# Patient Record
Sex: MALE | Race: WHITE | Employment: OTHER | URBAN - METROPOLITAN AREA
[De-identification: names, ages, dates, MRNs, and addresses within clinical notes are randomized per-mention and may not be internally consistent; named-entity substitution may affect disease eponyms.]

---

## 2018-01-01 ENCOUNTER — HOSPITAL ENCOUNTER (OUTPATIENT)
Dept: INFUSION THERAPY | Age: 73
Discharge: HOME | End: 2018-11-02
Payer: MEDICARE

## 2018-01-01 ENCOUNTER — TELEPHONE (OUTPATIENT)
Dept: ONCOLOGY | Age: 73
End: 2018-01-01

## 2018-01-01 ENCOUNTER — DOCUMENTATION ONLY (OUTPATIENT)
Dept: ONCOLOGY | Age: 73
End: 2018-01-01

## 2018-01-01 ENCOUNTER — OFFICE VISIT (OUTPATIENT)
Dept: ONCOLOGY | Age: 73
End: 2018-01-01

## 2018-01-01 ENCOUNTER — HOSPITAL ENCOUNTER (OUTPATIENT)
Dept: INFUSION THERAPY | Age: 73
Discharge: HOME | End: 2018-12-19
Payer: MEDICARE

## 2018-01-01 ENCOUNTER — HOSPITAL ENCOUNTER (OUTPATIENT)
Dept: INFUSION THERAPY | Age: 73
Discharge: HOME | End: 2018-12-31
Payer: MEDICARE

## 2018-01-01 ENCOUNTER — APPOINTMENT (OUTPATIENT)
Dept: GENERAL RADIOLOGY | Age: 73
DRG: 808 | End: 2018-01-01
Attending: EMERGENCY MEDICINE
Payer: MEDICARE

## 2018-01-01 ENCOUNTER — HOSPITAL ENCOUNTER (OUTPATIENT)
Dept: INFUSION THERAPY | Age: 73
Discharge: HOME | End: 2018-11-09
Payer: MEDICARE

## 2018-01-01 ENCOUNTER — HOSPITAL ENCOUNTER (OUTPATIENT)
Dept: INFUSION THERAPY | Age: 73
Discharge: HOME | End: 2018-11-26
Payer: MEDICARE

## 2018-01-01 ENCOUNTER — HOSPITAL ENCOUNTER (OUTPATIENT)
Dept: INFUSION THERAPY | Age: 73
Discharge: HOME | End: 2018-10-31
Payer: MEDICARE

## 2018-01-01 ENCOUNTER — HOSPITAL ENCOUNTER (OUTPATIENT)
Dept: INFUSION THERAPY | Age: 73
Discharge: HOME | End: 2018-12-14
Payer: MEDICARE

## 2018-01-01 ENCOUNTER — HOSPITAL ENCOUNTER (OUTPATIENT)
Dept: INFUSION THERAPY | Age: 73
Discharge: HOME | End: 2018-10-29
Payer: MEDICARE

## 2018-01-01 ENCOUNTER — HOSPITAL ENCOUNTER (OUTPATIENT)
Dept: INFUSION THERAPY | Age: 73
Discharge: HOME | End: 2018-12-26
Payer: MEDICARE

## 2018-01-01 ENCOUNTER — APPOINTMENT (OUTPATIENT)
Dept: INFUSION THERAPY | Age: 73
End: 2018-01-01
Payer: MEDICARE

## 2018-01-01 ENCOUNTER — HOSPITAL ENCOUNTER (OUTPATIENT)
Dept: INFUSION THERAPY | Age: 73
Discharge: HOME | End: 2018-10-11
Payer: MEDICARE

## 2018-01-01 ENCOUNTER — HOSPITAL ENCOUNTER (OUTPATIENT)
Dept: INFUSION THERAPY | Age: 73
Discharge: HOME | End: 2018-11-12
Payer: MEDICARE

## 2018-01-01 ENCOUNTER — HOSPITAL ENCOUNTER (OUTPATIENT)
Dept: INFUSION THERAPY | Age: 73
Discharge: HOME | End: 2018-12-24
Payer: MEDICARE

## 2018-01-01 ENCOUNTER — HOSPITAL ENCOUNTER (OUTPATIENT)
Dept: INFUSION THERAPY | Age: 73
Discharge: HOME | End: 2018-12-05
Payer: MEDICARE

## 2018-01-01 ENCOUNTER — APPOINTMENT (OUTPATIENT)
Dept: ULTRASOUND IMAGING | Age: 73
DRG: 808 | End: 2018-01-01
Attending: NURSE PRACTITIONER
Payer: MEDICARE

## 2018-01-01 ENCOUNTER — HOSPITAL ENCOUNTER (OUTPATIENT)
Dept: INFUSION THERAPY | Age: 73
Discharge: HOME | End: 2018-11-05
Payer: MEDICARE

## 2018-01-01 ENCOUNTER — HOSPITAL ENCOUNTER (OUTPATIENT)
Dept: INFUSION THERAPY | Age: 73
Discharge: HOME | End: 2018-11-14
Payer: MEDICARE

## 2018-01-01 ENCOUNTER — HOSPITAL ENCOUNTER (OUTPATIENT)
Dept: INFUSION THERAPY | Age: 73
Discharge: HOME | End: 2018-11-21
Payer: MEDICARE

## 2018-01-01 ENCOUNTER — HOSPITAL ENCOUNTER (OUTPATIENT)
Dept: INFUSION THERAPY | Age: 73
Discharge: HOME | End: 2018-11-28
Payer: MEDICARE

## 2018-01-01 ENCOUNTER — HOSPITAL ENCOUNTER (EMERGENCY)
Age: 73
Discharge: HOME | End: 2018-10-04
Attending: EMERGENCY MEDICINE | Admitting: EMERGENCY MEDICINE
Payer: MEDICARE

## 2018-01-01 ENCOUNTER — HOSPITAL ENCOUNTER (OUTPATIENT)
Dept: INFUSION THERAPY | Age: 73
Discharge: HOME | End: 2018-10-04
Payer: MEDICARE

## 2018-01-01 ENCOUNTER — ANESTHESIA (OUTPATIENT)
Dept: ENDOSCOPY | Age: 73
DRG: 808 | End: 2018-01-01
Payer: MEDICARE

## 2018-01-01 ENCOUNTER — HOSPITAL ENCOUNTER (OUTPATIENT)
Dept: INFUSION THERAPY | Age: 73
Discharge: HOME | End: 2018-10-15
Payer: MEDICARE

## 2018-01-01 ENCOUNTER — APPOINTMENT (OUTPATIENT)
Dept: CT IMAGING | Age: 73
DRG: 808 | End: 2018-01-01
Attending: NURSE PRACTITIONER
Payer: MEDICARE

## 2018-01-01 ENCOUNTER — HOSPITAL ENCOUNTER (OUTPATIENT)
Dept: INFUSION THERAPY | Age: 73
Discharge: HOME | End: 2018-12-12
Payer: MEDICARE

## 2018-01-01 ENCOUNTER — HOSPITAL ENCOUNTER (OUTPATIENT)
Dept: INFUSION THERAPY | Age: 73
Discharge: HOME | End: 2018-10-23
Payer: MEDICARE

## 2018-01-01 ENCOUNTER — HOSPITAL ENCOUNTER (OUTPATIENT)
Dept: INFUSION THERAPY | Age: 73
Discharge: HOME | End: 2018-10-22
Payer: MEDICARE

## 2018-01-01 ENCOUNTER — ANESTHESIA EVENT (OUTPATIENT)
Dept: ENDOSCOPY | Age: 73
DRG: 808 | End: 2018-01-01
Payer: MEDICARE

## 2018-01-01 ENCOUNTER — HOSPITAL ENCOUNTER (OUTPATIENT)
Dept: GENERAL RADIOLOGY | Age: 73
Discharge: HOME | End: 2018-10-22
Attending: NURSE PRACTITIONER
Payer: MEDICARE

## 2018-01-01 ENCOUNTER — HOSPITAL ENCOUNTER (OUTPATIENT)
Dept: INFUSION THERAPY | Age: 73
Discharge: HOME | End: 2018-12-03
Payer: MEDICARE

## 2018-01-01 ENCOUNTER — HOSPITAL ENCOUNTER (OUTPATIENT)
Dept: INFUSION THERAPY | Age: 73
Discharge: HOME | End: 2018-12-21
Payer: MEDICARE

## 2018-01-01 ENCOUNTER — HOSPITAL ENCOUNTER (OUTPATIENT)
Dept: INFUSION THERAPY | Age: 73
Discharge: HOME | End: 2018-10-26
Payer: MEDICARE

## 2018-01-01 ENCOUNTER — HOSPITAL ENCOUNTER (OUTPATIENT)
Dept: INFUSION THERAPY | Age: 73
Discharge: HOME | End: 2018-12-07
Payer: MEDICARE

## 2018-01-01 ENCOUNTER — HOSPITAL ENCOUNTER (OUTPATIENT)
Dept: INFUSION THERAPY | Age: 73
Discharge: HOME | End: 2018-11-30
Payer: MEDICARE

## 2018-01-01 ENCOUNTER — HOSPITAL ENCOUNTER (OUTPATIENT)
Dept: INFUSION THERAPY | Age: 73
Discharge: HOME | End: 2018-12-17
Payer: MEDICARE

## 2018-01-01 ENCOUNTER — HOSPITAL ENCOUNTER (OUTPATIENT)
Dept: INFUSION THERAPY | Age: 73
Discharge: HOME | End: 2018-12-28
Payer: MEDICARE

## 2018-01-01 ENCOUNTER — APPOINTMENT (OUTPATIENT)
Dept: INFUSION THERAPY | Age: 73
End: 2018-01-01

## 2018-01-01 ENCOUNTER — HOSPITAL ENCOUNTER (OUTPATIENT)
Dept: INFUSION THERAPY | Age: 73
Discharge: HOME | End: 2018-10-18
Payer: MEDICARE

## 2018-01-01 ENCOUNTER — HOSPITAL ENCOUNTER (OUTPATIENT)
Dept: INFUSION THERAPY | Age: 73
Discharge: HOME | End: 2018-11-07
Payer: MEDICARE

## 2018-01-01 ENCOUNTER — HOSPITAL ENCOUNTER (INPATIENT)
Age: 73
LOS: 3 days | Discharge: HOME | DRG: 808 | End: 2018-09-29
Attending: EMERGENCY MEDICINE | Admitting: INTERNAL MEDICINE
Payer: MEDICARE

## 2018-01-01 ENCOUNTER — HOSPITAL ENCOUNTER (OUTPATIENT)
Dept: INFUSION THERAPY | Age: 73
Discharge: HOME | End: 2018-11-19
Payer: MEDICARE

## 2018-01-01 ENCOUNTER — HOSPITAL ENCOUNTER (OUTPATIENT)
Dept: INFUSION THERAPY | Age: 73
Discharge: HOME | End: 2018-11-16
Payer: MEDICARE

## 2018-01-01 VITALS
SYSTOLIC BLOOD PRESSURE: 110 MMHG | TEMPERATURE: 98.1 F | OXYGEN SATURATION: 98 % | DIASTOLIC BLOOD PRESSURE: 74 MMHG | RESPIRATION RATE: 16 BRPM | HEART RATE: 74 BPM

## 2018-01-01 VITALS
DIASTOLIC BLOOD PRESSURE: 61 MMHG | RESPIRATION RATE: 16 BRPM | OXYGEN SATURATION: 97 % | TEMPERATURE: 98.5 F | SYSTOLIC BLOOD PRESSURE: 133 MMHG | HEART RATE: 73 BPM

## 2018-01-01 VITALS
TEMPERATURE: 97.5 F | RESPIRATION RATE: 18 BRPM | HEART RATE: 74 BPM | WEIGHT: 193 LBS | BODY MASS INDEX: 27.02 KG/M2 | SYSTOLIC BLOOD PRESSURE: 139 MMHG | DIASTOLIC BLOOD PRESSURE: 69 MMHG | HEIGHT: 71 IN | OXYGEN SATURATION: 97 %

## 2018-01-01 VITALS
RESPIRATION RATE: 18 BRPM | TEMPERATURE: 96.6 F | DIASTOLIC BLOOD PRESSURE: 65 MMHG | OXYGEN SATURATION: 98 % | HEART RATE: 102 BPM | SYSTOLIC BLOOD PRESSURE: 131 MMHG

## 2018-01-01 VITALS
HEART RATE: 83 BPM | RESPIRATION RATE: 16 BRPM | SYSTOLIC BLOOD PRESSURE: 98 MMHG | TEMPERATURE: 98.2 F | DIASTOLIC BLOOD PRESSURE: 60 MMHG

## 2018-01-01 VITALS
HEART RATE: 77 BPM | RESPIRATION RATE: 18 BRPM | DIASTOLIC BLOOD PRESSURE: 64 MMHG | SYSTOLIC BLOOD PRESSURE: 106 MMHG | TEMPERATURE: 98.6 F

## 2018-01-01 VITALS
SYSTOLIC BLOOD PRESSURE: 104 MMHG | OXYGEN SATURATION: 97 % | RESPIRATION RATE: 18 BRPM | HEART RATE: 87 BPM | WEIGHT: 193 LBS | TEMPERATURE: 98.3 F | DIASTOLIC BLOOD PRESSURE: 81 MMHG | HEIGHT: 71 IN | BODY MASS INDEX: 27.02 KG/M2

## 2018-01-01 VITALS
SYSTOLIC BLOOD PRESSURE: 122 MMHG | RESPIRATION RATE: 18 BRPM | HEART RATE: 68 BPM | DIASTOLIC BLOOD PRESSURE: 69 MMHG | TEMPERATURE: 97.4 F

## 2018-01-01 VITALS
SYSTOLIC BLOOD PRESSURE: 121 MMHG | OXYGEN SATURATION: 98 % | DIASTOLIC BLOOD PRESSURE: 56 MMHG | RESPIRATION RATE: 18 BRPM | HEART RATE: 108 BPM | TEMPERATURE: 97.4 F

## 2018-01-01 VITALS
OXYGEN SATURATION: 97 % | HEART RATE: 72 BPM | DIASTOLIC BLOOD PRESSURE: 72 MMHG | SYSTOLIC BLOOD PRESSURE: 115 MMHG | RESPIRATION RATE: 18 BRPM | TEMPERATURE: 98.9 F

## 2018-01-01 VITALS
HEART RATE: 96 BPM | TEMPERATURE: 98.1 F | RESPIRATION RATE: 18 BRPM | SYSTOLIC BLOOD PRESSURE: 126 MMHG | DIASTOLIC BLOOD PRESSURE: 69 MMHG

## 2018-01-01 VITALS
RESPIRATION RATE: 16 BRPM | TEMPERATURE: 97.8 F | HEART RATE: 81 BPM | DIASTOLIC BLOOD PRESSURE: 81 MMHG | SYSTOLIC BLOOD PRESSURE: 116 MMHG

## 2018-01-01 VITALS
TEMPERATURE: 97.4 F | HEART RATE: 109 BPM | OXYGEN SATURATION: 99 % | RESPIRATION RATE: 18 BRPM | HEART RATE: 96 BPM | SYSTOLIC BLOOD PRESSURE: 130 MMHG | DIASTOLIC BLOOD PRESSURE: 76 MMHG | TEMPERATURE: 98 F | DIASTOLIC BLOOD PRESSURE: 53 MMHG | RESPIRATION RATE: 18 BRPM | SYSTOLIC BLOOD PRESSURE: 100 MMHG

## 2018-01-01 VITALS
RESPIRATION RATE: 16 BRPM | DIASTOLIC BLOOD PRESSURE: 73 MMHG | SYSTOLIC BLOOD PRESSURE: 119 MMHG | TEMPERATURE: 98 F | HEART RATE: 103 BPM

## 2018-01-01 VITALS
TEMPERATURE: 98 F | RESPIRATION RATE: 18 BRPM | HEART RATE: 110 BPM | DIASTOLIC BLOOD PRESSURE: 71 MMHG | SYSTOLIC BLOOD PRESSURE: 119 MMHG

## 2018-01-01 VITALS
RESPIRATION RATE: 18 BRPM | TEMPERATURE: 97.8 F | OXYGEN SATURATION: 95 % | DIASTOLIC BLOOD PRESSURE: 61 MMHG | SYSTOLIC BLOOD PRESSURE: 117 MMHG | BODY MASS INDEX: 26.32 KG/M2 | WEIGHT: 188 LBS | HEIGHT: 71 IN | HEART RATE: 94 BPM

## 2018-01-01 VITALS
HEART RATE: 106 BPM | RESPIRATION RATE: 18 BRPM | OXYGEN SATURATION: 99 % | TEMPERATURE: 97.8 F | SYSTOLIC BLOOD PRESSURE: 134 MMHG | DIASTOLIC BLOOD PRESSURE: 78 MMHG

## 2018-01-01 VITALS
TEMPERATURE: 97.7 F | SYSTOLIC BLOOD PRESSURE: 113 MMHG | HEART RATE: 88 BPM | RESPIRATION RATE: 18 BRPM | DIASTOLIC BLOOD PRESSURE: 68 MMHG

## 2018-01-01 VITALS
TEMPERATURE: 98 F | SYSTOLIC BLOOD PRESSURE: 135 MMHG | RESPIRATION RATE: 18 BRPM | DIASTOLIC BLOOD PRESSURE: 81 MMHG | HEART RATE: 80 BPM

## 2018-01-01 VITALS
SYSTOLIC BLOOD PRESSURE: 116 MMHG | TEMPERATURE: 96.4 F | HEART RATE: 95 BPM | OXYGEN SATURATION: 94 % | DIASTOLIC BLOOD PRESSURE: 55 MMHG | RESPIRATION RATE: 18 BRPM

## 2018-01-01 VITALS
DIASTOLIC BLOOD PRESSURE: 65 MMHG | HEART RATE: 70 BPM | RESPIRATION RATE: 16 BRPM | TEMPERATURE: 97.2 F | SYSTOLIC BLOOD PRESSURE: 102 MMHG

## 2018-01-01 VITALS
RESPIRATION RATE: 18 BRPM | TEMPERATURE: 97.6 F | SYSTOLIC BLOOD PRESSURE: 106 MMHG | HEART RATE: 86 BPM | DIASTOLIC BLOOD PRESSURE: 63 MMHG

## 2018-01-01 VITALS
TEMPERATURE: 97.7 F | BODY MASS INDEX: 23.8 KG/M2 | WEIGHT: 170 LBS | DIASTOLIC BLOOD PRESSURE: 68 MMHG | OXYGEN SATURATION: 96 % | SYSTOLIC BLOOD PRESSURE: 124 MMHG | RESPIRATION RATE: 16 BRPM | HEIGHT: 71 IN | HEART RATE: 76 BPM

## 2018-01-01 VITALS
RESPIRATION RATE: 18 BRPM | SYSTOLIC BLOOD PRESSURE: 119 MMHG | HEART RATE: 109 BPM | OXYGEN SATURATION: 93 % | DIASTOLIC BLOOD PRESSURE: 56 MMHG | TEMPERATURE: 97.3 F

## 2018-01-01 VITALS
SYSTOLIC BLOOD PRESSURE: 112 MMHG | RESPIRATION RATE: 18 BRPM | DIASTOLIC BLOOD PRESSURE: 58 MMHG | TEMPERATURE: 97.2 F | HEART RATE: 109 BPM

## 2018-01-01 VITALS
HEART RATE: 84 BPM | DIASTOLIC BLOOD PRESSURE: 65 MMHG | RESPIRATION RATE: 18 BRPM | SYSTOLIC BLOOD PRESSURE: 140 MMHG | TEMPERATURE: 98 F

## 2018-01-01 VITALS
TEMPERATURE: 96.3 F | DIASTOLIC BLOOD PRESSURE: 63 MMHG | SYSTOLIC BLOOD PRESSURE: 130 MMHG | HEART RATE: 80 BPM | OXYGEN SATURATION: 96 % | RESPIRATION RATE: 18 BRPM

## 2018-01-01 VITALS
TEMPERATURE: 97.4 F | SYSTOLIC BLOOD PRESSURE: 133 MMHG | RESPIRATION RATE: 16 BRPM | DIASTOLIC BLOOD PRESSURE: 81 MMHG | HEART RATE: 81 BPM

## 2018-01-01 VITALS
DIASTOLIC BLOOD PRESSURE: 60 MMHG | SYSTOLIC BLOOD PRESSURE: 120 MMHG | HEART RATE: 70 BPM | TEMPERATURE: 98.1 F | RESPIRATION RATE: 18 BRPM

## 2018-01-01 VITALS
RESPIRATION RATE: 18 BRPM | HEART RATE: 83 BPM | RESPIRATION RATE: 18 BRPM | HEART RATE: 89 BPM | SYSTOLIC BLOOD PRESSURE: 105 MMHG | DIASTOLIC BLOOD PRESSURE: 66 MMHG | SYSTOLIC BLOOD PRESSURE: 100 MMHG | TEMPERATURE: 98.3 F | DIASTOLIC BLOOD PRESSURE: 62 MMHG | TEMPERATURE: 97.4 F

## 2018-01-01 VITALS
RESPIRATION RATE: 16 BRPM | HEART RATE: 67 BPM | TEMPERATURE: 97.8 F | SYSTOLIC BLOOD PRESSURE: 132 MMHG | DIASTOLIC BLOOD PRESSURE: 66 MMHG

## 2018-01-01 VITALS
DIASTOLIC BLOOD PRESSURE: 70 MMHG | TEMPERATURE: 97.7 F | RESPIRATION RATE: 16 BRPM | SYSTOLIC BLOOD PRESSURE: 134 MMHG | HEART RATE: 76 BPM

## 2018-01-01 VITALS
OXYGEN SATURATION: 98 % | TEMPERATURE: 97 F | DIASTOLIC BLOOD PRESSURE: 54 MMHG | RESPIRATION RATE: 18 BRPM | SYSTOLIC BLOOD PRESSURE: 119 MMHG | HEART RATE: 89 BPM

## 2018-01-01 VITALS
SYSTOLIC BLOOD PRESSURE: 126 MMHG | DIASTOLIC BLOOD PRESSURE: 73 MMHG | OXYGEN SATURATION: 98 % | HEART RATE: 102 BPM | TEMPERATURE: 97 F | RESPIRATION RATE: 18 BRPM

## 2018-01-01 DIAGNOSIS — K74.60 CIRRHOSIS OF LIVER WITH ASCITES, UNSPECIFIED HEPATIC CIRRHOSIS TYPE (HCC): ICD-10-CM

## 2018-01-01 DIAGNOSIS — J81.0 ACUTE PULMONARY EDEMA (HCC): ICD-10-CM

## 2018-01-01 DIAGNOSIS — D64.9 ANEMIA, UNSPECIFIED TYPE: Primary | ICD-10-CM

## 2018-01-01 DIAGNOSIS — D61.818 PANCYTOPENIA (HCC): ICD-10-CM

## 2018-01-01 DIAGNOSIS — R18.8 CIRRHOSIS OF LIVER WITH ASCITES, UNSPECIFIED HEPATIC CIRRHOSIS TYPE (HCC): ICD-10-CM

## 2018-01-01 DIAGNOSIS — D61.818 PANCYTOPENIA (HCC): Primary | ICD-10-CM

## 2018-01-01 DIAGNOSIS — I21.4 NSTEMI (NON-ST ELEVATED MYOCARDIAL INFARCTION) (HCC): ICD-10-CM

## 2018-01-01 LAB
A1AT SERPL-MCNC: 152 MG/DL (ref 90–200)
ABO + RH BLD: NORMAL
AFP-TM SERPL-MCNC: 3.3 NG/ML (ref 0–8.3)
ALBUMIN SERPL-MCNC: 2.4 G/DL (ref 3.5–5)
ALBUMIN SERPL-MCNC: 2.4 G/DL (ref 3.5–5)
ALBUMIN SERPL-MCNC: 2.6 G/DL (ref 3.5–5)
ALBUMIN SERPL-MCNC: 2.6 G/DL (ref 3.5–5)
ALBUMIN/GLOB SERPL: 0.7 {RATIO} (ref 1.1–2.2)
ALP SERPL-CCNC: 209 U/L (ref 45–117)
ALP SERPL-CCNC: 213 U/L (ref 45–117)
ALP SERPL-CCNC: 223 U/L (ref 45–117)
ALP SERPL-CCNC: 244 U/L (ref 45–117)
ALT SERPL-CCNC: 15 U/L (ref 12–78)
ALT SERPL-CCNC: 30 U/L (ref 12–78)
ALT SERPL-CCNC: 32 U/L (ref 12–78)
ALT SERPL-CCNC: 32 U/L (ref 12–78)
AMORPH CRY URNS QL MICRO: ABNORMAL
ANA SER QL: POSITIVE
ANION GAP SERPL CALC-SCNC: 6 MMOL/L (ref 5–15)
ANION GAP SERPL CALC-SCNC: 7 MMOL/L (ref 5–15)
ANION GAP SERPL CALC-SCNC: 7 MMOL/L (ref 5–15)
ANION GAP SERPL CALC-SCNC: 8 MMOL/L (ref 5–15)
APPEARANCE UR: ABNORMAL
APPEARANCE UR: CLEAR
APTT PPP: 26.5 SEC (ref 22.1–32)
AST SERPL-CCNC: 31 U/L (ref 15–37)
AST SERPL-CCNC: 32 U/L (ref 15–37)
AST SERPL-CCNC: 35 U/L (ref 15–37)
AST SERPL-CCNC: 43 U/L (ref 15–37)
ATRIAL RATE: 88 BPM
BACTERIA URNS QL MICRO: NEGATIVE /HPF
BACTERIA URNS QL MICRO: NEGATIVE /HPF
BASOPHILS # BLD: 0 K/UL (ref 0–0.1)
BASOPHILS NFR BLD: 0 % (ref 0–1)
BASOPHILS NFR BLD: 1 % (ref 0–1)
BILIRUB SERPL-MCNC: 0.7 MG/DL (ref 0.2–1)
BILIRUB SERPL-MCNC: 1 MG/DL (ref 0.2–1)
BILIRUB SERPL-MCNC: 1.1 MG/DL (ref 0.2–1)
BILIRUB SERPL-MCNC: 2 MG/DL (ref 0.2–1)
BILIRUB UR QL CFM: NEGATIVE
BILIRUB UR QL CFM: POSITIVE
BLD PROD TYP BPU: NORMAL
BLOOD GROUP ANTIBODIES SERPL: NORMAL
BNP SERPL-MCNC: 1118 PG/ML (ref 0–450)
BPU ID: NORMAL
BUN SERPL-MCNC: 11 MG/DL (ref 6–20)
BUN SERPL-MCNC: 12 MG/DL (ref 6–20)
BUN/CREAT SERPL: 14 (ref 12–20)
BUN/CREAT SERPL: 17 (ref 12–20)
BUN/CREAT SERPL: 18 (ref 12–20)
BUN/CREAT SERPL: 19 (ref 12–20)
CALCIUM SERPL-MCNC: 8 MG/DL (ref 8.5–10.1)
CALCIUM SERPL-MCNC: 8.2 MG/DL (ref 8.5–10.1)
CALCIUM SERPL-MCNC: 8.2 MG/DL (ref 8.5–10.1)
CALCIUM SERPL-MCNC: 8.3 MG/DL (ref 8.5–10.1)
CALCULATED P AXIS, ECG09: 91 DEGREES
CALCULATED R AXIS, ECG10: 65 DEGREES
CALCULATED T AXIS, ECG11: 90 DEGREES
CAOX CRY URNS QL MICRO: ABNORMAL
CAOX CRY URNS QL MICRO: ABNORMAL
CERULOPLASMIN SERPL-MCNC: 37.1 MG/DL (ref 16–31)
CHLORIDE SERPL-SCNC: 101 MMOL/L (ref 97–108)
CHLORIDE SERPL-SCNC: 103 MMOL/L (ref 97–108)
CHLORIDE SERPL-SCNC: 104 MMOL/L (ref 97–108)
CHLORIDE SERPL-SCNC: 99 MMOL/L (ref 97–108)
CO2 SERPL-SCNC: 25 MMOL/L (ref 21–32)
CO2 SERPL-SCNC: 26 MMOL/L (ref 21–32)
CO2 SERPL-SCNC: 27 MMOL/L (ref 21–32)
CO2 SERPL-SCNC: 28 MMOL/L (ref 21–32)
COLOR UR: ABNORMAL
COLOR UR: ABNORMAL
COMMENT, HOLDF: NORMAL
COMMENT, HOLDF: NORMAL
CREAT SERPL-MCNC: 0.62 MG/DL (ref 0.7–1.3)
CREAT SERPL-MCNC: 0.68 MG/DL (ref 0.7–1.3)
CREAT SERPL-MCNC: 0.71 MG/DL (ref 0.7–1.3)
CREAT SERPL-MCNC: 0.79 MG/DL (ref 0.7–1.3)
CROSSMATCH RESULT,%XM: NORMAL
DIAGNOSIS, 93000: NORMAL
DIFFERENTIAL METHOD BLD: ABNORMAL
EOSINOPHIL # BLD: 0 K/UL (ref 0–0.4)
EOSINOPHIL # BLD: 0.1 K/UL (ref 0–0.4)
EOSINOPHIL NFR BLD: 0 % (ref 0–7)
EOSINOPHIL NFR BLD: 1 % (ref 0–7)
EOSINOPHIL NFR BLD: 2 % (ref 0–7)
EOSINOPHIL NFR BLD: 4 % (ref 0–7)
EPITH CASTS URNS QL MICRO: ABNORMAL /LPF
EPITH CASTS URNS QL MICRO: ABNORMAL /LPF
ERYTHROCYTE [DISTWIDTH] IN BLOOD BY AUTOMATED COUNT: 12.9 % (ref 11.5–14.5)
ERYTHROCYTE [DISTWIDTH] IN BLOOD BY AUTOMATED COUNT: 13.9 % (ref 11.5–14.5)
ERYTHROCYTE [DISTWIDTH] IN BLOOD BY AUTOMATED COUNT: 13.9 % (ref 11.5–14.5)
ERYTHROCYTE [DISTWIDTH] IN BLOOD BY AUTOMATED COUNT: 14.1 % (ref 11.5–14.5)
ERYTHROCYTE [DISTWIDTH] IN BLOOD BY AUTOMATED COUNT: 14.2 % (ref 11.5–14.5)
ERYTHROCYTE [DISTWIDTH] IN BLOOD BY AUTOMATED COUNT: 14.4 % (ref 11.5–14.5)
ERYTHROCYTE [DISTWIDTH] IN BLOOD BY AUTOMATED COUNT: 14.5 % (ref 11.5–14.5)
ERYTHROCYTE [DISTWIDTH] IN BLOOD BY AUTOMATED COUNT: 14.6 % (ref 11.5–14.5)
ERYTHROCYTE [DISTWIDTH] IN BLOOD BY AUTOMATED COUNT: 14.6 % (ref 11.5–14.5)
ERYTHROCYTE [DISTWIDTH] IN BLOOD BY AUTOMATED COUNT: 14.7 % (ref 11.5–14.5)
ERYTHROCYTE [DISTWIDTH] IN BLOOD BY AUTOMATED COUNT: 14.8 % (ref 11.5–14.5)
ERYTHROCYTE [DISTWIDTH] IN BLOOD BY AUTOMATED COUNT: 15 % (ref 11.5–14.5)
ERYTHROCYTE [DISTWIDTH] IN BLOOD BY AUTOMATED COUNT: 15.2 % (ref 11.5–14.5)
ERYTHROCYTE [DISTWIDTH] IN BLOOD BY AUTOMATED COUNT: 15.2 % (ref 11.5–14.5)
ERYTHROCYTE [DISTWIDTH] IN BLOOD BY AUTOMATED COUNT: 15.4 % (ref 11.5–14.5)
ERYTHROCYTE [DISTWIDTH] IN BLOOD BY AUTOMATED COUNT: 15.5 % (ref 11.5–14.5)
ERYTHROCYTE [DISTWIDTH] IN BLOOD BY AUTOMATED COUNT: 15.5 % (ref 11.5–14.5)
ERYTHROCYTE [DISTWIDTH] IN BLOOD BY AUTOMATED COUNT: 15.6 % (ref 11.5–14.5)
ERYTHROCYTE [DISTWIDTH] IN BLOOD BY AUTOMATED COUNT: 15.7 % (ref 11.5–14.5)
ERYTHROCYTE [DISTWIDTH] IN BLOOD BY AUTOMATED COUNT: 15.7 % (ref 11.5–14.5)
ERYTHROCYTE [DISTWIDTH] IN BLOOD BY AUTOMATED COUNT: 16.2 % (ref 11.5–14.5)
ERYTHROCYTE [DISTWIDTH] IN BLOOD BY AUTOMATED COUNT: 16.2 % (ref 11.5–14.5)
ERYTHROCYTE [DISTWIDTH] IN BLOOD BY AUTOMATED COUNT: 16.4 % (ref 11.5–14.5)
ERYTHROCYTE [DISTWIDTH] IN BLOOD BY AUTOMATED COUNT: 16.4 % (ref 11.5–14.5)
ERYTHROCYTE [DISTWIDTH] IN BLOOD BY AUTOMATED COUNT: 16.5 % (ref 11.5–14.5)
ERYTHROCYTE [DISTWIDTH] IN BLOOD BY AUTOMATED COUNT: 16.6 % (ref 11.5–14.5)
ERYTHROCYTE [DISTWIDTH] IN BLOOD BY AUTOMATED COUNT: 18.6 % (ref 11.5–14.5)
ERYTHROCYTE [DISTWIDTH] IN BLOOD BY AUTOMATED COUNT: 19.9 % (ref 11.5–14.5)
ERYTHROCYTE [DISTWIDTH] IN BLOOD BY AUTOMATED COUNT: 20.2 % (ref 11.5–14.5)
FERRITIN SERPL-MCNC: 512 NG/ML (ref 26–388)
FERRITIN SERPL-MCNC: 537 NG/ML (ref 26–388)
FOLATE SERPL-MCNC: 19.1 NG/ML (ref 5–21)
GLOBULIN SER CALC-MCNC: 3.4 G/DL (ref 2–4)
GLOBULIN SER CALC-MCNC: 3.4 G/DL (ref 2–4)
GLOBULIN SER CALC-MCNC: 3.7 G/DL (ref 2–4)
GLOBULIN SER CALC-MCNC: 3.8 G/DL (ref 2–4)
GLUCOSE SERPL-MCNC: 104 MG/DL (ref 65–100)
GLUCOSE SERPL-MCNC: 108 MG/DL (ref 65–100)
GLUCOSE SERPL-MCNC: 124 MG/DL (ref 65–100)
GLUCOSE SERPL-MCNC: 99 MG/DL (ref 65–100)
GLUCOSE UR STRIP.AUTO-MCNC: NEGATIVE MG/DL
GLUCOSE UR STRIP.AUTO-MCNC: NEGATIVE MG/DL
HAPTOGLOB SERPL-MCNC: 105 MG/DL (ref 30–200)
HAV AB SER QL IA: NEGATIVE
HBV SURFACE AB SER QL: REACTIVE
HBV SURFACE AB SER-ACNC: 29.1 MIU/ML
HCT VFR BLD AUTO: 14.6 % (ref 36.6–50.3)
HCT VFR BLD AUTO: 15.9 % (ref 36.6–50.3)
HCT VFR BLD AUTO: 16.3 % (ref 36.6–50.3)
HCT VFR BLD AUTO: 17.5 % (ref 36.6–50.3)
HCT VFR BLD AUTO: 19.7 % (ref 36.6–50.3)
HCT VFR BLD AUTO: 20 % (ref 36.6–50.3)
HCT VFR BLD AUTO: 20.2 % (ref 36.6–50.3)
HCT VFR BLD AUTO: 20.3 % (ref 36.6–50.3)
HCT VFR BLD AUTO: 20.6 % (ref 36.6–50.3)
HCT VFR BLD AUTO: 20.8 % (ref 36.6–50.3)
HCT VFR BLD AUTO: 21.1 % (ref 36.6–50.3)
HCT VFR BLD AUTO: 21.2 % (ref 36.6–50.3)
HCT VFR BLD AUTO: 21.2 % (ref 36.6–50.3)
HCT VFR BLD AUTO: 21.3 % (ref 36.6–50.3)
HCT VFR BLD AUTO: 21.3 % (ref 36.6–50.3)
HCT VFR BLD AUTO: 22.1 % (ref 36.6–50.3)
HCT VFR BLD AUTO: 22.2 % (ref 36.6–50.3)
HCT VFR BLD AUTO: 22.5 % (ref 36.6–50.3)
HCT VFR BLD AUTO: 22.6 % (ref 36.6–50.3)
HCT VFR BLD AUTO: 22.9 % (ref 36.6–50.3)
HCT VFR BLD AUTO: 23.1 % (ref 36.6–50.3)
HCT VFR BLD AUTO: 23.4 % (ref 36.6–50.3)
HCT VFR BLD AUTO: 23.4 % (ref 36.6–50.3)
HCT VFR BLD AUTO: 23.6 % (ref 36.6–50.3)
HCT VFR BLD AUTO: 23.9 % (ref 36.6–50.3)
HCT VFR BLD AUTO: 24 % (ref 36.6–50.3)
HCT VFR BLD AUTO: 24.1 % (ref 36.6–50.3)
HCT VFR BLD AUTO: 24.4 % (ref 36.6–50.3)
HCT VFR BLD AUTO: 25 % (ref 36.6–50.3)
HCT VFR BLD AUTO: 25 % (ref 36.6–50.3)
HCT VFR BLD AUTO: 26 % (ref 36.6–50.3)
HEMOCCULT STL QL: NEGATIVE
HEMOCCULT STL QL: NEGATIVE
HFE GENE MUT ANL BLD/T: NORMAL
HGB BLD-MCNC: 5.1 G/DL (ref 12.1–17)
HGB BLD-MCNC: 5.3 G/DL (ref 12.1–17)
HGB BLD-MCNC: 5.3 G/DL (ref 12.1–17)
HGB BLD-MCNC: 5.9 G/DL (ref 12.1–17)
HGB BLD-MCNC: 6.3 G/DL (ref 12.1–17)
HGB BLD-MCNC: 6.5 G/DL (ref 12.1–17)
HGB BLD-MCNC: 6.6 G/DL (ref 12.1–17)
HGB BLD-MCNC: 6.8 G/DL (ref 12.1–17)
HGB BLD-MCNC: 6.8 G/DL (ref 12.1–17)
HGB BLD-MCNC: 6.9 G/DL (ref 12.1–17)
HGB BLD-MCNC: 7 G/DL (ref 12.1–17)
HGB BLD-MCNC: 7.1 G/DL (ref 12.1–17)
HGB BLD-MCNC: 7.3 G/DL (ref 12.1–17)
HGB BLD-MCNC: 7.3 G/DL (ref 12.1–17)
HGB BLD-MCNC: 7.4 G/DL (ref 12.1–17)
HGB BLD-MCNC: 7.5 G/DL (ref 12.1–17)
HGB BLD-MCNC: 7.7 G/DL (ref 12.1–17)
HGB BLD-MCNC: 7.7 G/DL (ref 12.1–17)
HGB BLD-MCNC: 7.8 G/DL (ref 12.1–17)
HGB BLD-MCNC: 7.9 G/DL (ref 12.1–17)
HGB BLD-MCNC: 7.9 G/DL (ref 12.1–17)
HGB BLD-MCNC: 8 G/DL (ref 12.1–17)
HGB BLD-MCNC: 8.1 G/DL (ref 12.1–17)
HGB BLD-MCNC: 8.3 G/DL (ref 12.1–17)
HGB BLD-MCNC: 8.4 G/DL (ref 12.1–17)
HGB UR QL STRIP: NEGATIVE
HGB UR QL STRIP: NEGATIVE
IMM GRANULOCYTES # BLD: 0 K/UL
IMM GRANULOCYTES # BLD: 0 K/UL
IMM GRANULOCYTES # BLD: 0 K/UL (ref 0–0.04)
IMM GRANULOCYTES NFR BLD AUTO: 0 %
IMM GRANULOCYTES NFR BLD AUTO: 0 %
IMM GRANULOCYTES NFR BLD AUTO: 0 % (ref 0–0.5)
INR PPP: 1.3 (ref 0.9–1.1)
IRON SATN MFR SERPL: 93 % (ref 20–50)
IRON SATN MFR SERPL: 96 % (ref 20–50)
IRON SERPL-MCNC: 243 UG/DL (ref 35–150)
IRON SERPL-MCNC: 256 UG/DL (ref 35–150)
KETONES UR QL STRIP.AUTO: ABNORMAL MG/DL
KETONES UR QL STRIP.AUTO: ABNORMAL MG/DL
LDH SERPL L TO P-CCNC: 209 U/L (ref 85–241)
LEUKOCYTE ESTERASE UR QL STRIP.AUTO: ABNORMAL
LEUKOCYTE ESTERASE UR QL STRIP.AUTO: ABNORMAL
LYMPHOCYTES # BLD: 0.4 K/UL (ref 0.8–3.5)
LYMPHOCYTES # BLD: 0.6 K/UL (ref 0.8–3.5)
LYMPHOCYTES # BLD: 0.7 K/UL (ref 0.8–3.5)
LYMPHOCYTES # BLD: 0.8 K/UL (ref 0.8–3.5)
LYMPHOCYTES # BLD: 0.9 K/UL (ref 0.8–3.5)
LYMPHOCYTES # BLD: 1 K/UL (ref 0.8–3.5)
LYMPHOCYTES # BLD: 1.1 K/UL (ref 0.8–3.5)
LYMPHOCYTES # BLD: 1.4 K/UL (ref 0.8–3.5)
LYMPHOCYTES NFR BLD: 17 % (ref 12–49)
LYMPHOCYTES NFR BLD: 24 % (ref 12–49)
LYMPHOCYTES NFR BLD: 24 % (ref 12–49)
LYMPHOCYTES NFR BLD: 25 % (ref 12–49)
LYMPHOCYTES NFR BLD: 27 % (ref 12–49)
LYMPHOCYTES NFR BLD: 28 % (ref 12–49)
LYMPHOCYTES NFR BLD: 28 % (ref 12–49)
LYMPHOCYTES NFR BLD: 29 % (ref 12–49)
LYMPHOCYTES NFR BLD: 29 % (ref 12–49)
LYMPHOCYTES NFR BLD: 30 % (ref 12–49)
LYMPHOCYTES NFR BLD: 31 % (ref 12–49)
LYMPHOCYTES NFR BLD: 32 % (ref 12–49)
LYMPHOCYTES NFR BLD: 32 % (ref 12–49)
LYMPHOCYTES NFR BLD: 33 % (ref 12–49)
LYMPHOCYTES NFR BLD: 37 % (ref 12–49)
LYMPHOCYTES NFR BLD: 38 % (ref 12–49)
LYMPHOCYTES NFR BLD: 39 % (ref 12–49)
LYMPHOCYTES NFR BLD: 40 % (ref 12–49)
LYMPHOCYTES NFR BLD: 42 % (ref 12–49)
LYMPHOCYTES NFR BLD: 43 % (ref 12–49)
LYMPHOCYTES NFR BLD: 44 % (ref 12–49)
MAGNESIUM SERPL-MCNC: 1.5 MG/DL (ref 1.6–2.4)
MCH RBC QN AUTO: 28 PG (ref 26–34)
MCH RBC QN AUTO: 28.5 PG (ref 26–34)
MCH RBC QN AUTO: 28.7 PG (ref 26–34)
MCH RBC QN AUTO: 28.8 PG (ref 26–34)
MCH RBC QN AUTO: 29 PG (ref 26–34)
MCH RBC QN AUTO: 29.5 PG (ref 26–34)
MCH RBC QN AUTO: 29.8 PG (ref 26–34)
MCH RBC QN AUTO: 29.8 PG (ref 26–34)
MCH RBC QN AUTO: 29.9 PG (ref 26–34)
MCH RBC QN AUTO: 30 PG (ref 26–34)
MCH RBC QN AUTO: 30.1 PG (ref 26–34)
MCH RBC QN AUTO: 30.2 PG (ref 26–34)
MCH RBC QN AUTO: 30.3 PG (ref 26–34)
MCH RBC QN AUTO: 30.3 PG (ref 26–34)
MCH RBC QN AUTO: 31.1 PG (ref 26–34)
MCH RBC QN AUTO: 31.4 PG (ref 26–34)
MCH RBC QN AUTO: 31.8 PG (ref 26–34)
MCH RBC QN AUTO: 31.9 PG (ref 26–34)
MCH RBC QN AUTO: 32.5 PG (ref 26–34)
MCH RBC QN AUTO: 33.3 PG (ref 26–34)
MCH RBC QN AUTO: 33.3 PG (ref 26–34)
MCH RBC QN AUTO: 33.5 PG (ref 26–34)
MCH RBC QN AUTO: 33.7 PG (ref 26–34)
MCH RBC QN AUTO: 34 PG (ref 26–34)
MCH RBC QN AUTO: 34.1 PG (ref 26–34)
MCH RBC QN AUTO: 38.6 PG (ref 26–34)
MCHC RBC AUTO-ENTMCNC: 31.8 G/DL (ref 30–36.5)
MCHC RBC AUTO-ENTMCNC: 32.1 G/DL (ref 30–36.5)
MCHC RBC AUTO-ENTMCNC: 32.3 G/DL (ref 30–36.5)
MCHC RBC AUTO-ENTMCNC: 32.3 G/DL (ref 30–36.5)
MCHC RBC AUTO-ENTMCNC: 32.4 G/DL (ref 30–36.5)
MCHC RBC AUTO-ENTMCNC: 32.4 G/DL (ref 30–36.5)
MCHC RBC AUTO-ENTMCNC: 32.5 G/DL (ref 30–36.5)
MCHC RBC AUTO-ENTMCNC: 32.7 G/DL (ref 30–36.5)
MCHC RBC AUTO-ENTMCNC: 32.8 G/DL (ref 30–36.5)
MCHC RBC AUTO-ENTMCNC: 32.8 G/DL (ref 30–36.5)
MCHC RBC AUTO-ENTMCNC: 32.9 G/DL (ref 30–36.5)
MCHC RBC AUTO-ENTMCNC: 33.1 G/DL (ref 30–36.5)
MCHC RBC AUTO-ENTMCNC: 33.2 G/DL (ref 30–36.5)
MCHC RBC AUTO-ENTMCNC: 33.3 G/DL (ref 30–36.5)
MCHC RBC AUTO-ENTMCNC: 33.7 G/DL (ref 30–36.5)
MCHC RBC AUTO-ENTMCNC: 34 G/DL (ref 30–36.5)
MCHC RBC AUTO-ENTMCNC: 34.5 G/DL (ref 30–36.5)
MCHC RBC AUTO-ENTMCNC: 34.5 G/DL (ref 30–36.5)
MCHC RBC AUTO-ENTMCNC: 34.9 G/DL (ref 30–36.5)
MCHC RBC AUTO-ENTMCNC: 35.4 G/DL (ref 30–36.5)
MCV RBC AUTO: 100 FL (ref 80–99)
MCV RBC AUTO: 110.6 FL (ref 80–99)
MCV RBC AUTO: 86.6 FL (ref 80–99)
MCV RBC AUTO: 86.7 FL (ref 80–99)
MCV RBC AUTO: 86.7 FL (ref 80–99)
MCV RBC AUTO: 87.3 FL (ref 80–99)
MCV RBC AUTO: 87.6 FL (ref 80–99)
MCV RBC AUTO: 91.4 FL (ref 80–99)
MCV RBC AUTO: 91.4 FL (ref 80–99)
MCV RBC AUTO: 91.6 FL (ref 80–99)
MCV RBC AUTO: 91.7 FL (ref 80–99)
MCV RBC AUTO: 92.3 FL (ref 80–99)
MCV RBC AUTO: 92.4 FL (ref 80–99)
MCV RBC AUTO: 92.6 FL (ref 80–99)
MCV RBC AUTO: 92.7 FL (ref 80–99)
MCV RBC AUTO: 92.9 FL (ref 80–99)
MCV RBC AUTO: 93 FL (ref 80–99)
MCV RBC AUTO: 93.4 FL (ref 80–99)
MCV RBC AUTO: 93.9 FL (ref 80–99)
MCV RBC AUTO: 95.7 FL (ref 80–99)
MCV RBC AUTO: 95.8 FL (ref 80–99)
MCV RBC AUTO: 96.4 FL (ref 80–99)
MCV RBC AUTO: 97.6 FL (ref 80–99)
MCV RBC AUTO: 97.9 FL (ref 80–99)
MCV RBC AUTO: 98.2 FL (ref 80–99)
MCV RBC AUTO: 98.4 FL (ref 80–99)
MCV RBC AUTO: 98.5 FL (ref 80–99)
MCV RBC AUTO: 98.6 FL (ref 80–99)
MCV RBC AUTO: 98.9 FL (ref 80–99)
MITOCHONDRIA M2 IGG SER-ACNC: 20.6 UNITS (ref 0–20)
MONOCYTES # BLD: 0.2 K/UL (ref 0–1)
MONOCYTES # BLD: 0.3 K/UL (ref 0–1)
MONOCYTES # BLD: 0.4 K/UL (ref 0–1)
MONOCYTES # BLD: 0.5 K/UL (ref 0–1)
MONOCYTES NFR BLD: 10 % (ref 5–13)
MONOCYTES NFR BLD: 13 % (ref 5–13)
MONOCYTES NFR BLD: 14 % (ref 5–13)
MONOCYTES NFR BLD: 14 % (ref 5–13)
MONOCYTES NFR BLD: 15 % (ref 5–13)
MONOCYTES NFR BLD: 16 % (ref 5–13)
MONOCYTES NFR BLD: 17 % (ref 5–13)
MONOCYTES NFR BLD: 18 % (ref 5–13)
MONOCYTES NFR BLD: 8 % (ref 5–13)
MUCOUS THREADS URNS QL MICRO: ABNORMAL /LPF
NEUTS SEG # BLD: 0.9 K/UL (ref 1.8–8)
NEUTS SEG # BLD: 1 K/UL (ref 1.8–8)
NEUTS SEG # BLD: 1.1 K/UL (ref 1.8–8)
NEUTS SEG # BLD: 1.2 K/UL (ref 1.8–8)
NEUTS SEG # BLD: 1.3 K/UL (ref 1.8–8)
NEUTS SEG # BLD: 1.4 K/UL (ref 1.8–8)
NEUTS SEG # BLD: 1.5 K/UL (ref 1.8–8)
NEUTS SEG # BLD: 1.6 K/UL (ref 1.8–8)
NEUTS SEG # BLD: 1.7 K/UL (ref 1.8–8)
NEUTS SEG # BLD: 1.8 K/UL (ref 1.8–8)
NEUTS SEG # BLD: 1.8 K/UL (ref 1.8–8)
NEUTS SEG NFR BLD: 40 % (ref 32–75)
NEUTS SEG NFR BLD: 40 % (ref 32–75)
NEUTS SEG NFR BLD: 41 % (ref 32–75)
NEUTS SEG NFR BLD: 46 % (ref 32–75)
NEUTS SEG NFR BLD: 48 % (ref 32–75)
NEUTS SEG NFR BLD: 48 % (ref 32–75)
NEUTS SEG NFR BLD: 49 % (ref 32–75)
NEUTS SEG NFR BLD: 49 % (ref 32–75)
NEUTS SEG NFR BLD: 50 % (ref 32–75)
NEUTS SEG NFR BLD: 51 % (ref 32–75)
NEUTS SEG NFR BLD: 52 % (ref 32–75)
NEUTS SEG NFR BLD: 53 % (ref 32–75)
NEUTS SEG NFR BLD: 53 % (ref 32–75)
NEUTS SEG NFR BLD: 54 % (ref 32–75)
NEUTS SEG NFR BLD: 55 % (ref 32–75)
NEUTS SEG NFR BLD: 56 % (ref 32–75)
NEUTS SEG NFR BLD: 57 % (ref 32–75)
NEUTS SEG NFR BLD: 58 % (ref 32–75)
NEUTS SEG NFR BLD: 58 % (ref 32–75)
NEUTS SEG NFR BLD: 68 % (ref 32–75)
NITRITE UR QL STRIP.AUTO: NEGATIVE
NITRITE UR QL STRIP.AUTO: NEGATIVE
NRBC # BLD: 0 K/UL (ref 0–0.01)
NRBC BLD-RTO: 0 PER 100 WBC
OSMOLALITY SERPL: 281 MOSM/KG H2O
OSMOLALITY UR: 623 MOSM/KG H2O
P-R INTERVAL, ECG05: 140 MS
PATH REV BLD -IMP: ABNORMAL
PERIPHERAL SMEAR,PSM: NORMAL
PH UR STRIP: 5.5 [PH] (ref 5–8)
PH UR STRIP: 6 [PH] (ref 5–8)
PLATELET # BLD AUTO: 60 K/UL (ref 150–400)
PLATELET # BLD AUTO: 61 K/UL (ref 150–400)
PLATELET # BLD AUTO: 63 K/UL (ref 150–400)
PLATELET # BLD AUTO: 66 K/UL (ref 150–400)
PLATELET # BLD AUTO: 67 K/UL (ref 150–400)
PLATELET # BLD AUTO: 72 K/UL (ref 150–400)
PLATELET # BLD AUTO: 73 K/UL (ref 150–400)
PLATELET # BLD AUTO: 75 K/UL (ref 150–400)
PLATELET # BLD AUTO: 76 K/UL (ref 150–400)
PLATELET # BLD AUTO: 77 K/UL (ref 150–400)
PLATELET # BLD AUTO: 78 K/UL (ref 150–400)
PLATELET # BLD AUTO: 79 K/UL (ref 150–400)
PLATELET # BLD AUTO: 83 K/UL (ref 150–400)
PLATELET # BLD AUTO: 84 K/UL (ref 150–400)
PLATELET # BLD AUTO: 84 K/UL (ref 150–400)
PLATELET # BLD AUTO: 85 K/UL (ref 150–400)
PLATELET # BLD AUTO: 86 K/UL (ref 150–400)
PLATELET # BLD AUTO: 87 K/UL (ref 150–400)
PLATELET # BLD AUTO: 87 K/UL (ref 150–400)
PLATELET # BLD AUTO: 88 K/UL (ref 150–400)
PLATELET # BLD AUTO: 90 K/UL (ref 150–400)
PLATELET # BLD AUTO: 94 K/UL (ref 150–400)
PLATELET # BLD AUTO: 97 K/UL (ref 150–400)
PLATELET COMMENTS,PCOM: ABNORMAL
PMV BLD AUTO: 12.6 FL (ref 8.9–12.9)
PMV BLD AUTO: 12.6 FL (ref 8.9–12.9)
PMV BLD AUTO: 12.8 FL (ref 8.9–12.9)
PMV BLD AUTO: 12.9 FL (ref 8.9–12.9)
PMV BLD AUTO: 13 FL (ref 8.9–12.9)
POTASSIUM SERPL-SCNC: 4 MMOL/L (ref 3.5–5.1)
POTASSIUM SERPL-SCNC: 4.1 MMOL/L (ref 3.5–5.1)
POTASSIUM SERPL-SCNC: 4.1 MMOL/L (ref 3.5–5.1)
POTASSIUM SERPL-SCNC: 4.3 MMOL/L (ref 3.5–5.1)
PROT SERPL-MCNC: 5.8 G/DL (ref 6.4–8.2)
PROT SERPL-MCNC: 5.8 G/DL (ref 6.4–8.2)
PROT SERPL-MCNC: 6.3 G/DL (ref 6.4–8.2)
PROT SERPL-MCNC: 6.4 G/DL (ref 6.4–8.2)
PROT UR STRIP-MCNC: ABNORMAL MG/DL
PROT UR STRIP-MCNC: ABNORMAL MG/DL
PROTHROMBIN TIME: 13.2 SEC (ref 9–11.1)
Q-T INTERVAL, ECG07: 378 MS
QRS DURATION, ECG06: 72 MS
QTC CALCULATION (BEZET), ECG08: 457 MS
RBC # BLD AUTO: 1.32 M/UL (ref 4.1–5.7)
RBC # BLD AUTO: 1.59 M/UL (ref 4.1–5.7)
RBC # BLD AUTO: 1.66 M/UL (ref 4.1–5.7)
RBC # BLD AUTO: 1.77 M/UL (ref 4.1–5.7)
RBC # BLD AUTO: 2 M/UL (ref 4.1–5.7)
RBC # BLD AUTO: 2.08 M/UL (ref 4.1–5.7)
RBC # BLD AUTO: 2.09 M/UL (ref 4.1–5.7)
RBC # BLD AUTO: 2.15 M/UL (ref 4.1–5.7)
RBC # BLD AUTO: 2.18 M/UL (ref 4.1–5.7)
RBC # BLD AUTO: 2.2 M/UL (ref 4.1–5.7)
RBC # BLD AUTO: 2.27 M/UL (ref 4.1–5.7)
RBC # BLD AUTO: 2.33 M/UL (ref 4.1–5.7)
RBC # BLD AUTO: 2.4 M/UL (ref 4.1–5.7)
RBC # BLD AUTO: 2.41 M/UL (ref 4.1–5.7)
RBC # BLD AUTO: 2.41 M/UL (ref 4.1–5.7)
RBC # BLD AUTO: 2.43 M/UL (ref 4.1–5.7)
RBC # BLD AUTO: 2.43 M/UL (ref 4.1–5.7)
RBC # BLD AUTO: 2.44 M/UL (ref 4.1–5.7)
RBC # BLD AUTO: 2.46 M/UL (ref 4.1–5.7)
RBC # BLD AUTO: 2.5 M/UL (ref 4.1–5.7)
RBC # BLD AUTO: 2.52 M/UL (ref 4.1–5.7)
RBC # BLD AUTO: 2.55 M/UL (ref 4.1–5.7)
RBC # BLD AUTO: 2.56 M/UL (ref 4.1–5.7)
RBC # BLD AUTO: 2.59 M/UL (ref 4.1–5.7)
RBC # BLD AUTO: 2.61 M/UL (ref 4.1–5.7)
RBC # BLD AUTO: 2.62 M/UL (ref 4.1–5.7)
RBC # BLD AUTO: 2.71 M/UL (ref 4.1–5.7)
RBC # BLD AUTO: 2.75 M/UL (ref 4.1–5.7)
RBC # BLD AUTO: 3 M/UL (ref 4.1–5.7)
RBC #/AREA URNS HPF: ABNORMAL /HPF (ref 0–5)
RBC #/AREA URNS HPF: ABNORMAL /HPF (ref 0–5)
RBC MORPH BLD: ABNORMAL
RETICS # AUTO: 0 M/UL (ref 0.03–0.1)
RETICS # AUTO: 0.01 M/UL (ref 0.03–0.1)
RETICS/RBC NFR AUTO: 0.2 % (ref 0.7–2.1)
RETICS/RBC NFR AUTO: 0.4 % (ref 0.7–2.1)
SAMPLES BEING HELD,HOLD: NORMAL
SAMPLES BEING HELD,HOLD: NORMAL
SODIUM SERPL-SCNC: 131 MMOL/L (ref 136–145)
SODIUM SERPL-SCNC: 136 MMOL/L (ref 136–145)
SODIUM SERPL-SCNC: 137 MMOL/L (ref 136–145)
SODIUM SERPL-SCNC: 137 MMOL/L (ref 136–145)
SODIUM UR-SCNC: 13 MMOL/L
SP GR UR REFRACTOMETRY: 1.02 (ref 1–1.03)
SP GR UR REFRACTOMETRY: 1.02 (ref 1–1.03)
SPECIMEN EXP DATE BLD: NORMAL
STATUS OF UNIT,%ST: NORMAL
T4 FREE SERPL-MCNC: 1.2 NG/DL (ref 0.8–1.5)
T4 FREE SERPL-MCNC: 1.3 NG/DL (ref 0.8–1.5)
THERAPEUTIC RANGE,PTTT: NORMAL SECS (ref 58–77)
TIBC SERPL-MCNC: 252 UG/DL (ref 250–450)
TIBC SERPL-MCNC: 276 UG/DL (ref 250–450)
TROPONIN I SERPL-MCNC: 0.18 NG/ML
TROPONIN I SERPL-MCNC: 0.2 NG/ML
TROPONIN I SERPL-MCNC: 0.21 NG/ML
TROPONIN I SERPL-MCNC: 0.24 NG/ML
TSH SERPL DL<=0.05 MIU/L-ACNC: 4.45 UIU/ML (ref 0.36–3.74)
UNIT DIVISION, %UDIV: 0
UR CULT HOLD, URHOLD: NORMAL
UROBILINOGEN UR QL STRIP.AUTO: 1 EU/DL (ref 0.2–1)
UROBILINOGEN UR QL STRIP.AUTO: 1 EU/DL (ref 0.2–1)
VENTRICULAR RATE, ECG03: 88 BPM
VIT B12 SERPL-MCNC: >2000 PG/ML (ref 193–986)
WBC # BLD AUTO: 2.1 K/UL (ref 4.1–11.1)
WBC # BLD AUTO: 2.2 K/UL (ref 4.1–11.1)
WBC # BLD AUTO: 2.3 K/UL (ref 4.1–11.1)
WBC # BLD AUTO: 2.4 K/UL (ref 4.1–11.1)
WBC # BLD AUTO: 2.5 K/UL (ref 4.1–11.1)
WBC # BLD AUTO: 2.6 K/UL (ref 4.1–11.1)
WBC # BLD AUTO: 2.8 K/UL (ref 4.1–11.1)
WBC # BLD AUTO: 2.8 K/UL (ref 4.1–11.1)
WBC # BLD AUTO: 2.9 K/UL (ref 4.1–11.1)
WBC # BLD AUTO: 3 K/UL (ref 4.1–11.1)
WBC # BLD AUTO: 3.1 K/UL (ref 4.1–11.1)
WBC # BLD AUTO: 3.2 K/UL (ref 4.1–11.1)
WBC # BLD AUTO: 3.2 K/UL (ref 4.1–11.1)
WBC MORPH BLD: ABNORMAL
WBC URNS QL MICRO: ABNORMAL /HPF (ref 0–4)
WBC URNS QL MICRO: ABNORMAL /HPF (ref 0–4)

## 2018-01-01 PROCEDURE — 74011250636 HC RX REV CODE- 250/636: Performed by: NURSE PRACTITIONER

## 2018-01-01 PROCEDURE — 82746 ASSAY OF FOLIC ACID SERUM: CPT | Performed by: INTERNAL MEDICINE

## 2018-01-01 PROCEDURE — 85025 COMPLETE CBC W/AUTO DIFF WBC: CPT

## 2018-01-01 PROCEDURE — 36430 TRANSFUSION BLD/BLD COMPNT: CPT

## 2018-01-01 PROCEDURE — 83540 ASSAY OF IRON: CPT | Performed by: INTERNAL MEDICINE

## 2018-01-01 PROCEDURE — 74011250636 HC RX REV CODE- 250/636: Performed by: PHYSICIAN ASSISTANT

## 2018-01-01 PROCEDURE — 36592 COLLECT BLOOD FROM PICC: CPT

## 2018-01-01 PROCEDURE — 77030013169 SET IV BLD ICUM -A

## 2018-01-01 PROCEDURE — 74011250637 HC RX REV CODE- 250/637: Performed by: INTERNAL MEDICINE

## 2018-01-01 PROCEDURE — 74011250636 HC RX REV CODE- 250/636: Performed by: INTERNAL MEDICINE

## 2018-01-01 PROCEDURE — 85025 COMPLETE CBC W/AUTO DIFF WBC: CPT | Performed by: EMERGENCY MEDICINE

## 2018-01-01 PROCEDURE — 71045 X-RAY EXAM CHEST 1 VIEW: CPT

## 2018-01-01 PROCEDURE — 96523 IRRIG DRUG DELIVERY DEVICE: CPT

## 2018-01-01 PROCEDURE — 82272 OCCULT BLD FECES 1-3 TESTS: CPT | Performed by: EMERGENCY MEDICINE

## 2018-01-01 PROCEDURE — 86923 COMPATIBILITY TEST ELECTRIC: CPT

## 2018-01-01 PROCEDURE — 85025 COMPLETE CBC W/AUTO DIFF WBC: CPT | Performed by: INTERNAL MEDICINE

## 2018-01-01 PROCEDURE — 0DJ08ZZ INSPECTION OF UPPER INTESTINAL TRACT, VIA NATURAL OR ARTIFICIAL OPENING ENDOSCOPIC: ICD-10-PCS | Performed by: SPECIALIST

## 2018-01-01 PROCEDURE — 36415 COLL VENOUS BLD VENIPUNCTURE: CPT | Performed by: NURSE PRACTITIONER

## 2018-01-01 PROCEDURE — 36415 COLL VENOUS BLD VENIPUNCTURE: CPT | Performed by: INTERNAL MEDICINE

## 2018-01-01 PROCEDURE — 36415 COLL VENOUS BLD VENIPUNCTURE: CPT

## 2018-01-01 PROCEDURE — 84300 ASSAY OF URINE SODIUM: CPT | Performed by: INTERNAL MEDICINE

## 2018-01-01 PROCEDURE — 86900 BLOOD TYPING SEROLOGIC ABO: CPT | Performed by: PHYSICIAN ASSISTANT

## 2018-01-01 PROCEDURE — 85018 HEMOGLOBIN: CPT | Performed by: INTERNAL MEDICINE

## 2018-01-01 PROCEDURE — P9016 RBC LEUKOCYTES REDUCED: HCPCS

## 2018-01-01 PROCEDURE — 99284 EMERGENCY DEPT VISIT MOD MDM: CPT

## 2018-01-01 PROCEDURE — 97165 OT EVAL LOW COMPLEX 30 MIN: CPT | Performed by: OCCUPATIONAL THERAPIST

## 2018-01-01 PROCEDURE — 83516 IMMUNOASSAY NONANTIBODY: CPT | Performed by: SPECIALIST

## 2018-01-01 PROCEDURE — 88237 TISSUE CULTURE BONE MARROW: CPT | Performed by: INTERNAL MEDICINE

## 2018-01-01 PROCEDURE — 97116 GAIT TRAINING THERAPY: CPT

## 2018-01-01 PROCEDURE — 85027 COMPLETE CBC AUTOMATED: CPT | Performed by: INTERNAL MEDICINE

## 2018-01-01 PROCEDURE — 84484 ASSAY OF TROPONIN QUANT: CPT | Performed by: INTERNAL MEDICINE

## 2018-01-01 PROCEDURE — 77012 CT SCAN FOR NEEDLE BIOPSY: CPT

## 2018-01-01 PROCEDURE — 85025 COMPLETE CBC W/AUTO DIFF WBC: CPT | Performed by: NURSE PRACTITIONER

## 2018-01-01 PROCEDURE — 74011250637 HC RX REV CODE- 250/637: Performed by: EMERGENCY MEDICINE

## 2018-01-01 PROCEDURE — C1751 CATH, INF, PER/CENT/MIDLINE: HCPCS

## 2018-01-01 PROCEDURE — 86900 BLOOD TYPING SEROLOGIC ABO: CPT

## 2018-01-01 PROCEDURE — 80053 COMPREHEN METABOLIC PANEL: CPT | Performed by: INTERNAL MEDICINE

## 2018-01-01 PROCEDURE — 88185 FLOWCYTOMETRY/TC ADD-ON: CPT | Performed by: INTERNAL MEDICINE

## 2018-01-01 PROCEDURE — 82272 OCCULT BLD FECES 1-3 TESTS: CPT | Performed by: PHYSICIAN ASSISTANT

## 2018-01-01 PROCEDURE — 77030028872 HC BN BIOP NDL ON CNTRL TY TELE -C

## 2018-01-01 PROCEDURE — 82607 VITAMIN B-12: CPT | Performed by: INTERNAL MEDICINE

## 2018-01-01 PROCEDURE — 36569 INSJ PICC 5 YR+ W/O IMAGING: CPT | Performed by: NURSE PRACTITIONER

## 2018-01-01 PROCEDURE — 07DR3ZX EXTRACTION OF ILIAC BONE MARROW, PERCUTANEOUS APPROACH, DIAGNOSTIC: ICD-10-PCS | Performed by: RADIOLOGY

## 2018-01-01 PROCEDURE — 86923 COMPATIBILITY TEST ELECTRIC: CPT | Performed by: INTERNAL MEDICINE

## 2018-01-01 PROCEDURE — 86900 BLOOD TYPING SEROLOGIC ABO: CPT | Performed by: INTERNAL MEDICINE

## 2018-01-01 PROCEDURE — 74011000250 HC RX REV CODE- 250: Performed by: INTERNAL MEDICINE

## 2018-01-01 PROCEDURE — 81256 HFE GENE: CPT | Performed by: SPECIALIST

## 2018-01-01 PROCEDURE — 86923 COMPATIBILITY TEST ELECTRIC: CPT | Performed by: NURSE PRACTITIONER

## 2018-01-01 PROCEDURE — 82390 ASSAY OF CERULOPLASMIN: CPT | Performed by: SPECIALIST

## 2018-01-01 PROCEDURE — 84484 ASSAY OF TROPONIN QUANT: CPT | Performed by: EMERGENCY MEDICINE

## 2018-01-01 PROCEDURE — 88305 TISSUE EXAM BY PATHOLOGIST: CPT | Performed by: SPECIALIST

## 2018-01-01 PROCEDURE — 36591 DRAW BLOOD OFF VENOUS DEVICE: CPT

## 2018-01-01 PROCEDURE — 94762 N-INVAS EAR/PLS OXIMTRY CONT: CPT

## 2018-01-01 PROCEDURE — 86900 BLOOD TYPING SEROLOGIC ABO: CPT | Performed by: NURSE PRACTITIONER

## 2018-01-01 PROCEDURE — 36415 COLL VENOUS BLD VENIPUNCTURE: CPT | Performed by: SPECIALIST

## 2018-01-01 PROCEDURE — 76060000031 HC ANESTHESIA FIRST 0.5 HR: Performed by: SPECIALIST

## 2018-01-01 PROCEDURE — 74011250637 HC RX REV CODE- 250/637: Performed by: NURSE PRACTITIONER

## 2018-01-01 PROCEDURE — 80053 COMPREHEN METABOLIC PANEL: CPT | Performed by: EMERGENCY MEDICINE

## 2018-01-01 PROCEDURE — 36415 COLL VENOUS BLD VENIPUNCTURE: CPT | Performed by: EMERGENCY MEDICINE

## 2018-01-01 PROCEDURE — 88264 CHROMOSOME ANALYSIS 20-25: CPT | Performed by: INTERNAL MEDICINE

## 2018-01-01 PROCEDURE — 93306 TTE W/DOPPLER COMPLETE: CPT

## 2018-01-01 PROCEDURE — 82728 ASSAY OF FERRITIN: CPT | Performed by: INTERNAL MEDICINE

## 2018-01-01 PROCEDURE — P9016 RBC LEUKOCYTES REDUCED: HCPCS | Performed by: NURSE PRACTITIONER

## 2018-01-01 PROCEDURE — 86708 HEPATITIS A ANTIBODY: CPT | Performed by: PHYSICIAN ASSISTANT

## 2018-01-01 PROCEDURE — 76040000019: Performed by: SPECIALIST

## 2018-01-01 PROCEDURE — 83615 LACTATE (LD) (LDH) ENZYME: CPT | Performed by: INTERNAL MEDICINE

## 2018-01-01 PROCEDURE — 84439 ASSAY OF FREE THYROXINE: CPT | Performed by: INTERNAL MEDICINE

## 2018-01-01 PROCEDURE — 88313 SPECIAL STAINS GROUP 2: CPT | Performed by: INTERNAL MEDICINE

## 2018-01-01 PROCEDURE — 81001 URINALYSIS AUTO W/SCOPE: CPT | Performed by: EMERGENCY MEDICINE

## 2018-01-01 PROCEDURE — 74011000250 HC RX REV CODE- 250: Performed by: PHYSICIAN ASSISTANT

## 2018-01-01 PROCEDURE — 81450 HL NEO GSAP 5-50DNA/DNA&RNA: CPT | Performed by: INTERNAL MEDICINE

## 2018-01-01 PROCEDURE — 99213 OFFICE O/P EST LOW 20 MIN: CPT

## 2018-01-01 PROCEDURE — 85730 THROMBOPLASTIN TIME PARTIAL: CPT | Performed by: INTERNAL MEDICINE

## 2018-01-01 PROCEDURE — 88342 IMHCHEM/IMCYTCHM 1ST ANTB: CPT | Performed by: INTERNAL MEDICINE

## 2018-01-01 PROCEDURE — 88184 FLOWCYTOMETRY/ TC 1 MARKER: CPT | Performed by: INTERNAL MEDICINE

## 2018-01-01 PROCEDURE — 81001 URINALYSIS AUTO W/SCOPE: CPT | Performed by: PHYSICIAN ASSISTANT

## 2018-01-01 PROCEDURE — 77030018719 HC DRSG PTCH ANTIMIC J&J -A

## 2018-01-01 PROCEDURE — 86900 BLOOD TYPING SEROLOGIC ABO: CPT | Performed by: EMERGENCY MEDICINE

## 2018-01-01 PROCEDURE — 83540 ASSAY OF IRON: CPT | Performed by: SPECIALIST

## 2018-01-01 PROCEDURE — 86038 ANTINUCLEAR ANTIBODIES: CPT | Performed by: SPECIALIST

## 2018-01-01 PROCEDURE — 77030009426 HC FCPS BIOP ENDOSC BSC -B: Performed by: SPECIALIST

## 2018-01-01 PROCEDURE — 83935 ASSAY OF URINE OSMOLALITY: CPT | Performed by: INTERNAL MEDICINE

## 2018-01-01 PROCEDURE — 84443 ASSAY THYROID STIM HORMONE: CPT | Performed by: INTERNAL MEDICINE

## 2018-01-01 PROCEDURE — 85610 PROTHROMBIN TIME: CPT | Performed by: INTERNAL MEDICINE

## 2018-01-01 PROCEDURE — P9016 RBC LEUKOCYTES REDUCED: HCPCS | Performed by: INTERNAL MEDICINE

## 2018-01-01 PROCEDURE — 83010 ASSAY OF HAPTOGLOBIN QUANT: CPT | Performed by: INTERNAL MEDICINE

## 2018-01-01 PROCEDURE — 76937 US GUIDE VASCULAR ACCESS: CPT

## 2018-01-01 PROCEDURE — 93005 ELECTROCARDIOGRAM TRACING: CPT

## 2018-01-01 PROCEDURE — 85045 AUTOMATED RETICULOCYTE COUNT: CPT | Performed by: INTERNAL MEDICINE

## 2018-01-01 PROCEDURE — 77030014115

## 2018-01-01 PROCEDURE — 96360 HYDRATION IV INFUSION INIT: CPT

## 2018-01-01 PROCEDURE — 77030018786 HC NDL GD F/USND BARD -B

## 2018-01-01 PROCEDURE — 82103 ALPHA-1-ANTITRYPSIN TOTAL: CPT | Performed by: SPECIALIST

## 2018-01-01 PROCEDURE — 83930 ASSAY OF BLOOD OSMOLALITY: CPT | Performed by: INTERNAL MEDICINE

## 2018-01-01 PROCEDURE — 90686 IIV4 VACC NO PRSV 0.5 ML IM: CPT | Performed by: INTERNAL MEDICINE

## 2018-01-01 PROCEDURE — 88374 M/PHMTRC ALYS ISHQUANT/SEMIQ: CPT | Performed by: INTERNAL MEDICINE

## 2018-01-01 PROCEDURE — 97535 SELF CARE MNGMENT TRAINING: CPT | Performed by: OCCUPATIONAL THERAPIST

## 2018-01-01 PROCEDURE — 85027 COMPLETE CBC AUTOMATED: CPT

## 2018-01-01 PROCEDURE — 65270000029 HC RM PRIVATE

## 2018-01-01 PROCEDURE — 90471 IMMUNIZATION ADMIN: CPT

## 2018-01-01 PROCEDURE — 99152 MOD SED SAME PHYS/QHP 5/>YRS: CPT

## 2018-01-01 PROCEDURE — 83880 ASSAY OF NATRIURETIC PEPTIDE: CPT | Performed by: EMERGENCY MEDICINE

## 2018-01-01 PROCEDURE — 74011250636 HC RX REV CODE- 250/636: Performed by: EMERGENCY MEDICINE

## 2018-01-01 PROCEDURE — 0DBK8ZX EXCISION OF ASCENDING COLON, VIA NATURAL OR ARTIFICIAL OPENING ENDOSCOPIC, DIAGNOSTIC: ICD-10-PCS | Performed by: SPECIALIST

## 2018-01-01 PROCEDURE — 82105 ALPHA-FETOPROTEIN SERUM: CPT | Performed by: PHYSICIAN ASSISTANT

## 2018-01-01 PROCEDURE — 85018 HEMOGLOBIN: CPT | Performed by: NURSE PRACTITIONER

## 2018-01-01 PROCEDURE — 86923 COMPATIBILITY TEST ELECTRIC: CPT | Performed by: PHYSICIAN ASSISTANT

## 2018-01-01 PROCEDURE — 65660000000 HC RM CCU STEPDOWN

## 2018-01-01 PROCEDURE — 86706 HEP B SURFACE ANTIBODY: CPT | Performed by: PHYSICIAN ASSISTANT

## 2018-01-01 PROCEDURE — 88341 IMHCHEM/IMCYTCHM EA ADD ANTB: CPT | Performed by: INTERNAL MEDICINE

## 2018-01-01 PROCEDURE — 83735 ASSAY OF MAGNESIUM: CPT | Performed by: INTERNAL MEDICINE

## 2018-01-01 PROCEDURE — 99285 EMERGENCY DEPT VISIT HI MDM: CPT

## 2018-01-01 PROCEDURE — 76700 US EXAM ABDOM COMPLETE: CPT

## 2018-01-01 PROCEDURE — 74011250636 HC RX REV CODE- 250/636

## 2018-01-01 PROCEDURE — 86923 COMPATIBILITY TEST ELECTRIC: CPT | Performed by: EMERGENCY MEDICINE

## 2018-01-01 PROCEDURE — 36415 COLL VENOUS BLD VENIPUNCTURE: CPT | Performed by: PHYSICIAN ASSISTANT

## 2018-01-01 PROCEDURE — 30233N1 TRANSFUSION OF NONAUTOLOGOUS RED BLOOD CELLS INTO PERIPHERAL VEIN, PERCUTANEOUS APPROACH: ICD-10-PCS | Performed by: INTERNAL MEDICINE

## 2018-01-01 PROCEDURE — P9016 RBC LEUKOCYTES REDUCED: HCPCS | Performed by: EMERGENCY MEDICINE

## 2018-01-01 PROCEDURE — 82728 ASSAY OF FERRITIN: CPT | Performed by: SPECIALIST

## 2018-01-01 PROCEDURE — 97161 PT EVAL LOW COMPLEX 20 MIN: CPT

## 2018-01-01 PROCEDURE — 74011250636 HC RX REV CODE- 250/636: Performed by: RADIOLOGY

## 2018-01-01 RX ORDER — SODIUM CHLORIDE 0.9 % (FLUSH) 0.9 %
10 SYRINGE (ML) INJECTION AS NEEDED
Status: ACTIVE | OUTPATIENT
Start: 2018-01-01 | End: 2018-01-01

## 2018-01-01 RX ORDER — SODIUM CHLORIDE 0.9 % (FLUSH) 0.9 %
5-10 SYRINGE (ML) INJECTION AS NEEDED
Status: ACTIVE | OUTPATIENT
Start: 2018-01-01 | End: 2018-01-01

## 2018-01-01 RX ORDER — SODIUM CHLORIDE 0.9 % (FLUSH) 0.9 %
5-10 SYRINGE (ML) INJECTION EVERY 8 HOURS
Status: DISCONTINUED | OUTPATIENT
Start: 2018-01-01 | End: 2018-01-01 | Stop reason: HOSPADM

## 2018-01-01 RX ORDER — DIPHENHYDRAMINE HCL 25 MG
25 CAPSULE ORAL ONCE
Status: COMPLETED | OUTPATIENT
Start: 2018-01-01 | End: 2018-01-01

## 2018-01-01 RX ORDER — SODIUM CHLORIDE 9 MG/ML
250 INJECTION, SOLUTION INTRAVENOUS AS NEEDED
Status: DISPENSED | OUTPATIENT
Start: 2018-01-01 | End: 2018-01-01

## 2018-01-01 RX ORDER — MIDAZOLAM HYDROCHLORIDE 1 MG/ML
.25-5 INJECTION, SOLUTION INTRAMUSCULAR; INTRAVENOUS AS NEEDED
Status: DISCONTINUED | OUTPATIENT
Start: 2018-01-01 | End: 2018-01-01 | Stop reason: HOSPADM

## 2018-01-01 RX ORDER — SODIUM CHLORIDE 0.9 % (FLUSH) 0.9 %
10 SYRINGE (ML) INJECTION AS NEEDED
Status: DISCONTINUED | OUTPATIENT
Start: 2018-01-01 | End: 2018-01-01 | Stop reason: HOSPADM

## 2018-01-01 RX ORDER — SODIUM CHLORIDE 0.9 % (FLUSH) 0.9 %
5-10 SYRINGE (ML) INJECTION AS NEEDED
Status: DISCONTINUED | OUTPATIENT
Start: 2018-01-01 | End: 2018-01-01 | Stop reason: HOSPADM

## 2018-01-01 RX ORDER — SODIUM CHLORIDE 9 MG/ML
250 INJECTION, SOLUTION INTRAVENOUS AS NEEDED
Status: DISCONTINUED | OUTPATIENT
Start: 2018-01-01 | End: 2018-01-01 | Stop reason: HOSPADM

## 2018-01-01 RX ORDER — PROPOFOL 10 MG/ML
INJECTION, EMULSION INTRAVENOUS
Status: DISCONTINUED | OUTPATIENT
Start: 2018-01-01 | End: 2018-01-01 | Stop reason: HOSPADM

## 2018-01-01 RX ORDER — MAGNESIUM SULFATE HEPTAHYDRATE 40 MG/ML
2 INJECTION, SOLUTION INTRAVENOUS ONCE
Status: COMPLETED | OUTPATIENT
Start: 2018-01-01 | End: 2018-01-01

## 2018-01-01 RX ORDER — HEPARIN 100 UNIT/ML
500 SYRINGE INTRAVENOUS AS NEEDED
Status: ACTIVE | OUTPATIENT
Start: 2018-01-01 | End: 2018-01-01

## 2018-01-01 RX ORDER — BUMETANIDE 0.25 MG/ML
1 INJECTION INTRAMUSCULAR; INTRAVENOUS ONCE
Status: COMPLETED | OUTPATIENT
Start: 2018-01-01 | End: 2018-01-01

## 2018-01-01 RX ORDER — PANTOPRAZOLE SODIUM 40 MG/1
40 TABLET, DELAYED RELEASE ORAL
Status: DISCONTINUED | OUTPATIENT
Start: 2018-01-01 | End: 2018-01-01 | Stop reason: HOSPADM

## 2018-01-01 RX ORDER — MIDAZOLAM HYDROCHLORIDE 1 MG/ML
5 INJECTION, SOLUTION INTRAMUSCULAR; INTRAVENOUS
Status: DISCONTINUED | OUTPATIENT
Start: 2018-01-01 | End: 2018-01-01

## 2018-01-01 RX ORDER — SODIUM CHLORIDE 0.9 % (FLUSH) 0.9 %
10-30 SYRINGE (ML) INJECTION AS NEEDED
Status: DISCONTINUED | OUTPATIENT
Start: 2018-01-01 | End: 2018-01-01 | Stop reason: HOSPADM

## 2018-01-01 RX ORDER — SODIUM CHLORIDE 9 MG/ML
250 INJECTION, SOLUTION INTRAVENOUS AS NEEDED
Status: DISPENSED | OUTPATIENT
Start: 2018-01-01 | End: 2019-01-01

## 2018-01-01 RX ORDER — SODIUM CHLORIDE 9 MG/ML
50 INJECTION, SOLUTION INTRAVENOUS CONTINUOUS
Status: DISCONTINUED | OUTPATIENT
Start: 2018-01-01 | End: 2018-01-01

## 2018-01-01 RX ORDER — CARVEDILOL 3.12 MG/1
3.12 TABLET ORAL 2 TIMES DAILY WITH MEALS
Status: DISCONTINUED | OUTPATIENT
Start: 2018-01-01 | End: 2018-01-01 | Stop reason: HOSPADM

## 2018-01-01 RX ORDER — ACETAMINOPHEN 325 MG/1
650 TABLET ORAL ONCE
Status: COMPLETED | OUTPATIENT
Start: 2018-01-01 | End: 2018-01-01

## 2018-01-01 RX ORDER — SODIUM CHLORIDE 0.9 % (FLUSH) 0.9 %
10 SYRINGE (ML) INJECTION EVERY 24 HOURS
Status: DISCONTINUED | OUTPATIENT
Start: 2018-01-01 | End: 2018-01-01 | Stop reason: HOSPADM

## 2018-01-01 RX ORDER — SODIUM CHLORIDE 9 MG/ML
INJECTION, SOLUTION INTRAVENOUS
Status: DISCONTINUED | OUTPATIENT
Start: 2018-01-01 | End: 2018-01-01 | Stop reason: HOSPADM

## 2018-01-01 RX ORDER — HEPARIN SODIUM (PORCINE) LOCK FLUSH IV SOLN 100 UNIT/ML 100 UNIT/ML
500 SOLUTION INTRAVENOUS AS NEEDED
Status: DISCONTINUED | OUTPATIENT
Start: 2018-01-01 | End: 2018-01-01

## 2018-01-01 RX ORDER — SODIUM CHLORIDE 9 MG/ML
10 INJECTION INTRAMUSCULAR; INTRAVENOUS; SUBCUTANEOUS AS NEEDED
Status: ACTIVE | OUTPATIENT
Start: 2018-01-01 | End: 2018-01-01

## 2018-01-01 RX ORDER — FLUMAZENIL 0.1 MG/ML
0.2 INJECTION INTRAVENOUS
Status: DISCONTINUED | OUTPATIENT
Start: 2018-01-01 | End: 2018-01-01 | Stop reason: HOSPADM

## 2018-01-01 RX ORDER — BACITRACIN 500 UNIT/G
1 PACKET (EA) TOPICAL AS NEEDED
Status: DISCONTINUED | OUTPATIENT
Start: 2018-01-01 | End: 2018-01-01 | Stop reason: HOSPADM

## 2018-01-01 RX ORDER — FENTANYL CITRATE 50 UG/ML
100 INJECTION, SOLUTION INTRAMUSCULAR; INTRAVENOUS
Status: DISCONTINUED | OUTPATIENT
Start: 2018-01-01 | End: 2018-01-01

## 2018-01-01 RX ORDER — PEPPERMINT OIL
SPIRIT ORAL ONCE
Status: COMPLETED | OUTPATIENT
Start: 2018-01-01 | End: 2018-01-01

## 2018-01-01 RX ORDER — EPINEPHRINE 0.1 MG/ML
1 INJECTION INTRACARDIAC; INTRAVENOUS
Status: DISCONTINUED | OUTPATIENT
Start: 2018-01-01 | End: 2018-01-01 | Stop reason: HOSPADM

## 2018-01-01 RX ORDER — SODIUM CHLORIDE 9 MG/ML
250 INJECTION, SOLUTION INTRAVENOUS AS NEEDED
Status: CANCELLED | OUTPATIENT
Start: 2018-01-01

## 2018-01-01 RX ORDER — SODIUM CHLORIDE 0.9 % (FLUSH) 0.9 %
10-40 SYRINGE (ML) INJECTION EVERY 8 HOURS
Status: DISCONTINUED | OUTPATIENT
Start: 2018-01-01 | End: 2018-01-01 | Stop reason: HOSPADM

## 2018-01-01 RX ORDER — SODIUM CHLORIDE 9 MG/ML
250 INJECTION, SOLUTION INTRAVENOUS AS NEEDED
Status: DISCONTINUED | OUTPATIENT
Start: 2018-01-01 | End: 2018-01-01

## 2018-01-01 RX ORDER — DEXTROMETHORPHAN/PSEUDOEPHED 2.5-7.5/.8
1.2 DROPS ORAL
Status: DISCONTINUED | OUTPATIENT
Start: 2018-01-01 | End: 2018-01-01 | Stop reason: HOSPADM

## 2018-01-01 RX ORDER — LIDOCAINE HYDROCHLORIDE 10 MG/ML
10 INJECTION, SOLUTION EPIDURAL; INFILTRATION; INTRACAUDAL; PERINEURAL
Status: COMPLETED | OUTPATIENT
Start: 2018-01-01 | End: 2018-01-01

## 2018-01-01 RX ORDER — PROPOFOL 10 MG/ML
INJECTION, EMULSION INTRAVENOUS AS NEEDED
Status: DISCONTINUED | OUTPATIENT
Start: 2018-01-01 | End: 2018-01-01 | Stop reason: HOSPADM

## 2018-01-01 RX ORDER — NALOXONE HYDROCHLORIDE 0.4 MG/ML
0.4 INJECTION, SOLUTION INTRAMUSCULAR; INTRAVENOUS; SUBCUTANEOUS
Status: DISCONTINUED | OUTPATIENT
Start: 2018-01-01 | End: 2018-01-01 | Stop reason: HOSPADM

## 2018-01-01 RX ORDER — SODIUM CHLORIDE 0.9 % (FLUSH) 0.9 %
20 SYRINGE (ML) INJECTION EVERY 24 HOURS
Status: DISCONTINUED | OUTPATIENT
Start: 2018-01-01 | End: 2018-01-01 | Stop reason: HOSPADM

## 2018-01-01 RX ORDER — CARVEDILOL 3.12 MG/1
3.12 TABLET ORAL 2 TIMES DAILY WITH MEALS
Qty: 60 TAB | Refills: 0 | Status: SHIPPED | OUTPATIENT
Start: 2018-01-01 | End: 2018-01-01

## 2018-01-01 RX ORDER — LIDOCAINE HYDROCHLORIDE 20 MG/ML
INJECTION, SOLUTION EPIDURAL; INFILTRATION; INTRACAUDAL; PERINEURAL AS NEEDED
Status: DISCONTINUED | OUTPATIENT
Start: 2018-01-01 | End: 2018-01-01 | Stop reason: HOSPADM

## 2018-01-01 RX ORDER — ATROPINE SULFATE 0.1 MG/ML
0.5 INJECTION INTRAVENOUS
Status: DISCONTINUED | OUTPATIENT
Start: 2018-01-01 | End: 2018-01-01 | Stop reason: HOSPADM

## 2018-01-01 RX ORDER — FENTANYL CITRATE 50 UG/ML
25 INJECTION, SOLUTION INTRAMUSCULAR; INTRAVENOUS AS NEEDED
Status: DISCONTINUED | OUTPATIENT
Start: 2018-01-01 | End: 2018-01-01 | Stop reason: HOSPADM

## 2018-01-01 RX ORDER — GUAIFENESIN 100 MG/5ML
324 LIQUID (ML) ORAL
Status: COMPLETED | OUTPATIENT
Start: 2018-01-01 | End: 2018-01-01

## 2018-01-01 RX ORDER — ONDANSETRON 2 MG/ML
4 INJECTION INTRAMUSCULAR; INTRAVENOUS
Status: DISCONTINUED | OUTPATIENT
Start: 2018-01-01 | End: 2018-01-01 | Stop reason: HOSPADM

## 2018-01-01 RX ORDER — ACETAMINOPHEN 325 MG/1
650 TABLET ORAL
Status: DISCONTINUED | OUTPATIENT
Start: 2018-01-01 | End: 2018-01-01 | Stop reason: HOSPADM

## 2018-01-01 RX ORDER — PANTOPRAZOLE SODIUM 40 MG/1
40 TABLET, DELAYED RELEASE ORAL
Qty: 30 TAB | Refills: 0 | Status: SHIPPED | OUTPATIENT
Start: 2018-01-01 | End: 2018-01-01

## 2018-01-01 RX ADMIN — SODIUM CHLORIDE, PRESERVATIVE FREE 500 UNITS: 5 INJECTION INTRAVENOUS at 09:50

## 2018-01-01 RX ADMIN — SODIUM CHLORIDE 250 ML: 900 INJECTION, SOLUTION INTRAVENOUS at 10:36

## 2018-01-01 RX ADMIN — DIPHENHYDRAMINE HYDROCHLORIDE 25 MG: 25 CAPSULE ORAL at 09:53

## 2018-01-01 RX ADMIN — Medication 10 ML: at 08:04

## 2018-01-01 RX ADMIN — Medication 10 ML: at 08:19

## 2018-01-01 RX ADMIN — ACETAMINOPHEN 650 MG: 325 TABLET ORAL at 09:33

## 2018-01-01 RX ADMIN — Medication 10 ML: at 08:28

## 2018-01-01 RX ADMIN — Medication 10 ML: at 17:35

## 2018-01-01 RX ADMIN — CARVEDILOL 3.12 MG: 3.12 TABLET, FILM COATED ORAL at 09:17

## 2018-01-01 RX ADMIN — ACETAMINOPHEN 650 MG: 325 TABLET ORAL at 10:14

## 2018-01-01 RX ADMIN — Medication 10 ML: at 08:43

## 2018-01-01 RX ADMIN — Medication 10 ML: at 14:55

## 2018-01-01 RX ADMIN — Medication 10 ML: at 06:39

## 2018-01-01 RX ADMIN — HEPARIN 500 UNITS: 100 SYRINGE at 08:28

## 2018-01-01 RX ADMIN — Medication 500 UNITS: at 08:20

## 2018-01-01 RX ADMIN — Medication 10 ML: at 08:40

## 2018-01-01 RX ADMIN — Medication 10 ML: at 08:53

## 2018-01-01 RX ADMIN — Medication 10 ML: at 15:18

## 2018-01-01 RX ADMIN — PROPOFOL 10 MG: 10 INJECTION, EMULSION INTRAVENOUS at 12:03

## 2018-01-01 RX ADMIN — HEPARIN 300 UNITS: 100 SYRINGE at 09:49

## 2018-01-01 RX ADMIN — Medication: at 22:15

## 2018-01-01 RX ADMIN — Medication 10 ML: at 15:42

## 2018-01-01 RX ADMIN — PANTOPRAZOLE SODIUM 40 MG: 40 TABLET, DELAYED RELEASE ORAL at 09:16

## 2018-01-01 RX ADMIN — SODIUM CHLORIDE 50 ML/HR: 900 INJECTION, SOLUTION INTRAVENOUS at 11:02

## 2018-01-01 RX ADMIN — Medication 10 ML: at 08:20

## 2018-01-01 RX ADMIN — DIPHENHYDRAMINE HYDROCHLORIDE 25 MG: 25 CAPSULE ORAL at 09:33

## 2018-01-01 RX ADMIN — Medication 10 ML: at 14:17

## 2018-01-01 RX ADMIN — CARVEDILOL 3.12 MG: 3.12 TABLET, FILM COATED ORAL at 19:12

## 2018-01-01 RX ADMIN — Medication 10 ML: at 08:27

## 2018-01-01 RX ADMIN — Medication 10 ML: at 08:48

## 2018-01-01 RX ADMIN — SODIUM CHLORIDE 250 ML: 900 INJECTION, SOLUTION INTRAVENOUS at 10:55

## 2018-01-01 RX ADMIN — PANTOPRAZOLE SODIUM 40 MG: 40 TABLET, DELAYED RELEASE ORAL at 06:39

## 2018-01-01 RX ADMIN — INFLUENZA VIRUS VACCINE 0.5 ML: 15; 15; 15; 15 SUSPENSION INTRAMUSCULAR at 14:29

## 2018-01-01 RX ADMIN — Medication 10 ML: at 08:39

## 2018-01-01 RX ADMIN — PROPOFOL 50 MG: 10 INJECTION, EMULSION INTRAVENOUS at 12:00

## 2018-01-01 RX ADMIN — HEPARIN 500 UNITS: 100 SYRINGE at 12:45

## 2018-01-01 RX ADMIN — Medication 500 UNITS: at 08:50

## 2018-01-01 RX ADMIN — Medication 10 ML: at 07:52

## 2018-01-01 RX ADMIN — Medication 500 UNITS: at 08:53

## 2018-01-01 RX ADMIN — Medication 10 ML: at 12:55

## 2018-01-01 RX ADMIN — Medication 10 ML: at 08:50

## 2018-01-01 RX ADMIN — Medication 10 ML: at 08:45

## 2018-01-01 RX ADMIN — PROPOFOL 140 MCG/KG/MIN: 10 INJECTION, EMULSION INTRAVENOUS at 12:00

## 2018-01-01 RX ADMIN — Medication 10 ML: at 08:13

## 2018-01-01 RX ADMIN — CARVEDILOL 3.12 MG: 3.12 TABLET, FILM COATED ORAL at 08:57

## 2018-01-01 RX ADMIN — Medication 500 UNITS: at 08:41

## 2018-01-01 RX ADMIN — DIPHENHYDRAMINE HYDROCHLORIDE 25 MG: 25 CAPSULE ORAL at 09:43

## 2018-01-01 RX ADMIN — LIDOCAINE HYDROCHLORIDE 40 MG: 20 INJECTION, SOLUTION EPIDURAL; INFILTRATION; INTRACAUDAL; PERINEURAL at 12:02

## 2018-01-01 RX ADMIN — Medication 500 UNITS: at 07:52

## 2018-01-01 RX ADMIN — SODIUM CHLORIDE 1000 ML: 900 INJECTION, SOLUTION INTRAVENOUS at 10:14

## 2018-01-01 RX ADMIN — SODIUM CHLORIDE, PRESERVATIVE FREE 500 UNITS: 5 INJECTION INTRAVENOUS at 08:45

## 2018-01-01 RX ADMIN — Medication 10 ML: at 08:05

## 2018-01-01 RX ADMIN — CARVEDILOL 3.12 MG: 3.12 TABLET, FILM COATED ORAL at 20:10

## 2018-01-01 RX ADMIN — Medication 200 UNITS: at 09:00

## 2018-01-01 RX ADMIN — Medication 10 ML: at 23:05

## 2018-01-01 RX ADMIN — MIDAZOLAM 1 MG: 1 INJECTION INTRAMUSCULAR; INTRAVENOUS at 13:47

## 2018-01-01 RX ADMIN — LIDOCAINE HYDROCHLORIDE 10 ML: 10 INJECTION, SOLUTION EPIDURAL; INFILTRATION; INTRACAUDAL; PERINEURAL at 14:13

## 2018-01-01 RX ADMIN — FENTANYL CITRATE 25 MCG: 50 INJECTION, SOLUTION INTRAMUSCULAR; INTRAVENOUS at 13:47

## 2018-01-01 RX ADMIN — SODIUM CHLORIDE: 9 INJECTION, SOLUTION INTRAVENOUS at 11:54

## 2018-01-01 RX ADMIN — Medication 10 ML: at 08:12

## 2018-01-01 RX ADMIN — DIPHENHYDRAMINE HYDROCHLORIDE 25 MG: 25 CAPSULE ORAL at 10:31

## 2018-01-01 RX ADMIN — Medication 10 ML: at 15:35

## 2018-01-01 RX ADMIN — Medication 10 ML: at 08:49

## 2018-01-01 RX ADMIN — Medication 10 ML: at 09:50

## 2018-01-01 RX ADMIN — SODIUM CHLORIDE 250 ML: 900 INJECTION, SOLUTION INTRAVENOUS at 12:53

## 2018-01-01 RX ADMIN — MIDAZOLAM 1 MG: 1 INJECTION INTRAMUSCULAR; INTRAVENOUS at 13:39

## 2018-01-01 RX ADMIN — MAGNESIUM SULFATE HEPTAHYDRATE 2 G: 40 INJECTION, SOLUTION INTRAVENOUS at 10:46

## 2018-01-01 RX ADMIN — ACETAMINOPHEN 650 MG: 325 TABLET ORAL at 09:53

## 2018-01-01 RX ADMIN — FENTANYL CITRATE 25 MCG: 50 INJECTION, SOLUTION INTRAMUSCULAR; INTRAVENOUS at 13:39

## 2018-01-01 RX ADMIN — Medication 500 UNITS: at 08:43

## 2018-01-01 RX ADMIN — BUMETANIDE 1 MG: 0.25 INJECTION INTRAMUSCULAR; INTRAVENOUS at 12:55

## 2018-01-01 RX ADMIN — MIDAZOLAM 1 MG: 1 INJECTION INTRAMUSCULAR; INTRAVENOUS at 13:42

## 2018-01-01 RX ADMIN — SODIUM CHLORIDE 250 ML: 900 INJECTION, SOLUTION INTRAVENOUS at 10:09

## 2018-01-01 RX ADMIN — Medication 500 UNITS: at 08:11

## 2018-01-01 RX ADMIN — Medication 10 ML: at 12:45

## 2018-01-01 RX ADMIN — DIPHENHYDRAMINE HYDROCHLORIDE 25 MG: 25 CAPSULE ORAL at 10:14

## 2018-01-01 RX ADMIN — Medication 10 ML: at 07:27

## 2018-01-01 RX ADMIN — Medication 10 ML: at 08:11

## 2018-01-01 RX ADMIN — ACETAMINOPHEN 650 MG: 325 TABLET ORAL at 10:31

## 2018-01-01 RX ADMIN — FENTANYL CITRATE 25 MCG: 50 INJECTION, SOLUTION INTRAMUSCULAR; INTRAVENOUS at 13:43

## 2018-01-01 RX ADMIN — ACETAMINOPHEN 650 MG: 325 TABLET ORAL at 09:43

## 2018-01-01 RX ADMIN — PANTOPRAZOLE SODIUM 40 MG: 40 TABLET, DELAYED RELEASE ORAL at 08:56

## 2018-01-01 RX ADMIN — Medication 10 ML: at 09:47

## 2018-01-01 RX ADMIN — SODIUM CHLORIDE, PRESERVATIVE FREE 500 UNITS: 5 INJECTION INTRAVENOUS at 17:35

## 2018-01-01 RX ADMIN — Medication 10 ML: at 09:48

## 2018-01-01 RX ADMIN — SODIUM CHLORIDE 250 ML: 900 INJECTION, SOLUTION INTRAVENOUS at 14:11

## 2018-01-01 RX ADMIN — Medication 10 ML: at 23:15

## 2018-01-01 RX ADMIN — Medication 500 UNITS: at 15:42

## 2018-01-01 RX ADMIN — SODIUM CHLORIDE 10 ML: 9 INJECTION INTRAMUSCULAR; INTRAVENOUS; SUBCUTANEOUS at 08:00

## 2018-01-01 RX ADMIN — POLYETHYLENE GLYCOL-3350 AND ELECTROLYTES 2000 ML: 236; 6.74; 5.86; 2.97; 22.74 POWDER, FOR SOLUTION ORAL at 15:06

## 2018-01-01 RX ADMIN — Medication 500 UNITS: at 14:17

## 2018-01-01 RX ADMIN — SODIUM CHLORIDE 250 ML: 900 INJECTION, SOLUTION INTRAVENOUS at 09:30

## 2018-01-01 RX ADMIN — Medication 10 ML: at 08:38

## 2018-01-01 RX ADMIN — Medication 10 ML: at 08:00

## 2018-01-01 RX ADMIN — ASPIRIN 81 MG 324 MG: 81 TABLET ORAL at 12:38

## 2018-01-01 RX ADMIN — SODIUM CHLORIDE, PRESERVATIVE FREE 500 UNITS: 5 INJECTION INTRAVENOUS at 08:39

## 2018-09-26 PROBLEM — E87.1 HYPONATREMIA: Status: ACTIVE | Noted: 2018-01-01

## 2018-09-26 PROBLEM — D61.818 PANCYTOPENIA (HCC): Status: ACTIVE | Noted: 2018-01-01

## 2018-09-26 PROBLEM — R93.89 ABNORMAL CHEST X-RAY: Status: ACTIVE | Noted: 2018-01-01

## 2018-09-26 PROBLEM — R77.8 ELEVATED TROPONIN: Status: ACTIVE | Noted: 2018-01-01

## 2018-09-26 NOTE — Clinical Note
Status[de-identified] Inpatient [101] Type of Bed: Remote Telemetry [29] Inpatient Hospitalization Certified Necessary for the Following Reasons: 9. Other (further clarification in H&P documentation) Admitting Diagnosis: Pancytopenia ([facility] [address] [[id number]] Admitting Physician: Edelmira Earl Attending Physician: Edelmira Earl Estimated Length of Stay: 3-4 Midnights Discharge Plan[de-identified] Home with Office Follow-up

## 2018-09-26 NOTE — CONSULTS
[facility] [place] [id number] [facility], [address] [place] [address] W: [phone]  F: [phone] Reason for Visit:  
Opal Toscano is a 74 y.o. male who is seen in consultation at the request of Dr. Qi Fields  for evaluation of pancytopenia. Hematology / Oncology Treatment History: N/A History of Present Illness:  
Mr Ashvin Varma was admitted on 9/26/2018 from the ED when he presented with c/o weakness and fatigue x 2 days associated with decreased appetite and SOB. Denies any change in bowels or color change. ED labs WBC 2.5 ANC 1.7  Hgb 5.1 .6 Therefore was admitted for further eval and management. Reports was so SOB and weak over the last couple of  days that he had to call the EMS. Mr Ashvin Varma states he is unaware of any abnormal blood work from the past. Was told in 2016 that he had something wrong with his liver but he has never had any follow up. He does not currently have a PCP. Denies fever, night sweats or weight loss. Denies having had colonoscopies. ETOH use: drank 2 drinks/day x 40 yrs: quit 6 months ago Smoking Hx: 1 ppd x 40 yrs ; quit 6 months ago due to cost of cigarettes Family Hx of cancer: father had lung cancer No siblings or children Mother had to have B12 injections. Retired : enjoys traveling and reading Past Medical History:  
Diagnosis Date  Cirrhosis ([facility] [address]  Pancytopenia ([facility] [address]  Patella fracture 2007 Right knee Past Surgical History:  
Procedure Laterality Date  HX APPENDECTOMY  HX HERNIA REPAIR Bilateral   
 HX RETINAL DETACHMENT REPAIR Social History Substance Use Topics  Smoking status: Former Smoker  Smokeless tobacco: Not on file  Alcohol use Yes Comment: rare Family History Problem Relation Age of Onset  Anemia Mother  Cancer Father Lung Current Facility-Administered Medications Medication Dose Route Frequency  0.9% sodium chloride infusion 250 mL  250 mL IntraVENous PRN  
 sodium chloride (NS) flush 5-10 mL  5-10 mL IntraVENous Q8H  
 sodium chloride (NS) flush 5-10 mL  5-10 mL IntraVENous PRN  
 acetaminophen (TYLENOL) tablet 650 mg  650 mg Oral Q4H PRN  
 [START ON 9/27/2018] pantoprazole (PROTONIX) tablet 40 mg  40 mg Oral ACB  ondansetron (ZOFRAN) injection 4 mg  4 mg IntraVENous Q4H PRN  
 influenza vaccine 2018-19 (6 mos+)(PF) (FLUARIX QUAD/FLULAVAL QUAD) injection 0.5 mL  0.5 mL IntraMUSCular PRIOR TO DISCHARGE Current Outpatient Prescriptions Medication Sig  
 folic acid/multivit-min/lutein (CENTRUM SILVER PO) Take 1 Tab by mouth daily. No Known Allergies Review of Systems: A complete review of systems was obtained, negative except as described above. Physical Exam:  
 
Visit Vitals  /55 (BP 1 Location: Right arm, BP Patient Position: At rest)  Pulse 97  Temp 98.4 °F (36.9 °C)  Resp 19  
 Ht 5' 11\" (1.803 m)  Wt 170 lb (77.1 kg)  SpO2 95%  BMI 23.71 kg/m2 ECOG PS: 1 General: No distress Eyes: PERRLA, anicteric sclerae HENT: Atraumatic with normal appearance of ears and nose; OP clear Neck: Supple; no thyromegaly Lymphatic: No cervical, supraclavicular, or axillary adenopathy Respiratory: CTAB, normal respiratory effort CV: Normal rate, regular rhythm, no murmurs, no peripheral edema GI: Soft, nontender, nondistended, no masses, no hepatomegaly, no splenomegaly MS: Digits without clubbing or cyanosis. Skin: No rashes, ecchymoses, or petechiae. Normal temperature, turgor, and texture. Neuro/Psych: Alert, oriented, appropriate affect, normal judgment/insight Results:  
 
Lab Results Component Value Date/Time  WBC 2.5 (L) 09/26/2018 10:15 AM  
 HGB 5.1 (LL) 09/26/2018 10:15 AM  
 HCT 14.6 (LL) 09/26/2018 10:15 AM  
 PLATELET 78 (L) 06/69/8291 10:15 AM  
 .6 (H) 09/26/2018 10:15 AM  
 ABS. NEUTROPHILS 1.7 (L) 09/26/2018 10:15 AM  
 
Lab Results Component Value Date/Time Sodium 131 (L) 09/26/2018 10:15 AM  
 Potassium 4.0 09/26/2018 10:15 AM  
 Chloride 99 09/26/2018 10:15 AM  
 CO2 25 09/26/2018 10:15 AM  
 Glucose 124 (H) 09/26/2018 10:15 AM  
 BUN 11 09/26/2018 10:15 AM  
 Creatinine 0.79 09/26/2018 10:15 AM  
 GFR est AA >60 09/26/2018 10:15 AM  
 GFR est non-AA >60 09/26/2018 10:15 AM  
 Calcium 8.3 (L) 09/26/2018 10:15 AM  
 
Lab Results Component Value Date/Time Bilirubin, total 0.7 09/26/2018 10:15 AM  
 ALT (SGPT) 32 09/26/2018 10:15 AM  
 AST (SGOT) 35 09/26/2018 10:15 AM  
 Alk. phosphatase 244 (H) 09/26/2018 10:15 AM  
 Protein, total 6.4 09/26/2018 10:15 AM  
 Albumin 2.6 (L) 09/26/2018 10:15 AM  
 Globulin 3.8 09/26/2018 10:15 AM  
 
Lab Results Component Value Date/Time Reticulocyte count 0.4 (L) 09/26/2018 10:15 AM  
 Iron % saturation 93 (H) 09/26/2018 10:15 AM  
 TIBC 276 09/26/2018 10:15 AM  
 Ferritin 512 (H) 09/26/2018 01:00 PM  
 Vitamin B12 >2000 (H) 09/26/2018 01:00 PM  
 Folate 19.1 09/26/2018 01:00 PM  
 Haptoglobin 105 09/26/2018 10:15 AM  
  09/26/2018 10:15 AM  
 Hep C  virus Ab Interp. NONREACTIVE 04/20/2016 09:58 AM  
 
Lab Results Component Value Date/Time INR 1.3 (H) 09/26/2018 10:15 AM  
 aPTT 26.5 09/26/2018 10:15 AM  
 
Lab Results Component Value Date/Time  09/26/2018 10:15 AM  
 
9/26/2018 Peripheral Smear There is a macrocytic, normochromic anemia. Granulocytes and  
lymphocytes are reduced and mature. Platelets are reduced and no  
blasts are seen. If cytopenias persist, a bone marrow biopsy  may be  
beneficial. Per Dr. Estephania Moeller MD. 
 
4/20/2016 US ABD 1. Nodular liver contour with diffusely heterogeneous echogenicity. Findings  
are consistent with the clinical concern of cirrhosis. 9/26/2018 XR CHEST Impression: Small bilateral effusions with underlying atelectasis and mild 
pulmonary edema. Assessment and Recommendations:  
 
1. Thrombocytopenia Chronic since at least 2009. Possibly related to cirrhosis. Peripheral smear unremarkable. Additional labs pending. Check US of liver/spleen. Consider bone marrow biopsy given multiple cell lines down, but will await these other results first. 
 
2. Leukopenia Acute ; unclear etiology Possibly 2/2 to cirrhosis, splenomegaly Labs pending, US pending 3. Anemia, macrocytic Severe, and fairly acute. No evidence of hemolysis, stool blood negative. Low retic is concerning for a production issue, but etiology not readily apparent. No evidence of B12 deficiency, iron deficiency, folate deficiency. Peripheral smear negative. Agree with transfusion support. GI considering EGD to eval for varices. We may need to consider bone marrow biopsy, given multiple cell lines down. 4. Elevated Troponin Cardiology consulted: felt likely due to demand ischemia due to severe anemia ECHO pending 5. Cirrhosis Dx in 2016; viral hepatitis panel negative at that time GI consult: additional labs ordered Patient seen in conjunction with Jadon Walker NP.  
 
 
Signed By: Efrem Rosales MD

## 2018-09-26 NOTE — PROGRESS NOTES
Problem: Falls - Risk of 
Goal: *Absence of Falls Document Cale Velázquez Fall Risk and appropriate interventions in the flowsheet. Outcome: Progressing Towards Goal 
Fall Risk Interventions: 
Mobility Interventions: Communicate number of staff needed for ambulation/transfer, OT consult for ADLs, Patient to call before getting OOB, PT Consult for mobility concerns, PT Consult for assist device competence, Utilize walker, cane, or other assistive device Medication Interventions: Patient to call before getting OOB, Teach patient to arise slowly, Assess postural VS orthostatic hypotension Elimination Interventions: Call light in reach, Patient to call for help with toileting needs, Toileting schedule/hourly rounds, Urinal in reach

## 2018-09-26 NOTE — ED PROVIDER NOTES
HPI Comments: Pt. Presents to the ER with complaints of fatigue. Pt. Has had these sx for the last few days. Pt. Reports that he feels well at rest, however, with minimal exertion (including just standing up), pt. Feels like his legs go weak, he feels SOB and dizzy, and he feels like he is going to pass out. Pt. Denies similar sx in the past.  NO fevers/chills. No CP or chest pressure. No changes with his urine or bowel movements. Pt. Denies any blood in his stool or black stool. No changes with his urine. No abdominal pain. No swelling in his legs. No recent travel or surgeries. Pt. Denies any other complaints. Patient is a 74 y.o. male presenting with fatigue. Fatigue Associated symptoms include shortness of breath. Pertinent negatives include no chest pain, no vomiting and no nausea. Past Medical History:  
Diagnosis Date  Patella fracture 2007 Right knee Past Surgical History:  
Procedure Laterality Date  HX APPENDECTOMY  HX HERNIA REPAIR Bilateral   
 HX RETINAL DETACHMENT REPAIR No family history on file. Social History Social History  Marital status: SINGLE Spouse name: N/A  
 Number of children: N/A  
 Years of education: N/A Occupational History  Not on file. Social History Main Topics  Smoking status: Never Smoker  Smokeless tobacco: Not on file  Alcohol use Yes  Drug use: No  
 Sexual activity: Not on file Other Topics Concern  Not on file Social History Narrative  No narrative on file ALLERGIES: Review of patient's allergies indicates no known allergies. Review of Systems Constitutional: Positive for fatigue. Negative for chills and fever. HENT: Negative for rhinorrhea and sore throat. Respiratory: Positive for shortness of breath. Negative for cough. Cardiovascular: Negative for chest pain. Gastrointestinal: Negative for abdominal pain, diarrhea, nausea and vomiting. Genitourinary: Negative for dysuria and hematuria. Musculoskeletal: Negative for arthralgias and myalgias. Skin: Negative for pallor and rash. Neurological: Positive for dizziness. Negative for weakness and light-headedness. Vitals:  
 09/26/18 2339 BP: 110/51 Pulse: 90 Resp: 18 Temp: 98.1 °F (36.7 °C) SpO2: 96% Weight: 77.1 kg (170 lb) Height: 5' 11\" (1.803 m) Physical Exam  
Vital signs reviewed. Nursing notes reviewed. Const:  No acute distress, well developed, well nourished Head:  Atraumatic, normocephalic Eyes:  PERRL, conjunctiva normal, no scleral icterus Neck:  Supple, trachea midline Cardiovascular:  RRR, no murmurs, no gallops, no rubs Resp:  No resp distress, no increased work of breathing, no wheezes, no rhonchi, mild rales at the left base, Abd:  Soft, non-tender, non-distended, no rebound, no guarding, no CVA tenderness :  Deferred MSK:  No pedal edema, normal ROM Neuro:  Alert and oriented x3, no cranial nerve defect Skin:  Warm, dry, intact Psych: normal mood and affect, behavior is normal, judgement and thought content is normal 
 
 
 
MDM Number of Diagnoses or Management Options Acute pulmonary edema ([address]): Anemia, unspecified type:  
NSTEMI (non-ST elevated myocardial infarction) [facility]): Amount and/or Complexity of Data Reviewed Clinical lab tests: ordered and reviewed Tests in the radiology section of CPT®: ordered and reviewed Review and summarize past medical records: yes Patient Progress Patient progress: stable ED Course Pt. Presents to the ER with complaints of HORN. Pt. Found to have anemia. No blood in her stool. Pt. Also with elevated troponin and pulmonary edema. Pt. To be evaluated for admission by the hospitalist.  
 
Procedures

## 2018-09-26 NOTE — CONSULTS
Lisbet Smith DO [facility] [address], [address] [place], [address] [facility] Office 21  [phone] Office 21  [phone] Date of  Admission: 9/26/2018  9:42 AM 
PCP- None Martin Dale is a 74 y.o. male admitted for Pancytopenia ([facility] [address] Pancytopenia ([facility] [address] Consult requested by Villa Braun MD 
 
Assessment/Plan Elevated troponinlikely due to demand ischemia due to severe anemia with hemoglobin of 5. Does have risk factor of smoking for many years. No personal family history of coronary artery disease. Addendum: STEMI type II elevated troponin in setting of severe anemia Recommend to continue trending troponin. Echocardiogram is pending at this time  Consider outpatient follow-up once acute pancytopenia has been resolved. Pancytopeniawhite count of 2.5, hemoglobin 5, platelets of 95L 
-Treatment per  primary team 
  
Liver fibrosisnoted on previous abdominal imaging, INR is not significantly elevated unclear if pancytopenia is related to liver cirrhosis or more likely bone marrow suppression. Clinically does not appear to have significant cirrhosis, however has evidence of fibrosis on prior abdominal imaging. I appreciate the opportunity to [facility]. See below note for details. please do not hesitate to contact us with questions or concerns. Thank you for allowing me to participate in this nice gentleman's care Chon Spangler DO Subjective: 
Martin Dale is a 74 y.o. male with a prior history of liver fibrosis and thrombocytopenia presents to the ED for evaluation of feeling weak and dizzy. He notices gotten worse over the last several weeks. When he stands up he feels like his legs will give way and just does not have any energy.   Symptoms were progressive so he sought care in the emergency department. In the ED he was found to have pancytopenia with a white count of 2.5, hemoglobin 5.1, platelets of 61,786. He also had an elevated troponin of 0.2. Electrocardiogram showed sinus rhythm with nonspecific ST and T wave changes in his inferior and lateral leads. He is without any chest pain, with apnea and PND. Past Medical History:  
Diagnosis Date  Cirrhosis ([facility] [address]  Pancytopenia ([facility] [address]  Patella fracture 2007 Right knee Past Surgical History:  
Procedure Laterality Date  HX APPENDECTOMY  HX HERNIA REPAIR Bilateral   
 HX RETINAL DETACHMENT REPAIR No Known Allergies Family History Problem Relation Age of Onset  Anemia Mother  Cancer Father Lung Social History Substance Use Topics  Smoking status: Former Smoker  Smokeless tobacco: Not on file  Alcohol use Yes Comment: rare Medications: (Not in a hospital admission) Current Facility-Administered Medications Medication Dose Route Frequency  0.9% sodium chloride infusion 250 mL  250 mL IntraVENous PRN  
 sodium chloride (NS) flush 5-10 mL  5-10 mL IntraVENous Q8H  
 sodium chloride (NS) flush 5-10 mL  5-10 mL IntraVENous PRN  
 acetaminophen (TYLENOL) tablet 650 mg  650 mg Oral Q4H PRN  
 [START ON 9/27/2018] pantoprazole (PROTONIX) tablet 40 mg  40 mg Oral ACB  ondansetron (ZOFRAN) injection 4 mg  4 mg IntraVENous Q4H PRN Current Outpatient Prescriptions Medication Sig  
 folic acid/multivit-min/lutein (CENTRUM SILVER PO) Take 1 Tab by mouth daily. Review of Systems: 
Pertinent Positive: Fatigue Pertinent Negative: No chest pain shortness of breath orthopnea PND All Other systems reviewed and are negative for a Comprehensive ROS (10+) Physical Exam: 
Visit Vitals  /57  Pulse 80  Temp 98.1 °F (36.7 °C)  Resp 19  
 Ht 5' 11\" (1.803 m)  Wt 170 lb (77.1 kg)  SpO2 96%  BMI 23.71 kg/m2 Gen: Well-developed, well-nourished, in no acute distress HEENT:   pale conjunctivae, hearing intact to voice, moist mucous membranes Neck: Supple,No JVD, No Carotid Bruit Resp: No accessory muscle use, Clear breath sounds, No rales or rhonchi 
Card: Normal Rate,Regular Rythm,Normal S1, S2, No murmurs, rubs or gallop. No thrills. Abd:  Soft, non-tender, non-distended, normoactive bowel sounds are present MSK: No cyanosis or clubbing Skin: No rashes or ulcers Neuro:  Cranial nerves are grossly intact, moving all four extremities, no focal deficit, follows commands appropriately Psych:  Good insight, oriented to person, place and time, alert, Nml Affect LE: No edema Vascular:Distal Pulses 2+ and symmetric EKG: Normal sinus rhythm nonspecific ST-T wave changes in inferior leads LABS: 
 
Lab Results Component Value Date/Time WBC 2.5 (L) 09/26/2018 10:15 AM  
 HGB 5.1 (LL) 09/26/2018 10:15 AM  
 HCT 14.6 (LL) 09/26/2018 10:15 AM  
 PLATELET 78 (L) 31/83/7803 10:15 AM  
 .6 (H) 09/26/2018 10:15 AM  
 
Lab Results Component Value Date/Time Sodium 131 (L) 09/26/2018 10:15 AM  
 Potassium 4.0 09/26/2018 10:15 AM  
 Chloride 99 09/26/2018 10:15 AM  
 CO2 25 09/26/2018 10:15 AM  
 Anion gap 7 09/26/2018 10:15 AM  
 Glucose 124 (H) 09/26/2018 10:15 AM  
 BUN 11 09/26/2018 10:15 AM  
 Creatinine 0.79 09/26/2018 10:15 AM  
 BUN/Creatinine ratio 14 09/26/2018 10:15 AM  
 GFR est AA >60 09/26/2018 10:15 AM  
 GFR est non-AA >60 09/26/2018 10:15 AM  
 Calcium 8.3 (L) 09/26/2018 10:15 AM  
 
Lab Results Component Value Date/Time  (H) 04/23/2016 12:43 AM  
 CK-MB Index 0.7 04/20/2016 09:58 AM  
 Troponin-I, Qt. 0.20 (H) 09/26/2018 12:58 PM  
 
Lab Results Component Value Date/Time  
 aPTT 26.5 09/26/2018 10:15 AM  
 
Lab Results Component Value Date/Time  INR 1.3 (H) 09/26/2018 10:15 AM  
 INR 1.1 04/20/2016 09:58 AM  
 INR 1.2 (H) 10/25/2009 12:49 PM  
 Prothrombin time 13.2 (H) 09/26/2018 10:15 AM  
 Prothrombin time 11.0 04/20/2016 09:58 AM  
 Prothrombin time 11.8 (H) 10/25/2009 12:49 PM  
 
 
 
 
Humberto Landin DO

## 2018-09-26 NOTE — ED TRIAGE NOTES
Patient presents with 2-day history of weakness/fatigue. Reports decreased appetite. [Dyspnea noted while patient changing to gown.]

## 2018-09-26 NOTE — PROGRESS NOTES
GI note Consult received chart reviewed Cirrhosis by imaging Pancytopenia Stool occult blood negative No report of GI blood loss Viral serologies negative I've added additional serologic testing He needs [facility] screening with ultrasound Eventual EGD but not scheduled yet so he can feed. Full consult to follow Jessie Lozano MD

## 2018-09-26 NOTE — PROGRESS NOTES
Pt receiving blood - Hgb was 5.1 on admission. Will follow and attempt to evaluate in the morning. Pt was ambulating outdoors with a straight cane, independently in apartment, no falls in past year. Pt was independent in ADL's but had noted fatigue, dizziness and HORN in recent days.

## 2018-09-26 NOTE — PROGRESS NOTES
[facility]: MED RECONCILIATION Allergies: Review of patient's allergies indicates no known allergies. Prior to Admission Medications:  
 
Prior to Admission Medications Prescriptions Last Dose Informant Patient Reported? Taking?  
folic acid/multivit-min/lutein (CENTRUM SILVER PO) 9/25/2018 at am  Yes Yes Sig: Take 1 Tab by mouth daily. Facility-Administered Medications: None Thank you, Abdi Menchaca, PharmD, BCPS

## 2018-09-26 NOTE — ED NOTES
Bedside and Verbal shift change report given to Juan Manuel Germain (oncoming nurse) by Natty Alexander (offgoing nurse). Report included the following information SBAR, ED Summary, Intake/Output, MAR and Recent Results.

## 2018-09-26 NOTE — PROGRESS NOTES
9/26/2018 
4:07 PM 
Case management note Met with patient to discuss discharge planning. Confirmed demographics. Patient lives alone in a condo with a couple of steps on walkway. Patient uses a cane. He takes No RX medications. If needed Emmanuel @ [facility]. Patient drives and performs ADL's. Patient does not have PCP, gave list of [facility] PCP's and he was appreciative. Patient has living will and POA is [facility] [place] . Reason for Admission:   pancytopenia RRAT Score:     15 Do you (patient/family) have any concerns for transition/discharge? Lives alone Plan for utilizing home health: Would benefit Likelihood of readmission? Moderate/yellow Transition of Care Plan:      Home with services Care Management Interventions PCP Verified by CM: Yes (no PCP, gave list of  PCP's) Mode of Transport at Discharge: Self Transition of Care Consult (CM Consult): Discharge Planning Current Support Network: Lives Alone Confirm Follow Up Transport: Friends Plan discussed with Pt/Family/Caregiver: Yes Discharge Location Discharge Placement: Home with outpatient services [facility] [place], [address]

## 2018-09-26 NOTE — PROGRESS NOTES
Bedside shift change report given to Ny Rosas (oncoming nurse) by Samy YAÑEZ (offgoing nurse). Report included the following information SBAR, Kardex, Intake/Output, MAR, Recent Results and Cardiac Rhythm NSR.

## 2018-09-26 NOTE — IP AVS SNAPSHOT
[address] [facility] 
 
 
 [address] [place] [address] 
[phone] Patient: Kriss Tinajero MRN: [id number] ZWT:[id number] About your hospitalization You were admitted on:  September 26, 2018 You last received care in the:  [facility] 3 [address] [address] You were discharged on:  September 29, 2018 Why you were hospitalized Your primary diagnosis was:  Pancytopenia (Hcc) Your diagnoses also included:  Cirrhosis (Hcc), Hyponatremia, Elevated Troponin, Abnormal Chest X-Ray Follow-up Information Follow up With Details Comments Contact Info Mango Nuñez MD Go on 10/4/2018 appt at 1:30 pm 301 [facility] [address] [place] [address] 
[phone] Dom Leon MD Call to schedule follow up and review of pending liver tests [address] 
[phone] None   None ([phone]) Patient stated that they have no PCP Your Scheduled Appointments Thursday October 04, 2018  1:30 PM EDT HOSPITAL FOLLOW-UP with Mango Nuñez MD  
[facility] at [facility] 301 [facility], [address] 1900 [facility]  
[phone] Thursday October 04, 2018  2:30 PM EDT Infusion with SS INF4 CH4 <4H  
[address] ([address]) 301 [facility] 70 [id number] [phone] Discharge Orders None A check jovany indicates which time of day the medication should be taken. My Medications START taking these medications Instructions Each Dose to Equal  
 Morning Noon Evening Bedtime  
 carvedilol 3.125 mg tablet Commonly known as:  Jay Jay  Your next dose is:  09/29/2018 pm  
   
 Take 1 Tab by mouth two (2) times daily (with meals) for 30 days. 3.125 mg  
    
   
   
   
  
 pantoprazole 40 mg tablet Commonly known as:  PROTONIX Start taking on:  9/30/2018 Your next dose is:  2018 pm  
   
 Take 1 Tab by mouth Daily (before breakfast) for 30 days. 40 mg CONTINUE taking these medications Instructions Each Dose to Equal  
 Morning Noon Evening Bedtime CENTRUM SILVER PO Your next dose is:  2018 as per routine Take 1 Tab by mouth daily. 1 Tab Where to Get Your Medications Information on where to get these meds will be given to you by the nurse or doctor. ! Ask your nurse or doctor about these medications  
  carvedilol 3.125 mg tablet  
 pantoprazole 40 mg tablet Discharge Instructions HOSPITALIST DISCHARGE INSTRUCTIONS 
 
NAME:             Charmaine Cheadle :  1945 MRN:  [id number] Date:     2018 ADMIT DATE: 2018 DISCHARGE DATE: 2018 PRINCIPAL ADMITTING DIAGNOSIS: 
Pancytopenia ([address] DISCHARGE DIAGNOSES: 
Principal Problem: 
  Pancytopenia ([address] (2018) Cirrhosis ([facility] Hyponatremia (2018) Elevated troponin (2018) Abnormal chest x-ray (2018) MEDICATIONS: 
 
· It is important that medications are taken exactly as they are prescribed on the discharge medication instructions and keep them your  in the bottles provided by the pharmacist.  
· Keep a list of the medication names, dosages, and times to be taken at all times. · Do not take other medications without consulting your doctor. Recommended diet:  Regular Diet Recommended activity: Activity as tolerated Post discharge care: 
 
Notify follow up health care provider or return to the emergency department if you cannot get hold of your doctor if you feel worse or experience symptoms similar to those that brought you to hospital 
 
Follow-up Information Follow up With Details Comments Contact Info Darius Salgado MD Go on 10/4/2018 appt at 1:30 pm 301 [facility]. [address] [place] [address] [phone] Dom Leon MD Call to schedule follow up and review of pending liver tests Chapin Jones [address] [place] [address] 
[phone] Information obtained by : 
I understand that if any problems occur once I am at home I am to contact my physician and I understand and acknowledge receipt of the instructions indicated above. Physician's or R.N.'s Signature                                                                  Date/Time Patient or Representative Signature                                                          Date/Time Bone Marrow Aspiration and Biopsy: What to Expect at [facility] Your Recovery The biopsy site may feel sore for several days. It can help to walk, take pain medicine, and put ice packs on the site. You will probably be able to return to work and your usual activities the day after the procedure. Your doctor or nurse will call you with the results of your test. 
This care sheet gives you a general idea about how long it will take for you to recover. But each person recovers at a different pace. Follow the steps below to get better as quickly as possible. How can you care for yourself at home? Activity 
  · Rest when you feel tired. Getting enough sleep will help you recover.  
  · You may drive when you are no longer taking pain pills and can quickly move your foot from the gas pedal to the brake. You must also be able to sit comfortably for a long period of time, even if you do not plan to go far. You might get caught in traffic.  
  · Most people are able to return to work the day after the procedure. Medicines   · Your doctor will tell you if and when you can restart your medicines. He or she will also give you instructions about taking any new medicines.  
  · If you take blood thinners, such as warfarin (Coumadin), clopidogrel (Plavix), or aspirin, be sure to talk to your doctor. He or she will tell you if and when to start taking those medicines again. Make sure that you understand exactly what your doctor wants you to do.  
  · Be safe with medicines. Take pain medicines exactly as directed. ¨ If the doctor gave you a prescription medicine for pain, take it as prescribed. ¨ If you are not taking a prescription pain medicine, take an over-the-counter medicine such as acetaminophen (Tylenol), ibuprofen (Advil, Motrin), or naproxen (Aleve). Read and follow all instructions on the label. ¨ Do not take two or more pain medicines at the same time unless the doctor told you to. Many pain medicines have acetaminophen, which is Tylenol. Too much acetaminophen (Tylenol) can be harmful.  
  · If you think your pain medicine is making you sick to your stomach: 
¨ Take your medicine after meals (unless your doctor has told you not to). ¨ Ask your doctor for a different pain medicine.  
  · If your doctor prescribed antibiotics, take them as directed. Do not stop taking them just because you feel better.  
 Ice 
  · Put ice or a cold pack on the biopsy site for 10 to 20 minutes at a time. Put a thin cloth between the ice and your skin. Follow-up care is a key part of your treatment and safety. Be sure to make and go to all appointments, and call your doctor if you are having problems. It's also a good idea to know your test results and keep a list of the medicines you take. When should you call for help? Call 911 anytime you think you may need emergency care. For example, call if: 
  · You passed out (lost consciousness).  
 Call your doctor now or seek immediate medical care if: 
  · You have signs of infection, such as: ¨ Increased pain, swelling, warmth, or redness. ¨ Red streaks leading from the biopsy site. ¨ Pus draining from the biopsy site. ¨ Swollen lymph nodes in your neck, armpits, or groin. ¨ A fever.  
 Watch closely for any changes in your health, and be sure to contact your doctor if: 
  · You are not getting better as expected. Where can you learn more? Go to http://xiang-lucho.info/. Enter E148 in the search box to learn more about \"Bone Marrow Aspiration and Biopsy: What to Expect at Home. \" Current as of: September 10, 2017 Content Version: 11.7 © 0970-8731 "[employer]. Care instructions adapted under license by [employer] (which disclaims liability or warranty for this information). If you have questions about a medical condition or this instruction, always ask your healthcare professional. [address] any warranty or liability for your use of this information. [employer] Announcement We are excited to announce that we are making your provider's discharge notes available to you in [employer]. You will see these notes when they are completed and signed by the physician that discharged you from your recent hospital stay. If you have any questions or concerns about any information you see in [employer], please call the Health Information Department where you were seen or reach out to your Primary Care Provider for more information about your plan of care. Introducing hospitals & HEALTH SERVICES! Hazel Drew introduces [employer] patient portal. Now you can access parts of your medical record, email your doctor's office, and request medication refills online. 1. In your internet browser, go to [email or web address] 2. Click on the First Time User? Click Here link in the Sign In box. You will see the New Member Sign Up page. 3. Enter your [employer] Access Code exactly as it appears below.  You will not need to use this code after youve completed the sign-up process. If you do not sign up before the expiration date, you must request a new code. · [employer] Access Code: [email or web address] Expires: 12/25/2018  9:46 AM 
 
4. Enter the last four digits of your Social Security Number (xxxx) and Date of Birth (mm/dd/yyyy) as indicated and click Submit. You will be taken to the next sign-up page. 5. Create a [employer] ID. This will be your [employer] login ID and cannot be changed, so think of one that is secure and easy to remember. 6. Create a [employer] password. You can change your password at any time. 7. Enter your Password Reset Question and Answer. This can be used at a later time if you forget your password. 8. Enter your e-mail address. You will receive e-mail notification when new information is available in [address] 9. Click Sign Up. You can now view and download portions of your medical record. 10. Click the Download Summary menu link to download a portable copy of your medical information. If you have questions, please visit the Frequently Asked Questions section of the [employer] website. Remember, [employer] is NOT to be used for urgent needs. For medical emergencies, dial 911. Now available from your iPhone and Android! Introducing Judson Tran As a Valanastacia Grimaldo patient, I wanted to make you aware of our electronic visit tool called [employer]son Trevonrickshiva. Francisco Javier Grimaldo 24/7 allows you to connect within minutes with a medical provider 24 hours a day, seven days a week via a mobile device or tablet or logging into a secure website from your computer. You can access Judson Chelsea from anywhere in the [place].  
 
A virtual visit might be right for you when you have a simple condition and feel like you just dont want to get out of bed, or cant get away from work for an appointment, when your regular Vallerremy Lev provider is not available (evenings, weekends or holidays), or when youre out of town and need minor care. Electronic visits cost only $49 and if the [address] [place] [address] 24/7 provider determines a prescription is needed to treat your condition, one can be electronically transmitted to a nearby pharmacy*. Please take a moment to enroll today if you have not already done so. The enrollment process is free and takes just a few minutes. To enroll, please download the [address] [place] [address] 24/7 lay to your tablet or phone, or visit [email or web address] to enroll on your computer. And, as an [address] [place] [address] patient with a [employer] account, the results of your visits will be scanned into your electronic medical record and your primary care provider will be able to view the scanned results. We urge you to continue to see your regular [address] [place] [address] provider for your ongoing medical care. And while your primary care provider may not be the one available when you seek a [employer] virtual visit, the peace of mind you get from getting a real diagnosis real time can be priceless. For more information on [employer], view our Frequently Asked Questions (FAQs) at [email or web address]. Sincerely, 
 
Mark Meehan MD 
Chief Medical Officer [address] *:  certain medications cannot be prescribed via [employer] Unresulted Labs-Please follow up with your PCP about these lab tests Order Current Status HEMOCHROMATOSIS MUTATION In process Providers Seen During Your Hospitalization Provider Specialty Primary office phone Huyen Hart MD Emergency Medicine [phone] Bret Roche MD Internal Medicine [phone] Yovani Galdamez MD Internal Medicine [phone] Immunizations Administered for This Admission Name Date Influenza Vaccine (Quad) PF 9/29/2018 Your Primary Care Physician (PCP) Primary Care Physician Office Phone Office Fax NONE ** None ** ** None ** You are allergic to the following No active allergies Recent Documentation Height Weight BMI Smoking Status 1.803 m 77.1 kg 23.71 kg/m2 Former Smoker Emergency Contacts Name Discharge Info Relation Home Work Mobile SurjitDheeraj D DISCHARGE CAREGIVER [3] Friend [5]   [phone] Patient Belongings The following personal items are in your possession at time of discharge: 
  Dental Appliances: Lowers, Uppers, Other (comment) (Patient left in room)  Visual Aid: Glasses, At bedside      Home Medications: None   Jewelry: None  Clothing: At bedside, Shirt, Pants    Other Valuables: Cell Phone, Mapp 1923, Personal toiletries, Money (comment) (100) Please provide this summary of care documentation to your next provider. Signatures-by signing, you are acknowledging that this After Visit Summary has been reviewed with you and you have received a copy. Patient Signature:  ____________________________________________________________ Date:  ____________________________________________________________  
  
Karyn Macias Provider Signature:  ____________________________________________________________ Date:  ____________________________________________________________

## 2018-09-26 NOTE — H&P
Admission History and Physical 
 
 
NAME:  Jay Jacobs :   1945 MRN:  [id number] PCP:  None  
 
Date/Time:  2018 Assessment/Plan:   
  
Pancytopenia ([facility] (2018): Suspect this may be due to bone marrow suppression and cirrhosis and not due to blood loss. Has been noted to have low WBC and platelets in past.  Currently symptomatic with anemia. -- agree with transfusion of 2 unit prbc 
 -- check iron, b12, folate, retic, ldh, hapto, pt, ptt prior to transfusion -- hematology consult Cirrhosis ([facility] ():  Prior viral hepatitis panel was negative. Patient has not had follow up since hospitalization. -- GI consult -- will defer additional serologic testing to GI Nausea:  Has nausea with eating and early satiety. -- GI consult as above 
 -- add PPI incase of varices Hyponatremia (2018): May be from poor po intake. -- check TSH 
 -- urine sodium, osm, serum osm 
 -- no IVF due to possible edema on CXR Elevated troponin (2018): Denies chest pain. Has HORN but this is due to anemia, likely. -- trend troponin -- check echo 
 -- cardiology consult Abnormal chest x-ray (2018):  Despite CXR with mild edema, patient does not have peripheral edema or rales. -- no IVF 
 -- hold on diuresis for now due to mildly low BP Subjective: CHIEF COMPLAINT:  Lightheaded and weak. HISTORY OF PRESENT ILLNESS:    
Mr. Leandra Yang is a 74 y.o.  male who is admitted with Pancytopenia ([facility] Mr. Leandra Yang presented to the Emergency Department today complaining of increasing weakness and lightheadedness for the past few days. Symptoms worse when standing and trying to walk. Legs feel weak. Then feels like he might pass out. Has had sob with activity. No chest pains. No melena or blood in stool. Last BM this AM was brown. Does report poor appetite and nausea when eating, but no vomiting.   Denies blood in urine. Does not recall being told about liver disease on last admission. Past Medical History:  
Diagnosis Date  Cir[facility]osis ([facility]  Pancytopenia ([facility]  Patella fracture 2007 Right knee Past Surgical History:  
Procedure Laterality Date  HX APPENDECTOMY  HX HERNIA REPAIR Bilateral   
 HX RETINAL DETACHMENT REPAIR Social History Substance Use Topics  Smoking status: Former Smoker  Smokeless tobacco: Not on file  Alcohol use Yes Comment: rare Family History Problem Relation Age of Onset  Anemia Mother  Cancer Father Lung No Known Allergies Prior to Admission medications Medication Sig Start Date End Date Taking? Authorizing Provider HYDROcodone-acetaminophen (NORCO) 5-325 mg per tablet Take 1 Tab by mouth every four (4) hours as needed. Max Daily Amount: 6 Tabs. For moderate pain 4/23/16   Danisha Garza MD  
MV,MINERALS/FA/LYCOPENE/GINKGO (ONE-A-DAY MEN'S 50+ ADVANTAGE PO) Take 1 Tab by mouth daily. Historical Provider Review of Systems: 
(bold if positive, if negative) Gen:  fatigueEyes:  ENT:  CVS:  dizzinessPulm:  dyspneaGI:  nausea :   
MS:  Skin:  Hem:  Renal:   
Neuro:  weakness Objective: VITALS:   
Vital signs reviewed; most recent are: 
 
Visit Vitals  /57  Pulse 80  Temp 98.1 °F (36.7 °C)  Resp 19  
 Ht 5' 11\" (1.803 m)  Wt 77.1 kg (170 lb)  SpO2 96%  BMI 23.71 kg/m2 SpO2 Readings from Last 6 Encounters:  
09/26/18 96% 04/23/16 97% No intake or output data in the 24 hours ending 09/26/18 1240 Exam:  
 
Physical Exam: 
 
Gen:  Well-developed, well-nourished, in no acute distress HEENT:  Pale conjunctivae, PERRL, hearing intact to voice, moist mucous membranes Neck:  Supple Resp:  No accessory muscle use, clear breath sounds without wheezes rales or rhonchi Card:  No murmurs, normal S1, S2 without thrills or peripheral edema Abd:  Soft, non-tender, non-distended, normoactive bowel sounds are present Musc:  No cyanosis Skin:  No rashes or ulcers, skin turgor is good Neuro:  Cranial nerves 3-12 are grossly intact,  strength is 5/5 bilaterally, dorsi / plantarflexion strength is 5/5 bilaterally, follows commands appropriately Psych:  Alert with good insight. Oriented to person, place, and time Labs: 
 
Recent Labs  
   09/26/18 
 1015 WBC  2.5* HGB  5.1*  
HCT  14.6* PLT  78* Recent Labs  
   09/26/18 
 1015 NA  131* K  4.0  
CL  99  
CO2  25 GLU  124* BUN  11  
CREA  0.79 CA  8.3* ALB  2.6* TBILI  0.7 SGOT  35 ALT  32 No results found for: [address] No results for input(s): PH, PCO2, PO2, HCO3, FIO2 in the last 72 hours. No results for input(s): INR in the last 72 hours. No lab exists for component: INREXT Chest Xray:  Small bilateral effusions with underlying atelectasis and mild pulmonary edema. EKG reviewed:   Initial EKG: No acute changes suggestive of ischemia Medical records reviewed in preparation for this admission: Old medical records. Surrogate decision maker:  Qing Schmidt. Total time spent in care of this patient: 79 Minutes Care Plan discussed with: Patient Discussed:  Care Plan Prophylaxis:  SCD's 
 
Probable Disposition:  Home w/Family 
        
___________________________________________________ Attending Physician: Bianca Zhu MD

## 2018-09-27 NOTE — PROGRESS NOTES
Pt received to [facility] [place], confirmed NKA and NPO since breakfast.  IV flushed/patent. Vitals stable, Mallampati 2 with 3 fingers, dentures were removed prior to arrival.  Dr Brianna Corona here for consent for bone marrow bx.   
 
1330--To CT, prepped on table. Timeout at 1350, start time 1351. Pt tolerated procedure well, total of 3 mg versed and 75 mcg fentanyl given for procedure. Stop time 1400. Clean dressing applied to back, returned to [facility] in stable condition. Tolerating PO. Report called to OTILIO Barillas.

## 2018-09-27 NOTE — PROGRESS NOTES
Physical Therapy Note: 
Chart reviewed and spoke with nursing. Patient is currently off the floor for a procedure. Will continue to follow up with the patient once available and appropriate. Thank you.

## 2018-09-27 NOTE — PROGRESS NOTES
Medical Progress Note NAME: Damian Flannery :  1945 MRM:  [id number] Date/Time: 2018  7:09 AM 
 
  
Assessment / Plan:  
 
Pancytopenia ([address] (2018): Suspect this may be due to bone marrow suppression and cirrhosis and not due to blood loss. Has been noted to have low WBC and platelets in past.  No evidence of iron, b12, or folate def. Retic low. S/p 2 unit prbc. No visible bleeding noted. -- monitor HGB 
 -- consideration of BM bx 
  
Cirrhosis (HCC) ():  Prior viral hepatitis panel was negative. Patient has not had follow up since hospitalization. -- await US 
 -- await additional serologies 
  
Nausea:  Has nausea with eating and early satiety. Resolved overnight and ate well last night. -- continue PPI incase of varices 
  
Hyponatremia (2018): May be from poor po intake. Resolved overnight. TSH elevated. -- check free T4 
  
Elevated troponin (2018): Denies chest pain. Has HORN but this is due to anemia, likely. Appreciate cards eval.  Trop flat overnight. -- await echo 
 -- further testing as outpatient **6:23 PM 
Type 2 MI due to anemia. EF noted to be 45% by echo with areas of hypokinesis. Will add low dose bb with parameters and await further cardiology recs. 
  
Abnormal chest x-ray (2018): Denies sob overnight despite transfusion. No rales on exam. 
 -- await echo Subjective: Chief Complaint:  Feels much better. No dyspnea. Denies lightheadedness. No bleeding noted. ROS: 
(bold if positive, if negative) Objective:  
 
 
Vitals:  
 
 
  
Last 24hrs VS reviewed since prior progress note. Most recent are: 
 
Visit Vitals  /60  Pulse 93  Temp 98.9 °F (37.2 °C)  Resp 20  
 Ht 5' 11\" (1.803 m)  Wt 77.1 kg (170 lb)  SpO2 97%  BMI 23.71 kg/m2 SpO2 Readings from Last 6 Encounters:  
18 97% 16 97% Intake/Output Summary (Last 24 hours) at 18 9179 Last data filed at 09/27/18 0209 Gross per 24 hour Intake           1355.1 ml Output             1050 ml Net            305.1 ml Exam:  
 
Physical Exam: 
 
Gen:  Well-developed, well-nourished, in no acute distress HEENT:  Pale conjunctivae, hearing intact to voice, moist mucous membranes Resp:  No accessory muscle use, clear breath sounds without wheezes rales or rhonchi 
Card:  No murmurs, normal S1, S2 without thrills or peripheral edema Abd:  Soft, non-tender, non-distended, normoactive bowel sounds are present Skin:  No rashes Neuro: Face symmetric, tongue midline, speech fluent,  strength is 5/5 bilaterally, follows commands appropriately Psych:  Good insight, oriented to person, place and time, alert Telemetry reviewed:   normal sinus rhythm, brief atrial tach? Medications Reviewed: (see below) Lab Data Reviewed: (see below) 
 
______________________________________________________________________ Medications:  
 
Current Facility-Administered Medications Medication Dose Route Frequency  magnesium sulfate 2 g/50 ml IVPB (premix or compounded)  2 g IntraVENous ONCE  
 0.9% sodium chloride infusion 250 mL  250 mL IntraVENous PRN  
 sodium chloride (NS) flush 5-10 mL  5-10 mL IntraVENous Q8H  
 sodium chloride (NS) flush 5-10 mL  5-10 mL IntraVENous PRN  
 acetaminophen (TYLENOL) tablet 650 mg  650 mg Oral Q4H PRN  pantoprazole (PROTONIX) tablet 40 mg  40 mg Oral ACB  ondansetron (ZOFRAN) injection 4 mg  4 mg IntraVENous Q4H PRN  
 influenza vaccine 2018-19 (6 mos+)(PF) (FLUARIX QUAD/FLULAVAL QUAD) injection 0.5 mL  0.5 mL IntraMUSCular PRIOR TO DISCHARGE  
 0.9% sodium chloride infusion 250 mL  250 mL IntraVENous PRN Lab Review:  
 
Recent Labs  
   09/27/18 
 0414  09/26/18 
 2144  09/26/18 
 1015 WBC  3.0*   --   2.5* HGB  7.5*  6.3*  5.1*  
HCT  21.2*   --   14.6* PLT  63*   --   78* Recent Labs  
   09/27/18 
 0414  09/26/18 1015  
NA  137  131* K  4.1  4.0  
CL  103  99 CO2  26  25 GLU  99  124* BUN  12  11 CREA  0.62*  0.79 CA  8.0*  8.3*  
MG  1.5*   --   
ALB  2.4*  2.6* TBILI  2.0*  0.7 SGOT  43*  35 ALT  32  32 INR   --   1.3* No results found for: Mendy Jara Total time spent with patient: 30 Minutes Care Plan discussed with: Patient Discussed:  Care Plan Prophylaxis:  SCD's Disposition:  Home w/Family 
        
___________________________________________________ Attending Physician: Mace Buerger, MD

## 2018-09-27 NOTE — CONSULTS
Gastroenterology Consultation Note Admit Date: 9/26/2018 Consult Date: 9/27/2018 I greatly appreciate your asking me to see Dileep Bridges, thank you very much for the opportunity to participate in his care. Narrative Assessment and Plan · Cirrhosis - noted on imaging initially in 2016, etiology unclear (does have a history of daily alcohol use) · US without evidence of HCC, check AFP - if also normal no additional imaging for [facility] [address] screening · Hepatitis panel negative, additional liver serologies ordered · Check hep B surface Ab and Hep A total Ab - if not immune then recommend immunizations · Variceal screening - EGD 9/28/18 · Pancytopenia · Low suspicion for GI blood loss · Hematology following and considering bone marrow biopsy · Nausea - resolved · Overdue for colorectal cancer screening · Colonoscopy 9/28/18 Plan 
- clear liquids for remains of day 
- start bowel prep 
- NPO after midnight Medina Jackson. Alex Quintanilla, [address] [place] [address] 
[phone] office 
[phone] voicemail I have personally reviewed the history and independently examined the patient. I have reviewed the chart and agree with the documentation recorded by the Mid Level Provider, including the assessment, treatment plan, and disposition. ASSESSMENT AND PLAN: 
Pancytopenia, cirrhosis, and iron excess. For EGDcolonoscopy tomorrow. Check HFE gene. Jessie Lozano MD 
 
 
 
Subjective: Chief Complaint: fatigue History of Present Illness: Dileep Bridges is a 74 y.o. male who presented to ED with complaints of fatigue, weakness, and lightheadedness. In ED found to have pancytopenia and admitted for further evaluation. Cirrhosis also diagnosed on imaging (also noted on US in 2016 but patient never followed up with GI). Patient states that he was in his usual state of health until onset of fatigue and weakness. Did notice some nausea with meals but no vomiting.  Denies associated abdominal pain, change in bowel habits or weight loss. No know family history of liver disease. Does admit to daily alcohol use up until 6 months ago. He would have \"several\" drinks per day. No known family history of colon cancer or colon polyps. No prior colonoscopy. Currently patient states that he is feeling much better since blood transfusion. Also states that nausea he was experiencing has resolved. He is tolerating diet without difficulty. PCP: 
None Past Medical History:  
Diagnosis Date  Cirrhosis ([facility] [address]  Pancytopenia ([facility] [address]  Patella fracture 2007 Right knee Past Surgical History:  
Procedure Laterality Date  HX APPENDECTOMY  HX HERNIA REPAIR Bilateral   
 HX RETINAL DETACHMENT REPAIR Social History Substance Use Topics  Smoking status: Former Smoker  Smokeless tobacco: Not on file  Alcohol use Yes Comment: rare Family History Problem Relation Age of Onset  Anemia Mother  Cancer Father Lung No Known Allergies Home Medications: 
Prior to Admission Medications Prescriptions Last Dose Informant Patient Reported? Taking?  
folic acid/multivit-min/lutein (CENTRUM SILVER PO) 9/25/2018 at am  Yes Yes Sig: Take 1 Tab by mouth daily. Facility-Administered Medications: None Hospital Medications: 
Current Facility-Administered Medications Medication Dose Route Frequency  magnesium sulfate 2 g/50 ml IVPB (premix or compounded)  2 g IntraVENous ONCE  
 0.9% sodium chloride infusion 250 mL  250 mL IntraVENous PRN  
 sodium chloride (NS) flush 5-10 mL  5-10 mL IntraVENous Q8H  
 sodium chloride (NS) flush 5-10 mL  5-10 mL IntraVENous PRN  
 acetaminophen (TYLENOL) tablet 650 mg  650 mg Oral Q4H PRN  pantoprazole (PROTONIX) tablet 40 mg  40 mg Oral ACB  ondansetron (ZOFRAN) injection 4 mg  4 mg IntraVENous Q4H PRN  
 influenza vaccine 2018-19 (6 mos+)(PF) (FLUARIX QUAD/FLULAVAL QUAD) injection 0.5 mL  0.5 mL IntraMUSCular PRIOR TO DISCHARGE  
 0.9% sodium chloride infusion 250 mL  250 mL IntraVENous PRN Review of Systems: Admission ROS by Anna Sarmiento MD from 9/26/2018 were reviewed with the patient and changes (other than per HPI) include: none Objective:  
 
Physical Exam: 
Visit Vitals  /59 (BP 1 Location: Right arm, BP Patient Position: At rest;Supine)  Pulse 77  Temp 98.2 °F (36.8 °C)  Resp 24  
 Ht 5' 11\" (1.803 m)  Wt 77.1 kg (170 lb)  SpO2 93%  BMI 23.71 kg/m2 SpO2 Readings from Last 6 Encounters:  
09/27/18 93% 04/23/16 97% Intake/Output Summary (Last 24 hours) at 09/27/18 1105 Last data filed at 09/27/18 1016 Gross per 24 hour Intake           1835.1 ml Output             1500 ml Net            335.1 ml  
  
General: no distress, comfortable Skin:  No rash or palpable dermatologic mass lesions HEENT: Pupils equal, sclera anicteric, oropharynx with no gross lesions Cardiovascular: No abnormal audible heart sounds, well perfused, no edema Respiratory:  No abnormal audible breath sounds, normal respiratory effort, no throacic deformity GI:  Bowel sounds present, soft, nondistended, nontender Musculoskeletal:  No skeletal deformity nor acute arthritis noted. Neurological:  Motor and sensory function intact in upper extremeties Psychiatric:  Normal affect, memory intact, appears to have insight into current illness Laboratory:   
Recent Results (from the past 24 hour(s)) URINALYSIS W/MICROSCOPIC Collection Time: 09/26/18 11:11 AM  
Result Value Ref Range Color DARK YELLOW Appearance CLOUDY (A) CLEAR Specific gravity 1.024 1.003 - 1.030    
 pH (UA) 5.5 5.0 - 8.0 Protein TRACE (A) NEG mg/dL Glucose NEGATIVE  NEG mg/dL Ketone TRACE (A) NEG mg/dL Blood NEGATIVE  NEG Urobilinogen 1.0 0.2 - 1.0 EU/dL Nitrites NEGATIVE  NEG  Leukocyte Esterase SMALL (A) NEG    
 WBC 0-4 0 - 4 /hpf  
 RBC 0-5 0 - 5 /hpf Epithelial cells FEW FEW /lpf Bacteria NEGATIVE  NEG /hpf Mucus 1+ (A) NEG /lpf Amorphous Crystals FEW (A) NEG    
 CA Oxalate crystals 3+ (A) NEG  
OCCULT BLOOD, STOOL Collection Time: 09/26/18 11:11 AM  
Result Value Ref Range Occult blood, stool NEGATIVE  NEG    
BILIRUBIN, CONFIRM Collection Time: 09/26/18 11:11 AM  
Result Value Ref Range Bilirubin UA, confirm POSITIVE (A) NEG    
TROPONIN I Collection Time: 09/26/18 12:58 PM  
Result Value Ref Range Troponin-I, Qt. 0.20 (H) <0.05 ng/mL OSMOLALITY, UR Collection Time: 09/26/18 12:59 PM  
Result Value Ref Range Osmolality,urine 623 MOSM/kg H2O  
SODIUM, UR, RANDOM Collection Time: 09/26/18 12:59 PM  
Result Value Ref Range Sodium,urine random 13 MMOL/L FERRITIN Collection Time: 09/26/18  1:00 PM  
Result Value Ref Range Ferritin 512 (H) 26 - 388 NG/ML  
FOLATE Collection Time: 09/26/18  1:00 PM  
Result Value Ref Range Folate 19.1 5.0 - 21.0 ng/mL VITAMIN B12 Collection Time: 09/26/18  1:00 PM  
Result Value Ref Range Vitamin B12 >2000 (H) 193 - 986 pg/mL OSMOLALITY, SERUM/PLASMA Collection Time: 09/26/18  1:00 PM  
Result Value Ref Range Osmolality, serum/plasma 281 mOsm/kg H2O  
TROPONIN I Collection Time: 09/26/18  9:44 PM  
Result Value Ref Range Troponin-I, Qt. 0.24 (H) <0.05 ng/mL HEMOGLOBIN Collection Time: 09/26/18  9:44 PM  
Result Value Ref Range HGB 6.3 (L) 12.1 - 17.0 g/dL TROPONIN I Collection Time: 09/27/18  4:14 AM  
Result Value Ref Range Troponin-I, Qt. 0.18 (H) <0.05 ng/mL CBC WITH AUTOMATED DIFF Collection Time: 09/27/18  4:14 AM  
Result Value Ref Range WBC 3.0 (L) 4.1 - 11.1 K/uL  
 RBC 2.20 (L) 4.10 - 5.70 M/uL HGB 7.5 (L) 12.1 - 17.0 g/dL HCT 21.2 (L) 36.6 - 50.3 % MCV 96.4 80.0 - 99.0 FL  
 MCH 34.1 (H) 26.0 - 34.0 PG  
 MCHC 35.4 30.0 - 36.5 g/dL RDW 20.2 (H) 11.5 - 14.5 % PLATELET 63 (L) 272 - 400 K/uL MPV 13.0 (H) 8.9 - 12.9 FL  
 NRBC 0.0 0  WBC ABSOLUTE NRBC 0.00 0.00 - 0.01 K/uL NEUTROPHILS 52 32 - 75 % LYMPHOCYTES 33 12 - 49 % MONOCYTES 14 (H) 5 - 13 % EOSINOPHILS 1 0 - 7 % BASOPHILS 0 0 - 1 % IMMATURE GRANULOCYTES 0 0.0 - 0.5 % ABS. NEUTROPHILS 1.6 (L) 1.8 - 8.0 K/UL  
 ABS. LYMPHOCYTES 1.0 0.8 - 3.5 K/UL  
 ABS. MONOCYTES 0.4 0.0 - 1.0 K/UL  
 ABS. EOSINOPHILS 0.0 0.0 - 0.4 K/UL  
 ABS. BASOPHILS 0.0 0.0 - 0.1 K/UL  
 ABS. IMM. GRANS. 0.0 0.00 - 0.04 K/UL  
 DF AUTOMATED METABOLIC PANEL, COMPREHENSIVE Collection Time: 09/27/18  4:14 AM  
Result Value Ref Range Sodium 137 136 - 145 mmol/L Potassium 4.1 3.5 - 5.1 mmol/L Chloride 103 97 - 108 mmol/L  
 CO2 26 21 - 32 mmol/L Anion gap 8 5 - 15 mmol/L Glucose 99 65 - 100 mg/dL BUN 12 6 - 20 MG/DL Creatinine 0.62 (L) 0.70 - 1.30 MG/DL  
 BUN/Creatinine ratio 19 12 - 20 GFR est AA >60 >60 ml/min/1.73m2 GFR est non-AA >60 >60 ml/min/1.73m2 Calcium 8.0 (L) 8.5 - 10.1 MG/DL Bilirubin, total 2.0 (H) 0.2 - 1.0 MG/DL  
 ALT (SGPT) 32 12 - 78 U/L  
 AST (SGOT) 43 (H) 15 - 37 U/L Alk. phosphatase 223 (H) 45 - 117 U/L Protein, total 5.8 (L) 6.4 - 8.2 g/dL Albumin 2.4 (L) 3.5 - 5.0 g/dL Globulin 3.4 2.0 - 4.0 g/dL A-G Ratio 0.7 (L) 1.1 - 2.2 MAGNESIUM Collection Time: 09/27/18  4:14 AM  
Result Value Ref Range Magnesium 1.5 (L) 1.6 - 2.4 mg/dL TSH 3RD GENERATION Collection Time: 09/27/18  4:14 AM  
Result Value Ref Range TSH 4.45 (H) 0.36 - 3.74 uIU/mL T4, FREE Collection Time: 09/27/18  4:14 AM  
Result Value Ref Range T4, Free 1.3 0.8 - 1.5 NG/DL FERRITIN Collection Time: 09/27/18  4:14 AM  
Result Value Ref Range Ferritin 537 (H) 26 - 388 NG/ML  
IRON PROFILE Collection Time: 09/27/18  4:14 AM  
Result Value Ref Range Iron 243 (H) 35 - 150 ug/dL TIBC 252 250 - 450 ug/dL Iron % saturation 96 (H) 20 - 50 % Assessment/Plan:  
 
Principal Problem: 
  Pancytopenia ([facility] [address] (9/26/2018) Active Problems: 
  Cirrhosis ([facility] () Hyponatremia (9/26/2018) Elevated troponin (9/26/2018) Abnormal chest x-ray (9/26/2018) See above narrative for full detail.  
 
Moishe Osgood, PA-C 
09/27/18 
11:05 AM

## 2018-09-27 NOTE — ED NOTES
Spoke with Dr. Herlene Gottron in regard to lab work. Verbal order received to transfused an addition unit of blood. Order placed. Will continue to monitor troponin

## 2018-09-27 NOTE — ROUTINE PROCESS
TRANSFER - IN REPORT: 
 
Verbal report received from Milind Michelle RN (name) on Carlos Foster  being received from ED (unit) for routine progression of care Report consisted of patients Situation, Background, Assessment and  
Recommendations(SBAR). Information from the following report(s) SBAR, Kardex, Intake/Output, MAR, Accordion and Recent Results was reviewed with the receiving nurse. Opportunity for questions and clarification was provided. Assessment completed upon patients arrival to unit and care assumed. Bedside and Verbal shift change report given to Nargis Maurice RN (oncoming nurse) by Kalli Forte RN (offgoing nurse). Report included the following information SBAR, Kardex, Intake/Output, MAR, Accordion and Recent Results.

## 2018-09-27 NOTE — PROGRESS NOTES
[facility] [place] at [address], [facility] [address] W: [phone]  F: [phone] Reason for Visit:  
Shira King is a 74 y.o. male who is seen in consultation at the request of Dr. Jennifer Fraser  for evaluation of pancytopenia. Hematology / Oncology Treatment History: N/A History of Present Illness:  
Mr Mirela Agarwal was admitted on 9/26/2018 from the ED when he presented with c/o weakness and fatigue x 2 days associated with decreased appetite and SOB. Denies any change in bowels or color change. ED labs WBC 2.5 ANC 1.7  Hgb 5.1 .6 Therefore was admitted for further eval and management. Reports was so SOB and weak over the last couple of  days that he had to call the EMS. Mr Mirela Agarwal states he is unaware of any abnormal blood work from the past. Was told in 2016 that he had something wrong with his liver but he has never had any follow up. He does not currently have a PCP. Denies fever, night sweats or weight loss. Denies having had colonoscopies. ETOH use: drank 2 drinks/day x 40 yrs: quit 6 months ago Smoking Hx: 1 ppd x 40 yrs ; quit 6 months ago due to cost of cigarettes Family Hx of cancer: father had lung cancer No siblings or children Mother had to have B12 injections. Retired : enjoys traveling and reading Interval History:  
Feeling stronger today; ate good breakfast; deneis pain or SOB. Anxious about procedure tomorrow. No family at bedside. Past Medical History:  
Diagnosis Date  Cirrhosis ([address]  Pancytopenia ([address]  Patella fracture 2007 Right knee Past Surgical History:  
Procedure Laterality Date  HX APPENDECTOMY  HX HERNIA REPAIR Bilateral   
 HX RETINAL DETACHMENT REPAIR Social History Substance Use Topics  Smoking status: Former Smoker  Smokeless tobacco: Not on file  Alcohol use Yes Comment: rare Family History Problem Relation Age of Onset  Anemia Mother  Cancer Father Lung Current Facility-Administered Medications Medication Dose Route Frequency  magnesium sulfate 2 g/50 ml IVPB (premix or compounded)  2 g IntraVENous ONCE  
 0.9% sodium chloride infusion 250 mL  250 mL IntraVENous PRN  
 sodium chloride (NS) flush 5-10 mL  5-10 mL IntraVENous Q8H  
 sodium chloride (NS) flush 5-10 mL  5-10 mL IntraVENous PRN  
 acetaminophen (TYLENOL) tablet 650 mg  650 mg Oral Q4H PRN  pantoprazole (PROTONIX) tablet 40 mg  40 mg Oral ACB  ondansetron (ZOFRAN) injection 4 mg  4 mg IntraVENous Q4H PRN  
 influenza vaccine 2018-19 (6 mos+)(PF) (FLUARIX QUAD/FLULAVAL QUAD) injection 0.5 mL  0.5 mL IntraMUSCular PRIOR TO DISCHARGE  
 0.9% sodium chloride infusion 250 mL  250 mL IntraVENous PRN No Known Allergies Review of Systems: A complete review of systems was obtained, negative except as described above. Physical Exam:  
 
Visit Vitals  /59 (BP 1 Location: Right arm, BP Patient Position: At rest;Supine)  Pulse 77  Temp 98.2 °F (36.8 °C)  Resp 24  
 Ht 5' 11\" (1.803 m)  Wt 77.1 kg (170 lb)  SpO2 93%  BMI 23.71 kg/m2 ECOG PS: 1 General: No distress Eyes: PERRLA, anicteric sclerae HENT: Atraumatic with normal appearance of ears and nose; OP clear Neck: Supple; no thyromegaly Respiratory: CTAB, normal respiratory effort CV: Normal rate, regular rhythm, no murmurs, no peripheral edema GI: Soft, nontender, nondistended, no masses, no hepatomegaly, no splenomegaly MS: Digits without clubbing or cyanosis. Skin: No rashes, ecchymoses, or petechiae. Normal temperature, turgor, and texture. Neuro/Psych: Alert, oriented, appropriate affect, normal judgment/insight Results:  
 
Lab Results Component Value Date/Time  WBC 3.0 (L) 09/27/2018 04:14 AM  
 HGB 7.5 (L) 09/27/2018 04:14 AM  
 HCT 21.2 (L) 09/27/2018 04:14 AM  
 PLATELET 63 (L) 02/69/8101 04:14 AM  
 MCV 96.4 09/27/2018 04:14 AM  
 ABS. NEUTROPHILS 1.6 (L) 09/27/2018 04:14 AM  
 
Lab Results Component Value Date/Time Sodium 137 09/27/2018 04:14 AM  
 Potassium 4.1 09/27/2018 04:14 AM  
 Chloride 103 09/27/2018 04:14 AM  
 CO2 26 09/27/2018 04:14 AM  
 Glucose 99 09/27/2018 04:14 AM  
 BUN 12 09/27/2018 04:14 AM  
 Creatinine 0.62 (L) 09/27/2018 04:14 AM  
 GFR est AA >60 09/27/2018 04:14 AM  
 GFR est non-AA >60 09/27/2018 04:14 AM  
 Calcium 8.0 (L) 09/27/2018 04:14 AM  
 
Lab Results Component Value Date/Time Bilirubin, total 2.0 (H) 09/27/2018 04:14 AM  
 ALT (SGPT) 32 09/27/2018 04:14 AM  
 AST (SGOT) 43 (H) 09/27/2018 04:14 AM  
 Alk. phosphatase 223 (H) 09/27/2018 04:14 AM  
 Protein, total 5.8 (L) 09/27/2018 04:14 AM  
 Albumin 2.4 (L) 09/27/2018 04:14 AM  
 Globulin 3.4 09/27/2018 04:14 AM  
 
Lab Results Component Value Date/Time Reticulocyte count 0.4 (L) 09/26/2018 10:15 AM  
 Iron % saturation 96 (H) 09/27/2018 04:14 AM  
 TIBC 252 09/27/2018 04:14 AM  
 Ferritin 537 (H) 09/27/2018 04:14 AM  
 Vitamin B12 >2000 (H) 09/26/2018 01:00 PM  
 Folate 19.1 09/26/2018 01:00 PM  
 Haptoglobin 105 09/26/2018 10:15 AM  
  09/26/2018 10:15 AM  
 TSH 4.45 (H) 09/27/2018 04:14 AM  
 Hep C  virus Ab Interp. NONREACTIVE 04/20/2016 09:58 AM  
 
Lab Results Component Value Date/Time INR 1.3 (H) 09/26/2018 10:15 AM  
 aPTT 26.5 09/26/2018 10:15 AM  
 
Lab Results Component Value Date/Time  09/26/2018 10:15 AM  
 
9/26/2018 Peripheral Smear There is a macrocytic, normochromic anemia. Granulocytes and  
lymphocytes are reduced and mature. Platelets are reduced and no  
blasts are seen. If cytopenias persist, a bone marrow biopsy  may be  
beneficial. Per Dr. Rigoberto Garvey MD. 
 
4/20/2016 US ABD 1. Nodular liver contour with diffusely heterogeneous echogenicity. Findings  
are consistent with the clinical concern of cirrhosis. 9/26/2018 XR CHEST Impression: Small bilateral effusions with underlying atelectasis and mild 
pulmonary edema. 9/27/2018 ECHO Read pending 9/27/2018 Ultrasound of abd 
IMPRESSION:  
1. Cirrhosis. Liver protocol CT or MRI should be considered for more sensitive 
hepatocellular carcinoma screening. 2. Small volume of ascites and right pleural effusion. Assessment and Recommendations:  
 
1. Thrombocytopenia 
stable Chronic since at least 2009. Possibly related to cirrhosis. Peripheral smear unremarkable. US Abd: no splenomegaly Consider bone marrow biopsy given multiple cell lines down, but will await these other results first. 
 
2. Leukopenia Slight improvement this am 
Acute ; unclear etiology Possibly 2/2 to cirrhosis, splenomegaly US abd: no splenomegaly noted 3. Anemia, macrocytic (s/p 3 units PRBCS) Severe, and fairly acute. Admitting Hgb 5.1 Responded to transfusion No evidence of hemolysis, stool blood negative. Low retic is concerning for a production issue, but etiology not readily apparent. No evidence of B12 deficiency, iron deficiency, folate deficiency. Peripheral smear negative. GI considering EGD to eval for varices. We may need to consider bone marrow biopsy, given multiple cell lines down:Plan for BM bx today (9/27) Discussed with GI PA: planning for EGD/Colonoscopy tomorrow (9/28) 4. Elevated Troponin Cardiology consulted: felt likely due to demand ischemia due to severe anemia ECHO read pending 5. Cirrhosis Dx in 2016; viral hepatitis panel negative at that time GI consult: additional labs ordered Plan reviewed with Dr Richi Pike Signed By: Dylon Thacker NP

## 2018-09-27 NOTE — PROGRESS NOTES
Occupational Therapy Note: 
Chart reviewed and spoke with nursing. Patient is currently off the floor for a procedure. Will continue to follow once available + appropriate. Thank you.  
Kris Brown OTR/TIFFANY

## 2018-09-27 NOTE — PROGRESS NOTES
Spiritual Care Partner Volunteer visited patient in 320 on 9.27.18. Documented by: 
 
Belkis Snyder M.Div, M.S, [facility] Spiritual Care available at [facility]([id number])

## 2018-09-27 NOTE — CDMP QUERY
Please clarify if this patient is (was) being treated/managed for:  
 
=> Type 2 MI in the setting of elevated troponin, likely due to demand ischemia; Cardiology consult, trend troponins 
=> Other explanation of clinical findings  
=> Clinically Undetermined (no explanation for clinical findings) The medical record reflects the following clinical findings, treatment, and risk factors. Risk Factors:  Severe anemia POA Clinical Indicators:  Cardiology consult: \" Electrocardiogram showed sinus rhythm with nonspecific ST and T wave changes in his inferior and lateral leads\" & \" Elevated troponinlikely due to demand ischemia due to severe anemia\" Trop 0.21/0.20/0.24/0.18 ED diff diagnosis: NSTEMI Treatment: Cardiology consult; trend troponins, received 2 units PRBC Please clarify and document your clinical opinion in the progress notes and discharge summary including the definitive and/or presumptive diagnosis, (suspected or probable), related to the above clinical findings. Please include clinical findings supporting your diagnosis. Thank you, 
Doug Solis, MSN, [facility], [address] [place] [address]

## 2018-09-27 NOTE — ED NOTES
TRANSFER - OUT REPORT: 
 
Verbal report given to Alicia Reyes RN on Shira King  being transferred to [facility] for routine progression of care Report consisted of patients Situation, Background, Assessment and  
Recommendations(SBAR). Information from the following report(s) SBAR, MAR, Accordion and Cardiac Rhythm Sinus Rhythm was reviewed with the receiving nurse. Lines:  
Peripheral IV 09/26/18 Left; Outer Antecubital (Active) Site Assessment Clean, dry, & intact 9/26/2018  3:20 PM  
Phlebitis Assessment 0 9/26/2018  3:20 PM  
Infiltration Assessment 0 9/26/2018  3:20 PM  
Dressing Status Clean, dry, & intact 9/26/2018  3:20 PM  
Dressing Type Transparent 9/26/2018  3:20 PM  
Hub Color/Line Status Pink; Infusing 9/26/2018  3:20 PM  
Action Taken Blood drawn 9/26/2018 10:13 AM  
Alcohol Cap Used Yes 9/26/2018  3:20 PM  
  
 
Opportunity for questions and clarification was provided. Patient transported with: 
 Monitor Registered Nurse

## 2018-09-27 NOTE — ED NOTES
Admission Time Out Check signed & held orders:  [x] Yes or  [] No  
 
Assess Vital Signs over the last 2 hours:  [x] Stable or  [] Unstable Patient Vitals for the past 4 hrs: 
 Temp Pulse Resp BP SpO2  
09/26/18 2245 - 87 18 110/66 -  
09/26/18 2230 - 85 21 109/54 94 % 09/26/18 2215 - 83 24 99/47 91 %  
09/26/18 2200 - 83 19 111/62 94 % 09/26/18 2145 - 89 20 116/51 98 %  
09/26/18 2130 - 84 25 111/59 95 % 09/26/18 2115 - 85 22 108/51 94 % 09/26/18 2105 98.9 °F (37.2 °C) - - - -  
09/26/18 2100 - 84 24 109/61 96 %  
09/26/18 2045 - 81 23 112/58 92 %  
09/26/18 2030 - 87 26 105/56 91 %  
09/26/18 2020 - 85 19 104/53 95 % 09/26/18 2006 98.8 °F (37.1 °C) 93 18 105/58 94 % 09/26/18 2000 - 86 25 105/58 96 %  
09/26/18 1945 - 80 26 100/54 97 % 09/26/18 1930 - 81 21 108/55 94 % 09/26/18 1915 - 82 21 107/49 97 % 09/26/18 1903 98.7 °F (37.1 °C) 90 18 127/65 99 % Does this patient meet Code Purple criteria or other Core Measure protocol? [] Yes or  [x] No or  [] Already being treated Unit patient assigned to: Telemetry Does the patient need any special isolation? []  Yes or  [x] No 
 
Is the assigned unit appropriate? [x] Yes or  [] No 
 
Does the patient need to be transported on a monitor and/or has critical drips? [x] Yes or  [] No or  [] Order obtained to travel without Monitor/nurse Any needs/concerns that need to be addressed prior to admission? (i.e. ED/Admit provider or Nursing Supervisor) 
    [] Yes or  [x] No 
 
Does patient require IV fluid continued on admission? [] Yes or  [x] No 
 
 
Inderjit Calloway Che

## 2018-09-27 NOTE — PROGRESS NOTES
9- CASE MANAGEMENT NOTE: 
I met with the pt to introduce myself and explain the role of case management. He confirmed that he lives alone in a first floor apartment with several entry steps. He is independent with his ADL's, uses a cane and drives. His friend/POA, Myrna Houser ([phone]), lives in  [place] and he states he has no local . He will require taxi transportation upon discharge. CM will continue to follow.  MARSHA SummersW, CM

## 2018-09-28 NOTE — ROUTINE PROCESS
Bedside and Verbal shift change report given to [address] (oncoming nurse) by Nicky Carpenter RN (offgoing nurse). Report given with SBAR, Kardex, MAR and Recent Results.

## 2018-09-28 NOTE — PERIOP NOTES
Procedure being performed under MAC; Jordan Trejo CRNA at bedside monitoring patient at [phone]. See anesthesia notes. Endoscope was pre-cleaned at bedside immediately following procedure by Valdez Winters GI Tech. at [phone]. Care of patient assumed from the anesthesia provider at [phone]. Patient tolerated procedure well. Abdomen remains soft and non tender post procedure, no complaints or indication of discomfort noted at this time. See anesthesia note. Patient transferred to Endoscopy Recovery and report given to recovery nurse Addis Lemos. recovery room RN. TRANSFER - OUT REPORT: 
 
Verbal report given to Dorys(name) on Curry Sheppard  being transferred to [facility](unit) for routine progression of care Report consisted of patients Situation, Background, Assessment and  
Recommendations(SBAR). Information from the following report(s) SBAR, Procedure Summary and Recent Results was reviewed with the receiving nurse. Lines:  
Peripheral IV 09/26/18 Left; Outer Antecubital (Active) Site Assessment Clean, dry, & intact 9/28/2018 11:08 AM  
Phlebitis Assessment 0 9/28/2018 11:08 AM  
Infiltration Assessment 0 9/28/2018 11:08 AM  
Dressing Status Clean, dry, & intact 9/28/2018 11:08 AM  
Dressing Type Transparent;Tape 9/28/2018 11:08 AM  
Hub Color/Line Status Pink;Capped 9/28/2018 11:08 AM  
Action Taken Open ports on tubing capped 9/28/2018 11:08 AM  
Alcohol Cap Used Yes 9/28/2018 11:08 AM  
  
 
Opportunity for questions and clarification was provided.

## 2018-09-28 NOTE — PROGRESS NOTES
Mild persistent elevated troponin in setting of pancytopenia. No chest pain or dyspnea. BP is borderline. Recommend to continue low dose carvedilol for LV dysfunction. Not able to tolerate acei due to BP. Recommend outpaitent follow-up with me pending evaluation of pancytopenia. No aspirin due to thrombocytopenia and severe anemia. Please do not hesitate to contact me with further questions. Signed By: Matti Wetzel DO September 28, 2018

## 2018-09-28 NOTE — PERIOP NOTES
Chart accessed/reviewed by writer, patient is a pending or scheduled endoscopy of Dr.Lyons Breen Quiet

## 2018-09-28 NOTE — PROGRESS NOTES
Bedside and Verbal shift change report given to martine (oncoming nurse) by Rod Gonzalez (offgoing nurse). Report included the following information SBAR, Kardex, MAR, Accordion and Recent Results.

## 2018-09-28 NOTE — ROUTINE PROCESS
Richard Masters 1945 
[id number] Situation: 
Verbal report received from: Santos Santillan RN Procedure: Procedure(s): ESOPHAGOGASTRODUODENOSCOPY (EGD) COLONOSCOPY 
ENDOSCOPIC POLYPECTOMY Background: 
 
Preoperative diagnosis: anemia Postoperative diagnosis: Normal EGD Ascending colon polyp Hemorrhoids :  Dr. Melvi Summers Assistant(s): Endoscopy Technician-1: Wanda Nunes Endoscopy RN-1: Santos Santillan RN Specimens:  
ID Type Source Tests Collected by Time Destination 1 : Ascending colon polyp Preservative   Reena Villegas MD 9/28/2018 1213 Pathology H. Pylori  no Assessment: 
Intra-procedure medications Anesthesia gave intra-procedure sedation and medications, see anesthesia flow sheet yes Intravenous fluids: NS@ Leanora Peyer Vital signs stable Abdominal assessment: round and soft Recommendation: 
Discharge patient per MD order. Return to floor Permission to share finding with family or friend n/a.

## 2018-09-28 NOTE — PROGRESS NOTES
Hilario Patel [facility] 
[address] [facility], [address] [place] [address] 
[phone] Medical Progress Note NAME:         Carlos Foster :        1945 MRM:        [id number] Date:          2018 Subjective: Patient has been seen and examined as a follow up for multiple medical issues. Chart, labs, diagnostics reviewed. He feels well. Weak but not in much acute distress. No nausea or vomiting. No abdominal discomfort. Objective: 
 
Vital Signs: 
 
Visit Vitals  /61  Pulse 79  Temp 97.8 °F (36.6 °C)  Resp 18  Ht 5' 11\" (1.803 m)  Wt 77.1 kg (170 lb)  SpO2 99%  BMI 23.71 kg/m2 Intake/Output Summary (Last 24 hours) at 18 1527 Last data filed at 18 1238 Gross per 24 hour Intake              350 ml Output             1450 ml Net            -1100 ml Physical Examination: 
 
General:   Well looking and nourished patient in no acute distress Eyes:   pink conjunctivae, PERRLA with no discharge. ENT:   no ottorrhea or rhinorrhea with moist mucous membranes Neck: no masses, thyroid non-tender and trachea central. 
Pulm: clear breath sounds without crackles or wheezes Card:  no JVD or murmurs, has regular and normal S1, S2 without thrills, bruits or peripheral edema Abd:  Soft, non-tender, non-distended, normoactive bowel sounds Musc:  No cyanosis, clubbing, atrophy or deformities. Skin:  No rashes, bruising or ulcers. Neuro: Awake and alert. Generally a non focal exam. Follows commands appropriately Psych:  Has a good insight and is oriented x 3 Current Facility-Administered Medications Medication Dose Route Frequency  carvedilol (COREG) tablet 3.125 mg  3.125 mg Oral BID WITH MEALS  
 0.9% sodium chloride infusion 250 mL  250 mL IntraVENous PRN  
 sodium chloride (NS) flush 5-10 mL  5-10 mL IntraVENous Q8H  
  sodium chloride (NS) flush 5-10 mL  5-10 mL IntraVENous PRN  
 acetaminophen (TYLENOL) tablet 650 mg  650 mg Oral Q4H PRN  pantoprazole (PROTONIX) tablet 40 mg  40 mg Oral ACB  ondansetron (ZOFRAN) injection 4 mg  4 mg IntraVENous Q4H PRN  
 influenza vaccine 2018-19 (6 mos+)(PF) (FLUARIX QUAD/FLULAVAL QUAD) injection 0.5 mL  0.5 mL IntraMUSCular PRIOR TO DISCHARGE  
 0.9% sodium chloride infusion 250 mL  250 mL IntraVENous PRN Laboratory data and review: 
 
Recent Labs  
   09/28/18 
 1312  09/28/18 
 0113  09/27/18 
 0414   09/26/18 
 1015 WBC   --   2.9*  3.0*   --   2.5* HGB  7.3*  7.0*  7.5*   < >  5.1*  
HCT  21.1*  20.3*  21.2*   --   14.6* PLT   --   61*  63*   --   78*  
 < > = values in this interval not displayed. Recent Labs  
   09/28/18 
 0113  09/27/18 
 0414  09/26/18 
 1015 NA  137  137  131* K  4.3  4.1  4.0  
CL  104  103  99 CO2  27  26  25 GLU  104*  99  124* BUN  12  12  11 CREA  0.71  0.62*  0.79 CA  8.2*  8.0*  8.3*  
MG   --   1.5*   --   
ALB  2.4*  2.4*  2.6* SGOT  32  43*  35 ALT  15  32  32 INR   --    --   1.3* No components found for: [place] Assessment and Plan: 
 
Pancytopenia ([facility] [address] (9/26/2018): suspect this may be due to bone marrow suppression and cirrhosis vs other. No evidence of iron, b12, or folate def. Retic low. S/p 2 unit prbc. No visible bleeding noted. Hgb remains stable. Seen by GI and hematology. He is sp a bone marrow aspirate (results pending), EGD and colonoscopy - both unremarkable other than a polyp which was removed. Mobilize with plan for discharge 9/29. Cirrhosis [facility]):  Prior viral hepatitis panel was negative. Abdominal ultrasound not suggestive of HCC. AFP and other serologies pending Nausea:  Has nausea with eating and early satiety. Better. EGD unremarkable. Continue PPi.    
Hyponatremia (9/26/2018):  May be from poor po intake. Resolved overnight. TSH elevated.  Free T4 normal.  
 
 Elevated troponin (9/26/2018)/Type 2 MI due to anemia:  Denies chest pain.  Has HORN but this is due to anemia, likely. Troponin flat. Seen by cardiology. Echo showed an EF of 45 % to 50 %. There was hypokinesis of the basal anterior and basal-mid anteroseptal wall(s). Out patient cardiology follow up for further testing once other work up completed Abnormal chest x-ray (9/26/2018):  Denies sob overnight despite transfusion. No rales on exam. Stable. Total time spent for the patient's care: 35 Minutes Care Plan discussed with: Patient and Nursing Staff Discussed:  Care Plan and D/C Planning Prophylaxis:  H2B/PPI Anticipated Disposition:  Home w/Family 
        
___________________________________________________ Attending Physician:   Meredith Bustos MD

## 2018-09-28 NOTE — ROUTINE PROCESS
Bedside and Verbal shift change report given to oncoming nurse by Meghan Davis RN (offgoing nurse). Report given with SBAR, Kardex, MAR and Recent Results.

## 2018-09-28 NOTE — PERIOP NOTES
Khoa Ashley 1945 
[id number] Situation: 
 
Scheduled Procedure: Procedure(s): ESOPHAGOGASTRODUODENOSCOPY (EGD) COLONOSCOPY Verbal report received from: Dayna Chowdhury RN 
Preoperative diagnosis: anemia Background: 
 
Procedure: Procedure(s): ESOPHAGOGASTRODUODENOSCOPY (EGD) COLONOSCOPY Physician performing procedure; Dr. Aj Novoa SBAR QUESTIONS FLOOR TO ENDO RN 
 
NPO Status/Last PO Intake: 12 midnight Pregnancy Test:No If yes, result: none Is the patient taking Blood Thinners: NO If yes, list:  and last taken Is the patient diabetic:no If yes, what was the last BS:    Time taken? Anything given? no          
Does the patient have a Pacemaker/Defibrillator in place?: no  
Does the patient need antibiotics before/during/after procedure: no If the patient is having a colon, How much prep was drank? All What were the Colon prep results? clear Does the patient have SCD in place:no Is patient on CONTACT precautions:no If yes, what kind of CONTACT precautions:  
 
Assessment: 
Are the vital signs stable prior to patient coming to ENDO?  yes Is the patient alert/oriented and able to sign consent for the procedures:yes How does the patient's abdomen feel prior to coming to ENDO? round and soft yes Does the patient have a patient IV in place? Yes Recommendation: 
Family or Friend present no Permission to share finding with Family or Friend yes

## 2018-09-28 NOTE — PROGRESS NOTES
Physical Therapy:  
Orders received and  Chart reviewed. Patient currently off the floor for procedure. We will continue to follow and re-attempt later as able. Thank you.  
Darryl Carroll PT,DPT,NCS

## 2018-09-28 NOTE — PROGRESS NOTES
[facility] [place] [address] [address] [facility], [address] [place] [address] W: [phone]  F: [phone] Reason for Visit:  
Magda Morrissey is a 74 y.o. male who is seen in consultation at the request of Dr. Dejon Burns  for evaluation of pancytopenia. Hematology / Oncology Treatment History: N/A History of Present Illness:  
Mr Fannie Saenz was admitted on 9/26/2018 from the ED when he presented with c/o weakness and fatigue x 2 days associated with decreased appetite and SOB. Denies any change in bowels or color change. ED labs WBC 2.5 ANC 1.7  Hgb 5.1 .6 Therefore was admitted for further eval and management. Reports was so SOB and weak over the last couple of  days that he had to call the EMS. Mr Fannie Saenz states he is unaware of any abnormal blood work from the past. Was told in 2016 that he had something wrong with his liver but he has never had any follow up. He does not currently have a PCP. Denies fever, night sweats or weight loss. Denies having had colonoscopies. ETOH use: drank 2 drinks/day x 40 yrs: quit 6 months ago Smoking Hx: 1 ppd x 40 yrs ; quit 6 months ago due to cost of cigarettes Family Hx of cancer: father had lung cancer No siblings or children Mother had to have B12 injections. Retired : enjoys traveling and reading Interval History:  
Slept well: denies any SOB or dizzy spells. Weakness greatly improved. No family at bedside. Current Facility-Administered Medications Medication Dose Route Frequency  carvedilol (COREG) tablet 3.125 mg  3.125 mg Oral BID WITH MEALS  
 0.9% sodium chloride infusion 250 mL  250 mL IntraVENous PRN  
 sodium chloride (NS) flush 5-10 mL  5-10 mL IntraVENous Q8H  
 sodium chloride (NS) flush 5-10 mL  5-10 mL IntraVENous PRN  
 acetaminophen (TYLENOL) tablet 650 mg  650 mg Oral Q4H PRN  pantoprazole (PROTONIX) tablet 40 mg  40 mg Oral ACB  ondansetron (ZOFRAN) injection 4 mg  4 mg IntraVENous Q4H PRN  
 influenza vaccine 2018-19 (6 mos+)(PF) (FLUARIX QUAD/FLULAVAL QUAD) injection 0.5 mL  0.5 mL IntraMUSCular PRIOR TO DISCHARGE  
 0.9% sodium chloride infusion 250 mL  250 mL IntraVENous PRN No Known Allergies Review of Systems: A complete review of systems was obtained, negative except as described above. Physical Exam:  
 
Visit Vitals  /57 (BP 1 Location: Left arm, BP Patient Position: At rest;Supine)  Pulse 77  Temp 98.9 °F (37.2 °C)  Resp 18  Ht 5' 11\" (1.803 m)  Wt 77.1 kg (170 lb)  SpO2 91%  BMI 23.71 kg/m2 ECOG PS: 1 General: No distress Eyes: PERRLA, anicteric sclerae HENT: Atraumatic with normal appearance of ears and nose; OP clear Neck: Supple; no thyromegaly Respiratory: CTAB, normal respiratory effort CV: Normal rate, regular rhythm, no murmurs, no peripheral edema GI: Soft, nontender, nondistended, no masses, no hepatomegaly, no splenomegaly MS: Digits without clubbing or cyanosis. Skin: No rashes, ecchymoses, or petechiae. Normal temperature, turgor, and texture. Neuro/Psych: Alert, oriented, appropriate affect, normal judgment/insight Results:  
 
Lab Results Component Value Date/Time WBC 2.9 (L) 09/28/2018 01:13 AM  
 HGB 7.0 (L) 09/28/2018 01:13 AM  
 HCT 20.3 (L) 09/28/2018 01:13 AM  
 PLATELET 61 (L) 32/33/9599 01:13 AM  
 MCV 97.6 09/28/2018 01:13 AM  
 ABS. NEUTROPHILS 1.6 (L) 09/27/2018 04:14 AM  
 
Lab Results Component Value Date/Time Sodium 137 09/28/2018 01:13 AM  
 Potassium 4.3 09/28/2018 01:13 AM  
 Chloride 104 09/28/2018 01:13 AM  
 CO2 27 09/28/2018 01:13 AM  
 Glucose 104 (H) 09/28/2018 01:13 AM  
 BUN 12 09/28/2018 01:13 AM  
 Creatinine 0.71 09/28/2018 01:13 AM  
 GFR est AA >60 09/28/2018 01:13 AM  
 GFR est non-AA >60 09/28/2018 01:13 AM  
 Calcium 8.2 (L) 09/28/2018 01:13 AM  
 
Lab Results Component Value Date/Time Bilirubin, total 1.1 (H) 09/28/2018 01:13 AM  
 ALT (SGPT) 15 09/28/2018 01:13 AM  
 AST (SGOT) 32 09/28/2018 01:13 AM  
 Alk. phosphatase 213 (H) 09/28/2018 01:13 AM  
 Protein, total 5.8 (L) 09/28/2018 01:13 AM  
 Albumin 2.4 (L) 09/28/2018 01:13 AM  
 Globulin 3.4 09/28/2018 01:13 AM  
 
Lab Results Component Value Date/Time Reticulocyte count 0.4 (L) 09/26/2018 10:15 AM  
 Iron % saturation 96 (H) 09/27/2018 04:14 AM  
 TIBC 252 09/27/2018 04:14 AM  
 Ferritin 537 (H) 09/27/2018 04:14 AM  
 Vitamin B12 >2000 (H) 09/26/2018 01:00 PM  
 Folate 19.1 09/26/2018 01:00 PM  
 Haptoglobin 105 09/26/2018 10:15 AM  
  09/26/2018 10:15 AM  
 TSH 4.45 (H) 09/27/2018 04:14 AM  
 Hep C  virus Ab Interp. NONREACTIVE 04/20/2016 09:58 AM  
 
Lab Results Component Value Date/Time INR 1.3 (H) 09/26/2018 10:15 AM  
 aPTT 26.5 09/26/2018 10:15 AM  
 
Lab Results Component Value Date/Time  09/26/2018 10:15 AM  
 
9/26/2018 Peripheral Smear There is a macrocytic, normochromic anemia. Granulocytes and  
lymphocytes are reduced and mature. Platelets are reduced and no  
blasts are seen. If cytopenias persist, a bone marrow biopsy  may be  
beneficial. Per Dr. Niecy Odell MD. 
 
4/20/2016 US ABD 1. Nodular liver contour with diffusely heterogeneous echogenicity. Findings  
are consistent with the clinical concern of cirrhosis. 9/26/2018 XR CHEST Impression: Small bilateral effusions with underlying atelectasis and mild 
pulmonary edema. 9/27/2018 ECHO Systolic function mildly reduced EF 45-50% 9/27/2018 Ultrasound of abd 
IMPRESSION:  
1. Cirrhosis. Liver protocol CT or MRI should be considered for more sensitive 
hepatocellular carcinoma screening. 2. Small volume of ascites and right pleural effusion. Assessment and Recommendations:  
 
1. Thrombocytopenia 
stable Chronic since at least 2009. Possibly related to cirrhosis. Peripheral smear unremarkable. US Abd: spleen upper limits of normal 
BM bx results pending 2. Leukopenia Slight improvement this am 
Acute ; unclear etiology Possibly 2/2 to cirrhosis US abd: spleen upper limits of normal 
 
3. Anemia, macrocytic (s/p 3 units PRBCS) Severe, and fairly acute. Admitting Hgb 5.1 Responded to transfusion: Hgb 7.0 this am will repeat labs this pm to see if remains stable or need an additional trasnfusion No evidence of hemolysis, stool blood negative. No evidence of B12 deficiency, iron deficiency, folate deficiency. Peripheral smear negative Low retic is concerning for a production issue, but etiology not readily apparent. .   
(9/27) BM bx: path pending 
(9/28) GI following: planning for EGD/Colonoscopy today 4. Elevated Troponin Cardiology consulted: felt likely due to demand ischemia due to severe anemia 
(4/18) ECHO Systolic function mildly reduced EF 45-50%; initiated on carvedilol 5. Cirrhosis Dx in 2016; viral hepatitis panel negative at that time GI following: scheduled for EGD/Colonoscopy today (9/28) Plan reviewed with Dr Abad Madsen Signed By: Jena Adma NP

## 2018-09-28 NOTE — PROGRESS NOTES
Occupational Therapy EVALUATION/discharge Patient: Khoa Ashley (89 y.o. male) Date: 9/28/2018 Primary Diagnosis: Pancytopenia ([address] Pancytopenia (HCC) 
anemia Procedure(s) (LRB): ESOPHAGOGASTRODUODENOSCOPY (EGD) (N/A) COLONOSCOPY (N/A) ENDOSCOPIC POLYPECTOMY (N/A) Day of Surgery Precautions: Fall ASSESSMENT:  
Based on the objective data described below, the patient presents at an overall supervision level with LE ADLs, toileting and functional mobility in bathroom using cane to amb. He demonstrated good safety awareness and able to self correct when balance was lost.  Pt lives alone, amb with cane and drives. He verbalized independence with energy conservation techniques. Further skilled acute occupational therapy is not indicated at this time. Discharge Recommendations: None for OT Further Equipment Recommendations for Discharge: none for OT SUBJECTIVE:  
Patient stated I am a little cautious after the anesthesia.  OBJECTIVE DATA SUMMARY:  
HISTORY:  
Past Medical History:  
Diagnosis Date  Cirrhosis ([facility] [address]  Pancytopenia ([facility] [address]  Patella fracture 2007 Right knee Past Surgical History:  
Procedure Laterality Date  COLONOSCOPY N/A 9/28/2018 COLONOSCOPY performed by Pao Vazquez MD at [address]  HX HERNIA REPAIR Bilateral   
 HX RETINAL DETACHMENT REPAIR Prior Level of Function/Environment/Context: Pt lives alone, amb with cane and drives. Home Situation Home Environment: Apartment # Steps to Enter: 3 Rails to Enter: No 
Wheelchair Ramp: No 
One/Two Story Residence: One story Living Alone: Yes Support Systems: Friends \ neighbors Patient Expects to be Discharged to[de-identified] [id number] Current DME Used/Available at Home: Cane, straight Tub or Shower Type: Tub/Shower combination Hand dominance: Right EXAMINATION OF PERFORMANCE DEFICITS: 
Cognitive/Behavioral Status: 
Neurologic State: Alert; Appropriate for age Orientation Level: Oriented X4 Cognition: Appropriate for age attention/concentration; Follows commands Perception: Appears intact Perseveration: No perseveration noted Safety/Judgement: Awareness of environment; Fall prevention; Insight into deficits Hearing: Auditory Auditory Impairment: None Vision/Perceptual:   
 
Acuity: Impaired near vision Corrective Lenses: Reading glasses Range of Motion: 
AROM: Within functional limits PROM: Within functional limits Strength: 
Strength: Generally decreased, functional 
  
  
  
  
 
Coordination: 
Coordination: Within functional limits Fine Motor Skills-Upper: Left Intact; Right Intact Gross Motor Skills-Upper: Left Intact; Right Intact Tone & Sensation: 
Tone: Normal 
Sensation: Intact Balance: 
Sitting: Intact Standing: Intact; With support (cane) Functional Mobility and Transfers for ADLs: 
Bed Mobility: 
Rolling: Independent Supine to Sit: Independent Sit to Supine: Independent Scooting: Independent Transfers: 
Sit to Stand: Supervision Stand to Sit: Supervision Bathroom Mobility: Supervision/set up (using cane) Toilet Transfer : Supervision; Additional time (using cane) ADL Assessment: 
Feeding: Independent Oral Facial Hygiene/Grooming: Supervision (standing at sink to wash hands) Bathing: Supervision; Additional time Upper Body Dressing: Setup Lower Body Dressing: Supervision; Additional time Toileting: Supervision ADL Intervention and task modifications: 
Patient was educated on the benefits of maintaining activity tolerance, functional mobility, and independence with self care tasks during acute stay. Encouraged patient to be out of bed for all meals, perform daily ADLs (as approved by RN/MD regarding bathing etc), performing functional mobility to/from bathroom, and increasing time OOB daily.  Patient educated about the importance of maintaining activity tolerance to ensure safe return home and to baseline. Patient verbalized understanding of education. Cognitive Retraining Safety/Judgement: Awareness of environment; Fall prevention; Insight into deficits Functional Measure: 
Barthel Index: 
 
Bathin Bladder: 10 Bowels: 10 
Groomin Dressin Feeding: 10 Mobility: 10 Stairs: 5 Toilet Use: 5 Transfer (Bed to Chair and Back): 10 Total: 70 Barthel and G-code impairment scale: 
Percentage of impairment CH 
0% CI 
1-19% CJ 
20-39% CK 
40-59% CL 
60-79% CM 
80-99% CN 
100% Barthel Score 0-100 100 99-80 79-60 59-40 20-39 1-19 
 0 Barthel Score 0-20 20 17-19 13-16 9-12 5-8 1-4 0 The Barthel ADL Index: Guidelines 1. The index should be used as a record of what a patient does, not as a record of what a patient could do. 2. The main aim is to establish degree of independence from any help, physical or verbal, however minor and for whatever reason. 3. The need for supervision renders the patient not independent. 4. A patient's performance should be established using the best available evidence. Asking the patient, friends/relatives and nurses are the usual sources, but direct observation and common sense are also important. However direct testing is not needed. 5. Usually the patient's performance over the preceding 24-48 hours is important, but occasionally longer periods will be relevant. 6. Middle categories imply that the patient supplies over 50 per cent of the effort. 7. Use of aids to be independent is allowed. Evelyn Davalos., Barthel, D.W. (1645). Functional evaluation: the Barthel Index. [facility] (14)2. AHSAN Mercado, Felix Lowry., Juan R Lyles., Griselda, [address] ().  Measuring the change indisability after inpatient rehabilitation; comparison of the responsiveness of the Barthel Index and Functional [place] Measure. Journal of Neurology, Neurosurgery, and Psychiatry, 66(4), 906-794. DENNIS Miles, TASH Pantoja, & Veronica Tanner M.A. (2004.) Assessment of post-stroke quality of life in cost-effectiveness studies: The usefulness of the Barthel Index and the EuroQoL-5D. [facility], 13, 108-60 G codes: In compliance with CMSs Claims Based Outcome Reporting, the following G-code set was chosen for this patient based on their primary functional limitation being treated: The outcome measure chosen to determine the severity of the functional limitation was the Barthel Index with a score of 70/100 which was correlated with the impairment scale. ? Self Care:  
  - CURRENT STATUS: CJ - 20%-39% impaired, limited or restricted  - GOAL STATUS: CJ - 20%-39% impaired, limited or restricted  - D/C STATUS:  CJ - 20%-39% impaired, limited or restricted Occupational Therapy Evaluation Charge Determination History Examination Decision-Making LOW Complexity : Brief history review  LOW Complexity : 1-3 performance deficits relating to physical, cognitive , or psychosocial skils that result in activity limitations and / or participation restrictions  LOW Complexity : No comorbidities that affect functional and no verbal or physical assistance needed to complete eval tasks Based on the above components, the patient evaluation is determined to be of the following complexity level: LOW Pain: 
Pain Scale 1: Numeric (0 - 10) Pain Intensity 1: 0 Activity Tolerance:  
Fair Please refer to the flowsheet for vital signs taken during this treatment. After treatment:  
[]  Patient left in no apparent distress sitting up in chair 
[x]  Patient left in no apparent distress in bed 
[x]  Call bell left within reach [x]  Nursing notified 
[]  Caregiver present 
[]  Bed alarm activated COMMUNICATION/EDUCATION:  
Communication/Collaboration: [x]      Home safety education was provided and the patient/caregiver indicated understanding. [x]      Patient/family have participated as able and agree with findings and recommendations. []      Patient is unable to participate in plan of care at this time. Findings and recommendations were discussed with: Physical Therapist and Registered Nurse Nikolas Brady OT Time Calculation: 26 mins

## 2018-09-28 NOTE — PROGRESS NOTES
Problem: Falls - Risk of 
Goal: *Absence of Falls Document Chris Herrera Fall Risk and appropriate interventions in the flowsheet. Outcome: Progressing Towards Goal 
Fall Risk Interventions: 
Mobility Interventions: Patient to call before getting OOB, Utilize walker, cane, or other assistive device Medication Interventions: Patient to call before getting OOB, Teach patient to arise slowly Elimination Interventions: Call light in reach, Patient to call for help with toileting needs, Toileting schedule/hourly rounds

## 2018-09-28 NOTE — PROCEDURES
[address] Junie Draper. Rachelle Bolanos, [address] [place] [address] 
[phone] 2018 Esophagogastroduodenoscopy & Colonoscopy Procedure Note Opal Toscano : 1945 [address] [place] [address] Record Number: [id number] Indications:    Anemia Cirrhosis Referring Physician:  None Anesthesia/Sedation: Conscious Sedation/Moderate Sedation/MAC Endoscopist:  Dr. Wendy Kaur Complications:  None Estimated Blood Loss:  None Permit: The indications, risks, benefits and alternatives were reviewed with the patient or their decision maker who was provided an opportunity to ask questions and all questions were answered. The specific risks of esophagogastroduodenoscopy with conscious sedation were reviewed, including but not limited to anesthetic complication, bleeding, adverse drug reaction, missed lesion, infection, IV site reactions, and intestinal perforation which would lead to the need for surgical repair. Alternatives to EGD and colonoscopy including radiographic imaging, observation without testing, or laboratory testing were reviewed as well as the limitations of those alternatives discussed. After considering the options and having all their questions answered, the patient or their decision maker provided both verbal and written consent to proceed. -----------EGD------------ Procedure in Detail: After obtaining informed consent, positioning of the patient in the left lateral decubitus position, and conduction of a pre-procedure pause or \"time out\" the endoscope was introduced into the mouth and advanced to the duodenum. A careful inspection was made, and findings or interventions are described below. Findings:  
Esophagus:normal 
Stomach: normal  
Duodenum/jejunum: normal 
 
 
 
----------Colonoscopy----------- Procedure in Detail: After obtaining informed consent, positioning of the patient in the left lateral decubitus position, and conduction of a pre-procedure pause or \"time out\" the endoscope was introduced into the anus and advanced to the cecum, which was identified by the ileocecal valve and appendiceal orifice. The quality of the colonic preparation was good. A careful inspection was made as the colonoscope was withdrawn, findings and interventions are described below. Findings:  
One ascending colon polyp 2mm, taken with cold forceps. 
 
 
------------------------------ Specimens:  
 See above Complications:  
None; patient tolerated the procedure well. Impressions: 
EGD:  Normal - no varices. Colonoscopy: One tiny polyp Recommendations: - Await pathology. 
-Consider causes for anemia outside the GI tract. Thank you for entrusting me with this patient's care. Please do not hesitate to contact me with any questions or if I can be of assistance with any of your other patients' GI needs. Signed By: Tru Hooker MD 
                      September 28, 2018 Surgical assistant none

## 2018-09-28 NOTE — ANESTHESIA PREPROCEDURE EVALUATION
Anesthetic History No history of anesthetic complications Review of Systems / Medical History Patient summary reviewed, nursing notes reviewed and pertinent labs reviewed Pulmonary Within defined limits Neuro/Psych Within defined limits Cardiovascular Exercise tolerance: >4 METS Comments: Reduced EF, 45% by echo GI/Hepatic/Renal 
  
 
 
 
Liver disease (cirrhosis) Endo/Other Anemia (hg=7) Other Findings Comments: Presented with pancytopenia and fatigue a couple days ago, endo for anemia and cirrhosis Physical Exam 
 
Airway Mallampati: I 
TM Distance: 4 - 6 cm Neck ROM: normal range of motion Mouth opening: Normal 
 
 Cardiovascular Rhythm: regular Rate: normal 
 
 
 
 Dental 
 
Dentition: Edentulous Pulmonary Breath sounds clear to auscultation Abdominal 
 
 
 
 Other Findings Anesthetic Plan ASA: 3 Anesthesia type: MAC Induction: Intravenous Anesthetic plan and risks discussed with: Patient

## 2018-09-29 NOTE — PROGRESS NOTES
2300 
Bedside and Verbal shift change report given to Hanna Trammell (oncoming nurse) by Laurence Jett (offgoing nurse). Report included the following information SBAR, Kardex, Procedure Summary, Intake/Output, MAR, Accordion and Recent Results. Patient in bed. Initial assessment done. 5198 MD aware of HGB - 6.8. No orders given. Visit Vitals  /64 (BP 1 Location: Left arm, BP Patient Position: At rest;Sitting)  Pulse 78  Temp 98.5 °F (36.9 °C)  Resp 20  
 Ht 5' 11\" (1.803 m)  Wt 77.1 kg (170 lb)  SpO2 94%  BMI 23.71 kg/m2  
 
0700 Bedside and Verbal shift change report given to Nargis YAÑEZ (oncoming nurse) by Jr Flores RN (offgoing nurse). Report included the following information SBAR, Kardex, Procedure Summary, Intake/Output, MAR, Accordion and Recent Results.

## 2018-09-29 NOTE — PROGRESS NOTES
Bedside and Verbal shift change report given to Geri Gutierrez RN (oncoming nurse) by Chago Malone RN (offgoing nurse). Report included the following information SBAR, Kardex, Accordion, Recent Results and Cardiac Rhythm NSR. Bedside and Verbal shift change report given to Randi Moore RN (oncoming nurse) by Geri Gutierrez RN (offgoing nurse). Report included the following information SBAR, Kardex, Accordion, Recent Results and Cardiac Rhythm NSR.

## 2018-09-29 NOTE — PROGRESS NOTES
physical Therapy EVALUATION/DISCHARGE Patient: Johnna Colunga (7 y.o. male) Date: 9/29/2018 Primary Diagnosis: Pancytopenia ([address] Pancytopenia (HCC) 
anemia Procedure(s) (LRB): ESOPHAGOGASTRODUODENOSCOPY (EGD) (N/A) COLONOSCOPY (N/A) ENDOSCOPIC POLYPECTOMY (N/A) 1 Day Post-Op Precautions:   Fall ASSESSMENT : 
Based on the objective data described below, the patient presents with decreased endurance and otherwise flat surface ambulation at baseline status on day 3 of admission with pancytopenia. He ambulates using single point cane with modified independence today and is performing lower body dressing independently on PT arrival. He demonstrates appropriate device use, safety considerations, and insight into deficits throughout encounter. He tolerates treatment without complaints and is cleared for discharge from PT perspective, which he anticipates within next few hours. Skilled physical therapy is not indicated at this time. PLAN : 
Discharge Recommendations: None Further Equipment Recommendations for Discharge: none SUBJECTIVE:  
Patient stated I do all my own shopping and cooking.  Pt received seated, agreeable to PT and cleared by RN. OBJECTIVE DATA SUMMARY:  
HISTORY:   
Past Medical History:  
Diagnosis Date  Cirrhosis ([address]  Pancytopenia ([address]  Patella fracture 2007 Right knee Past Surgical History:  
Procedure Laterality Date  COLONOSCOPY N/A 9/28/2018 COLONOSCOPY performed by Romulo Corbin MD at [address] [place] [address] Drive  HX HERNIA REPAIR Bilateral   
 HX RETINAL DETACHMENT REPAIR Prior Level of Function/Home Situation: independent baseline mobility using single point cane. Lives alone. Denies fall history. Personal factors and/or comorbidities impacting plan of care: as above. Home Situation Home Environment: Apartment # Steps to Enter: 0 Rails to Enter: No 
Wheelchair Ramp: No 
One/Two Story Residence: One story Living Alone: Yes Support Systems: Friends \ neighbors Patient Expects to be Discharged to[de-identified] [id number] Current DME Used/Available at Home: Cane, straight Tub or Shower Type: Tub/Shower combination EXAMINATION/PRESENTATION/DECISION MAKING:  
Critical Behavior: 
Neurologic State: Alert, Appropriate for age Orientation Level: Oriented X4 Cognition: Appropriate decision making, Appropriate for age attention/concentration, Appropriate safety awareness, Follows commands Safety/Judgement: Awareness of environment, Fall prevention, Insight into deficits Hearing: Auditory Auditory Impairment: None Skin:  Exposed skin intact Edema: none noted LEs. Range Of Motion: 
AROM: Within functional limits PROM: Within functional limits Strength:   
Strength: Generally decreased, functional 
  
  
  
  
  
  
Tone & Sensation:  
Tone: Normal 
  
  
  
  
Sensation: Intact Coordination: 
Coordination: Within functional limits Functional Mobility: 
Bed Mobility: 
  
  
Sit to Supine: Independent Scooting: Independent Transfers: 
Sit to Stand: Modified independent Stand to Sit: Modified independent Balance:  
Sitting: Intact Standing: With support Standing - Static: Good Standing - Dynamic : Good Ambulation/Gait Training: 
Distance (ft): 200 Feet (ft) Assistive Device: Gait belt;Cane, straight Ambulation - Level of Assistance: Modified independent Gait Abnormalities: Decreased step clearance Base of Support: Widened Speed/Jojo: Pace decreased (<100 feet/min) No loss of balance, appropriate attention to environment and obstacle negotiation. Functional Measure: 
Tinetti test: 
 
Sitting Balance: 1 Arises: 1 Attempts to Rise: 2 Immediate Standing Balance: 2 Standing Balance: 2 Nudged: 2 Eyes Closed: 1 Turn 360 Degrees - Continuous/Discontinuous: 1 Turn 360 Degrees - Steady/Unsteady: 1 Sitting Down: 2 Balance Score: 15 Indication of Gait: 1 
R Step Length/Height: 1 L Step Length/Height: 1 
R Foot Clearance: 1 L Foot Clearance: 1 Step Symmetry: 1 Step Continuity: 1 Path: 1 (uses cane) Trunk: 0 Walking Time: 0 Gait Score: 8 Total Score: 23 Tinetti Test and G-code impairment scale: 
Percentage of Impairment CH 
 
0% 
 CI 
 
1-19% CJ 
 
20-39% CK 
 
40-59% CL 
 
60-79% CM 
 
80-99% CN  
 
100% Tinetti Score 0-28 28 23-27 17-22 12-16 6-11 1-5 0 Tinetti Tool Score Risk of Falls 
<19 = High Fall Risk 19-24 = Moderate Fall Risk 25-28 = Low Fall Risk Tinetti ME. Performance-Oriented Assessment of Mobility Problems in Elderly Patients. [address]; [id number]. (Scoring Description: PT Bulletin Feb. 10, 1993) Older adults: Percy Mccartney et al, 2009; n = [address] [facility] elderly evaluated with ABC, BLAIR, ADL, and IADL) · Mean BLAIR score for males aged 69-68 years = 26.21(3.40) · Mean BLAIR score for females age 69-68 years = 25.16(4.30) · Mean BLAIR score for males over 80 years = 23.29(6.02) · Mean BLAIR score for females over 80 years = 17.20(8.32) G codes: In compliance with CMSs Claims Based Outcome Reporting, the following G-code set was chosen for this patient based on their primary functional limitation being treated: The outcome measure chosen to determine the severity of the functional limitation was the tinetti with a score of 23/28 which was correlated with the impairment scale. ? Mobility - Walking and Moving Around:  
  - CURRENT STATUS: CI - 1%-19% impaired, limited or restricted  - GOAL STATUS: CI - 1%-19% impaired, limited or restricted  - D/C STATUS:  CI - 1%-19% impaired, limited or restricted Physical Therapy Evaluation Charge Determination History Examination Presentation Decision-Making MEDIUM  Complexity : 1-2 comorbidities / personal factors will impact the outcome/ POC  HIGH Complexity : 4+ Standardized tests and measures addressing body structure, function, activity limitation and / or participation in recreation  LOW Complexity : Stable, uncomplicated  LOW Complexity : FOTO score of  Based on the above components, the patient evaluation is determined to be of the following complexity level: LOW Pain: 
Pain Scale 1: Numeric (0 - 10) Pain Intensity 1: 0 Activity Tolerance: No pt complaints. Please refer to the flowsheet for vital signs taken during this treatment. After treatment:  
[]   Patient left in no apparent distress sitting up in chair 
[x]   Patient left in no apparent distress in bed 
[x]   Call bell left within reach [x]   Nursing notified 
[]   Caregiver present 
[]   Bed alarm activated COMMUNICATION/EDUCATION:  
Communication/Collaboration: 
[x]   Fall prevention education was provided and the patient/caregiver indicated understanding. [x]   Patient/family have participated as able and agree with findings and recommendations. []   Patient is unable to participate in plan of care at this time. Findings and recommendations were discussed with: Registered Nurse Thank you for this referral. 
Nadine Chester, PT, DPT Time Calculation: 25 mins

## 2018-09-29 NOTE — DISCHARGE SUMMARY
Hilario Patel [facility] 79 
8905 [facility], [address] [place] [address] Tel: [phone] Physician Discharge Summary Patient ID:    Nicolasa Lovett Age:              74 y.o.    : 1945 MRN:             [id number] PCP: None Admit date: 2018 Discharge date: 2018 Principal admission Diagnosis: 
Pancytopenia ([address] Discharge Diagnoses: 
Principal Problem: 
  Pancytopenia ([address] (2018) Cirrhosis ([address] () Hyponatremia (2018) Elevated troponin (2018) Abnormal chest x-ray (2018) Consults: Cardiology, GI and Hematology/Oncology Hospital Course:  
 
Mr. Vernon Hernandez is a 74 y.o. admitted to [facility] and treated for the following: 
 
Pancytopenia ([address] (2018): suspect this may be due to bone marrow suppression and cirrhosis vs other. No evidence of iron, b12, or folate def.  Retic low.  S/p 2 unit prbc. No visible bleeding noted. Hgb remains stable. Seen by GI and hematology. He is sp a bone marrow aspirate (results pending), EGD and colonoscopy - both unremarkable other than a polyp which was removed. Hgb dropped to 6.8 and he was transfused with 1 unit PRBC. Hgb 7.7 g/dL at discharge. He has a hematology follow up    
  
Cirrhosis [facility]):  Prior viral hepatitis panel was negative. Abdominal ultrasound not suggestive of HCC. AFP and other serologies pending. Out patient follow up 
  
Nausea:  Has nausea with eating and early satiety. Better. EGD unremarkable. Continue PPi.    
Hyponatremia (2018):  May be from poor po intake.  Resolved. TSH elevated. Free T4 normal.  
  
Elevated troponin (2018)/Type 2 MI due to anemia:  Denies chest pain.  Has HORN but this is due to anemia, likely. Troponin flat. Seen by cardiology. Echo showed an EF of 45 % to 50 %. There was hypokinesis of the basal anterior and basal-mid anteroseptal wall(s).  Out patient cardiology follow up for further testing once other work up completed. Continue Coreg 
  
Abnormal chest x-ray (9/26/2018): indicated some pulmonary edema. He has remained asymptomatic. Discharge Exam:   
Visit Vitals  /65 (BP 1 Location: Right arm, BP Patient Position: At rest;Supine)  Pulse 74  Temp 98.2 °F (36.8 °C)  Resp 17  Ht 5' 11\" (1.803 m)  Wt 77.1 kg (170 lb)  SpO2 97%  BMI 23.71 kg/m2 General: well looking and stable patient in no acute distress Pulm: clear breath sounds without wheezes Card: no murmurs, normal S1, S2 without thrills, bruits Abd:    soft, non-tender, normoactive bowel sounds Skin: no rashes or ulcers, skin turgor is good Neuro: awake, alert and has a non focal  
 
Activity: Activity as tolerated Diet: Regular Diet Current Discharge Medication List  
  
START taking these medications Details  
pantoprazole (PROTONIX) 40 mg tablet Take 1 Tab by mouth Daily (before breakfast) for 30 days. Qty: 30 Tab, Refills: 0  
  
carvedilol (COREG) 3.125 mg tablet Take 1 Tab by mouth two (2) times daily (with meals) for 30 days. Qty: 60 Tab, Refills: 0 CONTINUE these medications which have NOT CHANGED Details  
folic acid/multivit-min/lutein (CENTRUM SILVER PO) Take 1 Tab by mouth daily. Follow-up Information Follow up With Details Comments Contact Info Elena Johnston MD Go on 10/4/2018 appt at 1:30 pm [address] [place] [address] 
[phone] Meg Martinez MD Call to schedule follow up and review of pending liver tests [address] [facility] 57 
[phone] Follow-up tests or labs: None Discharge Condition: Stable Disposition: home Time taken to arrange discharge:  35 minutes. Signed: 
Patrick Kennedy MD     [facility] 9/29/2018   12:09 PM

## 2018-09-29 NOTE — DISCHARGE INSTRUCTIONS
HOSPITALIST DISCHARGE INSTRUCTIONS    NAME:             Demetria Gallagher   :  1945   MRN:  [id number]     Date:     2018    ADMIT DATE: 2018     DISCHARGE DATE: 2018     PRINCIPAL ADMITTING DIAGNOSIS:  Pancytopenia ([facility] [address] [facility]    DISCHARGE DIAGNOSES:  Principal Problem:    Pancytopenia ([address] (2018)    Cirrhosis ([facility] [address] [facility]     Hyponatremia (2018)    Elevated troponin (2018)    Abnormal chest x-ray (2018)    MEDICATIONS:    · It is important that medications are taken exactly as they are prescribed on the discharge medication instructions and keep them your  in the bottles provided by the pharmacist.   · Keep a list of the medication names, dosages, and times to be taken at all times. · Do not take other medications without consulting your doctor. Recommended diet:  Regular Diet    Recommended activity: Activity as tolerated    Post discharge care:    Notify follow up health care provider or return to the emergency department if you cannot get hold of your doctor if you feel worse or experience symptoms similar to those that brought you to hospital    Follow-up Information     Follow up With Details Jewel Perez MD Go on 10/4/2018 appt at 1:30 pm [address]  [address] [facility]  [phone]      Xander Cerrato MD Call to schedule follow up and review of pending liver tests [address] [place] [address]  [phone]            Information obtained by :  I understand that if any problems occur once I am at home I am to contact my physician and I understand and acknowledge receipt of the instructions indicated above.                                                                                                                                            Physician's or R.N.'s Signature                                                                  Date/Time Patient or Representative Signature                                                          Date/Time           Bone Marrow Aspiration and Biopsy: What to Expect at Home  Your Recovery  The biopsy site may feel sore for several days. It can help to walk, take pain medicine, and put ice packs on the site. You will probably be able to return to work and your usual activities the day after the procedure. Your doctor or nurse will call you with the results of your test.  This care sheet gives you a general idea about how long it will take for you to recover. But each person recovers at a different pace. Follow the steps below to get better as quickly as possible. How can you care for yourself at home? Activity    · Rest when you feel tired. Getting enough sleep will help you recover.     · You may drive when you are no longer taking pain pills and can quickly move your foot from the gas pedal to the brake. You must also be able to sit comfortably for a long period of time, even if you do not plan to go far. You might get caught in traffic.     · Most people are able to return to work the day after the procedure. Medicines    · Your doctor will tell you if and when you can restart your medicines. He or she will also give you instructions about taking any new medicines.     · If you take blood thinners, such as warfarin (Coumadin), clopidogrel (Plavix), or aspirin, be sure to talk to your doctor. He or she will tell you if and when to start taking those medicines again. Make sure that you understand exactly what your doctor wants you to do.     · Be safe with medicines. Take pain medicines exactly as directed. ¨ If the doctor gave you a prescription medicine for pain, take it as prescribed.   ¨ If you are not taking a prescription pain medicine, take an over-the-counter medicine such as acetaminophen (Tylenol), ibuprofen (Advil, Motrin), or naproxen (Aleve). Read and follow all instructions on the label. ¨ Do not take two or more pain medicines at the same time unless the doctor told you to. Many pain medicines have acetaminophen, which is Tylenol. Too much acetaminophen (Tylenol) can be harmful.     · If you think your pain medicine is making you sick to your stomach:  ¨ Take your medicine after meals (unless your doctor has told you not to). ¨ Ask your doctor for a different pain medicine.     · If your doctor prescribed antibiotics, take them as directed. Do not stop taking them just because you feel better.    Ice    · Put ice or a cold pack on the biopsy site for 10 to 20 minutes at a time. Put a thin cloth between the ice and your skin. Follow-up care is a key part of your treatment and safety. Be sure to make and go to all appointments, and call your doctor if you are having problems. It's also a good idea to know your test results and keep a list of the medicines you take. When should you call for help? Call 911 anytime you think you may need emergency care. For example, call if:    · You passed out (lost consciousness).    Call your doctor now or seek immediate medical care if:    · You have signs of infection, such as:  ¨ Increased pain, swelling, warmth, or redness. ¨ Red streaks leading from the biopsy site. ¨ Pus draining from the biopsy site. ¨ Swollen lymph nodes in your neck, armpits, or groin. ¨ A fever.    Watch closely for any changes in your health, and be sure to contact your doctor if:    · You are not getting better as expected. Where can you learn more? Go to http://xiang-lucho.info/. Enter E148 in the search box to learn more about \"Bone Marrow Aspiration and Biopsy: What to Expect at Home. \"  Current as of: September 10, 2017  Content Version: 11.7  © 8615-8324 [employer].  Care instructions adapted under license by [employer] (which disclaims liability or warranty for this information). If you have questions about a medical condition or this instruction, always ask your healthcare professional. [place] [address] any warranty or liability for your use of this information.

## 2018-09-30 NOTE — ANESTHESIA POSTPROCEDURE EVALUATION
Post-Anesthesia Evaluation and Assessment Patient: Barry Yanez MRN: [id number]  SSN: [id number] YOB: 1945  Age: 74 y.o. Sex: male Cardiovascular Function/Vital Signs Visit Vitals  /68  Pulse 76  Temp 36.5 °C (97.7 °F)  Resp 16  
 Ht 5' 11\" (1.803 m)  Wt 77.1 kg (170 lb)  SpO2 96%  BMI 23.71 kg/m2 Patient is status post MAC anesthesia for Procedure(s): ESOPHAGOGASTRODUODENOSCOPY (EGD) COLONOSCOPY 
ENDOSCOPIC POLYPECTOMY. Nausea/Vomiting: None Postoperative hydration reviewed and adequate. Pain: 
Pain Scale 1: Numeric (0 - 10) (09/29/18 0856) Pain Intensity 1: 0 (09/29/18 0856) Managed Neurological Status:  
Neuro Neurologic State: Alert; Appropriate for age (09/28/18 2319) Orientation Level: Oriented X4 (09/28/18 2319) Cognition: Appropriate decision making; Appropriate for age attention/concentration; Appropriate safety awareness; Follows commands (09/28/18 2319) Speech: Clear; Appropriate for age (09/28/18 2319) Assessment L Pupil: Brisk;Round (09/28/18 1547) Size L Pupil (mm): 3 (09/28/18 1547) Assessment R Pupil: Brisk;Round (09/28/18 1547) Size R Pupil (mm): 3 (09/28/18 1547) At baseline Mental Status and Level of Consciousness: Arousable Pulmonary Status:  
O2 Device: Room air (09/29/18 0805) Adequate oxygenation and airway patent Complications related to anesthesia: None Post-anesthesia assessment completed. No concerns Signed By: Jeanette Martínez MD   
 September 30, 2018

## 2018-10-04 NOTE — DISCHARGE INSTRUCTIONS
Thrombocytopenia: Care Instructions  Your Care Instructions    Thrombocytopenia is a low number of platelets in the blood. Platelets are the cells that help blood clot. If you don't have enough of them, your blood cannot clot well. So it is harder to stop bleeding. You may have low platelets because your bone marrow does not make them. Or your body's defenses (immune system) may destroy them. Having an enlarged spleen can also reduce the number of platelets in your blood. This is because they can get trapped in the enlarged spleen. Some diseases or medicines may also cause low platelets. But platelets may go back to normal levels if the disease is treated or the medicine is stopped. You may not need treatment if your problem is mild. If you do need treatment, you may have platelets added to your blood. Or you may get medicine to stop the loss of platelets or help your body make them. Follow-up care is a key part of your treatment and safety. Be sure to make and go to all appointments, and call your doctor if you are having problems. It's also a good idea to know your test results and keep a list of the medicines you take. How can you care for yourself at home? · Be safe with medicines. Take your medicines exactly as prescribed. Call your doctor if you think you are having a problem with your medicine. · Do not take aspirin or anti-inflammatory medicines unless your doctor says it is okay. Examples are ibuprofen (Advil, Motrin) and naproxen (Aleve). They may increase the risk of bleeding. · Avoid contact sports or activities that could cause you to fall. When should you call for help? Call 911 anytime you think you may need emergency care. For example, call if:    · You passed out (lost consciousness).     · You have signs of severe bleeding, which includes:  ¨ You have a severe headache that is different from past headaches. ¨ You vomit blood or what looks like coffee grounds.   ¨ Your stools are maroon or very bloody.    Call your doctor now or seek immediate medical care if:    · You are dizzy or lightheaded, or you feel like you may faint.     · You have abnormal bleeding, such as:  ¨ Your stools are black and look like tar, or they have streaks of blood. ¨ You have blood in your urine. ¨ You have joint pain. ¨ You have bruises or blood spots under your skin.    Watch closely for changes in your health, and be sure to contact your doctor if:    · You do not get better as expected. Where can you learn more? Go to http://xiang-lucho.info/. Enter R063 in the search box to learn more about \"Thrombocytopenia: Care Instructions. \"  Current as of: May 7, 2018  Content Version: 11.8  © 5831-9452 [employer]. Care instructions adapted under license by [employer] (which disclaims liability or warranty for this information). If you have questions about a medical condition or this instruction, always ask your healthcare professional. [place] [address] any warranty or liability for your use of this information. Anemia: Care Instructions  Your Care Instructions    Anemia is a low level of red blood cells, which carry oxygen throughout your body. Many things can cause anemia. Lack of iron is one of the most common causes. Your body needs iron to make hemoglobin, a substance in red blood cells that carries oxygen from the lungs to your body's cells. Without enough iron, the body produces fewer and smaller red blood cells. As a result, your body's cells do not get enough oxygen, and you feel tired and weak. And you may have trouble concentrating. Bleeding is the most common cause of a lack of iron. You may have heavy menstrual bleeding or bleeding caused by conditions such as ulcers, hemorrhoids, or cancer. Regular use of aspirin or other anti-inflammatory medicines (such as ibuprofen) also can cause bleeding in some people.  A lack of iron in your diet also can cause anemia, especially at times when the body needs more iron, such as during pregnancy, infancy, and the teen years. Your doctor may have prescribed iron pills. It may take several months of treatment for your iron levels to return to normal. Your doctor also may suggest that you eat foods that are rich in iron, such as meat and beans. There are many other causes of anemia. It is not always due to a lack of iron. Finding the specific cause of your anemia will help your doctor find the right treatment for you. Follow-up care is a key part of your treatment and safety. Be sure to make and go to all appointments, and call your doctor if you are having problems. It's also a good idea to know your test results and keep a list of the medicines you take. How can you care for yourself at home? · Take your medicines exactly as prescribed. Call your doctor if you think you are having a problem with your medicine. · If your doctor recommends iron pills, take them as directed:  ¨ Try to take the pills on an empty stomach about 1 hour before or 2 hours after meals. But you may need to take iron with food to avoid an upset stomach. ¨ Do not take antacids or drink milk or caffeine drinks (such as coffee, tea, or cola) at the same time or within 2 hours of the time that you take your iron. They can make it hard for your body to absorb the iron. ¨ Vitamin C (from food or supplements) helps your body absorb iron. Try taking iron pills with a glass of orange juice or some other food that is high in vitamin C, such as citrus fruits. ¨ Iron pills may cause stomach problems, such as heartburn, nausea, diarrhea, constipation, and cramps. Be sure to drink plenty of fluids, and include fruits, vegetables, and fiber in your diet each day. Iron pills often make your bowel movements dark or green. ¨ If you forget to take an iron pill, do not take a double dose of iron the next time you take a pill.   ¨ Keep iron pills out of the reach of small children. An overdose of iron can be very dangerous. · Follow your doctor's advice about eating iron-rich foods. These include red meat, shellfish, poultry, eggs, beans, raisins, whole-grain bread, and leafy green vegetables. · Steam vegetables to help them keep their iron content. When should you call for help? Call 911 anytime you think you may need emergency care. For example, call if:    · You have symptoms of a heart attack. These may include:  ¨ Chest pain or pressure, or a strange feeling in the chest.  ¨ Sweating. ¨ Shortness of breath. ¨ Nausea or vomiting. ¨ Pain, pressure, or a strange feeling in the back, neck, jaw, or upper belly or in one or both shoulders or arms. ¨ Lightheadedness or sudden weakness. ¨ A fast or irregular heartbeat. After you call 911, the  may tell you to chew 1 adult-strength or 2 to 4 low-dose aspirin. Wait for an ambulance. Do not try to drive yourself.     · You passed out (lost consciousness).    Call your doctor now or seek immediate medical care if:    · You have new or increased shortness of breath.     · You are dizzy or lightheaded, or you feel like you may faint.     · Your fatigue and weakness continue or get worse.     · You have any abnormal bleeding, such as:  ¨ Nosebleeds. ¨ Vaginal bleeding that is different (heavier, more frequent, at a different time of the month) than what you are used to. ¨ Bloody or black stools, or rectal bleeding. ¨ Bloody or pink urine.    Watch closely for changes in your health, and be sure to contact your doctor if:    · You do not get better as expected. Where can you learn more? Go to http://xiang-lucho.info/. Enter R301 in the search box to learn more about \"Anemia: Care Instructions. \"  Current as of: May 7, 2018  Content Version: 11.8  © 6435-0799 [employer].  Care instructions adapted under license by [employer] (which disclaims liability or warranty for this information). If you have questions about a medical condition or this instruction, always ask your healthcare professional. [place] [address] any warranty or liability for your use of this information.

## 2018-10-04 NOTE — PROGRESS NOTES
[facility] [place] [facility] [place] [address]  [address] [facility], [address] [facility] [address] [facility]  Silvano Chaney: [phone]  F: [phone]     Reason for Visit:   Ana Davison is a 74 y.o. male who is seen for follow up of pancytopenia. History of Present Illness:   nAa Davison is a pleasant 74 y.o. male who presents today for hospital follow up. See my inpatient consult notes for additional details. He had a bone marrow biopsy done in the hospital, and he is here to discuss the results. He reports feeling much better since returning home. Improved energy, though still some fatigue. Has been active, doing housework, running errands. No dyspnea or dizziness. Weakness in legs has resolved. No bleeding. No pain. He is unaccompanied today. Past Medical History:   Diagnosis Date    Anemia     Cirrhosis ([address]     Pancytopenia ([address]     Patella fracture 2007    Right knee      Past Surgical History:   Procedure Laterality Date    COLONOSCOPY N/A 9/28/2018    COLONOSCOPY performed by Miriam Blackwell MD at [address] HX APPENDECTOMY      HX HERNIA REPAIR Bilateral     HX RETINAL DETACHMENT REPAIR        Social History   Substance Use Topics    Smoking status: Former Smoker    Smokeless tobacco: Never Used    Alcohol use Yes      Comment: rare      Family History   Problem Relation Age of Onset    Anemia Mother     Cancer Father 61     Lung     Current Outpatient Prescriptions   Medication Sig    pantoprazole (PROTONIX) 40 mg tablet Take 1 Tab by mouth Daily (before breakfast) for 30 days.  carvedilol (COREG) 3.125 mg tablet Take 1 Tab by mouth two (2) times daily (with meals) for 30 days.  folic acid/multivit-min/lutein (CENTRUM SILVER PO) Take 1 Tab by mouth daily. No current facility-administered medications for this visit. No Known Allergies     Review of Systems: A complete review of systems was obtained, negative except as described above.     Physical Exam:     Visit Vitals    /69 (BP 1 Location: Left arm, BP Patient Position: Sitting)    Pulse 74    Temp 97.5 °F (36.4 °C) (Temporal)    Resp 18    Ht 5' 11\" (1.803 m)    Wt 193 lb (87.5 kg)    SpO2 97%    BMI 26.92 kg/m2     General: No distress  Eyes: PERRLA, anicteric sclerae  HENT: Atraumatic, OP clear  Neck: Supple  Lymphatic: No cervical, supraclavicular, or inguinal adenopathy  Respiratory: CTAB, normal respiratory effort  CV: Normal rate, regular rhythm, no murmurs, no peripheral edema  GI: Soft, nontender, nondistended, no masses, no hepatomegaly, no splenomegaly  MS: Normal gait and station. Digits without clubbing or cyanosis. Skin: No rashes, ecchymoses, or petechiae. Normal temperature, turgor, and texture. Psych: Alert, oriented, appropriate affect, normal judgment/insight    Results:     Lab Results   Component Value Date/Time    WBC 3.2 (L) 10/04/2018 02:52 PM    HGB 5.3 (LL) 10/04/2018 02:52 PM    HCT 15.9 (LL) 10/04/2018 02:52 PM    PLATELET 94 (L) 64/21/8750 02:52 PM    .0 (H) 10/04/2018 02:52 PM    ABS. NEUTROPHILS PENDING 10/04/2018 02:52 PM     Lab Results   Component Value Date/Time    Sodium 137 09/28/2018 01:13 AM    Potassium 4.3 09/28/2018 01:13 AM    Chloride 104 09/28/2018 01:13 AM    CO2 27 09/28/2018 01:13 AM    Glucose 104 (H) 09/28/2018 01:13 AM    BUN 12 09/28/2018 01:13 AM    Creatinine 0.71 09/28/2018 01:13 AM    GFR est AA >60 09/28/2018 01:13 AM    GFR est non-AA >60 09/28/2018 01:13 AM    Calcium 8.2 (L) 09/28/2018 01:13 AM     Lab Results   Component Value Date/Time    Bilirubin, total 1.1 (H) 09/28/2018 01:13 AM    ALT (SGPT) 15 09/28/2018 01:13 AM    AST (SGOT) 32 09/28/2018 01:13 AM    Alk.  phosphatase 213 (H) 09/28/2018 01:13 AM    Protein, total 5.8 (L) 09/28/2018 01:13 AM    Albumin 2.4 (L) 09/28/2018 01:13 AM    Globulin 3.4 09/28/2018 01:13 AM     Lab Results   Component Value Date/Time    Reticulocyte count 0.2 (L) 10/04/2018 02:52 PM    Iron % saturation 96 (H) 09/27/2018 04:14 AM    TIBC 252 09/27/2018 04:14 AM    Ferritin 537 (H) 09/27/2018 04:14 AM    Vitamin B12 >2000 (H) 09/26/2018 01:00 PM    Folate 19.1 09/26/2018 01:00 PM    Haptoglobin 105 09/26/2018 10:15 AM     09/26/2018 10:15 AM    TSH 4.45 (H) 09/27/2018 04:14 AM    OSCAR, Direct Positive (A) 09/27/2018 04:14 AM    Hep C  virus Ab Interp. NONREACTIVE 04/20/2016 09:58 AM     Lab Results   Component Value Date/Time    INR 1.3 (H) 09/26/2018 10:15 AM    aPTT 26.5 09/26/2018 10:15 AM     HGB (g/dL)   Date Value   10/04/2018 5.3 (LL)   09/29/2018 7.7 (L)   09/29/2018 6.8 (L)   09/28/2018 7.3 (L)   09/28/2018 7.0 (L)   09/27/2018 7.5 (L)   09/26/2018 6.3 (L)   09/26/2018 5.1 (LL)   04/21/2016 10.6 (L)   04/20/2016 13.0   10/26/2009 12.4         Peripheral smear 9/26/2018: There is a macrocytic, normochromic anemia. Granulocytes and   lymphocytes are reduced and mature. Platelets are reduced and no   blasts are seen. If cytopenias persist, a bone marrow biopsy  may be   beneficial. Per Dr. Alanis Jennings MD.    Abdominal US 9/27/2018  1. Cirrhosis. Liver protocol CT or MRI should be considered for more sensitive hepatocellular carcinoma screening. 2. Small volume of ascites and right pleural effusion. Bone marrow biopsy 9/27/2018: Cellular marrow with maturing trilineage hematopoiesis, no increase in blasts. Lymphoid aggregates, favor benign/reactive. Mild reticulin fibrosis. Increased storage iron. FISH and cytogenetics pending. Assessment:   1) Anemia, macrocytic  S/p 3u PRBC during hospital stay 9/2018. Uncertain etiology. Labwork fairly unremarkable. EGD and colonoscopy without bleeding. Concern for production issue, given low retic and other cytopenias. However, bone marrow biopsy completed 9/27 was unrevealing. Possible early MDS, and FISH and cytogenetics are pending.   We will continue to monitor for now with transfusion support as needed for HGB <7.  If anemia persists, consider referral to [facility] or [facility] for another opinion. 2) Thrombocytopenia  Chronic since at least 2009. Possibly related to cirrhosis, labwork otherwise unremarkable. Spleen at upper limit of normal on imaging. Bone marrow without definite etiology, though perhaps early MDS as above. Monitor.    3) Neutropenia  Mild. New this year. As above, possibly related to cirrhosis vs early MDS. Monitor. 4) Cirrhosis   Follow up with Dr. Vidal Rodriguez    5) Health maintenance  He needs a PCP. I gave him a list of names. Plan:     · Labs today: CBC, retic  · Follow up with GI  · Establish with a PCP  · Labs monthly: CBC  · Return to see me in 2 months, or sooner pending today's lab results      Signed By: Olden Fleischer, MD        ADDENDUM  Labs returned with HGB back down to 5.3, from 7.7 less than a week ago. Somewhat surprising, as he reported clinically doing better when I saw him in the office earlier today. We have instructed him to go to the ED for urgent transfusion. The cause of his severe anemia is uncertain. Extensive evaluation with labs, endoscopy, and bone marrow biopsy has all been unrevealing. I recommend repeating his stool blood to rule out bleeding, given the rapid drop. Though low retic continues to support a production issue. Likely would benefit from second opinion at an academic center.

## 2018-10-04 NOTE — PROGRESS NOTES
[facility] Lab Visit: 
 
1440:  Pt arrived ambulatory and in no distress, labs drawn , Departed [facility] ambulatory and in no distress. Visit Vitals  /72  Pulse 72  Temp 98.9 °F (37.2 °C)  Resp 18  SpO2 97% Labs available in CC once resulted. HGB 5.3 called to MD office no orders received. Office to call patient and offer ER or [facility] for blood transfusion tomorrow. Recent Results (from the past 12 hour(s)) CBC WITH AUTOMATED DIFF Collection Time: 10/04/18  2:52 PM  
Result Value Ref Range WBC 3.2 (L) 4.1 - 11.1 K/uL  
 RBC 1.59 (L) 4.10 - 5.70 M/uL HGB 5.3 (LL) 12.1 - 17.0 g/dL HCT 15.9 (LL) 36.6 - 50.3 % .0 (H) 80.0 - 99.0 FL  
 MCH 33.3 26.0 - 34.0 PG  
 MCHC 33.3 30.0 - 36.5 g/dL  
 RDW 16.4 (H) 11.5 - 14.5 % PLATELET 94 (L) 151 - 400 K/uL NRBC 0.0 0  WBC ABSOLUTE NRBC 0.00 0.00 - 0.01 K/uL NEUTROPHILS PENDING % LYMPHOCYTES PENDING % MONOCYTES PENDING % EOSINOPHILS PENDING % BASOPHILS PENDING % IMMATURE GRANULOCYTES PENDING %  
 ABS. NEUTROPHILS PENDING K/UL  
 ABS. LYMPHOCYTES PENDING K/UL  
 ABS. MONOCYTES PENDING K/UL  
 ABS. EOSINOPHILS PENDING K/UL  
 ABS. BASOPHILS PENDING K/UL  
 ABS. IMM. GRANS. PENDING K/UL  
 DF PENDING   
RETICULOCYTE COUNT Collection Time: 10/04/18  2:52 PM  
Result Value Ref Range Reticulocyte count 0.2 (L) 0.7 - 2.1 % Absolute Retic Cnt. 0.0037 (L) 0.0260 - 0.0950 M/ul

## 2018-10-04 NOTE — PROGRESS NOTES
10/4/2018 5:45 PM 
Case management note Met with patient to discuss discharge planning. Confirmed demographics. Patient lives in single story apartment alone with a couple of steps. Patient has a cane. Patient performs ADL's independently Patient drove to hospital today per MD call for blood transfusion. hgb 5.3 [facility] [address] for [employer] needs Patient does not have PCP but continued discussion with Dr. Lainey Acosta today Reason for Readmission:     Low hgb RRAT Score and Risk Level:     15 Level of Readmission:    Level 1 Care Conference scheduled:   Patient would benefit from goals of care, education on disease process Resources/supports as identified by patient/family:   No support in [facility] Top Challenges facing patient (as identified by patient/family and CM): Finances/Medication cost?     No issus with finances Transportation      Self/ taxie Support system or lack thereof? No support Living arrangements? Lives alone Self-care/ADLs/Cognition? Can do self are/ fair health cognition Current Advanced Directive/Advance Care Plan:  Patient has advance directive and POA/ Leigha Lick Plan for utilizing home health:   Patient would benefit from skilled nursing care for hgb management/ education of disease process/ PT for strengthen and endurance Likelihood of additional readmission:   Moderate/yellow    Higher due to disease process Transition of Care Plan:    Based on readmission, the patient's previous Plan of Care 
 has been evaluated and/or modified. The current Transition of Care Plan is:       Home health, community resources for support Care Management Interventions PCP Verified by CM: No (no pcp, list given on last admission, pt had discussion with dr. Evelyn Marie today) Mode of Transport at Discharge: Self Transition of Care Consult (CM Consult): Discharge Planning Current Support Network: Lives Alone Confirm Follow Up Transport: Self Discharge Location Discharge Placement: Home with home health Marily Meng, [address]

## 2018-10-04 NOTE — PROGRESS NOTES
[facility]: MED RECONCILIATION Information obtained from: Patient Allergies: Review of patient's allergies indicates no known allergies. Comment: 
Patient's med rec was done previously on 9/26/18 Patient was discharged on 9/29/18 (Coreg and Protonix were added) Prior to Admission Medications:  
 
Medication Documentation Review Audit Reviewed by Carola Carias PHARMD (Pharmacist) on 10/04/18 at [id number] [id number] Medication Sig Documenting Provider Last Dose Status Taking?  
 
 carvedilol (COREG) 3.125 mg tablet Take 1 Tab by mouth two (2) times daily (with meals) for 30 days. Carla Hardin MD 10/4/2018 AM Active Yes  
 folic acid/multivit-min/lutein (CENTRUM SILVER PO) Take 1 Tab by mouth daily. Historical Provider 10/4/2018 Active Yes  
 pantoprazole (PROTONIX) 40 mg tablet Take 1 Tab by mouth Daily (before breakfast) for 30 days. Carla Hardin MD 10/4/2018 AM Active Yes Rhett Gomez [place]   Contact: [phone]

## 2018-10-04 NOTE — PATIENT INSTRUCTIONS
· Get labs today in the infusion center  · Get labs in 1 month at [address]  · Get labs in 2 months at [address]  · Return to see me in 2 months, a few days after your lab draw  · Follow up with Dr. Puneet Izaguirre, gastroenterologist  · Establish care with a primary care physician

## 2018-10-04 NOTE — ED PROVIDER NOTES
HPI Comments: 4:14 PM 
I have evaluated the patient as the Provider in Triage. I have reviewed His vital signs and the triage nurse assessment. I have talked with the patient and any available family and advised that I am the provider in triage and have ordered the appropriate study to initiate their work up based on the clinical presentation during my assessment. I have advised that the patient will be accommodated in the Main ED as soon as possible. I have also requested to contact the triage nurse or myself immediately if the patient experiences any changes in their condition during this brief waiting period. Dorthey Halsted, MD 
 
Pt. Presents to the ER with anemia. Pt. Had labs drawn today. Pt. Found to have a Hg of 5. Pt. Denies sx.  -lightheadedness, - dizziness.  - blood in his stool, - black stool. - pain. Pt. Was called to come into the ER for transfusion. 74 y.o. male with past medical history significant for patella fracture, cirrhosis, pancytopenia, and anemia who presents from home with chief complaint of abnormal lab results. He states \"I feel fine. \" Pt reports a hemoglobin of 5.3 at his follow up with Dr. Óscar Chen (Oncology). Pt notes he was referred to the ED for an emergent blood transfusion by Dr. Óscar Chen. Pt was admitted to the hospital from 9/26-9/29 for pancytopenia and anemia. Pt notes he required three red blood count transfusions during his admission. Pt has no other complaints. Pt denies blood in stools, hematuria, and vomiting. There are no other acute medical concerns at this time. Note written by Oumar Baumann, as dictated by CAMMY Cooley 5:13 PM 
 
 
 
 
The history is provided by the patient. Past Medical History:  
Diagnosis Date  Anemia  Cirrhosis ([address]  Pancytopenia ([address]  Patella fracture 2007 Right knee Past Surgical History:  
Procedure Laterality Date  COLONOSCOPY N/A 9/28/2018 COLONOSCOPY performed by Yaneth Davis MD at [address] [place] [address] Drive  HX HERNIA REPAIR Bilateral   
 HX RETINAL DETACHMENT REPAIR Family History:  
Problem Relation Age of Onset  Anemia Mother  Cancer Father 61 Lung Social History Social History  Marital status: SINGLE Spouse name: N/A  
 Number of children: N/A  
 Years of education: N/A Occupational History  Not on file. Social History Main Topics  Smoking status: Former Smoker  Smokeless tobacco: Never Used  Alcohol use Yes Comment: rare  Drug use: No  
 Sexual activity: No  
 
Other Topics Concern  Not on file Social History Narrative ALLERGIES: Review of patient's allergies indicates no known allergies. Review of Systems Constitutional: Negative. Negative for activity change, chills, fatigue and unexpected weight change. Respiratory: Negative for cough, chest tightness, shortness of breath and wheezing. Cardiovascular: Negative. Negative for chest pain and palpitations. Gastrointestinal: Negative. Negative for abdominal pain, blood in stool, diarrhea, nausea and vomiting. Genitourinary: Negative. Negative for dysuria, flank pain, frequency and hematuria. Musculoskeletal: Negative. Negative for arthralgias, back pain, neck pain and neck stiffness. Skin: Negative. Negative for color change and rash. Neurological: Negative. Negative for dizziness, numbness and headaches. Psychiatric/Behavioral: Negative. Negative for confusion. All other systems reviewed and are negative. There were no vitals filed for this visit. Physical Exam  
Constitutional: He is oriented to person, place, and time. He appears well-developed and well-nourished. He is active. Non-toxic appearance. No distress. HENT:  
Head: Normocephalic and atraumatic.   
Eyes: Conjunctivae are normal. Pupils are equal, round, and reactive to light. Right eye exhibits no discharge. Left eye exhibits no discharge. Neck: Normal range of motion and full passive range of motion without pain. Neck supple. No tracheal tenderness present. Cardiovascular: Normal rate, regular rhythm, normal heart sounds, intact distal pulses and normal pulses. Exam reveals no gallop and no friction rub. No murmur heard. Pulmonary/Chest: Effort normal and breath sounds normal. No respiratory distress. He has no wheezes. He has no rales. He exhibits no tenderness. Abdominal: Soft. Bowel sounds are normal. He exhibits no distension. There is no tenderness. There is no rebound and no guarding. Genitourinary:  
Genitourinary Comments: Tan colored stool NC. normal rectal tone. No TTP. Musculoskeletal: Normal range of motion. He exhibits no edema or tenderness. Neurological: He is alert and oriented to person, place, and time. He has normal strength. No cranial nerve deficit or sensory deficit. Coordination normal.  
Skin: Skin is warm, dry and intact. No abrasion and no rash noted. He is not diaphoretic. No erythema. Psychiatric: He has a normal mood and affect. His speech is normal and behavior is normal. Cognition and memory are normal.  
Nursing note and vitals reviewed. MDM Number of Diagnoses or Management Options Diagnosis management comments:  
 
Ddx: blood loss anemia, chronic anemia Amount and/or Complexity of Data Reviewed Clinical lab tests: ordered and reviewed Review and summarize past medical records: yes Discuss the patient with other providers: yes Patient Progress Patient progress: stable ED Course Procedures Hemoglobin now 7. Told nurse to hold starting transfusion at this time. Per Dr Bettencourt's recent note transfuse <7. Will consult hematology. Sonia Soto PA-C 
 
 
 CONSULT NOTE:  
6:18 PM 
Sonia Soto PA-C spoke with Dr. Burak Madrigal, Specialty: heme/onc Discussed pt's hx, disposition, and available diagnostic and imaging results. Reviewed care plans. Consultant agrees with plans as outlined. She states if hemoglobin is now 7.0, he's hemodynamically stable and no signs of blood in stool or urine he can f/u outpatient with Dr. Stephanie Wade in the next week. Written by Efra Brown PA-C. 
 
 
LABORATORY TESTS: 
Recent Results (from the past 12 hour(s)) SAMPLES BEING HELD Collection Time: 10/04/18  2:50 PM  
Result Value Ref Range SAMPLES BEING HELD sst   
 COMMENT Add-on orders for these samples will be processed based on acceptable specimen integrity and analyte stability, which may vary by analyte. CBC WITH AUTOMATED DIFF Collection Time: 10/04/18  2:52 PM  
Result Value Ref Range WBC 3.2 (L) 4.1 - 11.1 K/uL  
 RBC 1.59 (L) 4.10 - 5.70 M/uL HGB 5.3 (LL) 12.1 - 17.0 g/dL HCT 15.9 (LL) 36.6 - 50.3 % .0 (H) 80.0 - 99.0 FL  
 MCH 33.3 26.0 - 34.0 PG  
 MCHC 33.3 30.0 - 36.5 g/dL  
 RDW 16.4 (H) 11.5 - 14.5 % PLATELET 94 (L) 099 - 400 K/uL NRBC 0.0 0  WBC ABSOLUTE NRBC 0.00 0.00 - 0.01 K/uL NEUTROPHILS 46 32 - 75 % LYMPHOCYTES 44 12 - 49 % MONOCYTES 10 5 - 13 % EOSINOPHILS 0 0 - 7 % BASOPHILS 0 0 - 1 % IMMATURE GRANULOCYTES 0 %  
 ABS. NEUTROPHILS 1.5 (L) 1.8 - 8.0 K/UL  
 ABS. LYMPHOCYTES 1.4 0.8 - 3.5 K/UL  
 ABS. MONOCYTES 0.3 0.0 - 1.0 K/UL  
 ABS. EOSINOPHILS 0.0 0.0 - 0.4 K/UL  
 ABS. BASOPHILS 0.0 0.0 - 0.1 K/UL  
 ABS. IMM. GRANS. 0.0 K/UL  
 DF MANUAL    
 RBC COMMENTS NORMOCYTIC, NORMOCHROMIC    
RETICULOCYTE COUNT Collection Time: 10/04/18  2:52 PM  
Result Value Ref Range Reticulocyte count 0.2 (L) 0.7 - 2.1 % Absolute Retic Cnt. 0.0037 (L) 0.0260 - 0.0950 M/ul METABOLIC PANEL, COMPREHENSIVE Collection Time: 10/04/18  5:09 PM  
Result Value Ref Range Sodium 136 136 - 145 mmol/L Potassium 4.1 3.5 - 5.1 mmol/L  Chloride 101 97 - 108 mmol/L  
 CO2 28 21 - 32 mmol/L  
 Anion gap 7 5 - 15 mmol/L Glucose 108 (H) 65 - 100 mg/dL BUN 12 6 - 20 MG/DL Creatinine 0.68 (L) 0.70 - 1.30 MG/DL  
 BUN/Creatinine ratio 18 12 - 20 GFR est AA >60 >60 ml/min/1.73m2 GFR est non-AA >60 >60 ml/min/1.73m2 Calcium 8.2 (L) 8.5 - 10.1 MG/DL Bilirubin, total 1.0 0.2 - 1.0 MG/DL  
 ALT (SGPT) 30 12 - 78 U/L  
 AST (SGOT) 31 15 - 37 U/L Alk. phosphatase 209 (H) 45 - 117 U/L Protein, total 6.3 (L) 6.4 - 8.2 g/dL Albumin 2.6 (L) 3.5 - 5.0 g/dL Globulin 3.7 2.0 - 4.0 g/dL A-G Ratio 0.7 (L) 1.1 - 2.2    
CBC WITH AUTOMATED DIFF Collection Time: 10/04/18  5:09 PM  
Result Value Ref Range WBC 2.4 (L) 4.1 - 11.1 K/uL  
 RBC 2.09 (L) 4.10 - 5.70 M/uL HGB 7.0 (L) 12.1 - 17.0 g/dL HCT 20.6 (L) 36.6 - 50.3 % MCV 98.6 80.0 - 99.0 FL  
 MCH 33.5 26.0 - 34.0 PG  
 MCHC 34.0 30.0 - 36.5 g/dL  
 RDW 16.5 (H) 11.5 - 14.5 % PLATELET 66 (L) 536 - 400 K/uL NRBC 0.0 0  WBC ABSOLUTE NRBC 0.00 0.00 - 0.01 K/uL NEUTROPHILS 46 32 - 75 % LYMPHOCYTES 40 12 - 49 % MONOCYTES 13 5 - 13 % EOSINOPHILS 1 0 - 7 % BASOPHILS 0 0 - 1 % IMMATURE GRANULOCYTES 0 0.0 - 0.5 % ABS. NEUTROPHILS 1.1 (L) 1.8 - 8.0 K/UL  
 ABS. LYMPHOCYTES 1.0 0.8 - 3.5 K/UL  
 ABS. MONOCYTES 0.3 0.0 - 1.0 K/UL  
 ABS. EOSINOPHILS 0.0 0.0 - 0.4 K/UL  
 ABS. BASOPHILS 0.0 0.0 - 0.1 K/UL  
 ABS. IMM. GRANS. 0.0 0.00 - 0.04 K/UL  
 DF AUTOMATED PLATELET COMMENTS Large Platelets RBC COMMENTS NORMOCYTIC, NORMOCHROMIC    
TYPE + CROSSMATCH Collection Time: 10/04/18  5:09 PM  
Result Value Ref Range Crossmatch Expiration 10/07/2018 ABO/Rh(D) A POSITIVE Antibody screen NEG Unit number [id number] Blood component type [facility] Unit division 00 Status of unit ALLOCATED Crossmatch result Compatible OCCULT BLOOD, STOOL Collection Time: 10/04/18  6:11 PM  
Result Value Ref Range  Occult blood, stool NEGATIVE  NEG    
URINALYSIS W/MICROSCOPIC  
 Collection Time: 10/04/18  6:11 PM  
Result Value Ref Range Color DARK YELLOW Appearance CLEAR CLEAR Specific gravity 1.023 1.003 - 1.030    
 pH (UA) 6.0 5.0 - 8.0 Protein TRACE (A) NEG mg/dL Glucose NEGATIVE  NEG mg/dL Ketone TRACE (A) NEG mg/dL Blood NEGATIVE  NEG Urobilinogen 1.0 0.2 - 1.0 EU/dL Nitrites NEGATIVE  NEG Leukocyte Esterase TRACE (A) NEG    
 WBC PENDING /hpf  
 RBC PENDING /hpf Epithelial cells PENDING /lpf Bacteria PENDING /hpf URINE CULTURE HOLD SAMPLE Collection Time: 10/04/18  6:11 PM  
Result Value Ref Range Urine culture hold URINE ON HOLD IN MICROBIOLOGY DEPT FOR 3 DAYS. IF UNPRESERVED URINE IS SUBMITTED, IT CANNOT BE USED FOR ADDITIONAL TESTING AFTER 24 HRS, RECOLLECTION WILL BE REQUIRED. BILIRUBIN, CONFIRM Collection Time: 10/04/18  6:11 PM  
Result Value Ref Range Bilirubin UA, confirm NEGATIVE  NEG DISCHARGE NOTE: 
6:53 PM 
The patient's results have been reviewed with them and/or available family. Patient and/or family verbally conveyed their understanding and agreement of the patient's signs, symptoms, diagnosis, treatment and prognosis and additionally agree to follow up as recommended in the discharge instructions or to return to the Emergency Room should their condition change prior to their follow-up appointment. The patient/family verbally agrees with the care-plan and verbally conveys that all of their questions have been answered. The discharge instructions have also been provided to the patient and/or family with some educational information regarding the patient's diagnosis as well a list of reasons why the patient would want to return to the ER prior to their follow-up appointment, should their condition change. Plan: 
1. F/U with hematology 2. Return precautions discussed and advised to return to ER if worse

## 2018-10-04 NOTE — MR AVS SNAPSHOT
[address] 
 
 
 301 [facility], [address] 
[phone] Patient: Richard Apodaca MRN: [id number] [facility]:7[birth year]1/[birth year]19 Visit Information Date & Time Provider Department Dept. Phone Encounter #  
 10/4/2018  1:30 PM MD JIMBO [facility] [employer] at [address] [place] [address] [id number] Follow-up Instructions Return in about 2 months (around 12/4/2018) for dalila Bettencourt fu, tcp fu. Follow-up and Disposition History Your Appointments 12/13/2018  8:30 AM  
ESTABLISHED PATIENT with MD BRIDGER [facility] at [address] [place] [address] [facility]) Appt Note: 2mo fu, windy 301 [facility], [address] [place] [address] [facility] [place] [facility] [address]  
[phone]  
  
   
 [address] [place] [address], [address] [place] [address] Upcoming Health Maintenance Date Due DTaP/Tdap/Td series (1 - Tdap) 2/8/1964 Shingrix Vaccine Age 50> (1 of 2) 2/8/1993 GLAUCOMA SCREENING Q2Y 2/8/2008 Pneumococcal 65+ Low/Medium Risk (1 of 2 - PCV13) 2/8/2008 MEDICARE YEARLY EXAM 3/14/2018 Allergies as of 10/4/2018  Review Complete On: 10/4/2018 By: Bong Bermeo MD  
 No Known Allergies Current Immunizations  Reviewed on 9/28/2018 Name Date Influenza Vaccine (Quad) PF 9/29/2018  2:29 PM  
  
 Not reviewed this visit You Were Diagnosed With   
  
 Codes Comments [facility]    -  Primary ICD-10-CM: F50.615 ICD-9-CM: 284.19 Cirrhosis of liver with ascites, unspecified hepatic cirrhosis type ([facility]     ICD-10-CM: K74.60, R18.8 ICD-9-CM: 571.5 Vitals BP Pulse Temp Resp Height(growth percentile) 139/69 (BP 1 Location: Left arm, BP Patient Position: Sitting) 74 97.5 °F (36.4 °C) (Temporal) 18 5' 11\" (1.803 m) Weight(growth percentile) SpO2 BMI Smoking Status 193 lb (87.5 kg) 97% 26.92 kg/m2 Former Smoker Vitals History BMI and BSA Data Body Mass Index Body Surface Area  
 26.92 kg/m 2 2.09 m 2 Preferred Pharmacy Pharmacy Name Phone [id number] [phone] [facility] [phone] Your Updated Medication List  
  
   
This list is accurate as of 10/4/18  2:07 PM.  Always use your most recent med list.  
  
  
  
  
 carvedilol 3.125 mg tablet Commonly known as:  Casimiro Dhruv Take 1 Tab by mouth two (2) times daily (with meals) for 30 days. CENTRUM SILVER PO Take 1 Tab by mouth daily. pantoprazole 40 mg tablet Commonly known as:  PROTONIX Take 1 Tab by mouth Daily (before breakfast) for 30 days. We Performed the Following CBC WITH AUTOMATED DIFF [38358 CPT(R)] CBC WITH AUTOMATED DIFF [22745 CPT(R)] REFERRAL TO GASTROENTEROLOGY [AIP79 Custom] Comments:  
 Please evaluate patient for cirrhosis. Seen by Dr. Melvi Summers in the hospital at [facility] 9/2018. Follow-up Instructions Return in about 2 months (around 12/4/2018) for dalila Bettencourt fu, tcp fu. To-Do List   
 10/04/2018  2:30 PM  
  Appointment with SS INF4 CH4 <4H at [facility] 73 ([phone]) Referral Information Referral ID Referred By Referred To  
  
 [id number] Barbara BETTENCOURT MD [address] [place], [address]  Phone: [phone] Fax: [phone] Visits Status Start Date End Date 1 New Request 10/4/18 10/4/19 If your referral has a status of pending review or denied, additional information will be sent to support the outcome of this decision. Patient Instructions · Get labs today in the infusion center · Get labs in 1 month at [facility] 
· Get labs in 2 months at [address] [place] [address] 
· Return to see me in 2 months, a few days after your lab draw · Follow up with Dr. Melvi Summers, gastroenterologist 
· Establish care with a primary care physician [facility] SERVICES! [employer] introduces Swyzzle patient portal. Now you can access parts of your medical record, email your doctor's office, and request medication refills online. 1. In your internet browser, go to [email or web address] 2. Click on the First Time User? Click Here link in the Sign In box. You will see the New Member Sign Up page. 3. Enter your Swyzzle Access Code exactly as it appears below. You will not need to use this code after youve completed the sign-up process. If you do not sign up before the expiration date, you must request a new code. · Swyzzle Access Code: [email or web address] Expires: 12/25/2018  9:46 AM 
 
4. Enter the last four digits of your Social Security Number (xxxx) and Date of Birth (mm/dd/yyyy) as indicated and click Submit. You will be taken to the next sign-up page. 5. Create a Swyzzle ID. This will be your Swyzzle login ID and cannot be changed, so think of one that is secure and easy to remember. 6. Create a Swyzzle password. You can change your password at any time. 7. Enter your Password Reset Question and Answer. This can be used at a later time if you forget your password. 8. Enter your e-mail address. You will receive e-mail notification when new information is available in [address] 9. Click Sign Up. You can now view and download portions of your medical record. 10. Click the Download Summary menu link to download a portable copy of your medical information. If you have questions, please visit the Frequently Asked Questions section of the Swyzzle website. Remember, Swyzzle is NOT to be used for urgent needs. For medical emergencies, dial 911. Now available from your iPhone and Android! Please provide this summary of care documentation to your next provider. Your primary care clinician is listed as NONE. If you have any questions after today's visit, please call [phone].

## 2018-10-05 NOTE — TELEPHONE ENCOUNTER
3[address] Rehan Oneill at [facility]  [phone]    Patient sent to the ED yesterday with HGB of 5.3. He was transfused up to 7.0 and discharged. I remain quite worried about the acute drop in his HGB from 7.7 to 5.3 in just 5 days. I would have preferred that he be admitted, as he is not holding onto blood for any length of time, and I expect him to be quite low again after the weekend. My extensive evaluation has been unremarkable. I don't know what is causing his anemia. I recommend transfusion support as needed and referral to an academic center. I called the patient to discuss. No answer, left message for him to call me back.

## 2018-10-08 NOTE — TELEPHONE ENCOUNTER
Patient left a voicemail and stated that he was supposed to have an appointment with the infusion center this morning but is having bad diarrhea and is not able to make it in. Patient stated that he rescheduled for thursday and wanted to let our office know.  # [phone]

## 2018-10-08 NOTE — TELEPHONE ENCOUNTER
[facility] Thing[facility] at [facility] 88  [phone]    10/08/18- Patient reports diarrhea this morning that's \"slowing down\" now, but patient doesn't want to be too far from his bathroom. He denies bloody or dark stools, dizziness or weakness. Stated he feels pretty good. Patient reports drinking fluids fine and was able to eat breakfast this morning. Discussed with Emily Nava, okay to reschedule [facility] appointment to Thursday for labs/blood transfusion. Patient verbalized understanding, no further questions or concerns at this time.

## 2018-10-11 NOTE — DISCHARGE INSTRUCTIONS
OUTPATIENT INFUSION CENTER    DISCHARGE INSTRUCTIONS FOR:  BLOOD TRANSFUSION    We hope you are feeling better after your blood transfusion. Some mild tenderness or slight bruising at your IV site is normal.  Avoid lifting or heavy use of that extremity for the rest of the day. Drink plenty of fluids, eat a normal diet and get some rest.    There are some important signs that you need to watch for in case you experience a delayed reaction to the blood you have received. Call your physician immediately if you develop any of the following symptoms:    1. Severe headache or backache;    2. Fever above 100 degrees;    3. Chills;    4. Difficulty breathing;    5.  Blood or red color in urine;    6. The feeling of weakness or constant fatigue;    7. Yellowing of the whites of your eyes or skin (jaundice). If your physician is not available, call or go to the nearest emergency room, or dial 911.     Andres Kennedy, Signature: ___________________________ 10/11/2018  Kain Mulligan RN

## 2018-10-11 NOTE — PROGRESS NOTES
[facility] VISIT NOTE 
 
0820 Pt arrived at [facility] ambulatory and in no distress for labs and possible blood transfusion. Assessment completed, pt denies any chest pain, tachycardia, shortness of breath and dizziness. Blood pressure 107/60, pulse 89, temperature 98 °F (36.7 °C), resp. rate 18, SpO2 98 %. Labs drawn peripherally from 22G PIV placed in right arm in case he needs a blood transfusion. Critical Hgb reviewed with Dr. Anamaria Kimbrough nurse. 2 units PRBC ordered per standing orders. Anupam Matamoros NP signed consent with the patient today. Recent Results (from the past 12 hour(s)) CBC WITH AUTOMATED DIFF Collection Time: 10/11/18  8:53 AM  
Result Value Ref Range WBC 2.1 (L) 4.1 - 11.1 K/uL  
 RBC 1.77 (L) 4.10 - 5.70 M/uL HGB 5.9 (LL) 12.1 - 17.0 g/dL HCT 17.5 (LL) 36.6 - 50.3 % MCV 98.9 80.0 - 99.0 FL  
 MCH 33.3 26.0 - 34.0 PG  
 MCHC 33.7 30.0 - 36.5 g/dL  
 RDW 16.4 (H) 11.5 - 14.5 % PLATELET 73 (L) 867 - 400 K/uL MPV 12.9 8.9 - 12.9 FL  
 NRBC 0.0 0  WBC ABSOLUTE NRBC 0.00 0.00 - 0.01 K/uL NEUTROPHILS 40 32 - 75 % LYMPHOCYTES 39 12 - 49 % MONOCYTES 18 (H) 5 - 13 % EOSINOPHILS 2 0 - 7 % BASOPHILS 1 0 - 1 % IMMATURE GRANULOCYTES 0 0.0 - 0.5 % ABS. NEUTROPHILS 0.9 (L) 1.8 - 8.0 K/UL  
 ABS. LYMPHOCYTES 0.8 0.8 - 3.5 K/UL  
 ABS. MONOCYTES 0.4 0.0 - 1.0 K/UL  
 ABS. EOSINOPHILS 0.0 0.0 - 0.4 K/UL  
 ABS. BASOPHILS 0.0 0.0 - 0.1 K/UL  
 ABS. IMM. GRANS. 0.0 0.00 - 0.04 K/UL  
 DF AUTOMATED    
 RBC COMMENTS ANISOCYTOSIS 1+ 
    
 RBC COMMENTS MICROCYTOSIS 
1+ WBC COMMENTS ATYPICAL LYMPHOCYTES PRESENT    
TYPE + CROSSMATCH Collection Time: 10/11/18  8:53 AM  
Result Value Ref Range Crossmatch Expiration 10/14/2018 ABO/Rh(D) A POSITIVE Antibody screen NEG Unit number [id number] Blood component type RC LR Unit division 00 Status of unit ISSUED Crossmatch result Compatible Unit number [id number] Blood component type RC LR Unit division 00 Status of unit ISSUED Crossmatch result Compatible Signs/symptoms of adverse blood reaction discussed with pt, voiced understanding. 1100:  1st unit PRBCs started and infusing without difficulty, observed x 15 minutes 1310:  1st unit completed without adverse reaction noted, NS flushing line. 1320:  2nd unit PRBCs started and infusing without difficulty, observed x 15 minutes 1530:  2nd unit completed without adverse reaction noted, NS flushing line. 1530 Tolerated transfusion  well, no adverse reaction noted. D/C instructions reviewed, copy to pt, voiced understanding. Patient declined 1 hour post transfusion observation. D/Cd from [facility] ambulatory and in no distress. PIV flushed and removed.  
 
Next appointment is 10/15/18 at 10am.

## 2018-10-15 NOTE — PROGRESS NOTES
Patient seen in infusion center to f/u on lab work. Advised Dr. Leilani Orozco would like to initiate process for second opinion/referral to [facility]. He continues with pancytopenia requiring frequent blood transfusions. Would like [facility] to review path from bone marrow biopsy and further evaluation of patient condition. Patient is agreeable. Will refer to [facility], he will be notified of appointment Will support here with at least weekly lab check with possible transfusion until appointment at [facility]. Will need to provide him with specific directions and location of  parking at [facility] clinic as he is unfamiliar with area.

## 2018-10-15 NOTE — PROGRESS NOTES
Problem: Patient Education:  Go to Education Activity Goal: Patient/Family Education Outcome: Progressing Towards Goal 
Pt here for labs possible blood transfusion.

## 2018-10-15 NOTE — PROGRESS NOTES
[facility] VISIT NOTE Pt arrived at [facility] ambulatory and in no distress for labs and possible blood transfusion. .  Assessment completed, pt said he felt much better than last week. Jacinta Slater Right wrist PIV accessed and labs drawn and sent. Labs not within treatment parameters. Recent Results (from the past 12 hour(s)) CBC WITH AUTOMATED DIFF Collection Time: 10/15/18 10:55 AM  
Result Value Ref Range WBC 2.2 (L) 4.1 - 11.1 K/uL  
 RBC 2.41 (L) 4.10 - 5.70 M/uL HGB 7.5 (L) 12.1 - 17.0 g/dL HCT 23.6 (L) 36.6 - 50.3 % MCV 97.9 80.0 - 99.0 FL  
 MCH 31.1 26.0 - 34.0 PG  
 MCHC 31.8 30.0 - 36.5 g/dL  
 RDW 15.6 (H) 11.5 - 14.5 % PLATELET 79 (L) 806 - 400 K/uL NRBC 0.0 0  WBC ABSOLUTE NRBC 0.00 0.00 - 0.01 K/uL NEUTROPHILS 41 32 - 75 % LYMPHOCYTES 43 12 - 49 % MONOCYTES 15 (H) 5 - 13 % EOSINOPHILS 1 0 - 7 % BASOPHILS 0 0 - 1 % IMMATURE GRANULOCYTES 0 0.0 - 0.5 % ABS. NEUTROPHILS 1.0 (L) 1.8 - 8.0 K/UL  
 ABS. LYMPHOCYTES 0.9 0.8 - 3.5 K/UL  
 ABS. MONOCYTES 0.3 0.0 - 1.0 K/UL  
 ABS. EOSINOPHILS 0.0 0.0 - 0.4 K/UL  
 ABS. BASOPHILS 0.0 0.0 - 0.1 K/UL  
 ABS. IMM. GRANS. 0.0 0.00 - 0.04 K/UL  
 DF SMEAR SCANNED    
 RBC COMMENTS NORMOCYTIC, NORMOCHROMIC    
 
 
D/C'd from [facility] ambulatory and in no distress. Next appointment is 10/18/18.

## 2018-10-18 NOTE — PROGRESS NOTES
[facility] VISIT NOTE 
 
0840 Pt arrived at [facility] ambulatory and in no distress for Labs/ blood. Assessment completed, pt w/o complaints PIV 24G L Hand Positive blood return noted Pt did not need a blood transfusion today Hgb 7.9 Tolerated treatment well, no adverse reaction noted. PIV removed gauze and band aid . Patient Vitals for the past 12 hrs: 
 Temp Pulse Resp BP  
10/18/18 0841 97.8 °F (36.6 °C) 67 16 132/66 Recent Results (from the past 12 hour(s)) CBC WITH AUTOMATED DIFF Collection Time: 10/18/18  9:20 AM  
Result Value Ref Range WBC 2.6 (L) 4.1 - 11.1 K/uL  
 RBC 2.43 (L) 4.10 - 5.70 M/uL HGB 7.9 (L) 12.1 - 17.0 g/dL HCT 23.9 (L) 36.6 - 50.3 % MCV 98.4 80.0 - 99.0 FL  
 MCH 32.5 26.0 - 34.0 PG  
 MCHC 33.1 30.0 - 36.5 g/dL  
 RDW 15.4 (H) 11.5 - 14.5 % PLATELET 88 (L) 502 - 400 K/uL NRBC 0.0 0  WBC ABSOLUTE NRBC 0.00 0.00 - 0.01 K/uL NEUTROPHILS 40 32 - 75 % LYMPHOCYTES 42 12 - 49 % MONOCYTES 15 (H) 5 - 13 % EOSINOPHILS 2 0 - 7 % BASOPHILS 0 0 - 1 % IMMATURE GRANULOCYTES 0 0.0 - 0.5 % ABS. NEUTROPHILS 1.1 (L) 1.8 - 8.0 K/UL  
 ABS. LYMPHOCYTES 1.1 0.8 - 3.5 K/UL  
 ABS. MONOCYTES 0.4 0.0 - 1.0 K/UL  
 ABS. EOSINOPHILS 0.1 0.0 - 0.4 K/UL  
 ABS. BASOPHILS 0.0 0.0 - 0.1 K/UL  
 ABS. IMM. GRANS. 0.0 0.00 - 0.04 K/UL  
 DF AUTOMATED    
TYPE & SCREEN Collection Time: 10/18/18  9:20 AM  
Result Value Ref Range Crossmatch Expiration 10/21/2018 ABO/Rh(D) A POSITIVE Antibody screen NEG   
 
0940 D/C'd from [facility] ambulatory and in no distress accompanied by self. Next appointment is 10/22/18 at 0900.

## 2018-10-23 NOTE — PROGRESS NOTES
[facility] VISIT NOTE 
 
 
1952: Pt arrived at [facility] ambulatory and in no distress for lab draw/possible transfusion. Assessment completed, pt voiced no acute issues and voiced no complaints. Labs drawn peripherally from R AC. Unable to establish a PIV, provider notified. Patient Vitals for the past 12 hrs: 
 Temp Pulse Resp BP SpO2  
10/22/18 1709 98.5 °F (36.9 °C) 73 16 133/61   
10/22/18 1635 98 °F (36.7 °C) 76 16 137/63   
10/22/18 1535 97.7 °F (36.5 °C) 77 16 110/55   
10/22/18 1455 97.7 °F (36.5 °C) 75 16 111/54   
10/22/18 1435 97 °F (36.1 °C) 75 16 105/68   
10/22/18 1419 98.2 °F (36.8 °C) 76 16 104/53 97 % Pt sent to IR for PICC line placement. Pt returned to [facility] at 1420. PICC line placed in RUE. Positive blood return noted. Pt received 1 unit PRBC today. Pt to receive 2nd unit on 10/23/18. Tolerated treatment well, no adverse reaction noted. PICC de-accessed and flushed per protocol. Positive blood return noted. 1730: D/C'd from [facility] and in no distress.  Next appointment is 10/23/18 at 10:00am.

## 2018-10-23 NOTE — PROGRESS NOTES
5155AV arrived at [facility] ambulatory and in no distress for transfusion of 1 units PRBCs. Assessment completed, no new complaints voiced. IPICC flushed with positive blood return. Signs/symptoms of adverse blood reaction discussed with pt, voiced understanding. Medications received:  
NS @ [facility] Tylenol 650 mg po Benadryl 25 mg po 
  
1022 1st unit PRBCs started and infusing without difficulty, will monitor. 1250: 1st unit completed without adverse reaction noted, NS flushing line. Patient Vitals for the past 12 hrs: 
 Temp Pulse Resp BP  
10/23/18 1305 98.1 °F (36.7 °C) 70 18 120/60  
10/23/18 1222 97.7 °F (36.5 °C) 66 18 103/58  
10/23/18 1122 97.4 °F (36.3 °C) 67 18 103/55  
10/23/18 1052 97.9 °F (36.6 °C) 72 18 114/53  
10/23/18 1037 98 °F (36.7 °C) 72 18 108/53  
10/23/18 1018 97.7 °F (36.5 °C) 78 18 113/54  
10/23/18 0932 97.5 °F (36.4 °C) 86 18 117/58 1315 Tolerated transfusion well, no adverse reaction noted. D/C instructions reviewed, copy to pt, voiced understanding. Patient declined 1 hour post transfusion observation. D/Cd from [facility] ambulatory and in no distress accompanied by self. Next appt 10/26/18

## 2018-10-26 NOTE — PROGRESS NOTES
[facility] Lab Visit: 
 
7837:  Pt arrived ambulatory and in no distress, labs drawn via R single lumen PICC line. Flushed with saline and hep locked. Hbg 8.3 Departed [facility] ambulatory and in no distress. Recent Results (from the past 8 hour(s)) CBC WITH AUTOMATED DIFF Collection Time: 10/26/18  7:51 AM  
Result Value Ref Range WBC 2.4 (L) 4.1 - 11.1 K/uL  
 RBC 2.61 (L) 4.10 - 5.70 M/uL HGB 8.3 (L) 12.1 - 17.0 g/dL HCT 25.0 (L) 36.6 - 50.3 % MCV 95.8 80.0 - 99.0 FL  
 MCH 31.8 26.0 - 34.0 PG  
 MCHC 33.2 30.0 - 36.5 g/dL  
 RDW 15.5 (H) 11.5 - 14.5 % PLATELET 67 (L) 752 - 400 K/uL MPV 12.8 8.9 - 12.9 FL  
 NRBC 0.0 0  WBC ABSOLUTE NRBC 0.00 0.00 - 0.01 K/uL NEUTROPHILS 49 32 - 75 % LYMPHOCYTES 30 12 - 49 % MONOCYTES 17 (H) 5 - 13 % EOSINOPHILS 4 0 - 7 % BASOPHILS 0 0 - 1 % IMMATURE GRANULOCYTES 0 0.0 - 0.5 % ABS. NEUTROPHILS 1.2 (L) 1.8 - 8.0 K/UL  
 ABS. LYMPHOCYTES 0.7 (L) 0.8 - 3.5 K/UL  
 ABS. MONOCYTES 0.4 0.0 - 1.0 K/UL  
 ABS. EOSINOPHILS 0.1 0.0 - 0.4 K/UL  
 ABS. BASOPHILS 0.0 0.0 - 0.1 K/UL  
 ABS. IMM. GRANS. 0.0 0.00 - 0.04 K/UL  
 DF SMEAR SCANNED    
 RBC COMMENTS NORMOCYTIC, NORMOCHROMIC Visit Vitals /56 (BP 1 Location: Left arm, BP Patient Position: Sitting) Pulse (!) 109 Temp 97.3 °F (36.3 °C) Resp 18 SpO2 93% Labs available in CC once resulted.

## 2018-10-29 NOTE — PROGRESS NOTES
[facility] VISIT NOTE 
 
0830 Pt arrived at [facility] ambulatory with cane and in no distress for labs & PICC line dressing change. Assessment completed, pt c/o mild nausea this morning. No other acute concerns voiced at this time. Blood pressure 134/78, pulse (!) 106, temperature 97.8 °F (36.6 °C), resp. rate 18, SpO2 99 %. Patient arrived with single lumen PICC line present to right arm. Dressing changed today and PICC line flushed and positive blood return noted. Labs drawn as ordered. Recent Results (from the past 12 hour(s)) CBC WITH AUTOMATED DIFF Collection Time: 10/29/18  8:38 AM  
Result Value Ref Range WBC 2.6 (L) 4.1 - 11.1 K/uL  
 RBC 2.55 (L) 4.10 - 5.70 M/uL HGB 8.0 (L) 12.1 - 17.0 g/dL HCT 24.4 (L) 36.6 - 50.3 % MCV 95.7 80.0 - 99.0 FL  
 MCH 31.4 26.0 - 34.0 PG  
 MCHC 32.8 30.0 - 36.5 g/dL  
 RDW 15.2 (H) 11.5 - 14.5 % PLATELET 76 (L) 202 - 400 K/uL NRBC 0.0 0  WBC ABSOLUTE NRBC 0.00 0.00 - 0.01 K/uL NEUTROPHILS 57 32 - 75 % LYMPHOCYTES 24 12 - 49 % MONOCYTES 17 (H) 5 - 13 % EOSINOPHILS 2 0 - 7 % BASOPHILS 0 0 - 1 % IMMATURE GRANULOCYTES 0 0.0 - 0.5 % ABS. NEUTROPHILS 1.5 (L) 1.8 - 8.0 K/UL  
 ABS. LYMPHOCYTES 0.6 (L) 0.8 - 3.5 K/UL  
 ABS. MONOCYTES 0.4 0.0 - 1.0 K/UL  
 ABS. EOSINOPHILS 0.1 0.0 - 0.4 K/UL  
 ABS. BASOPHILS 0.0 0.0 - 0.1 K/UL  
 ABS. IMM. GRANS. 0.0 0.00 - 0.04 K/UL  
 DF AUTOMATED PLATELET COMMENTS Large Platelets RBC COMMENTS NORMOCYTIC, NORMOCHROMIC Per MD orders, patient is not within parameters to receive blood transfusion today. 5985 D/C'd from [facility] ambulatory and in no distress. Next appointment is 10/31/18 at 0900.

## 2018-10-31 NOTE — PROGRESS NOTES
Outpatient Infusion Center Short Visit Progress Note 0830 Patient admitted to [facility] for PICC flush ambulatory in stable condition. Assessment completed. No new concerns voiced. Right arm single lumen PICC w/positive blood return, flushed with 20cc NS and heparin per protocol. Green cap applied post flush. Visit Vitals /76 Pulse 96 Temp 97.4 °F (36.3 °C) Resp 18 Medications: 
IV NS 
IV Heparin 7818 Patient tolerated treatment well. Patient discharged from [place] [address] ambulatory in no distress. Patient aware of next appointment scheduled for 10/2 @ 0830.

## 2018-11-02 NOTE — PROGRESS NOTES
Outpatient Infusion Center Progress Note 0820 Pt admit to [facility] for labs and possible blood transfusion ambulatory in stable condition. Assessment completed. No new concerns voiced. PICC flushed with positive blood. Labs drawn and sent. Hgb 7.4 today, pt does not need transfusion Visit Vitals /56 Pulse (!) 108 Temp 97.4 °F (36.3 °C) Resp 18 SpO2 98% 0900 Pt tolerated treatment well. D/c home ambulatory in no distress. Pt aware of next appointment scheduled for 11/5/18. Recent Results (from the past 12 hour(s)) CBC WITH AUTOMATED DIFF Collection Time: 11/02/18  8:22 AM  
Result Value Ref Range WBC 2.6 (L) 4.1 - 11.1 K/uL  
 RBC 2.44 (L) 4.10 - 5.70 M/uL HGB 7.4 (L) 12.1 - 17.0 g/dL HCT 22.9 (L) 36.6 - 50.3 % MCV 93.9 80.0 - 99.0 FL  
 MCH 30.3 26.0 - 34.0 PG  
 MCHC 32.3 30.0 - 36.5 g/dL  
 RDW 15.0 (H) 11.5 - 14.5 % PLATELET 85 (L) 934 - 400 K/uL MPV 13.0 (H) 8.9 - 12.9 FL  
 NRBC 0.0 0  WBC ABSOLUTE NRBC 0.00 0.00 - 0.01 K/uL NEUTROPHILS 53 32 - 75 % LYMPHOCYTES 27 12 - 49 % MONOCYTES 18 (H) 5 - 13 % EOSINOPHILS 2 0 - 7 % BASOPHILS 0 0 - 1 % IMMATURE GRANULOCYTES 0 0.0 - 0.5 % ABS. NEUTROPHILS 1.3 (L) 1.8 - 8.0 K/UL  
 ABS. LYMPHOCYTES 0.7 (L) 0.8 - 3.5 K/UL  
 ABS. MONOCYTES 0.5 0.0 - 1.0 K/UL  
 ABS. EOSINOPHILS 0.1 0.0 - 0.4 K/UL  
 ABS. BASOPHILS 0.0 0.0 - 0.1 K/UL  
 ABS. IMM. GRANS. 0.0 0.00 - 0.04 K/UL  
 DF SMEAR SCANNED    
 RBC COMMENTS ANISOCYTOSIS 1+ 
    
 RBC COMMENTS OVALOCYTES PRESENT

## 2018-11-05 NOTE — PROGRESS NOTES
[facility] VISIT NOTE 
 
[id number]. Pt arrived at [facility] ambulatory and in no distress for Labs + Possible Blood Transfusion. Assessment completed, pt states no new complaints or concerns. Right Single Lumen PICC flushed with positive blood return noted and labs drawn. PICC dressing changed as ordered per [facility] central line protocol. 0945. Notified Ramón Black NP's office of Hgb of 6.8. Orders for 2 units of PRBC given. 1115:  1st unit PRBCs started and infusing without difficulty, observed x 15 minutes 1315:  1st unit completed without adverse reaction noted, NS flushing line. 1335:  2nd unit PRBCs started and infusing without difficulty, observed x 15 minutes 1541:  2nd unit completed without adverse reaction noted, NS flushing line. Tolerated treatment well, no adverse reaction noted. PICC and flushed per protocol. Positive blood return noted. Patient Vitals for the past 12 hrs: 
 Temp Pulse Resp BP  
11/05/18 1544 97.4 °F (36.3 °C) 81  133/81  
11/05/18 1535 97 °F (36.1 °C) 78 16 131/74  
11/05/18 1435 97.5 °F (36.4 °C) 76 16 115/77  
11/05/18 1405 97.3 °F (36.3 °C) 75 16 110/67  
11/05/18 1350 97.1 °F (36.2 °C) 77 16 112/66  
11/05/18 1331 97 °F (36.1 °C) 78 16 110/68  
11/05/18 1315 97.8 °F (36.6 °C) 77 16 94/60  
11/05/18 1215 97.1 °F (36.2 °C) 75 16 105/65  
11/05/18 1145 97.7 °F (36.5 °C) 80 16 104/63  
11/05/18 1130 97.3 °F (36.3 °C) 82 16 107/65  
11/05/18 1112 97 °F (36.1 °C) 78 16 107/67  
11/05/18 0813 97 °F (36.1 °C) (!) 109 16 120/70 Recent Results (from the past 12 hour(s)) CBC WITH AUTOMATED DIFF Collection Time: 11/05/18  8:19 AM  
Result Value Ref Range WBC 2.3 (L) 4.1 - 11.1 K/uL  
 RBC 2.27 (L) 4.10 - 5.70 M/uL HGB 6.8 (L) 12.1 - 17.0 g/dL HCT 21.2 (L) 36.6 - 50.3 % MCV 93.4 80.0 - 99.0 FL  
 MCH 30.0 26.0 - 34.0 PG  
 MCHC 32.1 30.0 - 36.5 g/dL  
 RDW 15.2 (H) 11.5 - 14.5 % PLATELET 87 (L) 601 - 400 K/uL NRBC 0.0 0  WBC ABSOLUTE NRBC 0.00 0.00 - 0.01 K/uL NEUTROPHILS 52 32 - 75 % LYMPHOCYTES 29 12 - 49 % MONOCYTES 16 (H) 5 - 13 % EOSINOPHILS 2 0 - 7 % BASOPHILS 1 0 - 1 % IMMATURE GRANULOCYTES 0 0.0 - 0.5 % ABS. NEUTROPHILS 1.2 (L) 1.8 - 8.0 K/UL  
 ABS. LYMPHOCYTES 0.7 (L) 0.8 - 3.5 K/UL  
 ABS. MONOCYTES 0.4 0.0 - 1.0 K/UL  
 ABS. EOSINOPHILS 0.0 0.0 - 0.4 K/UL  
 ABS. BASOPHILS 0.0 0.0 - 0.1 K/UL  
 ABS. IMM. GRANS. 0.0 0.00 - 0.04 K/UL  
 DF SMEAR SCANNED    
 PLATELET COMMENTS Large Platelets RBC COMMENTS ANISOCYTOSIS 1+ 
    
 RBC COMMENTS OVALOCYTES PRESENT 
    
TYPE + CROSSMATCH Collection Time: 11/05/18  8:19 AM  
Result Value Ref Range Crossmatch Expiration 11/08/2018 ABO/Rh(D) A POSITIVE Antibody screen NEG Unit number [id number] Blood component type RC LR,1 Unit division 00 Status of unit ISSUED Crossmatch result Compatible Unit number [id number] Blood component type RC LR Unit division 00 Status of unit ISSUED Crossmatch result Compatible 1545. Tolerated transfusion  well, no adverse reaction noted. D/C instructions reviewed, copy to pt, voiced understanding. Patient declined 1 hour post transfusion observation. D/Cd from [facility] ambulatory and in no distress.   Next appt 11/7/18 at 8:30 am.

## 2018-11-07 NOTE — PROGRESS NOTES
[facility] Progress Note Date: 2018 Name: Shania Wu MRN: [id number] : 1945 
 
0820. Mr. Ashlie Mccord Arrived ambulatory and in no distress for [facility]. Assessment was completed, no acute issues at this time, no new complaints voiced. Single Lumen PICC line to RUE with + blood return. Flushed per protocol. Patient Vitals for the past 12 hrs: 
 Temp Pulse Resp BP  
18 0820 98 °F (36.7 °C) 80 18 135/81 Clarified with Candida Moore NP that patient is to receive [facility] MWF. Labs M & F. Flushing only on W.  
 
0830. Mr. Ashlie Mccord tolerated treatment well and was discharged from [place] [address] in stable condition. He is to return 18 for his next appointment. Chris Izaguirre RN 2018

## 2018-11-09 NOTE — PROGRESS NOTES
[facility] Progress Note Date: 2018 Name: Danelle Sanchez MRN: [id number] : 1945 
 
2203. Mr. Wilman Garcia Arrived ambulatory and in no distress for Labs. Assessment was completed, no acute issues at this time, no new complaints voiced. Single Lumen PICC line to RUE with + blood return. Flushed per protocol. Patient Vitals for the past 12 hrs: 
 Temp Pulse Resp BP  
18 0814 98 °F (36.7 °C) (!) 110 18 119/71 Hgb 8.1- does not need transfusion today. 7925. Mr. Wilman Garcia tolerated treatment well and was discharged from [place] [address] in stable condition. He is to return 18 for his next appointment. Dieudonne Merida RN 2018

## 2018-11-14 NOTE — PROGRESS NOTES
Outpatient Infusion Center Short Visit Progress Note 0830 Patient admitted to [facility] for PICC flush ambulatory in stable condition. Assessment completed. No new concerns voiced. RUE PICC w/positive blood return from single lumen. Flushed per protocol. Green cap applied. Visit Vitals /58 (BP 1 Location: Left arm, BP Patient Position: Sitting) Pulse (!) 109 Temp 97.2 °F (36.2 °C) Resp 18 Medications: 
IV NS 
IV Heparin 1672 Patient tolerated treatment well. Patient discharged from [facility] ambulatory in no distress. Patient aware of next appointment scheduled for 11/16 @ 0800.

## 2018-11-16 NOTE — PROGRESS NOTES
Outpatient Infusion Center Progress Note 0820 Pt admit to [facility] for Blood Transfusion ambulatory with cane in stable condition. Assessment completed. No new concerns voiced. R PICC flushed with positive blood return; labs drawn and sent for processing. Hgb, 6.5; per orders 2 units to be transfused today; pt stated does not want to \"be here all day\" and would like to receive only one unit today and the other on Monday, 11/19. Notified Svetlana Dominique NP, okay for pt to receive one unit today. Visit Vitals /65 Pulse 70 Temp 97.2 °F (36.2 °C) Resp 16 Medications: 
NS Tylenol 650 mg Benadryl 25 mg 
1 unit PRBC 
 
1050 First unit initiated. 1310 First unit completed. Pt monitored post transfusion with no s/s of reaction. Educated and provided with written material regarding s/s of delayed reaction to watch for at home. 1330 Pt tolerated treatment well. Pt refused to remain in [facility] x 1 hour post transfusion for observation. T/C drawn and sent for processing. D/c home ambulatory in no distress. Pt aware of next appointment scheduled for 11/19/2018 for second unit of PRBC's. Recent Results (from the past 12 hour(s)) CBC WITH AUTOMATED DIFF Collection Time: 11/16/18  8:30 AM  
Result Value Ref Range WBC 2.3 (L) 4.1 - 11.1 K/uL  
 RBC 2.15 (L) 4.10 - 5.70 M/uL HGB 6.5 (L) 12.1 - 17.0 g/dL HCT 20.0 (L) 36.6 - 50.3 % MCV 93.0 80.0 - 99.0 FL  
 MCH 30.2 26.0 - 34.0 PG  
 MCHC 32.5 30.0 - 36.5 g/dL  
 RDW 14.8 (H) 11.5 - 14.5 % PLATELET 90 (L) 644 - 400 K/uL NRBC 0.0 0  WBC ABSOLUTE NRBC 0.00 0.00 - 0.01 K/uL NEUTROPHILS 51 32 - 75 % LYMPHOCYTES 30 12 - 49 % MONOCYTES 18 (H) 5 - 13 % EOSINOPHILS 1 0 - 7 % BASOPHILS 0 0 - 1 % IMMATURE GRANULOCYTES 0 0.0 - 0.5 % ABS. NEUTROPHILS 1.2 (L) 1.8 - 8.0 K/UL  
 ABS. LYMPHOCYTES 0.7 (L) 0.8 - 3.5 K/UL  
 ABS. MONOCYTES 0.4 0.0 - 1.0 K/UL  
 ABS. EOSINOPHILS 0.0 0.0 - 0.4 K/UL ABS. BASOPHILS 0.0 0.0 - 0.1 K/UL  
 ABS. IMM. GRANS. 0.0 0.00 - 0.04 K/UL  
 DF SMEAR SCANNED    
 RBC COMMENTS HYPOCHROMIA 1+ 
    
 RBC COMMENTS ANISOCYTOSIS 
1+ TYPE + CROSSMATCH Collection Time: 11/16/18  8:30 AM  
Result Value Ref Range Crossmatch Expiration 11/19/2018 ABO/Rh(D) A POSITIVE Antibody screen NEG Unit number [id number] Blood component type [facility] Unit division 00 Status of unit ISSUED Crossmatch result Compatible Unit number [id number] Blood component type [facility] Unit division 00 Status of unit REL FROM [facility] Crossmatch result Compatible

## 2018-11-19 NOTE — PROGRESS NOTES
[facility] VISIT NOTE 
 
0740 Pt arrived at [facility] ambulatory with cane and in no distress for labs & possible transfusion. Assessment completed, pt c/o mild nausea and fatigue this morning. No other acute concerns voiced at this time. Blood pressure 135/77, pulse (!) 108, temperature 97.8 °F (36.6 °C), resp. rate 18. Single lumen PICC line present to right arm. Dressing changed per protocol. PICC line flushed and labs drawn as ordered. Recent Results (from the past 12 hour(s)) CBC WITH AUTOMATED DIFF Collection Time: 11/19/18  7:50 AM  
Result Value Ref Range WBC 2.6 (L) 4.1 - 11.1 K/uL  
 RBC 2.41 (L) 4.10 - 5.70 M/uL HGB 7.1 (L) 12.1 - 17.0 g/dL HCT 22.1 (L) 36.6 - 50.3 % MCV 91.7 80.0 - 99.0 FL  
 MCH 29.5 26.0 - 34.0 PG  
 MCHC 32.1 30.0 - 36.5 g/dL  
 RDW 14.6 (H) 11.5 - 14.5 % PLATELET 85 (L) 372 - 400 K/uL NRBC 0.0 0  WBC ABSOLUTE NRBC 0.00 0.00 - 0.01 K/uL NEUTROPHILS 50 32 - 75 % LYMPHOCYTES 32 12 - 49 % MONOCYTES 16 (H) 5 - 13 % EOSINOPHILS 2 0 - 7 % BASOPHILS 1 0 - 1 % IMMATURE GRANULOCYTES 0 0.0 - 0.5 % ABS. NEUTROPHILS 1.3 (L) 1.8 - 8.0 K/UL  
 ABS. LYMPHOCYTES 0.8 0.8 - 3.5 K/UL  
 ABS. MONOCYTES 0.4 0.0 - 1.0 K/UL  
 ABS. EOSINOPHILS 0.0 0.0 - 0.4 K/UL  
 ABS. BASOPHILS 0.0 0.0 - 0.1 K/UL  
 ABS. IMM. GRANS. 0.0 0.00 - 0.04 K/UL  
 DF AUTOMATED Per standing blood transfusion orders patient is within parameters to receive 1 unit of PRBC's today. Medications received: 
Tylenol PO Benadryl PO 
 
1020 Packed red blood cells initiated and titrated per protocol. No s/s of adverse reaction noted. Patient declined to remain in [facility] for 1 hour post transfusion observation. Tolerated treatment well, no adverse reaction noted. Discharge instructions reviewed with patient and PICC line flushed per protocol. Patient Vitals for the past 12 hrs: 
 Temp Pulse Resp BP  
11/19/18 1240 98.2 °F (36.8 °C) 83 16 98/60 11/19/18 1220 98.2 °F (36.8 °C) 75 16 97/61  
11/19/18 1120 97.9 °F (36.6 °C) 80 16 104/60  
11/19/18 1050 98 °F (36.7 °C) 81 16 101/58  
11/19/18 1035 98.2 °F (36.8 °C) 83 16 106/62  
11/19/18 1015 98 °F (36.7 °C) 81 18 105/64  
11/19/18 0740 97.8 °F (36.6 °C) (!) 108 18 135/77  
 
 
1245 D/C'd from [facility] ambulatory with cane and in no distress. Next appointment is 11/21/18 at 0800.

## 2018-11-21 NOTE — PROGRESS NOTES
Problem: Patient Education:  Go to Education Activity Goal: Patient/Family Education Outcome: Progressing Towards Goal 
Pt to receive PRBC if labs allow

## 2018-11-21 NOTE — PROGRESS NOTES
[facility] VISIT NOTE 
 
0740 Pt arrived at [facility] ambulatory and in no distress for labs/blood transfusion. Assessment completed, pt w/o complaints. CARROLL PICC. Positive blood return noted and labs drawn. No transfusion needed Hgb 7.5 today Tolerated treatment well, no adverse reaction noted. PICC flushed and . Positive blood return noted. Patient Vitals for the past 12 hrs: 
 Temp Pulse Resp BP  
11/21/18 0753 98 °F (36.7 °C) (!) 103 16 119/73  
 
0900 D/C'd from [facility] ambulatory and in no distress accompanied by self Next appointment is 11/26/18 at 0830.

## 2018-11-26 NOTE — PROGRESS NOTES
Outpatient Infusion Center Short Visit Progress Note 2025 Patient admitted to [facility] for labs/POCC dressing change/possible blood transfusion ambulatory with cane in stable condition. Assessment completed. No new concerns voiced. 1019 Hgb 6.6; 2UPRBC's ordered 1250 1st unit started. Brent Escalera RN or designated RN to complete blood documentation. 1400 SBAR given to [employer] Visit Vitals BP 97/54 Pulse 99 Temp 98.2 °F (36.8 °C) Resp 18 Recent Results (from the past 12 hour(s)) CBC W/O DIFF Collection Time: 11/26/18  8:37 AM  
Result Value Ref Range WBC 2.5 (L) 4.1 - 11.1 K/uL  
 RBC 2.18 (L) 4.10 - 5.70 M/uL HGB 6.6 (L) 12.1 - 17.0 g/dL HCT 20.2 (L) 36.6 - 50.3 % MCV 92.7 80.0 - 99.0 FL  
 MCH 30.3 26.0 - 34.0 PG  
 MCHC 32.7 30.0 - 36.5 g/dL  
 RDW 14.4 11.5 - 14.5 % PLATELET 84 (L) 744 - 400 K/uL NRBC 0.0 0  WBC ABSOLUTE NRBC 0.00 0.00 - 0.01 K/uL  
TYPE + CROSSMATCH Collection Time: 11/26/18 10:19 AM  
Result Value Ref Range Crossmatch Expiration 11/29/2018 ABO/Rh(D) A POSITIVE Antibody screen NEG Unit number [id number] Blood component type [facility] Unit division 00 Status of unit ISSUED Crossmatch result Compatible Unit number [id number] Blood component type [facility] Unit division 00 Status of unit ISSUED Crossmatch result Compatible RUE PICC with positive blood return. Lab drawn, PICC flushed per protocol and dressing changed using sterile technique. Medications: 
IV NS 
IV Heparin 2U PRBC's Patient aware of next appointment scheduled for 11/28 @ 0830.

## 2018-11-26 NOTE — PROGRESS NOTES
AMANDA from [employer]. 2nd unit of PRBC completed without difficulty. Pt declined hour observation post tx. Pt monitored post transfusion with no s/s of reaction. Educated and provided with written material regarding s/s of delayed reaction to watch for at home. 1800 Pt tolerated treatment well. D/c home ambulatory in no distress. Pt aware of next appointment scheduled for 11/28/18 @ 0830

## 2018-11-28 NOTE — PROGRESS NOTES
Outpatient Infusion Center Short Visit Progress Note 3831 Patient admitted to [facility] for PICC flush ambulatory in stable condition. Assessment completed. No new concerns voiced. CARROLL PICC intact. No signs redness/edema. Dressing D&I. Green cap in place. Flushed per protocol. +blood return noted. New green cap applied. Pigtail secured with Kerlix. Visit Vitals /69 (BP 1 Location: Left arm) Pulse 96 Temp 98.1 °F (36.7 °C) Resp 18 Medications: 
IV NS 
IV Heparin 1540 Patient tolerated treatment well. Patient discharged from [place] [address] ambulatory in no distress. Patient aware of next appointment scheduled for 11/30 @ 0800.

## 2018-11-30 NOTE — PROGRESS NOTES
Outpatient Infusion Center Short Visit Progress Note 
 
0800 Patient admitted to [address] [place] [address] for labs PICC flush ambulatory in stable condition. Assessment completed. No new concerns voiced. Recent Results (from the past 12 hour(s)) CBC WITH AUTOMATED DIFF Collection Time: 11/30/18  8:09 AM  
Result Value Ref Range WBC 2.6 (L) 4.1 - 11.1 K/uL  
 RBC 2.62 (L) 4.10 - 5.70 M/uL HGB 7.8 (L) 12.1 - 17.0 g/dL HCT 24.0 (L) 36.6 - 50.3 % MCV 91.6 80.0 - 99.0 FL  
 MCH 29.8 26.0 - 34.0 PG  
 MCHC 32.5 30.0 - 36.5 g/dL  
 RDW 14.1 11.5 - 14.5 % PLATELET 75 (L) 910 - 400 K/uL NRBC 0.0 0  WBC ABSOLUTE NRBC 0.00 0.00 - 0.01 K/uL NEUTROPHILS 58 32 - 75 % LYMPHOCYTES 24 12 - 49 % MONOCYTES 16 (H) 5 - 13 % EOSINOPHILS 2 0 - 7 % BASOPHILS 0 0 - 1 % IMMATURE GRANULOCYTES 0 0.0 - 0.5 % ABS. NEUTROPHILS 1.5 (L) 1.8 - 8.0 K/UL  
 ABS. LYMPHOCYTES 0.6 (L) 0.8 - 3.5 K/UL  
 ABS. MONOCYTES 0.4 0.0 - 1.0 K/UL  
 ABS. EOSINOPHILS 0.1 0.0 - 0.4 K/UL  
 ABS. BASOPHILS 0.0 0.0 - 0.1 K/UL  
 ABS. IMM. GRANS. 0.0 0.00 - 0.04 K/UL  
 DF SMEAR SCANNED    
 PLATELET COMMENTS Large Platelets RBC COMMENTS NORMOCYTIC, NORMOCHROMIC    
 
 
CARROLL PICC with positive blood return. No s/s redness or edema. Per parameters PRBCs not required 0945 Patient tolerated treatment well. Patient discharged from [place] [address] ambulatory in no distress at 0945.  Patient aware of next appointment scheduled for 12/3/18 @ 8:30

## 2018-12-03 NOTE — PROGRESS NOTES
Outpatient Infusion Center Short Visit Progress Note 0892 Patient admitted to [facility] for labs/possible transfusion ambulatory with cane assist in stable condition. Assessment completed. No new concerns voiced. Recent Results (from the past 12 hour(s)) CBC WITH AUTOMATED DIFF Collection Time: 12/03/18  8:30 AM  
Result Value Ref Range WBC 2.9 (L) 4.1 - 11.1 K/uL  
 RBC 2.43 (L) 4.10 - 5.70 M/uL HGB 7.3 (L) 12.1 - 17.0 g/dL HCT 22.5 (L) 36.6 - 50.3 % MCV 92.6 80.0 - 99.0 FL  
 MCH 30.0 26.0 - 34.0 PG  
 MCHC 32.4 30.0 - 36.5 g/dL  
 RDW 13.9 11.5 - 14.5 % PLATELET 83 (L) 127 - 400 K/uL NRBC 0.0 0  WBC ABSOLUTE NRBC 0.00 0.00 - 0.01 K/uL NEUTROPHILS 52 32 - 75 % LYMPHOCYTES 32 12 - 49 % MONOCYTES 15 (H) 5 - 13 % EOSINOPHILS 1 0 - 7 % BASOPHILS 0 0 - 1 % IMMATURE GRANULOCYTES 0 0.0 - 0.5 % ABS. NEUTROPHILS 1.5 (L) 1.8 - 8.0 K/UL  
 ABS. LYMPHOCYTES 0.9 0.8 - 3.5 K/UL  
 ABS. MONOCYTES 0.4 0.0 - 1.0 K/UL  
 ABS. EOSINOPHILS 0.0 0.0 - 0.4 K/UL  
 ABS. BASOPHILS 0.0 0.0 - 0.1 K/UL  
 ABS. IMM. GRANS. 0.0 0.00 - 0.04 K/UL  
 DF AUTOMATED    
TYPE + CROSSMATCH Collection Time: 12/03/18  8:30 AM  
Result Value Ref Range Crossmatch Expiration 12/06/2018 ABO/Rh(D) A POSITIVE Antibody screen NEG Unit number [id number] Blood component type [facility] Unit division 00 Status of unit ISSUED Crossmatch result Compatible PICC with positive blood return. Per protocol-patient received one unit PRBC today Visit Vitals /69 Pulse 68 Temp 97.4 °F (36.3 °C) Resp 18 Patient Vitals for the past 12 hrs: 
 Temp Pulse Resp BP  
12/03/18 1409 97.4 °F (36.3 °C) 68 18 122/69  
12/03/18 1310 97.3 °F (36.3 °C) 67 16 105/65  
12/03/18 1241 97.3 °F (36.3 °C) 77 16 102/63  
12/03/18 1225 97.5 °F (36.4 °C) 73 16 99/62  
12/03/18 1200 97.1 °F (36.2 °C) 75 16 109/65  
12/03/18 0832 98.2 °F (36.8 °C) 82 16 108/64 Pt politely declined one hour post tx observation. Pt monitored post transfusion with no s/s of reaction. Educated and provided with written material regarding s/s of delayed reaction to watch for at home. 1430 Pt tolerated treatment well. D/c home ambulatory in no distress. Pt aware of next appointment scheduled for 12/5/18 @ 9193

## 2018-12-05 NOTE — PROGRESS NOTES
Outpatient Infusion Center Short Visit Progress Note 0830 Patient admitted to [facility] for PICC flush ambulatory in stable condition. Assessment completed. No new concerns voiced. Patient Vitals for the past 12 hrs: 
 Temp Pulse Resp BP  
12/05/18 0840 (P) 97.3 °F (36.3 °C) (!) (P) 106 (P) 18 (P) 105/67 Medications: 
IV NS 
IV heparin 3858 Patient tolerated treatment well. Patient discharged from [place] [address] ambulatory in no distress. Patient aware of next appointment scheduled for 12/7 @ 0800.

## 2018-12-07 NOTE — PROGRESS NOTES
[facility] VISIT NOTE 
 
0800 Pt arrived at [facility] ambulatory and in no distress for labs/PRBC. Assessment completed, pt w/o complaints. CARROLL PICC . Positive blood return noted and labs drawn. No transfusion needed today Hgb 7.7 Tolerated treatment well, no adverse reaction noted. PICC  flushed per protocol. Positive blood return noted. Patient Vitals for the past 12 hrs: 
 Temp Pulse Resp BP  
12/07/18 0807 97.7 °F (36.5 °C) 88 18 113/68 Recent Results (from the past 12 hour(s)) CBC WITH AUTOMATED DIFF Collection Time: 12/07/18  8:13 AM  
Result Value Ref Range WBC 2.3 (L) 4.1 - 11.1 K/uL  
 RBC 2.56 (L) 4.10 - 5.70 M/uL HGB 7.7 (L) 12.1 - 17.0 g/dL HCT 23.4 (L) 36.6 - 50.3 % MCV 91.4 80.0 - 99.0 FL  
 MCH 30.1 26.0 - 34.0 PG  
 MCHC 32.9 30.0 - 36.5 g/dL  
 RDW 13.9 11.5 - 14.5 % PLATELET 72 (L) 055 - 400 K/uL MPV 12.6 8.9 - 12.9 FL  
 NRBC 0.0 0  WBC ABSOLUTE NRBC 0.00 0.00 - 0.01 K/uL NEUTROPHILS 46 32 - 75 % LYMPHOCYTES 38 12 - 49 % MONOCYTES 14 (H) 5 - 13 % EOSINOPHILS 2 0 - 7 % BASOPHILS 0 0 - 1 % IMMATURE GRANULOCYTES 0 0.0 - 0.5 % ABS. NEUTROPHILS 1.1 (L) 1.8 - 8.0 K/UL  
 ABS. LYMPHOCYTES 0.9 0.8 - 3.5 K/UL  
 ABS. MONOCYTES 0.3 0.0 - 1.0 K/UL  
 ABS. EOSINOPHILS 0.0 0.0 - 0.4 K/UL  
 ABS. BASOPHILS 0.0 0.0 - 0.1 K/UL  
 ABS. IMM. GRANS. 0.0 0.00 - 0.04 K/UL  
 DF AUTOMATED    
 
0840 D/C'd from [facility] ambulatory and in no distress accompanied by self. Next appointment is 12/10/18 at 0830.

## 2018-12-07 NOTE — PROGRESS NOTES
Problem: Patient Education:  Go to Education Activity Goal: Patient/Family Education Outcome: Progressing Towards Goal 
Pt receiving PRBC

## 2018-12-12 NOTE — PROGRESS NOTES
0805  Pt arrived at [facility] 860 line dressing change, labs, and possible blood and in no distress . Assessment completed, no new complaints voiced. PICC dressing changed per protocol, labs drawn and sent. Labs reviewed, will transfuse 2 units of blood today. Signs/symptoms of adverse blood reaction discussed with pt, voiced understanding. Medications received:   NS @ KVO     Patient Vitals for the past 12 hrs:   Temp Pulse Resp BP   12/12/18 1525 98.6 °F (37 °C) 77 18 106/64   12/12/18 1455 97.8 °F (36.6 °C) 73 18 111/65   12/12/18 1355 97.9 °F (36.6 °C) 73 18 103/59   12/12/18 1325 97 °F (36.1 °C) 74 18 101/63   12/12/18 1310 97.6 °F (36.4 °C) 77 18 109/58   12/12/18 1253 97.4 °F (36.3 °C) 78 18 108/64   12/12/18 1225 97.5 °F (36.4 °C) 80 18 103/60   12/12/18 1125 97.9 °F (36.6 °C) 80 18 91/57   12/12/18 1055 97.5 °F (36.4 °C) 84 18 99/61   12/12/18 1040 97.2 °F (36.2 °C) 78 18 109/58   12/12/18 1021 97.5 °F (36.4 °C) 72 18 108/63   12/12/18 0807 97.2 °F (36.2 °C) (!) 110 18 127/72       1025: 1st unit PRBCs started and infusing without difficulty, will monitor. 1240: 1st unit completed without adverse reaction noted, NS flushing line. 1255: 2nd unit PRBCs started and infusing without difficulty   1520: 2nd unit completed without adverse reaction noted, NS flushing line. 1525 Tolerated transfusion well, no adverse reaction noted. D/C instructions reviewed, copy to pt, voiced understanding. Patient declined 1 hour post transfusion observation. D/Cd from [facility] ambulatory and in no distress accompanied by self.  Next appt 12/14/18

## 2018-12-13 NOTE — PROGRESS NOTES
[facility] is a 74 y.o. male follow up for pancytopenia. 1. Have you been to the ER, urgent care clinic since your last visit? Hospitalized since your last visit?no    2. Have you seen or consulted any other health care providers outside of the [facility] since your last visit? Include any pap smears or colon screening.  no

## 2018-12-13 NOTE — PROGRESS NOTES
[facility] [place] at [facility]  [address] [facility], [address] [facility] [address] [facility]  Shruthi Rivers: [phone]  F: [phone]     Reason for Visit:   Damian Flannery is a 74 y.o. male who is seen for follow up of pancytopenia. History of Present Illness:   Presents today for follow up. No issues with blood transfusions. Had 2 units of blood yesterday. PICC line care has been going well. No issues with care of this in the home. No swelling in arms. No pain at the site. Appetite has been down, feeling full quickly. Had fluid removed from abd from abdominal paracentesis. Has felt that fluid has come back again some, but not as bad as it was before. Bowels have been alternating with constipation and loose stools. Soft stools usually 1-2 times daily. He is unaccompanied today. PAST HISTORY: The following sections were reviewed and updated in the EMR as appropriate: PMH, SH, FH, Medications, Allergies. No Known Allergies     Review of Systems: A complete review of systems was obtained, reviewed, and scanned into the EMR. Pertinent findings reviewed above. Physical Exam:     Visit Vitals  /61 (BP 1 Location: Left arm, BP Patient Position: Sitting)   Pulse 94   Temp 97.8 °F (36.6 °C) (Oral)   Resp 18   Ht 5' 11\" (1.803 m)   Wt 188 lb (85.3 kg)   SpO2 95%   BMI 26.22 kg/m²     General: No distress  Eyes: PERRLA, anicteric sclerae  HENT: Atraumatic, OP clear  Neck: Supple  Lymphatic: No cervical, supraclavicular, or inguinal adenopathy  Respiratory: CTAB, normal respiratory effort  CV: Normal rate, regular rhythm, no murmurs, no peripheral edema  GI: Soft, nontender, nondistended, no masses, no hepatomegaly, no splenomegaly  MS: Normal gait and station. Digits without clubbing or cyanosis. Skin: No rashes, ecchymoses, or petechiae. Normal temperature, turgor, and texture.   Psych: Alert, oriented, appropriate affect, normal judgment/insight    Results:     Lab Results   Component Value Date/Time    WBC 3.2 (L) 12/12/2018 08:10 AM    HGB 7.0 (L) 12/12/2018 08:10 AM    HCT 21.3 (L) 12/12/2018 08:10 AM    PLATELET 97 (L) 66/07/4981 08:10 AM    MCV 91.4 12/12/2018 08:10 AM    ABS. NEUTROPHILS 1.8 12/12/2018 08:10 AM     Lab Results   Component Value Date/Time    Sodium 136 10/04/2018 05:09 PM    Potassium 4.1 10/04/2018 05:09 PM    Chloride 101 10/04/2018 05:09 PM    CO2 28 10/04/2018 05:09 PM    Glucose 108 (H) 10/04/2018 05:09 PM    BUN 12 10/04/2018 05:09 PM    Creatinine 0.68 (L) 10/04/2018 05:09 PM    GFR est AA >60 10/04/2018 05:09 PM    GFR est non-AA >60 10/04/2018 05:09 PM    Calcium 8.2 (L) 10/04/2018 05:09 PM     Lab Results   Component Value Date/Time    Bilirubin, total 1.0 10/04/2018 05:09 PM    ALT (SGPT) 30 10/04/2018 05:09 PM    AST (SGOT) 31 10/04/2018 05:09 PM    Alk. phosphatase 209 (H) 10/04/2018 05:09 PM    Protein, total 6.3 (L) 10/04/2018 05:09 PM    Albumin 2.6 (L) 10/04/2018 05:09 PM    Globulin 3.7 10/04/2018 05:09 PM     Lab Results   Component Value Date/Time    Reticulocyte count 0.2 (L) 10/04/2018 02:52 PM    Iron % saturation 96 (H) 09/27/2018 04:14 AM    TIBC 252 09/27/2018 04:14 AM    Ferritin 537 (H) 09/27/2018 04:14 AM    Vitamin B12 >2,000 (H) 09/26/2018 01:00 PM    Folate 19.1 09/26/2018 01:00 PM    Haptoglobin 105 09/26/2018 10:15 AM     09/26/2018 10:15 AM    TSH 4.45 (H) 09/27/2018 04:14 AM    OSCAR, Direct Positive (A) 09/27/2018 04:14 AM    Hep C  virus Ab Interp.  NONREACTIVE 04/20/2016 09:58 AM     Lab Results   Component Value Date/Time    INR 1.3 (H) 09/26/2018 10:15 AM    aPTT 26.5 09/26/2018 10:15 AM     HGB (g/dL)   Date Value   12/12/2018 7.0 (L)   12/07/2018 7.7 (L)   12/03/2018 7.3 (L)   11/30/2018 7.8 (L)   11/26/2018 6.6 (L)   11/21/2018 7.5 (L)   11/19/2018 7.1 (L)   11/16/2018 6.5 (L)   11/12/2018 7.5 (L)   11/09/2018 8.1 (L)   11/05/2018 6.8 (L)   11/02/2018 7.4 (L)   10/29/2018 8.0 (L)   10/26/2018 8.3 (L)   10/22/2018 5.3 (LL) 10/18/2018 7.9 (L)   10/15/2018 7.5 (L)   10/11/2018 5.9 (LL)   10/04/2018 7.0 (L)   10/04/2018 5.3 (LL)   09/29/2018 7.7 (L)   09/29/2018 6.8 (L)   09/28/2018 7.3 (L)   09/28/2018 7.0 (L)   09/27/2018 7.5 (L)   09/26/2018 6.3 (L)   09/26/2018 5.1 (LL)   04/21/2016 10.6 (L)   04/20/2016 13.0   10/26/2009 12.4         Peripheral smear 9/26/2018: There is a macrocytic, normochromic anemia. Granulocytes and   lymphocytes are reduced and mature. Platelets are reduced and no   blasts are seen. If cytopenias persist, a bone marrow biopsy  may be   beneficial. Per Dr. Brittanie Morataya MD.    Abdominal US 9/27/2018  1. Cirrhosis. Liver protocol CT or MRI should be considered for more sensitive hepatocellular carcinoma screening. 2. Small volume of ascites and right pleural effusion. Bone marrow biopsy 9/27/2018: Cellular marrow with maturing trilineage hematopoiesis, no increase in blasts. Lymphoid aggregates, favor benign/reactive. Mild reticulin fibrosis. Increased storage iron. FISH and cytogenetics pending. Assessment:   1) Anemia, macrocytic  Uncertain etiology. Labwork fairly unremarkable. EGD and colonoscopy without bleeding. Concern for production issue, given low retic and other cytopenias. However, bone marrow biopsy completed 9/27 was unrevealing. 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 I have sent him to [address] [place] [address] for evaluation, he is seeing Dr. Shantelle Harrison and we are awaiting their opinion. For now, we will continue with transfusion support as needed for HGB <7. Check CBC weekly now. 2) Thrombocytopenia  Chronic since at least 2009. Possibly related to cirrhosis, labwork otherwise unremarkable. Spleen at upper limit of normal on imaging. Bone marrow without definite etiology, though perhaps early MDS as above. Monitor.    3) Neutropenia  Mild. New this year. As above, possibly related to cirrhosis vs early MDS. Monitor. Improved on labs done yesterday. 4) Cirrhosis   Follow up with Dr. Vidal Rodriguez, seen 10/16/18. Planned f/u again in March. Had abdominal paracentesis on 12/6/18 at [facility] due to ascites likely due to cirrhosis. Pathology pending. 5) Health maintenance  He needs a PCP. I gave him a list of names. 6) Poor venous access  PICC line to right arm now. Discussed option of port placement due to potential need for long term venous access. He would have difficulty finding someone to drive him to and from this procedure. He will think about it and let us know when to schedule. Plan:     · Labs with CBC with diff weekly  · Follow up with GI as scheduled. · Continue to recommend patient establish with a PCP  · Return to see me in 2 months following [facility] follow up    Patient was seen in conjunction with Jenna Shelby NP.     Signed By: Almas Villa MD

## 2018-12-14 NOTE — PROGRESS NOTES
Outpatient Infusion Center Progress Note    4249 Pt admit to [facility] for Labs/Possible Blood Tx ambulatory in stable condition. Assessment completed. Labs obtained via PICC line. No new concerns voiced. Visit Vitals  /62   Pulse 83   Temp 98.3 °F (36.8 °C)   Resp 18           0850: Blood Tx not needed today. See labs below. PICC line with positive blood return, flushed and capped. D/c home ambulatory in no distress. Pt aware of next appointment scheduled for 12/17. Recent Results (from the past 8 hour(s))   CBC WITH AUTOMATED DIFF    Collection Time: 12/14/18  7:59 AM   Result Value Ref Range    WBC 2.8 (L) 4.1 - 11.1 K/uL    RBC 3.00 (L) 4.10 - 5.70 M/uL    HGB 8.4 (L) 12.1 - 17.0 g/dL    HCT 26.0 (L) 36.6 - 50.3 %    MCV 86.7 80.0 - 99.0 FL    MCH 28.0 26.0 - 34.0 PG    MCHC 32.3 30.0 - 36.5 g/dL    RDW 16.6 (H) 11.5 - 14.5 %    PLATELET 85 (L) 975 - 400 K/uL    NRBC 0.0 0  WBC    ABSOLUTE NRBC 0.00 0.00 - 0.01 K/uL    NEUTROPHILS 54 32 - 75 %    LYMPHOCYTES 27 12 - 49 %    MONOCYTES 18 (H) 5 - 13 %    EOSINOPHILS 1 0 - 7 %    BASOPHILS 0 0 - 1 %    IMMATURE GRANULOCYTES 0 0.0 - 0.5 %    ABS. NEUTROPHILS 1.5 (L) 1.8 - 8.0 K/UL    ABS. LYMPHOCYTES 0.8 0.8 - 3.5 K/UL    ABS. MONOCYTES 0.5 0.0 - 1.0 K/UL    ABS. EOSINOPHILS 0.0 0.0 - 0.4 K/UL    ABS. BASOPHILS 0.0 0.0 - 0.1 K/UL    ABS. IMM.  GRANS. 0.0 0.00 - 0.04 K/UL    DF SMEAR SCANNED      RBC COMMENTS NORMOCYTIC, NORMOCHROMIC

## 2018-12-17 NOTE — PROGRESS NOTES
[facility] VISIT NOTE      Pt arrived at [facility] ambulatory and in no distress for lab draw. Assessment completed, pt has no complaints  Visit Vitals  /53   Pulse (!) 109   Temp 98 °F (36.7 °C)   Resp 18   SpO2 99%     Blood drawn from right upper arm PICC line, single lumen, without difficulty. Positive blood return. Dressing is clean, dry, and intact. Recent Results (from the past 12 hour(s))   CBC WITH AUTOMATED DIFF    Collection Time: 12/17/18  8:42 AM   Result Value Ref Range    WBC 2.9 (L) 4.1 - 11.1 K/uL    RBC 2.75 (L) 4.10 - 5.70 M/uL    HGB 7.9 (L) 12.1 - 17.0 g/dL    HCT 24.1 (L) 36.6 - 50.3 %    MCV 87.6 80.0 - 99.0 FL    MCH 28.7 26.0 - 34.0 PG    MCHC 32.8 30.0 - 36.5 g/dL    RDW 16.2 (H) 11.5 - 14.5 %    PLATELET 86 (L) 461 - 400 K/uL    NRBC 0.0 0  WBC    ABSOLUTE NRBC 0.00 0.00 - 0.01 K/uL    NEUTROPHILS 53 32 - 75 %    LYMPHOCYTES 29 12 - 49 %    MONOCYTES 17 (H) 5 - 13 %    EOSINOPHILS 0 0 - 7 %    BASOPHILS 0 0 - 1 %    IMMATURE GRANULOCYTES 0 0.0 - 0.5 %    ABS. NEUTROPHILS 1.5 (L) 1.8 - 8.0 K/UL    ABS. LYMPHOCYTES 0.8 0.8 - 3.5 K/UL    ABS. MONOCYTES 0.5 0.0 - 1.0 K/UL    ABS. EOSINOPHILS 0.0 0.0 - 0.4 K/UL    ABS. BASOPHILS 0.0 0.0 - 0.1 K/UL    ABS. IMM. GRANS. 0.0 0.00 - 0.04 K/UL    DF AUTOMATED     TYPE & SCREEN    Collection Time: 12/17/18  8:42 AM   Result Value Ref Range    Crossmatch Expiration 12/20/2018     ABO/Rh(D) A POSITIVE     Antibody screen NEG        PICC line flushed per protocol. Positive blood return noted. PICC line secured to upper arm. no adverse reaction noted. D/C'd from [facility] ambulatory and in no distress accompanied by self. . Next appointment is 12/19/18 at 0830.

## 2018-12-17 NOTE — PROGRESS NOTES
Problem: Knowledge Deficit  Goal: *Verbalizes understanding of procedures and medications  Outcome: Progressing Towards Goal  Patient here for labs

## 2018-12-19 NOTE — PROGRESS NOTES
[facility] Progress Note    Date: 2018    Name: Yehuda Anne    MRN: [id number]         : 1945     0815. Mr. Nancie Benedict Arrived ambulatory and in no distress for PICC line care. Assessment was completed, no acute issues at this time, no new complaints voiced. RUE PICC with + blood return, flushing without difficulty. PICC dressing changed per policy. Mr. Courtney Leonard vitals were reviewed. Patient Vitals for the past 12 hrs:   Temp Pulse Resp BP SpO2   18 0815 96.4 °F (35.8 °C) 95 18 116/55 94 %           0830. Mr. Nancie Benedict tolerated treatment well and was discharged from [place] [address] in stable condition. He is to return on 18 for his next appointment.     Estelle Ogden RN  2018

## 2018-12-21 NOTE — PROGRESS NOTES
[facility] VISIT NOTE    [id number]. Pt arrived at [facility] ambulatory and in no distress for PICC care. Assessment completed, pt c/o SOB, lightheadedness and fatigue. Notified MD office. Received order from Louise Garcia NP to draw CBC. Right PICC flushed per protocol. Positive blood return noted and labs drawn. 0945. Notified MD office of lab results. Orders to transfuse 2 units of PRBC's given. Signs/symptoms of adverse blood reaction discussed with pt, voiced understanding. Medications received:  NS @ KVO  Tylenol 650 mg PO  Benadryl 25 mg PO    1105.:  1st unit PRBCs started and infusing without difficulty, observed x 15 minutes  1320:  1st unit completed without adverse reaction noted, NS flushing line. 1345:  2nd unit PRBCs started and infusing without difficulty, observed x 15 minutes  1620:  2nd unit completed without adverse reaction noted, NS flushing line. 1625. Tolerated transfusion well, no adverse reaction noted. D/C instructions reviewed, copy to pt, voiced understanding. Patient declined 1 hour post transfusion observation. D/Cd from [facility] ambulatory and in no distress.   Next appt 12/24/18 at 8:00 am.    Patient Vitals for the past 12 hrs:   Temp Pulse Resp BP SpO2   12/21/18 1620 96.3 °F (35.7 °C) 80 18 130/63    12/21/18 1545 96.2 °F (35.7 °C) 95 18 114/70    12/21/18 1445 97 °F (36.1 °C) 79 16 105/58    12/21/18 1415 96.4 °F (35.8 °C) 77 18 105/60    12/21/18 1400 96.5 °F (35.8 °C) 79 18 114/61    12/21/18 1343 96.4 °F (35.8 °C) 80 18 104/57    12/21/18 1305 96.4 °F (35.8 °C) 82 16 105/58    12/21/18 1205 96.4 °F (35.8 °C) 85 16 90/57    12/21/18 1135 96.4 °F (35.8 °C) 82 16 104/60    12/21/18 1120 96.6 °F (35.9 °C) 84 16 104/58    12/21/18 1103 96.3 °F (35.7 °C) 86 16 103/55    12/21/18 0818 96.5 °F (35.8 °C) (!) 108 18 91/55 96 %     Recent Results (from the past 12 hour(s))   TYPE & SCREEN    Collection Time: 12/21/18  8:35 AM   Result Value Ref Range    Crossmatch Expiration 12/24/2018     ABO/Rh(D) A POSITIVE     Antibody screen NEG     Unit number [id number]     Blood component type [facility]     Unit division 00     Status of unit ISSUED     Crossmatch result Compatible     Unit number [id number]     Blood component type [facility]     Unit division 00     Status of unit ISSUED     Crossmatch result Compatible    CBC WITH AUTOMATED DIFF    Collection Time: 12/21/18  8:35 AM   Result Value Ref Range    WBC 2.3 (L) 4.1 - 11.1 K/uL    RBC 2.40 (L) 4.10 - 5.70 M/uL    HGB 6.9 (L) 12.1 - 17.0 g/dL    HCT 20.8 (L) 36.6 - 50.3 %    MCV 86.7 80.0 - 99.0 FL    MCH 28.8 26.0 - 34.0 PG    MCHC 33.2 30.0 - 36.5 g/dL    RDW 16.2 (H) 11.5 - 14.5 %    PLATELET 83 (L) 176 - 400 K/uL    NRBC 0.0 0  WBC    ABSOLUTE NRBC 0.00 0.00 - 0.01 K/uL    NEUTROPHILS 54 32 - 75 %    LYMPHOCYTES 28 12 - 49 %    MONOCYTES 17 (H) 5 - 13 %    EOSINOPHILS 1 0 - 7 %    BASOPHILS 0 0 - 1 %    IMMATURE GRANULOCYTES 0 0.0 - 0.5 %    ABS. NEUTROPHILS 1.3 (L) 1.8 - 8.0 K/UL    ABS. LYMPHOCYTES 0.6 (L) 0.8 - 3.5 K/UL    ABS. MONOCYTES 0.4 0.0 - 1.0 K/UL    ABS. EOSINOPHILS 0.0 0.0 - 0.4 K/UL    ABS. BASOPHILS 0.0 0.0 - 0.1 K/UL    ABS. IMM.  GRANS. 0.0 0.00 - 0.04 K/UL    DF SMEAR SCANNED      PLATELET COMMENTS Large Platelets      RBC COMMENTS NORMOCYTIC, NORMOCHROMIC

## 2018-12-24 NOTE — PROGRESS NOTES
Outpatient Infusion Center Progress Note    0740 Pt admit to [facility] for PICC flush ambulatory in stable condition. Assessment completed. No new concerns voiced. PICC flushed with positive blood return, heparinized and new Curos cap applied to end clave. Visit Vitals  /73   Pulse (!) 102   Temp 97 °F (36.1 °C)   Resp 18   SpO2 98%       Dressing to be changed at next appointment, 12/26/18.    0745 Pt tolerated treatment well. D/c home ambulatory in no distress. Pt aware of next appointment scheduled for 12/26/18.

## 2018-12-26 NOTE — PROGRESS NOTES
Problem: Infection - Risk of, Central Venous Catheter-Associated Bloodstream Infection  Goal: *Absence of infection signs and symptoms  Outcome: Progressing Towards Goal  Educated pt on s/sx infection at site of PICC

## 2018-12-26 NOTE — PROGRESS NOTES
Outpatient [facility] Short Visit Progress Note    0882 Patient admitted to [facility] for PICC flush and dressing change ambulatory in stable condition. Assessment completed. No new concerns voiced. RUE PICC flushed w/positive blood return noted. Dressing changed using sterile technique. Slight redness under dressing-appears to be from tape irritation. Pt denied pain or discomfort to site. Skin prep used prior to applying new dressing. Biopatch and anchor applied. Visit Vitals  /66   Pulse 89   Temp 97.4 °F (36.3 °C)   Resp 18       Medications:  IV Ns  IV Heparin    0910 Patient tolerated treatment well. Patient discharged from [place] [address] ambulatory in no distress. Patient aware of next appointment scheduled for 12/28 @ 0830 for labs.

## 2018-12-26 NOTE — PROGRESS NOTES
Problem: Knowledge Deficit  Goal: *Verbalizes understanding of procedures and medications  Outcome: Progressing Towards Goal  Instructed pt to inform nursing staff of pain/irritation/discomfort under dressing site

## 2018-12-28 NOTE — PROGRESS NOTES
Outpatient Infusion Center Short Visit Progress Note 0830 Patient admitted to [facility] for Labs/possible transfusion ambulatory in stable condition. Assessment completed. No new concerns voiced. Blood drawn from [address]. Flushed per protocol and new green cap applied. Visit Vitals /63 Pulse 86 Temp 97.6 °F (36.4 °C) Resp 18 Recent Results (from the past 12 hour(s)) CBC WITH AUTOMATED DIFF Collection Time: 12/28/18  8:33 AM  
Result Value Ref Range WBC 2.6 (L) 4.1 - 11.1 K/uL  
 RBC 2.59 (L) 4.10 - 5.70 M/uL HGB 7.5 (L) 12.1 - 17.0 g/dL HCT 22.6 (L) 36.6 - 50.3 % MCV 87.3 80.0 - 99.0 FL  
 MCH 29.0 26.0 - 34.0 PG  
 MCHC 33.2 30.0 - 36.5 g/dL  
 RDW 15.7 (H) 11.5 - 14.5 % PLATELET 84 (L) 617 - 400 K/uL NRBC 0.0 0  WBC ABSOLUTE NRBC 0.00 0.00 - 0.01 K/uL NEUTROPHILS 52 32 - 75 % LYMPHOCYTES 30 12 - 49 % MONOCYTES 18 (H) 5 - 13 % EOSINOPHILS 0 0 - 7 % BASOPHILS 0 0 - 1 % IMMATURE GRANULOCYTES 0 0.0 - 0.5 % ABS. NEUTROPHILS 1.3 (L) 1.8 - 8.0 K/UL  
 ABS. LYMPHOCYTES 0.8 0.8 - 3.5 K/UL  
 ABS. MONOCYTES 0.5 0.0 - 1.0 K/UL  
 ABS. EOSINOPHILS 0.0 0.0 - 0.4 K/UL  
 ABS. BASOPHILS 0.0 0.0 - 0.1 K/UL  
 ABS. IMM. GRANS. 0.0 0.00 - 0.04 K/UL  
 DF SMEAR SCANNED    
 PLATELET COMMENTS Large Platelets RBC COMMENTS ANISOCYTOSIS 1+ 
    
 RBC COMMENTS OVALOCYTES PRESENT 
    
 RBC COMMENTS HYPOCHROMIA 2+ Medications: 
IV NS 
IV Heparin 0945 Hgb 7.5. Transfusion not needed today. Patient discharged from [place] [address] ambulatory in no distress. Patient aware of next appointment scheduled for 12/31/18 @ 0830.

## 2018-12-31 NOTE — PROGRESS NOTES
50 [facility] DISCHARGE INSTRUCTIONS FOR:  BLOOD TRANSFUSION We hope you are feeling better after your blood transfusion. Some mild tenderness or slight bruising at your IV site is normal.  Avoid lifting or heavy use of that extremity for the rest of the day. Drink plenty of fluids, eat a normal diet and get some rest. 
 
There are some important signs that you need to watch for in case you experience a delayed reaction to the blood you have received. Call your physician immediately if you develop any of the following symptoms: 1. Severe headache or backache; 
 
2. Fever above 100 degrees; 
 
3. Chills; 
 
4. Difficulty breathing; 
 
5.  Blood or red color in urine; 
 
6. The feeling of weakness or constant fatigue; 
 
7. Yellowing of the whites of your eyes or skin (jaundice). If your physician is not available, call or go to the nearest emergency room, or dial 911. Jerome Pino, Signature: ___________________________ 12/31/2018 [facility]

## 2018-12-31 NOTE — PROGRESS NOTES
Outpatient Infusion Center Progress Note 0820 Pt admit to [facility] for Blood Transfusion ambulatory in stable condition. Assessment completed. No new concerns voiced. R arm PICC with positive blood return. Medications: 
Tylenol PO Benadryl PO 
 
1050 First unit initiated. 1310 First unit completed. 1320 Second unit initiated. 1530 Second unit completed. Patient Vitals for the past 12 hrs: 
 Temp Pulse Resp BP  
12/31/18 1530 97.8 °F (36.6 °C) 81 16 116/81  
12/31/18 1520 97.9 °F (36.6 °C) 79 16 112/57  
12/31/18 1420 98.1 °F (36.7 °C) 81 16 109/61  
12/31/18 1350 97.6 °F (36.4 °C) 78 16 116/61  
12/31/18 1335 98.1 °F (36.7 °C) 77 16 105/61  
12/31/18 1310 97.5 °F (36.4 °C) 77 16 104/67  
12/31/18 1250 98.1 °F (36.7 °C) 79 16 99/61  
12/31/18 1150 97.4 °F (36.3 °C) 81 16 98/54  
12/31/18 1120 97.9 °F (36.6 °C) 86 16 100/58  
12/31/18 1105 97.4 °F (36.3 °C) 89 16 (!) 85/57  
12/31/18 1045 98 °F (36.7 °C) 92 16 (!) 81/52 Pt monitored post transfusion with no s/s of reaction. Educated and provided with written material regarding s/s of delayed reaction to watch for at home. 1545 Pt tolerated treatment well. D/c home ambulatory in no distress. Pt declined 1 hour observation. Consent and discharge instructions given. Pt aware of next appointment scheduled for 1/2/18 at 0830 for PICC line drsg change.  
 
Floridalma Sigala RN

## 2019-01-01 ENCOUNTER — TELEPHONE (OUTPATIENT)
Dept: ONCOLOGY | Age: 74
End: 2019-01-01

## 2019-01-01 ENCOUNTER — HOSPITAL ENCOUNTER (OUTPATIENT)
Dept: INFUSION THERAPY | Age: 74
Discharge: HOME | End: 2019-01-21
Payer: MEDICARE

## 2019-01-01 ENCOUNTER — APPOINTMENT (OUTPATIENT)
Dept: VASCULAR SURGERY | Age: 74
DRG: 809 | End: 2019-01-01
Attending: INTERNAL MEDICINE
Payer: MEDICARE

## 2019-01-01 ENCOUNTER — HOSPITAL ENCOUNTER (OUTPATIENT)
Dept: INFUSION THERAPY | Age: 74
Discharge: HOME | End: 2019-01-09
Payer: MEDICARE

## 2019-01-01 ENCOUNTER — HOSPITAL ENCOUNTER (OUTPATIENT)
Dept: INFUSION THERAPY | Age: 74
Discharge: HOME | End: 2019-02-20
Payer: MEDICARE

## 2019-01-01 ENCOUNTER — HOSPITAL ENCOUNTER (OUTPATIENT)
Dept: INFUSION THERAPY | Age: 74
Discharge: HOME | End: 2019-01-18
Payer: MEDICARE

## 2019-01-01 ENCOUNTER — APPOINTMENT (OUTPATIENT)
Dept: INFUSION THERAPY | Age: 74
End: 2019-01-01
Payer: MEDICARE

## 2019-01-01 ENCOUNTER — APPOINTMENT (OUTPATIENT)
Dept: ULTRASOUND IMAGING | Age: 74
DRG: 432 | End: 2019-01-01
Attending: INTERNAL MEDICINE
Payer: MEDICARE

## 2019-01-01 ENCOUNTER — APPOINTMENT (OUTPATIENT)
Dept: INTERVENTIONAL RADIOLOGY/VASCULAR | Age: 74
DRG: 432 | End: 2019-01-01
Attending: FAMILY MEDICINE
Payer: MEDICARE

## 2019-01-01 ENCOUNTER — APPOINTMENT (OUTPATIENT)
Dept: ULTRASOUND IMAGING | Age: 74
DRG: 809 | End: 2019-01-01
Attending: INTERNAL MEDICINE
Payer: MEDICARE

## 2019-01-01 ENCOUNTER — HOSPITAL ENCOUNTER (OUTPATIENT)
Dept: INFUSION THERAPY | Age: 74
End: 2019-01-01
Payer: MEDICARE

## 2019-01-01 ENCOUNTER — HOSPITAL ENCOUNTER (INPATIENT)
Age: 74
LOS: 5 days | Discharge: HOME HEALTH | DRG: 809 | End: 2019-02-04
Attending: EMERGENCY MEDICINE | Admitting: INTERNAL MEDICINE
Payer: MEDICARE

## 2019-01-01 ENCOUNTER — HOSPICE ADMISSION (OUTPATIENT)
Dept: HOSPICE | Facility: HOSPICE | Age: 74
End: 2019-01-01

## 2019-01-01 ENCOUNTER — APPOINTMENT (OUTPATIENT)
Dept: CT IMAGING | Age: 74
DRG: 809 | End: 2019-01-01
Attending: NURSE PRACTITIONER
Payer: MEDICARE

## 2019-01-01 ENCOUNTER — HOSPITAL ENCOUNTER (OUTPATIENT)
Dept: INFUSION THERAPY | Age: 74
Discharge: HOME | End: 2019-02-06
Payer: MEDICARE

## 2019-01-01 ENCOUNTER — APPOINTMENT (OUTPATIENT)
Dept: GENERAL RADIOLOGY | Age: 74
DRG: 432 | End: 2019-01-01
Attending: INTERNAL MEDICINE
Payer: MEDICARE

## 2019-01-01 ENCOUNTER — APPOINTMENT (OUTPATIENT)
Dept: GENERAL RADIOLOGY | Age: 74
DRG: 432 | End: 2019-01-01
Attending: EMERGENCY MEDICINE
Payer: MEDICARE

## 2019-01-01 ENCOUNTER — APPOINTMENT (OUTPATIENT)
Dept: GENERAL RADIOLOGY | Age: 74
DRG: 809 | End: 2019-01-01
Attending: PHYSICIAN ASSISTANT
Payer: MEDICARE

## 2019-01-01 ENCOUNTER — HOSPICE ADMISSION (OUTPATIENT)
Dept: HOSPICE | Facility: HOSPICE | Age: 74
End: 2019-01-01
Payer: MEDICARE

## 2019-01-01 ENCOUNTER — HOME CARE VISIT (OUTPATIENT)
Dept: HOSPICE | Facility: HOSPICE | Age: 74
End: 2019-01-01
Payer: MEDICARE

## 2019-01-01 ENCOUNTER — APPOINTMENT (OUTPATIENT)
Dept: GENERAL RADIOLOGY | Age: 74
DRG: 809 | End: 2019-01-01
Attending: INTERNAL MEDICINE
Payer: MEDICARE

## 2019-01-01 ENCOUNTER — HOSPITAL ENCOUNTER (OUTPATIENT)
Dept: INFUSION THERAPY | Age: 74
Discharge: HOME | End: 2019-01-02
Payer: MEDICARE

## 2019-01-01 ENCOUNTER — HOSPITAL ENCOUNTER (OUTPATIENT)
Dept: ULTRASOUND IMAGING | Age: 74
Discharge: HOME | End: 2019-01-21
Attending: NURSE PRACTITIONER
Payer: MEDICARE

## 2019-01-01 ENCOUNTER — HOSPITAL ENCOUNTER (INPATIENT)
Age: 74
LOS: 5 days | Discharge: HOSPICE | DRG: 432 | End: 2019-03-22
Attending: EMERGENCY MEDICINE | Admitting: INTERNAL MEDICINE
Payer: MEDICARE

## 2019-01-01 ENCOUNTER — HOSPITAL ENCOUNTER (OUTPATIENT)
Dept: INFUSION THERAPY | Age: 74
Discharge: HOME | End: 2019-01-11
Payer: MEDICARE

## 2019-01-01 ENCOUNTER — HOSPITAL ENCOUNTER (OUTPATIENT)
Dept: INFUSION THERAPY | Age: 74
Discharge: HOME | End: 2019-01-04
Payer: MEDICARE

## 2019-01-01 ENCOUNTER — DOCUMENTATION ONLY (OUTPATIENT)
Dept: ONCOLOGY | Age: 74
End: 2019-01-01

## 2019-01-01 ENCOUNTER — HOSPITAL ENCOUNTER (OUTPATIENT)
Dept: INFUSION THERAPY | Age: 74
Discharge: HOME | End: 2019-01-23
Payer: MEDICARE

## 2019-01-01 ENCOUNTER — HOSPITAL ENCOUNTER (OUTPATIENT)
Dept: INFUSION THERAPY | Age: 74
Discharge: HOME | End: 2019-01-25
Payer: MEDICARE

## 2019-01-01 ENCOUNTER — HOSPITAL ENCOUNTER (OUTPATIENT)
Dept: INFUSION THERAPY | Age: 74
Discharge: HOME | End: 2019-03-04
Payer: MEDICARE

## 2019-01-01 ENCOUNTER — HOSPITAL ENCOUNTER (OUTPATIENT)
Dept: INFUSION THERAPY | Age: 74
Discharge: HOME | End: 2019-02-27
Payer: MEDICARE

## 2019-01-01 ENCOUNTER — HOSPITAL ENCOUNTER (OUTPATIENT)
Dept: INFUSION THERAPY | Age: 74
Discharge: HOME | End: 2019-01-28
Payer: MEDICARE

## 2019-01-01 ENCOUNTER — HOSPITAL ENCOUNTER (OUTPATIENT)
Dept: INFUSION THERAPY | Age: 74
Discharge: HOME | End: 2019-01-07
Payer: MEDICARE

## 2019-01-01 ENCOUNTER — HOSPITAL ENCOUNTER (OUTPATIENT)
Dept: INFUSION THERAPY | Age: 74
Discharge: HOME | End: 2019-01-16
Payer: MEDICARE

## 2019-01-01 ENCOUNTER — APPOINTMENT (OUTPATIENT)
Dept: NON INVASIVE DIAGNOSTICS | Age: 74
DRG: 809 | End: 2019-01-01
Attending: INTERNAL MEDICINE
Payer: MEDICARE

## 2019-01-01 ENCOUNTER — TELEPHONE (OUTPATIENT)
Dept: INFUSION THERAPY | Age: 74
End: 2019-01-01

## 2019-01-01 ENCOUNTER — APPOINTMENT (OUTPATIENT)
Dept: INFUSION THERAPY | Age: 74
End: 2019-01-01

## 2019-01-01 ENCOUNTER — APPOINTMENT (OUTPATIENT)
Dept: INTERVENTIONAL RADIOLOGY/VASCULAR | Age: 74
DRG: 809 | End: 2019-01-01
Attending: INTERNAL MEDICINE
Payer: MEDICARE

## 2019-01-01 ENCOUNTER — HOSPITAL ENCOUNTER (INPATIENT)
Age: 74
LOS: 5 days | End: 2019-03-27
Attending: INTERNAL MEDICINE | Admitting: FAMILY MEDICINE

## 2019-01-01 VITALS
RESPIRATION RATE: 20 BRPM | HEIGHT: 70 IN | OXYGEN SATURATION: 98 % | SYSTOLIC BLOOD PRESSURE: 90 MMHG | TEMPERATURE: 98 F | HEART RATE: 93 BPM | BODY MASS INDEX: 20.76 KG/M2 | WEIGHT: 145 LBS | DIASTOLIC BLOOD PRESSURE: 48 MMHG

## 2019-01-01 VITALS
OXYGEN SATURATION: 98 % | DIASTOLIC BLOOD PRESSURE: 51 MMHG | WEIGHT: 177.25 LBS | TEMPERATURE: 98.2 F | RESPIRATION RATE: 16 BRPM | HEIGHT: 71 IN | SYSTOLIC BLOOD PRESSURE: 101 MMHG | HEART RATE: 80 BPM | BODY MASS INDEX: 24.81 KG/M2

## 2019-01-01 VITALS
HEART RATE: 95 BPM | DIASTOLIC BLOOD PRESSURE: 56 MMHG | BODY MASS INDEX: 26.6 KG/M2 | WEIGHT: 190 LBS | RESPIRATION RATE: 12 BRPM | OXYGEN SATURATION: 99 % | HEIGHT: 71 IN | SYSTOLIC BLOOD PRESSURE: 112 MMHG

## 2019-01-01 VITALS
DIASTOLIC BLOOD PRESSURE: 57 MMHG | OXYGEN SATURATION: 99 % | TEMPERATURE: 96.8 F | SYSTOLIC BLOOD PRESSURE: 89 MMHG | RESPIRATION RATE: 18 BRPM | HEART RATE: 97 BPM

## 2019-01-01 VITALS
RESPIRATION RATE: 16 BRPM | HEART RATE: 117 BPM | DIASTOLIC BLOOD PRESSURE: 60 MMHG | DIASTOLIC BLOOD PRESSURE: 70 MMHG | SYSTOLIC BLOOD PRESSURE: 106 MMHG | OXYGEN SATURATION: 96 % | HEART RATE: 92 BPM | RESPIRATION RATE: 18 BRPM | SYSTOLIC BLOOD PRESSURE: 103 MMHG | TEMPERATURE: 97.8 F | TEMPERATURE: 96.8 F

## 2019-01-01 VITALS
DIASTOLIC BLOOD PRESSURE: 61 MMHG | SYSTOLIC BLOOD PRESSURE: 103 MMHG | TEMPERATURE: 98.1 F | HEART RATE: 89 BPM | RESPIRATION RATE: 18 BRPM

## 2019-01-01 VITALS
SYSTOLIC BLOOD PRESSURE: 115 MMHG | RESPIRATION RATE: 16 BRPM | DIASTOLIC BLOOD PRESSURE: 66 MMHG | HEART RATE: 103 BPM | TEMPERATURE: 97.7 F

## 2019-01-01 VITALS
RESPIRATION RATE: 16 BRPM | SYSTOLIC BLOOD PRESSURE: 108 MMHG | OXYGEN SATURATION: 100 % | TEMPERATURE: 97.5 F | HEART RATE: 58 BPM | DIASTOLIC BLOOD PRESSURE: 66 MMHG

## 2019-01-01 VITALS
BODY MASS INDEX: 22.5 KG/M2 | SYSTOLIC BLOOD PRESSURE: 95 MMHG | TEMPERATURE: 98.1 F | RESPIRATION RATE: 16 BRPM | HEIGHT: 70 IN | OXYGEN SATURATION: 100 % | WEIGHT: 157.2 LBS | DIASTOLIC BLOOD PRESSURE: 46 MMHG | HEART RATE: 80 BPM

## 2019-01-01 VITALS
RESPIRATION RATE: 14 BRPM | SYSTOLIC BLOOD PRESSURE: 114 MMHG | TEMPERATURE: 97.9 F | DIASTOLIC BLOOD PRESSURE: 69 MMHG | HEART RATE: 79 BPM

## 2019-01-01 VITALS
RESPIRATION RATE: 16 BRPM | OXYGEN SATURATION: 96 % | SYSTOLIC BLOOD PRESSURE: 121 MMHG | HEART RATE: 97 BPM | DIASTOLIC BLOOD PRESSURE: 72 MMHG | TEMPERATURE: 97.8 F

## 2019-01-01 VITALS
HEART RATE: 94 BPM | SYSTOLIC BLOOD PRESSURE: 90 MMHG | DIASTOLIC BLOOD PRESSURE: 64 MMHG | TEMPERATURE: 97.4 F | RESPIRATION RATE: 16 BRPM

## 2019-01-01 VITALS
HEART RATE: 80 BPM | TEMPERATURE: 97.7 F | RESPIRATION RATE: 18 BRPM | DIASTOLIC BLOOD PRESSURE: 64 MMHG | SYSTOLIC BLOOD PRESSURE: 98 MMHG | OXYGEN SATURATION: 100 %

## 2019-01-01 VITALS
SYSTOLIC BLOOD PRESSURE: 102 MMHG | HEART RATE: 113 BPM | DIASTOLIC BLOOD PRESSURE: 57 MMHG | TEMPERATURE: 97.5 F | RESPIRATION RATE: 20 BRPM

## 2019-01-01 VITALS
SYSTOLIC BLOOD PRESSURE: 104 MMHG | RESPIRATION RATE: 16 BRPM | TEMPERATURE: 96.7 F | HEART RATE: 107 BPM | DIASTOLIC BLOOD PRESSURE: 60 MMHG

## 2019-01-01 DIAGNOSIS — R18.8 CIRRHOSIS OF LIVER WITH ASCITES, UNSPECIFIED HEPATIC CIRRHOSIS TYPE (HCC): Primary | ICD-10-CM

## 2019-01-01 DIAGNOSIS — K76.9 LIVER LESION: ICD-10-CM

## 2019-01-01 DIAGNOSIS — D61.818 PANCYTOPENIA (HCC): ICD-10-CM

## 2019-01-01 DIAGNOSIS — R53.83 FATIGUE, UNSPECIFIED TYPE: ICD-10-CM

## 2019-01-01 DIAGNOSIS — Z71.89 ADVANCED CARE PLANNING/COUNSELING DISCUSSION: ICD-10-CM

## 2019-01-01 DIAGNOSIS — K70.31 ALCOHOLIC CIRRHOSIS OF LIVER WITH ASCITES (HCC): ICD-10-CM

## 2019-01-01 DIAGNOSIS — D64.9 SYMPTOMATIC ANEMIA: ICD-10-CM

## 2019-01-01 DIAGNOSIS — R18.8 OTHER ASCITES: ICD-10-CM

## 2019-01-01 DIAGNOSIS — K76.9 LIVER LESION: Primary | ICD-10-CM

## 2019-01-01 DIAGNOSIS — K74.60 CIRRHOSIS OF LIVER WITH ASCITES, UNSPECIFIED HEPATIC CIRRHOSIS TYPE (HCC): ICD-10-CM

## 2019-01-01 DIAGNOSIS — R18.8 CIRRHOSIS OF LIVER WITH ASCITES, UNSPECIFIED HEPATIC CIRRHOSIS TYPE (HCC): ICD-10-CM

## 2019-01-01 DIAGNOSIS — J18.9 COMMUNITY ACQUIRED PNEUMONIA OF RIGHT LOWER LOBE OF LUNG: ICD-10-CM

## 2019-01-01 DIAGNOSIS — J90 PLEURAL EFFUSION: ICD-10-CM

## 2019-01-01 DIAGNOSIS — R10.84 GENERALIZED ABDOMINAL PAIN: ICD-10-CM

## 2019-01-01 DIAGNOSIS — D64.9 ANEMIA, UNSPECIFIED TYPE: Primary | ICD-10-CM

## 2019-01-01 DIAGNOSIS — K70.31 ASCITES DUE TO ALCOHOLIC CIRRHOSIS (HCC): ICD-10-CM

## 2019-01-01 DIAGNOSIS — Z71.89 GOALS OF CARE, COUNSELING/DISCUSSION: ICD-10-CM

## 2019-01-01 DIAGNOSIS — K74.60 CIRRHOSIS OF LIVER WITH ASCITES, UNSPECIFIED HEPATIC CIRRHOSIS TYPE (HCC): Primary | ICD-10-CM

## 2019-01-01 DIAGNOSIS — R53.1 WEAKNESS: Primary | ICD-10-CM

## 2019-01-01 DIAGNOSIS — Z71.89 DNR (DO NOT RESUSCITATE) DISCUSSION: ICD-10-CM

## 2019-01-01 DIAGNOSIS — R63.0 ANOREXIA: ICD-10-CM

## 2019-01-01 LAB
ABO + RH BLD: NORMAL
ALBUMIN SERPL-MCNC: 1.8 G/DL (ref 3.5–5)
ALBUMIN SERPL-MCNC: 2 G/DL (ref 3.5–5)
ALBUMIN SERPL-MCNC: 2.1 G/DL (ref 3.5–5)
ALBUMIN SERPL-MCNC: 2.2 G/DL (ref 3.5–5)
ALBUMIN SERPL-MCNC: 2.4 G/DL (ref 3.5–5)
ALBUMIN/GLOB SERPL: 0.5 {RATIO} (ref 1.1–2.2)
ALBUMIN/GLOB SERPL: 0.7 {RATIO} (ref 1.1–2.2)
ALBUMIN/GLOB SERPL: 0.9 {RATIO} (ref 1.1–2.2)
ALP SERPL-CCNC: 174 U/L (ref 45–117)
ALP SERPL-CCNC: 175 U/L (ref 45–117)
ALP SERPL-CCNC: 231 U/L (ref 45–117)
ALP SERPL-CCNC: 268 U/L (ref 45–117)
ALP SERPL-CCNC: 293 U/L (ref 45–117)
ALP SERPL-CCNC: 361 U/L (ref 45–117)
ALP SERPL-CCNC: 361 U/L (ref 45–117)
ALT SERPL-CCNC: 18 U/L (ref 12–78)
ALT SERPL-CCNC: 19 U/L (ref 12–78)
ALT SERPL-CCNC: 27 U/L (ref 12–78)
ALT SERPL-CCNC: 28 U/L (ref 12–78)
ALT SERPL-CCNC: 36 U/L (ref 12–78)
ALT SERPL-CCNC: 92 U/L (ref 12–78)
ALT SERPL-CCNC: 94 U/L (ref 12–78)
AMMONIA PLAS-SCNC: <10 UMOL/L
AMMONIA PLAS-SCNC: <10 UMOL/L
AMORPH CRY URNS QL MICRO: ABNORMAL
ANION GAP SERPL CALC-SCNC: 4 MMOL/L (ref 5–15)
ANION GAP SERPL CALC-SCNC: 5 MMOL/L (ref 5–15)
ANION GAP SERPL CALC-SCNC: 6 MMOL/L (ref 5–15)
ANION GAP SERPL CALC-SCNC: 8 MMOL/L (ref 5–15)
APPEARANCE FLD: ABNORMAL
APPEARANCE UR: ABNORMAL
APPEARANCE UR: ABNORMAL
APTT PPP: 33.9 SEC (ref 22.1–32)
AST SERPL-CCNC: 18 U/L (ref 15–37)
AST SERPL-CCNC: 25 U/L (ref 15–37)
AST SERPL-CCNC: 30 U/L (ref 15–37)
AST SERPL-CCNC: 32 U/L (ref 15–37)
AST SERPL-CCNC: 36 U/L (ref 15–37)
AST SERPL-CCNC: 38 U/L (ref 15–37)
AST SERPL-CCNC: 44 U/L (ref 15–37)
ATRIAL RATE: 84 BPM
B PERT DNA SPEC QL NAA+PROBE: NOT DETECTED
BACTERIA SPEC CULT: ABNORMAL
BACTERIA SPEC CULT: NORMAL
BACTERIA URNS QL MICRO: NEGATIVE /HPF
BACTERIA URNS QL MICRO: NEGATIVE /HPF
BASOPHILS # BLD: 0 K/UL (ref 0–0.1)
BASOPHILS NFR BLD: 0 % (ref 0–1)
BASOPHILS NFR BLD: 1 % (ref 0–1)
BASOPHILS NFR BLD: 1 % (ref 0–1)
BILIRUB DIRECT SERPL-MCNC: 0.5 MG/DL (ref 0–0.2)
BILIRUB DIRECT SERPL-MCNC: 3.2 MG/DL (ref 0–0.2)
BILIRUB SERPL-MCNC: 0.9 MG/DL (ref 0.2–1)
BILIRUB SERPL-MCNC: 1.1 MG/DL (ref 0.2–1)
BILIRUB SERPL-MCNC: 1.1 MG/DL (ref 0.2–1)
BILIRUB SERPL-MCNC: 1.2 MG/DL (ref 0.2–1)
BILIRUB SERPL-MCNC: 1.7 MG/DL (ref 0.2–1)
BILIRUB SERPL-MCNC: 4.3 MG/DL (ref 0.2–1)
BILIRUB SERPL-MCNC: 5 MG/DL (ref 0.2–1)
BILIRUB UR QL CFM: NEGATIVE
BILIRUB UR QL CFM: NEGATIVE
BLD PROD TYP BPU: NORMAL
BLOOD GROUP ANTIBODIES SERPL: NORMAL
BNP SERPL-MCNC: 405 PG/ML (ref 0–450)
BPU ID: NORMAL
BUN SERPL-MCNC: 15 MG/DL (ref 6–20)
BUN SERPL-MCNC: 16 MG/DL (ref 6–20)
BUN SERPL-MCNC: 17 MG/DL (ref 6–20)
BUN SERPL-MCNC: 19 MG/DL (ref 6–20)
BUN SERPL-MCNC: 26 MG/DL (ref 6–20)
BUN SERPL-MCNC: 31 MG/DL (ref 6–20)
BUN SERPL-MCNC: 33 MG/DL (ref 6–20)
BUN SERPL-MCNC: 44 MG/DL (ref 6–20)
BUN SERPL-MCNC: 47 MG/DL (ref 6–20)
BUN/CREAT SERPL: 24 (ref 12–20)
BUN/CREAT SERPL: 26 (ref 12–20)
BUN/CREAT SERPL: 26 (ref 12–20)
BUN/CREAT SERPL: 29 (ref 12–20)
BUN/CREAT SERPL: 30 (ref 12–20)
BUN/CREAT SERPL: 59 (ref 12–20)
BUN/CREAT SERPL: 60 (ref 12–20)
BUN/CREAT SERPL: 62 (ref 12–20)
BUN/CREAT SERPL: 73 (ref 12–20)
BUN/CREAT SERPL: 77 (ref 12–20)
C PNEUM DNA SPEC QL NAA+PROBE: NOT DETECTED
CALCIUM SERPL-MCNC: 7.6 MG/DL (ref 8.5–10.1)
CALCIUM SERPL-MCNC: 7.7 MG/DL (ref 8.5–10.1)
CALCIUM SERPL-MCNC: 7.7 MG/DL (ref 8.5–10.1)
CALCIUM SERPL-MCNC: 8 MG/DL (ref 8.5–10.1)
CALCIUM SERPL-MCNC: 8 MG/DL (ref 8.5–10.1)
CALCIUM SERPL-MCNC: 8.1 MG/DL (ref 8.5–10.1)
CALCIUM SERPL-MCNC: 8.2 MG/DL (ref 8.5–10.1)
CALCIUM SERPL-MCNC: 8.2 MG/DL (ref 8.5–10.1)
CALCIUM SERPL-MCNC: 8.4 MG/DL (ref 8.5–10.1)
CALCULATED P AXIS, ECG09: 127 DEGREES
CALCULATED R AXIS, ECG10: 79 DEGREES
CALCULATED T AXIS, ECG11: 46 DEGREES
CC UR VC: ABNORMAL
CHLORIDE SERPL-SCNC: 100 MMOL/L (ref 97–108)
CHLORIDE SERPL-SCNC: 103 MMOL/L (ref 97–108)
CHLORIDE SERPL-SCNC: 95 MMOL/L (ref 97–108)
CHLORIDE SERPL-SCNC: 95 MMOL/L (ref 97–108)
CHLORIDE SERPL-SCNC: 96 MMOL/L (ref 97–108)
CHLORIDE SERPL-SCNC: 96 MMOL/L (ref 97–108)
CHLORIDE SERPL-SCNC: 97 MMOL/L (ref 97–108)
CHLORIDE SERPL-SCNC: 98 MMOL/L (ref 97–108)
CO2 SERPL-SCNC: 25 MMOL/L (ref 21–32)
CO2 SERPL-SCNC: 26 MMOL/L (ref 21–32)
CO2 SERPL-SCNC: 27 MMOL/L (ref 21–32)
CO2 SERPL-SCNC: 28 MMOL/L (ref 21–32)
COLOR FLD: YELLOW
COLOR UR: ABNORMAL
COLOR UR: ABNORMAL
COMMENT, HOLDF: NORMAL
CREAT SERPL-MCNC: 0.44 MG/DL (ref 0.7–1.3)
CREAT SERPL-MCNC: 0.5 MG/DL (ref 0.7–1.3)
CREAT SERPL-MCNC: 0.53 MG/DL (ref 0.7–1.3)
CREAT SERPL-MCNC: 0.55 MG/DL (ref 0.7–1.3)
CREAT SERPL-MCNC: 0.57 MG/DL (ref 0.7–1.3)
CREAT SERPL-MCNC: 0.58 MG/DL (ref 0.7–1.3)
CREAT SERPL-MCNC: 0.58 MG/DL (ref 0.7–1.3)
CREAT SERPL-MCNC: 0.64 MG/DL (ref 0.7–1.3)
CREAT SERPL-MCNC: 0.64 MG/DL (ref 0.7–1.3)
CREAT SERPL-MCNC: 0.65 MG/DL (ref 0.7–1.3)
CREAT SERPL-MCNC: 0.72 MG/DL (ref 0.7–1.3)
CROSSMATCH RESULT,%XM: NORMAL
DIAGNOSIS, 93000: NORMAL
DIFFERENTIAL METHOD BLD: ABNORMAL
ECHO AO ROOT DIAM: 3.6 CM
ECHO LA MAJOR AXIS: 3.5 CM
ECHO LA TO AORTIC ROOT RATIO: 0.97
ECHO LA VOL 2C: 55.34 ML (ref 18–58)
ECHO LA VOL 4C: 57.75 ML (ref 18–58)
ECHO LA VOL BP: 61.96 ML (ref 18–58)
ECHO LA VOL/BSA BIPLANE: 30.23 ML/M2 (ref 16–28)
ECHO LA VOLUME INDEX A2C: 27 ML/M2 (ref 16–28)
ECHO LA VOLUME INDEX A4C: 28.18 ML/M2 (ref 16–28)
ECHO LV INTERNAL DIMENSION DIASTOLIC: 4.27 CM (ref 4.2–5.9)
ECHO LV INTERNAL DIMENSION SYSTOLIC: 3.4 CM
ECHO LV IVSD: 0.76 CM (ref 0.6–1)
ECHO LV MASS 2D: 145.6 G (ref 88–224)
ECHO LV MASS INDEX 2D: 71 G/M2 (ref 49–115)
ECHO LV POSTERIOR WALL DIASTOLIC: 1.11 CM (ref 0.6–1)
ECHO LVOT DIAM: 2.1 CM
ECHO LVOT PEAK GRADIENT: 2.1 MMHG
ECHO LVOT PEAK VELOCITY: 71.68 CM/S
ECHO MV A VELOCITY: 78.73 CM/S
ECHO MV E DECELERATION TIME (DT): 249.1 MS
ECHO MV E VELOCITY: 0.64 CM/S
ECHO MV E/A RATIO: 0.01
ECHO MV REGURGITANT PEAK GRADIENT: 93.5 MMHG
ECHO MV REGURGITANT PEAK VELOCITY: 483.57 CM/S
ECHO PV MAX VELOCITY: 76.85 CM/S
ECHO PV PEAK GRADIENT: 2.4 MMHG
ECHO RV INTERNAL DIMENSION: 3.13 CM
ECHO TV REGURGITANT MAX VELOCITY: 284.47 CM/S
ECHO TV REGURGITANT PEAK GRADIENT: 32.4 MMHG
EOSINOPHIL # BLD: 0 K/UL (ref 0–0.4)
EOSINOPHIL # BLD: 0.1 K/UL (ref 0–0.4)
EOSINOPHIL # BLD: 0.1 K/UL (ref 0–0.4)
EOSINOPHIL NFR BLD: 0 % (ref 0–7)
EOSINOPHIL NFR BLD: 1 % (ref 0–7)
EOSINOPHIL NFR BLD: 2 % (ref 0–7)
EPITH CASTS URNS QL MICRO: ABNORMAL /LPF
EPITH CASTS URNS QL MICRO: ABNORMAL /LPF
ERYTHROCYTE [DISTWIDTH] IN BLOOD BY AUTOMATED COUNT: 14.6 % (ref 11.5–14.5)
ERYTHROCYTE [DISTWIDTH] IN BLOOD BY AUTOMATED COUNT: 14.6 % (ref 11.5–14.5)
ERYTHROCYTE [DISTWIDTH] IN BLOOD BY AUTOMATED COUNT: 14.7 % (ref 11.5–14.5)
ERYTHROCYTE [DISTWIDTH] IN BLOOD BY AUTOMATED COUNT: 14.8 % (ref 11.5–14.5)
ERYTHROCYTE [DISTWIDTH] IN BLOOD BY AUTOMATED COUNT: 14.8 % (ref 11.5–14.5)
ERYTHROCYTE [DISTWIDTH] IN BLOOD BY AUTOMATED COUNT: 14.9 % (ref 11.5–14.5)
ERYTHROCYTE [DISTWIDTH] IN BLOOD BY AUTOMATED COUNT: 15 % (ref 11.5–14.5)
ERYTHROCYTE [DISTWIDTH] IN BLOOD BY AUTOMATED COUNT: 15.3 % (ref 11.5–14.5)
ERYTHROCYTE [DISTWIDTH] IN BLOOD BY AUTOMATED COUNT: 15.4 % (ref 11.5–14.5)
ERYTHROCYTE [DISTWIDTH] IN BLOOD BY AUTOMATED COUNT: 15.8 % (ref 11.5–14.5)
ERYTHROCYTE [DISTWIDTH] IN BLOOD BY AUTOMATED COUNT: 16.4 % (ref 11.5–14.5)
ERYTHROCYTE [DISTWIDTH] IN BLOOD BY AUTOMATED COUNT: 16.5 % (ref 11.5–14.5)
ERYTHROCYTE [DISTWIDTH] IN BLOOD BY AUTOMATED COUNT: 16.6 % (ref 11.5–14.5)
ERYTHROCYTE [DISTWIDTH] IN BLOOD BY AUTOMATED COUNT: 17.1 % (ref 11.5–14.5)
FLUAV H1 2009 PAND RNA SPEC QL NAA+PROBE: NOT DETECTED
FLUAV H1 RNA SPEC QL NAA+PROBE: NOT DETECTED
FLUAV H3 RNA SPEC QL NAA+PROBE: NOT DETECTED
FLUAV SUBTYP SPEC NAA+PROBE: NOT DETECTED
FLUBV RNA SPEC QL NAA+PROBE: NOT DETECTED
FLUID CULTURE, SPNG2: NORMAL
GLOBULIN SER CALC-MCNC: 2.8 G/DL (ref 2–4)
GLOBULIN SER CALC-MCNC: 2.9 G/DL (ref 2–4)
GLOBULIN SER CALC-MCNC: 3.1 G/DL (ref 2–4)
GLOBULIN SER CALC-MCNC: 3.2 G/DL (ref 2–4)
GLOBULIN SER CALC-MCNC: 3.7 G/DL (ref 2–4)
GLOBULIN SER CALC-MCNC: 4.1 G/DL (ref 2–4)
GLOBULIN SER CALC-MCNC: 4.3 G/DL (ref 2–4)
GLUCOSE SERPL-MCNC: 103 MG/DL (ref 65–100)
GLUCOSE SERPL-MCNC: 111 MG/DL (ref 65–100)
GLUCOSE SERPL-MCNC: 111 MG/DL (ref 65–100)
GLUCOSE SERPL-MCNC: 112 MG/DL (ref 65–100)
GLUCOSE SERPL-MCNC: 112 MG/DL (ref 65–100)
GLUCOSE SERPL-MCNC: 114 MG/DL (ref 65–100)
GLUCOSE SERPL-MCNC: 118 MG/DL (ref 65–100)
GLUCOSE SERPL-MCNC: 118 MG/DL (ref 65–100)
GLUCOSE SERPL-MCNC: 132 MG/DL (ref 65–100)
GLUCOSE SERPL-MCNC: 133 MG/DL (ref 65–100)
GLUCOSE SERPL-MCNC: 136 MG/DL (ref 65–100)
GLUCOSE SERPL-MCNC: 137 MG/DL (ref 65–100)
GLUCOSE SERPL-MCNC: 95 MG/DL (ref 65–100)
GLUCOSE UR STRIP.AUTO-MCNC: NEGATIVE MG/DL
GLUCOSE UR STRIP.AUTO-MCNC: NEGATIVE MG/DL
GRAM STN SPEC: ABNORMAL
GRAM STN SPEC: ABNORMAL
HADV DNA SPEC QL NAA+PROBE: NOT DETECTED
HCOV 229E RNA SPEC QL NAA+PROBE: NOT DETECTED
HCOV HKU1 RNA SPEC QL NAA+PROBE: NOT DETECTED
HCOV NL63 RNA SPEC QL NAA+PROBE: NOT DETECTED
HCOV OC43 RNA SPEC QL NAA+PROBE: NOT DETECTED
HCT VFR BLD AUTO: 16.6 % (ref 36.6–50.3)
HCT VFR BLD AUTO: 18.6 % (ref 36.6–50.3)
HCT VFR BLD AUTO: 18.9 % (ref 36.6–50.3)
HCT VFR BLD AUTO: 19.8 % (ref 36.6–50.3)
HCT VFR BLD AUTO: 19.9 % (ref 36.6–50.3)
HCT VFR BLD AUTO: 20.1 % (ref 36.6–50.3)
HCT VFR BLD AUTO: 20.3 % (ref 36.6–50.3)
HCT VFR BLD AUTO: 20.6 % (ref 36.6–50.3)
HCT VFR BLD AUTO: 21 % (ref 36.6–50.3)
HCT VFR BLD AUTO: 21.2 % (ref 36.6–50.3)
HCT VFR BLD AUTO: 21.4 % (ref 36.6–50.3)
HCT VFR BLD AUTO: 21.6 % (ref 36.6–50.3)
HCT VFR BLD AUTO: 21.6 % (ref 36.6–50.3)
HCT VFR BLD AUTO: 21.9 % (ref 36.6–50.3)
HCT VFR BLD AUTO: 22.1 % (ref 36.6–50.3)
HCT VFR BLD AUTO: 22.5 % (ref 36.6–50.3)
HCT VFR BLD AUTO: 23.1 % (ref 36.6–50.3)
HCT VFR BLD AUTO: 23.6 % (ref 36.6–50.3)
HCT VFR BLD AUTO: 24 % (ref 36.6–50.3)
HCT VFR BLD AUTO: 24.1 % (ref 36.6–50.3)
HCT VFR BLD AUTO: 24.1 % (ref 36.6–50.3)
HCT VFR BLD AUTO: 24.8 % (ref 36.6–50.3)
HEMOCCULT STL QL: NEGATIVE
HGB BLD-MCNC: 5.8 G/DL (ref 12.1–17)
HGB BLD-MCNC: 6.3 G/DL (ref 12.1–17)
HGB BLD-MCNC: 6.7 G/DL (ref 12.1–17)
HGB BLD-MCNC: 6.8 G/DL (ref 12.1–17)
HGB BLD-MCNC: 6.9 G/DL (ref 12.1–17)
HGB BLD-MCNC: 7 G/DL (ref 12.1–17)
HGB BLD-MCNC: 7.2 G/DL (ref 12.1–17)
HGB BLD-MCNC: 7.2 G/DL (ref 12.1–17)
HGB BLD-MCNC: 7.3 G/DL (ref 12.1–17)
HGB BLD-MCNC: 7.4 G/DL (ref 12.1–17)
HGB BLD-MCNC: 7.4 G/DL (ref 12.1–17)
HGB BLD-MCNC: 7.5 G/DL (ref 12.1–17)
HGB BLD-MCNC: 7.7 G/DL (ref 12.1–17)
HGB BLD-MCNC: 7.9 G/DL (ref 12.1–17)
HGB BLD-MCNC: 8 G/DL (ref 12.1–17)
HGB BLD-MCNC: 8 G/DL (ref 12.1–17)
HGB BLD-MCNC: 8.1 G/DL (ref 12.1–17)
HGB BLD-MCNC: 8.1 G/DL (ref 12.1–17)
HGB BLD-MCNC: 8.3 G/DL (ref 12.1–17)
HGB UR QL STRIP: ABNORMAL
HGB UR QL STRIP: NEGATIVE
HMPV RNA SPEC QL NAA+PROBE: NOT DETECTED
HPIV1 RNA SPEC QL NAA+PROBE: NOT DETECTED
HPIV2 RNA SPEC QL NAA+PROBE: NOT DETECTED
HPIV3 RNA SPEC QL NAA+PROBE: NOT DETECTED
HPIV4 RNA SPEC QL NAA+PROBE: NOT DETECTED
HYALINE CASTS URNS QL MICRO: ABNORMAL /LPF (ref 0–5)
HYALINE CASTS URNS QL MICRO: ABNORMAL /LPF (ref 0–5)
IMM GRANULOCYTES # BLD AUTO: 0 K/UL
IMM GRANULOCYTES # BLD AUTO: 0 K/UL (ref 0–0.04)
IMM GRANULOCYTES # BLD AUTO: 0.1 K/UL (ref 0–0.04)
IMM GRANULOCYTES # BLD: 0 K/UL (ref 0–0.04)
IMM GRANULOCYTES NFR BLD AUTO: 0 %
IMM GRANULOCYTES NFR BLD AUTO: 0 % (ref 0–0.5)
IMM GRANULOCYTES NFR BLD AUTO: 1 % (ref 0–0.5)
INR PPP: 1.5 (ref 0.9–1.1)
INR PPP: 1.6 (ref 0.9–1.1)
INR PPP: 1.7 (ref 0.9–1.1)
INR PPP: 1.7 (ref 0.9–1.1)
KETONES UR QL STRIP.AUTO: 15 MG/DL
KETONES UR QL STRIP.AUTO: 15 MG/DL
L PNEUMO1 AG UR QL IA: NEGATIVE
LACTATE SERPL-SCNC: 1 MMOL/L (ref 0.4–2)
LACTATE SERPL-SCNC: 1.5 MMOL/L (ref 0.4–2)
LACTATE SERPL-SCNC: 2 MMOL/L (ref 0.4–2)
LEUKOCYTE ESTERASE UR QL STRIP.AUTO: ABNORMAL
LEUKOCYTE ESTERASE UR QL STRIP.AUTO: ABNORMAL
LYMPHOCYTES # BLD: 0.5 K/UL (ref 0.8–3.5)
LYMPHOCYTES # BLD: 0.5 K/UL (ref 0.8–3.5)
LYMPHOCYTES # BLD: 0.6 K/UL (ref 0.8–3.5)
LYMPHOCYTES # BLD: 0.7 K/UL (ref 0.8–3.5)
LYMPHOCYTES # BLD: 0.8 K/UL (ref 0.8–3.5)
LYMPHOCYTES # BLD: 0.8 K/UL (ref 0.8–3.5)
LYMPHOCYTES # BLD: 0.9 K/UL (ref 0.8–3.5)
LYMPHOCYTES # BLD: 1 K/UL (ref 0.8–3.5)
LYMPHOCYTES NFR BLD: 13 % (ref 12–49)
LYMPHOCYTES NFR BLD: 14 % (ref 12–49)
LYMPHOCYTES NFR BLD: 17 % (ref 12–49)
LYMPHOCYTES NFR BLD: 20 % (ref 12–49)
LYMPHOCYTES NFR BLD: 23 % (ref 12–49)
LYMPHOCYTES NFR BLD: 24 % (ref 12–49)
LYMPHOCYTES NFR BLD: 24 % (ref 12–49)
LYMPHOCYTES NFR BLD: 25 % (ref 12–49)
LYMPHOCYTES NFR BLD: 25 % (ref 12–49)
LYMPHOCYTES NFR BLD: 28 % (ref 12–49)
LYMPHOCYTES NFR BLD: 32 % (ref 12–49)
LYMPHOCYTES NFR BLD: 36 % (ref 12–49)
LYMPHOCYTES NFR BLD: 38 % (ref 12–49)
LYMPHOCYTES NFR BLD: 39 % (ref 12–49)
LYMPHOCYTES NFR FLD: 44 %
M PNEUMO DNA SPEC QL NAA+PROBE: NOT DETECTED
M PNEUMO IGG SER IA-ACNC: 2034 U/ML (ref 0–99)
M PNEUMO IGM SER IA-ACNC: <770 U/ML (ref 0–769)
MAGNESIUM SERPL-MCNC: 1.4 MG/DL (ref 1.6–2.4)
MAGNESIUM SERPL-MCNC: 1.5 MG/DL (ref 1.6–2.4)
MAGNESIUM SERPL-MCNC: 1.5 MG/DL (ref 1.6–2.4)
MAGNESIUM SERPL-MCNC: 1.7 MG/DL (ref 1.6–2.4)
MAGNESIUM SERPL-MCNC: 1.8 MG/DL (ref 1.6–2.4)
MAGNESIUM SERPL-MCNC: 1.8 MG/DL (ref 1.6–2.4)
MAGNESIUM SERPL-MCNC: 2 MG/DL (ref 1.6–2.4)
MCH RBC QN AUTO: 27.8 PG (ref 26–34)
MCH RBC QN AUTO: 28 PG (ref 26–34)
MCH RBC QN AUTO: 28.3 PG (ref 26–34)
MCH RBC QN AUTO: 28.5 PG (ref 26–34)
MCH RBC QN AUTO: 28.6 PG (ref 26–34)
MCH RBC QN AUTO: 28.7 PG (ref 26–34)
MCH RBC QN AUTO: 28.8 PG (ref 26–34)
MCH RBC QN AUTO: 28.8 PG (ref 26–34)
MCH RBC QN AUTO: 29.1 PG (ref 26–34)
MCH RBC QN AUTO: 29.3 PG (ref 26–34)
MCH RBC QN AUTO: 29.3 PG (ref 26–34)
MCH RBC QN AUTO: 29.4 PG (ref 26–34)
MCH RBC QN AUTO: 29.4 PG (ref 26–34)
MCH RBC QN AUTO: 29.6 PG (ref 26–34)
MCH RBC QN AUTO: 29.6 PG (ref 26–34)
MCH RBC QN AUTO: 29.7 PG (ref 26–34)
MCH RBC QN AUTO: 29.8 PG (ref 26–34)
MCH RBC QN AUTO: 29.9 PG (ref 26–34)
MCH RBC QN AUTO: 30 PG (ref 26–34)
MCH RBC QN AUTO: 30.2 PG (ref 26–34)
MCHC RBC AUTO-ENTMCNC: 32 G/DL (ref 30–36.5)
MCHC RBC AUTO-ENTMCNC: 32.7 G/DL (ref 30–36.5)
MCHC RBC AUTO-ENTMCNC: 33 G/DL (ref 30–36.5)
MCHC RBC AUTO-ENTMCNC: 33 G/DL (ref 30–36.5)
MCHC RBC AUTO-ENTMCNC: 33.2 G/DL (ref 30–36.5)
MCHC RBC AUTO-ENTMCNC: 33.2 G/DL (ref 30–36.5)
MCHC RBC AUTO-ENTMCNC: 33.5 G/DL (ref 30–36.5)
MCHC RBC AUTO-ENTMCNC: 33.8 G/DL (ref 30–36.5)
MCHC RBC AUTO-ENTMCNC: 33.8 G/DL (ref 30–36.5)
MCHC RBC AUTO-ENTMCNC: 33.9 G/DL (ref 30–36.5)
MCHC RBC AUTO-ENTMCNC: 34.2 G/DL (ref 30–36.5)
MCHC RBC AUTO-ENTMCNC: 34.2 G/DL (ref 30–36.5)
MCHC RBC AUTO-ENTMCNC: 34.3 G/DL (ref 30–36.5)
MCHC RBC AUTO-ENTMCNC: 34.3 G/DL (ref 30–36.5)
MCHC RBC AUTO-ENTMCNC: 34.6 G/DL (ref 30–36.5)
MCHC RBC AUTO-ENTMCNC: 34.8 G/DL (ref 30–36.5)
MCHC RBC AUTO-ENTMCNC: 34.9 G/DL (ref 30–36.5)
MCHC RBC AUTO-ENTMCNC: 35.1 G/DL (ref 30–36.5)
MCHC RBC AUTO-ENTMCNC: 35.2 G/DL (ref 30–36.5)
MCHC RBC AUTO-ENTMCNC: 35.4 G/DL (ref 30–36.5)
MCV RBC AUTO: 81.4 FL (ref 80–99)
MCV RBC AUTO: 81.7 FL (ref 80–99)
MCV RBC AUTO: 82.1 FL (ref 80–99)
MCV RBC AUTO: 82.5 FL (ref 80–99)
MCV RBC AUTO: 84.7 FL (ref 80–99)
MCV RBC AUTO: 85.8 FL (ref 80–99)
MCV RBC AUTO: 85.8 FL (ref 80–99)
MCV RBC AUTO: 85.9 FL (ref 80–99)
MCV RBC AUTO: 85.9 FL (ref 80–99)
MCV RBC AUTO: 86.1 FL (ref 80–99)
MCV RBC AUTO: 86.1 FL (ref 80–99)
MCV RBC AUTO: 86.7 FL (ref 80–99)
MCV RBC AUTO: 86.7 FL (ref 80–99)
MCV RBC AUTO: 86.8 FL (ref 80–99)
MCV RBC AUTO: 86.9 FL (ref 80–99)
MCV RBC AUTO: 86.9 FL (ref 80–99)
MCV RBC AUTO: 87.3 FL (ref 80–99)
MCV RBC AUTO: 88.2 FL (ref 80–99)
MCV RBC AUTO: 89.4 FL (ref 80–99)
MCV RBC AUTO: 89.9 FL (ref 80–99)
MESOTHL CELL NFR FLD: 3 %
MONOCYTES # BLD: 0.3 K/UL (ref 0–1)
MONOCYTES # BLD: 0.3 K/UL (ref 0–1)
MONOCYTES # BLD: 0.4 K/UL (ref 0–1)
MONOCYTES # BLD: 0.5 K/UL (ref 0–1)
MONOCYTES NFR BLD: 11 % (ref 5–13)
MONOCYTES NFR BLD: 13 % (ref 5–13)
MONOCYTES NFR BLD: 14 % (ref 5–13)
MONOCYTES NFR BLD: 15 % (ref 5–13)
MONOCYTES NFR BLD: 15 % (ref 5–13)
MONOCYTES NFR BLD: 16 % (ref 5–13)
MONOCYTES NFR BLD: 17 % (ref 5–13)
MONOCYTES NFR BLD: 18 % (ref 5–13)
MONOCYTES NFR BLD: 19 % (ref 5–13)
MONOCYTES NFR BLD: 6 % (ref 5–13)
MONOS+MACROS NFR FLD: 38 %
MUCOUS THREADS URNS QL MICRO: ABNORMAL /LPF
NEUTROPHILS NFR FLD: 15 %
NEUTS BAND NFR BLD MANUAL: 1 % (ref 0–6)
NEUTS SEG # BLD: 1.1 K/UL (ref 1.8–8)
NEUTS SEG # BLD: 1.1 K/UL (ref 1.8–8)
NEUTS SEG # BLD: 1.2 K/UL (ref 1.8–8)
NEUTS SEG # BLD: 1.2 K/UL (ref 1.8–8)
NEUTS SEG # BLD: 1.3 K/UL (ref 1.8–8)
NEUTS SEG # BLD: 1.3 K/UL (ref 1.8–8)
NEUTS SEG # BLD: 1.4 K/UL (ref 1.8–8)
NEUTS SEG # BLD: 1.4 K/UL (ref 1.8–8)
NEUTS SEG # BLD: 1.7 K/UL (ref 1.8–8)
NEUTS SEG # BLD: 1.9 K/UL (ref 1.8–8)
NEUTS SEG # BLD: 2.1 K/UL (ref 1.8–8)
NEUTS SEG # BLD: 2.2 K/UL (ref 1.8–8)
NEUTS SEG # BLD: 2.3 K/UL (ref 1.8–8)
NEUTS SEG # BLD: 2.3 K/UL (ref 1.8–8)
NEUTS SEG # BLD: 2.9 K/UL (ref 1.8–8)
NEUTS SEG # BLD: 3.9 K/UL (ref 1.8–8)
NEUTS SEG NFR BLD: 44 % (ref 32–75)
NEUTS SEG NFR BLD: 46 % (ref 32–75)
NEUTS SEG NFR BLD: 47 % (ref 32–75)
NEUTS SEG NFR BLD: 50 % (ref 32–75)
NEUTS SEG NFR BLD: 54 % (ref 32–75)
NEUTS SEG NFR BLD: 55 % (ref 32–75)
NEUTS SEG NFR BLD: 57 % (ref 32–75)
NEUTS SEG NFR BLD: 60 % (ref 32–75)
NEUTS SEG NFR BLD: 61 % (ref 32–75)
NEUTS SEG NFR BLD: 61 % (ref 32–75)
NEUTS SEG NFR BLD: 65 % (ref 32–75)
NEUTS SEG NFR BLD: 71 % (ref 32–75)
NEUTS SEG NFR BLD: 71 % (ref 32–75)
NEUTS SEG NFR BLD: 80 % (ref 32–75)
NITRITE UR QL STRIP.AUTO: POSITIVE
NITRITE UR QL STRIP.AUTO: POSITIVE
NRBC # BLD: 0 K/UL (ref 0–0.01)
NRBC BLD-RTO: 0 PER 100 WBC
NUC CELL # FLD: 21 /CU MM
ORGANISM ID, SPNG3: NORMAL
P-R INTERVAL, ECG05: 134 MS
PATH REV BLD -IMP: ABNORMAL
PH UR STRIP: 5.5 [PH] (ref 5–8)
PH UR STRIP: 5.5 [PH] (ref 5–8)
PHOSPHATE SERPL-MCNC: 1.6 MG/DL (ref 2.6–4.7)
PHOSPHATE SERPL-MCNC: 2 MG/DL (ref 2.6–4.7)
PHOSPHATE SERPL-MCNC: 2.3 MG/DL (ref 2.6–4.7)
PHOSPHATE SERPL-MCNC: 3.1 MG/DL (ref 2.6–4.7)
PHOSPHATE SERPL-MCNC: 3.2 MG/DL (ref 2.6–4.7)
PHOSPHATE SERPL-MCNC: 3.3 MG/DL (ref 2.6–4.7)
PHOSPHATE SERPL-MCNC: 3.7 MG/DL (ref 2.6–4.7)
PLATELET # BLD AUTO: 15 K/UL (ref 150–400)
PLATELET # BLD AUTO: 16 K/UL (ref 150–400)
PLATELET # BLD AUTO: 18 K/UL (ref 150–400)
PLATELET # BLD AUTO: 30 K/UL (ref 150–400)
PLATELET # BLD AUTO: 37 K/UL (ref 150–400)
PLATELET # BLD AUTO: 37 K/UL (ref 150–400)
PLATELET # BLD AUTO: 38 K/UL (ref 150–400)
PLATELET # BLD AUTO: 48 K/UL (ref 150–400)
PLATELET # BLD AUTO: 51 K/UL (ref 150–400)
PLATELET # BLD AUTO: 51 K/UL (ref 150–400)
PLATELET # BLD AUTO: 53 K/UL (ref 150–400)
PLATELET # BLD AUTO: 54 K/UL (ref 150–400)
PLATELET # BLD AUTO: 56 K/UL (ref 150–400)
PLATELET # BLD AUTO: 57 K/UL (ref 150–400)
PLATELET # BLD AUTO: 76 K/UL (ref 150–400)
PLATELET # BLD AUTO: 77 K/UL (ref 150–400)
PLATELET # BLD AUTO: 83 K/UL (ref 150–400)
PLATELET # BLD AUTO: 83 K/UL (ref 150–400)
PLATELET # BLD AUTO: 88 K/UL (ref 150–400)
PLATELET # BLD AUTO: 90 K/UL (ref 150–400)
PLATELET COMMENTS,PCOM: ABNORMAL
PLEASE NOTE, SPNG4: NORMAL
PMV BLD AUTO: 13.1 FL (ref 8.9–12.9)
PMV BLD AUTO: ABNORMAL FL (ref 8.9–12.9)
POTASSIUM SERPL-SCNC: 3.9 MMOL/L (ref 3.5–5.1)
POTASSIUM SERPL-SCNC: 4.1 MMOL/L (ref 3.5–5.1)
POTASSIUM SERPL-SCNC: 4.1 MMOL/L (ref 3.5–5.1)
POTASSIUM SERPL-SCNC: 4.2 MMOL/L (ref 3.5–5.1)
POTASSIUM SERPL-SCNC: 4.3 MMOL/L (ref 3.5–5.1)
POTASSIUM SERPL-SCNC: 4.4 MMOL/L (ref 3.5–5.1)
POTASSIUM SERPL-SCNC: 4.5 MMOL/L (ref 3.5–5.1)
POTASSIUM SERPL-SCNC: 5 MMOL/L (ref 3.5–5.1)
PROT SERPL-MCNC: 4.9 G/DL (ref 6.4–8.2)
PROT SERPL-MCNC: 5.2 G/DL (ref 6.4–8.2)
PROT SERPL-MCNC: 5.3 G/DL (ref 6.4–8.2)
PROT SERPL-MCNC: 5.4 G/DL (ref 6.4–8.2)
PROT SERPL-MCNC: 5.5 G/DL (ref 6.4–8.2)
PROT SERPL-MCNC: 6.3 G/DL (ref 6.4–8.2)
PROT SERPL-MCNC: 6.4 G/DL (ref 6.4–8.2)
PROT UR STRIP-MCNC: ABNORMAL MG/DL
PROT UR STRIP-MCNC: NEGATIVE MG/DL
PROTHROMBIN TIME: 15.1 SEC (ref 9–11.1)
PROTHROMBIN TIME: 15.8 SEC (ref 9–11.1)
PROTHROMBIN TIME: 16.1 SEC (ref 9–11.1)
PROTHROMBIN TIME: 16.1 SEC (ref 9–11.1)
PROTHROMBIN TIME: 16.8 SEC (ref 9–11.1)
PROTHROMBIN TIME: 16.8 SEC (ref 9–11.1)
Q-T INTERVAL, ECG07: 356 MS
QRS DURATION, ECG06: 74 MS
QTC CALCULATION (BEZET), ECG08: 420 MS
RBC # BLD AUTO: 1.96 M/UL (ref 4.1–5.7)
RBC # BLD AUTO: 2.14 M/UL (ref 4.1–5.7)
RBC # BLD AUTO: 2.27 M/UL (ref 4.1–5.7)
RBC # BLD AUTO: 2.29 M/UL (ref 4.1–5.7)
RBC # BLD AUTO: 2.3 M/UL (ref 4.1–5.7)
RBC # BLD AUTO: 2.32 M/UL (ref 4.1–5.7)
RBC # BLD AUTO: 2.37 M/UL (ref 4.1–5.7)
RBC # BLD AUTO: 2.46 M/UL (ref 4.1–5.7)
RBC # BLD AUTO: 2.47 M/UL (ref 4.1–5.7)
RBC # BLD AUTO: 2.49 M/UL (ref 4.1–5.7)
RBC # BLD AUTO: 2.51 M/UL (ref 4.1–5.7)
RBC # BLD AUTO: 2.55 M/UL (ref 4.1–5.7)
RBC # BLD AUTO: 2.55 M/UL (ref 4.1–5.7)
RBC # BLD AUTO: 2.66 M/UL (ref 4.1–5.7)
RBC # BLD AUTO: 2.68 M/UL (ref 4.1–5.7)
RBC # BLD AUTO: 2.72 M/UL (ref 4.1–5.7)
RBC # BLD AUTO: 2.74 M/UL (ref 4.1–5.7)
RBC # BLD AUTO: 2.78 M/UL (ref 4.1–5.7)
RBC # BLD AUTO: 2.84 M/UL (ref 4.1–5.7)
RBC # BLD AUTO: 2.9 M/UL (ref 4.1–5.7)
RBC # FLD: 21 /CU MM
RBC #/AREA URNS HPF: ABNORMAL /HPF (ref 0–5)
RBC #/AREA URNS HPF: ABNORMAL /HPF (ref 0–5)
RBC MORPH BLD: ABNORMAL
RETICS # AUTO: 0.01 M/UL (ref 0.03–0.1)
RETICS/RBC NFR AUTO: 0.3 % (ref 0.7–2.1)
RSV RNA SPEC QL NAA+PROBE: NOT DETECTED
RV+EV RNA SPEC QL NAA+PROBE: NOT DETECTED
S PNEUM AG SPEC QL LA: NEGATIVE
SAMPLES BEING HELD,HOLD: NORMAL
SERVICE CMNT-IMP: ABNORMAL
SERVICE CMNT-IMP: ABNORMAL
SERVICE CMNT-IMP: NORMAL
SODIUM SERPL-SCNC: 126 MMOL/L (ref 136–145)
SODIUM SERPL-SCNC: 127 MMOL/L (ref 136–145)
SODIUM SERPL-SCNC: 128 MMOL/L (ref 136–145)
SODIUM SERPL-SCNC: 128 MMOL/L (ref 136–145)
SODIUM SERPL-SCNC: 129 MMOL/L (ref 136–145)
SODIUM SERPL-SCNC: 130 MMOL/L (ref 136–145)
SODIUM SERPL-SCNC: 131 MMOL/L (ref 136–145)
SODIUM SERPL-SCNC: 133 MMOL/L (ref 136–145)
SODIUM SERPL-SCNC: 136 MMOL/L (ref 136–145)
SP GR UR REFRACTOMETRY: 1.02 (ref 1–1.03)
SP GR UR REFRACTOMETRY: 1.02 (ref 1–1.03)
SPECIMEN EXP DATE BLD: NORMAL
SPECIMEN SOURCE FLD: ABNORMAL
SPECIMEN SOURCE: NORMAL
SPECIMEN SOURCE: NORMAL
SPECIMEN, SPNG1: NORMAL
STATUS OF UNIT,%ST: NORMAL
THERAPEUTIC RANGE,PTTT: ABNORMAL SECS (ref 58–77)
TROPONIN I SERPL-MCNC: <0.05 NG/ML
UA: UC IF INDICATED,UAUC: ABNORMAL
UNIT DIVISION, %UDIV: 0
UR CULT HOLD, URHOLD: NORMAL
UROBILINOGEN UR QL STRIP.AUTO: 1 EU/DL (ref 0.2–1)
UROBILINOGEN UR QL STRIP.AUTO: 4 EU/DL (ref 0.2–1)
VENTRICULAR RATE, ECG03: 84 BPM
WBC # BLD AUTO: 2.2 K/UL (ref 4.1–11.1)
WBC # BLD AUTO: 2.3 K/UL (ref 4.1–11.1)
WBC # BLD AUTO: 2.4 K/UL (ref 4.1–11.1)
WBC # BLD AUTO: 2.6 K/UL (ref 4.1–11.1)
WBC # BLD AUTO: 2.8 K/UL (ref 4.1–11.1)
WBC # BLD AUTO: 2.8 K/UL (ref 4.1–11.1)
WBC # BLD AUTO: 3.1 K/UL (ref 4.1–11.1)
WBC # BLD AUTO: 3.2 K/UL (ref 4.1–11.1)
WBC # BLD AUTO: 3.4 K/UL (ref 4.1–11.1)
WBC # BLD AUTO: 3.7 K/UL (ref 4.1–11.1)
WBC # BLD AUTO: 3.9 K/UL (ref 4.1–11.1)
WBC # BLD AUTO: 4.5 K/UL (ref 4.1–11.1)
WBC # BLD AUTO: 4.9 K/UL (ref 4.1–11.1)
WBC URNS QL MICRO: ABNORMAL /HPF (ref 0–4)
WBC URNS QL MICRO: ABNORMAL /HPF (ref 0–4)

## 2019-01-01 PROCEDURE — 49083 ABD PARACENTESIS W/IMAGING: CPT

## 2019-01-01 PROCEDURE — 74011250636 HC RX REV CODE- 250/636: Performed by: INTERNAL MEDICINE

## 2019-01-01 PROCEDURE — 0651 HSPC ROUTINE HOME CARE

## 2019-01-01 PROCEDURE — 85025 COMPLETE CBC W/AUTO DIFF WBC: CPT

## 2019-01-01 PROCEDURE — 74011000258 HC RX REV CODE- 258: Performed by: INTERNAL MEDICINE

## 2019-01-01 PROCEDURE — C1894 INTRO/SHEATH, NON-LASER: HCPCS

## 2019-01-01 PROCEDURE — 74011250637 HC RX REV CODE- 250/637: Performed by: INTERNAL MEDICINE

## 2019-01-01 PROCEDURE — 36415 COLL VENOUS BLD VENIPUNCTURE: CPT

## 2019-01-01 PROCEDURE — 02HV33Z INSERTION OF INFUSION DEVICE INTO SUPERIOR VENA CAVA, PERCUTANEOUS APPROACH: ICD-10-PCS | Performed by: STUDENT IN AN ORGANIZED HEALTH CARE EDUCATION/TRAINING PROGRAM

## 2019-01-01 PROCEDURE — 36430 TRANSFUSION BLD/BLD COMPNT: CPT

## 2019-01-01 PROCEDURE — 85610 PROTHROMBIN TIME: CPT

## 2019-01-01 PROCEDURE — 74011250636 HC RX REV CODE- 250/636: Performed by: PHYSICIAN ASSISTANT

## 2019-01-01 PROCEDURE — 87040 BLOOD CULTURE FOR BACTERIA: CPT

## 2019-01-01 PROCEDURE — 87186 SC STD MICRODIL/AGAR DIL: CPT

## 2019-01-01 PROCEDURE — 85018 HEMOGLOBIN: CPT

## 2019-01-01 PROCEDURE — 3336590001 HSPC ROOM AND BOARD

## 2019-01-01 PROCEDURE — 96523 IRRIG DRUG DELIVERY DEVICE: CPT

## 2019-01-01 PROCEDURE — 80076 HEPATIC FUNCTION PANEL: CPT

## 2019-01-01 PROCEDURE — 71045 X-RAY EXAM CHEST 1 VIEW: CPT

## 2019-01-01 PROCEDURE — 99152 MOD SED SAME PHYS/QHP 5/>YRS: CPT

## 2019-01-01 PROCEDURE — 84484 ASSAY OF TROPONIN QUANT: CPT

## 2019-01-01 PROCEDURE — 74011000250 HC RX REV CODE- 250: Performed by: INTERNAL MEDICINE

## 2019-01-01 PROCEDURE — 97165 OT EVAL LOW COMPLEX 30 MIN: CPT

## 2019-01-01 PROCEDURE — C9113 INJ PANTOPRAZOLE SODIUM, VIA: HCPCS | Performed by: INTERNAL MEDICINE

## 2019-01-01 PROCEDURE — P9047 ALBUMIN (HUMAN), 25%, 50ML: HCPCS | Performed by: INTERNAL MEDICINE

## 2019-01-01 PROCEDURE — P9045 ALBUMIN (HUMAN), 5%, 250 ML: HCPCS | Performed by: INTERNAL MEDICINE

## 2019-01-01 PROCEDURE — 65270000029 HC RM PRIVATE

## 2019-01-01 PROCEDURE — 76705 ECHO EXAM OF ABDOMEN: CPT

## 2019-01-01 PROCEDURE — 86900 BLOOD TYPING SEROLOGIC ABO: CPT

## 2019-01-01 PROCEDURE — 74011636637 HC RX REV CODE- 636/637: Performed by: INTERNAL MEDICINE

## 2019-01-01 PROCEDURE — 88112 CYTOPATH CELL ENHANCE TECH: CPT

## 2019-01-01 PROCEDURE — 77030013169 SET IV BLD ICUM -A

## 2019-01-01 PROCEDURE — 80048 BASIC METABOLIC PNL TOTAL CA: CPT

## 2019-01-01 PROCEDURE — 74011250636 HC RX REV CODE- 250/636: Performed by: NURSE PRACTITIONER

## 2019-01-01 PROCEDURE — 97161 PT EVAL LOW COMPLEX 20 MIN: CPT

## 2019-01-01 PROCEDURE — 74011250636 HC RX REV CODE- 250/636: Performed by: EMERGENCY MEDICINE

## 2019-01-01 PROCEDURE — 94760 N-INVAS EAR/PLS OXIMETRY 1: CPT

## 2019-01-01 PROCEDURE — 77030031139 HC SUT VCRL2 J&J -A

## 2019-01-01 PROCEDURE — 84100 ASSAY OF PHOSPHORUS: CPT

## 2019-01-01 PROCEDURE — 83735 ASSAY OF MAGNESIUM: CPT

## 2019-01-01 PROCEDURE — 99285 EMERGENCY DEPT VISIT HI MDM: CPT

## 2019-01-01 PROCEDURE — P9016 RBC LEUKOCYTES REDUCED: HCPCS

## 2019-01-01 PROCEDURE — 77030012965 HC NDL HUBR BBMI -A

## 2019-01-01 PROCEDURE — 97530 THERAPEUTIC ACTIVITIES: CPT

## 2019-01-01 PROCEDURE — P9040 RBC LEUKOREDUCED IRRADIATED: HCPCS

## 2019-01-01 PROCEDURE — 86738 MYCOPLASMA ANTIBODY: CPT

## 2019-01-01 PROCEDURE — 85027 COMPLETE CBC AUTOMATED: CPT

## 2019-01-01 PROCEDURE — C1751 CATH, INF, PER/CENT/MIDLINE: HCPCS

## 2019-01-01 PROCEDURE — 86923 COMPATIBILITY TEST ELECTRIC: CPT

## 2019-01-01 PROCEDURE — 65660000000 HC RM CCU STEPDOWN

## 2019-01-01 PROCEDURE — 76942 ECHO GUIDE FOR BIOPSY: CPT

## 2019-01-01 PROCEDURE — 0W9G3ZZ DRAINAGE OF PERITONEAL CAVITY, PERCUTANEOUS APPROACH: ICD-10-PCS | Performed by: RADIOLOGY

## 2019-01-01 PROCEDURE — 74011250637 HC RX REV CODE- 250/637: Performed by: FAMILY MEDICINE

## 2019-01-01 PROCEDURE — 97116 GAIT TRAINING THERAPY: CPT

## 2019-01-01 PROCEDURE — P9047 ALBUMIN (HUMAN), 25%, 50ML: HCPCS | Performed by: RADIOLOGY

## 2019-01-01 PROCEDURE — 75989 ABSCESS DRAINAGE UNDER X-RAY: CPT

## 2019-01-01 PROCEDURE — 85045 AUTOMATED RETICULOCYTE COUNT: CPT

## 2019-01-01 PROCEDURE — C1729 CATH, DRAINAGE: HCPCS

## 2019-01-01 PROCEDURE — 87899 AGENT NOS ASSAY W/OPTIC: CPT

## 2019-01-01 PROCEDURE — 83880 ASSAY OF NATRIURETIC PEPTIDE: CPT

## 2019-01-01 PROCEDURE — 83605 ASSAY OF LACTIC ACID: CPT

## 2019-01-01 PROCEDURE — 0656 HSPC GENERAL INPATIENT

## 2019-01-01 PROCEDURE — 74011250636 HC RX REV CODE- 250/636: Performed by: FAMILY MEDICINE

## 2019-01-01 PROCEDURE — 77030039266 HC ADH SKN EXOFIN S2SG -A

## 2019-01-01 PROCEDURE — 97535 SELF CARE MNGMENT TRAINING: CPT

## 2019-01-01 PROCEDURE — 74011000250 HC RX REV CODE- 250: Performed by: STUDENT IN AN ORGANIZED HEALTH CARE EDUCATION/TRAINING PROGRAM

## 2019-01-01 PROCEDURE — 74011250636 HC RX REV CODE- 250/636: Performed by: RADIOLOGY

## 2019-01-01 PROCEDURE — 77030029065 HC DRSG HEMO QCLOT ZMED -B

## 2019-01-01 PROCEDURE — 30233N1 TRANSFUSION OF NONAUTOLOGOUS RED BLOOD CELLS INTO PERIPHERAL VEIN, PERCUTANEOUS APPROACH: ICD-10-PCS | Performed by: INTERNAL MEDICINE

## 2019-01-01 PROCEDURE — 99233 SBSQ HOSP IP/OBS HIGH 50: CPT | Performed by: INTERNAL MEDICINE

## 2019-01-01 PROCEDURE — 36591 DRAW BLOOD OFF VENOUS DEVICE: CPT

## 2019-01-01 PROCEDURE — 81001 URINALYSIS AUTO W/SCOPE: CPT

## 2019-01-01 PROCEDURE — 74011000250 HC RX REV CODE- 250: Performed by: PHYSICIAN ASSISTANT

## 2019-01-01 PROCEDURE — 74011000250 HC RX REV CODE- 250: Performed by: NURSE PRACTITIONER

## 2019-01-01 PROCEDURE — 93306 TTE W/DOPPLER COMPLETE: CPT

## 2019-01-01 PROCEDURE — 85730 THROMBOPLASTIN TIME PARTIAL: CPT

## 2019-01-01 PROCEDURE — 94761 N-INVAS EAR/PLS OXIMETRY MLT: CPT

## 2019-01-01 PROCEDURE — 93970 EXTREMITY STUDY: CPT

## 2019-01-01 PROCEDURE — 0W9930Z DRAINAGE OF RIGHT PLEURAL CAVITY WITH DRAINAGE DEVICE, PERCUTANEOUS APPROACH: ICD-10-PCS | Performed by: RADIOLOGY

## 2019-01-01 PROCEDURE — 87449 NOS EACH ORGANISM AG IA: CPT

## 2019-01-01 PROCEDURE — 71250 CT THORAX DX C-: CPT

## 2019-01-01 PROCEDURE — 36592 COLLECT BLOOD FROM PICC: CPT

## 2019-01-01 PROCEDURE — 87086 URINE CULTURE/COLONY COUNT: CPT

## 2019-01-01 PROCEDURE — 77030027138 HC INCENT SPIROMETER -A

## 2019-01-01 PROCEDURE — 87205 SMEAR GRAM STAIN: CPT

## 2019-01-01 PROCEDURE — 80053 COMPREHEN METABOLIC PANEL: CPT

## 2019-01-01 PROCEDURE — 74011250636 HC RX REV CODE- 250/636

## 2019-01-01 PROCEDURE — 96360 HYDRATION IV INFUSION INIT: CPT

## 2019-01-01 PROCEDURE — 76937 US GUIDE VASCULAR ACCESS: CPT

## 2019-01-01 PROCEDURE — 77030012935 HC DRSG AQUACEL BMS -B

## 2019-01-01 PROCEDURE — 76450000000

## 2019-01-01 PROCEDURE — 88305 TISSUE EXAM BY PATHOLOGIST: CPT

## 2019-01-01 PROCEDURE — P9047 ALBUMIN (HUMAN), 25%, 50ML: HCPCS | Performed by: PHYSICIAN ASSISTANT

## 2019-01-01 PROCEDURE — 36561 INSERT TUNNELED CV CATH: CPT

## 2019-01-01 PROCEDURE — 77030032490 HC SLV COMPR SCD KNE COVD -B

## 2019-01-01 PROCEDURE — C1788 PORT, INDWELLING, IMP: HCPCS

## 2019-01-01 PROCEDURE — 74011250636 HC RX REV CODE- 250/636: Performed by: STUDENT IN AN ORGANIZED HEALTH CARE EDUCATION/TRAINING PROGRAM

## 2019-01-01 PROCEDURE — 99153 MOD SED SAME PHYS/QHP EA: CPT

## 2019-01-01 PROCEDURE — 3336500001 HSPC ELECTION

## 2019-01-01 PROCEDURE — 82272 OCCULT BLD FECES 1-3 TESTS: CPT

## 2019-01-01 PROCEDURE — C1769 GUIDE WIRE: HCPCS

## 2019-01-01 PROCEDURE — 87077 CULTURE AEROBIC IDENTIFY: CPT

## 2019-01-01 PROCEDURE — 87633 RESP VIRUS 12-25 TARGETS: CPT

## 2019-01-01 PROCEDURE — 77001 FLUOROGUIDE FOR VEIN DEVICE: CPT

## 2019-01-01 PROCEDURE — 99284 EMERGENCY DEPT VISIT MOD MDM: CPT

## 2019-01-01 PROCEDURE — 89050 BODY FLUID CELL COUNT: CPT

## 2019-01-01 PROCEDURE — 82140 ASSAY OF AMMONIA: CPT

## 2019-01-01 PROCEDURE — 97110 THERAPEUTIC EXERCISES: CPT

## 2019-01-01 PROCEDURE — 97162 PT EVAL MOD COMPLEX 30 MIN: CPT

## 2019-01-01 PROCEDURE — 93005 ELECTROCARDIOGRAM TRACING: CPT

## 2019-01-01 RX ORDER — ALBUMIN HUMAN 250 G/1000ML
50 SOLUTION INTRAVENOUS ONCE
Status: COMPLETED | OUTPATIENT
Start: 2019-01-01 | End: 2019-01-01

## 2019-01-01 RX ORDER — SPIRONOLACTONE 25 MG/1
50 TABLET ORAL DAILY
Status: DISCONTINUED | OUTPATIENT
Start: 2019-01-01 | End: 2019-01-01

## 2019-01-01 RX ORDER — SODIUM CHLORIDE 0.9 % (FLUSH) 0.9 %
5-10 SYRINGE (ML) INJECTION AS NEEDED
Status: ACTIVE | OUTPATIENT
Start: 2019-01-01 | End: 2019-01-01

## 2019-01-01 RX ORDER — HEPARIN 100 UNIT/ML
500 SYRINGE INTRAVENOUS AS NEEDED
Status: ACTIVE | OUTPATIENT
Start: 2019-01-01 | End: 2019-01-01

## 2019-01-01 RX ORDER — SODIUM CHLORIDE 9 MG/ML
250 INJECTION, SOLUTION INTRAVENOUS AS NEEDED
Status: DISPENSED | OUTPATIENT
Start: 2019-01-01 | End: 2019-01-01

## 2019-01-01 RX ORDER — NALOXONE HYDROCHLORIDE 0.4 MG/ML
0.4 INJECTION, SOLUTION INTRAMUSCULAR; INTRAVENOUS; SUBCUTANEOUS AS NEEDED
Status: DISCONTINUED | OUTPATIENT
Start: 2019-01-01 | End: 2019-01-01 | Stop reason: HOSPADM

## 2019-01-01 RX ORDER — LIDOCAINE HYDROCHLORIDE 10 MG/ML
10 INJECTION, SOLUTION EPIDURAL; INFILTRATION; INTRACAUDAL; PERINEURAL
Status: COMPLETED | OUTPATIENT
Start: 2019-01-01 | End: 2019-01-01

## 2019-01-01 RX ORDER — FUROSEMIDE 20 MG/1
20 TABLET ORAL DAILY
Status: DISCONTINUED | OUTPATIENT
Start: 2019-01-01 | End: 2019-01-01

## 2019-01-01 RX ORDER — MIDAZOLAM HYDROCHLORIDE 1 MG/ML
.5-1 INJECTION, SOLUTION INTRAMUSCULAR; INTRAVENOUS
Status: DISCONTINUED | OUTPATIENT
Start: 2019-01-01 | End: 2019-01-01 | Stop reason: HOSPADM

## 2019-01-01 RX ORDER — SCOLOPAMINE TRANSDERMAL SYSTEM 1 MG/1
1 PATCH, EXTENDED RELEASE TRANSDERMAL
Status: DISCONTINUED | OUTPATIENT
Start: 2019-01-01 | End: 2019-01-01 | Stop reason: HOSPADM

## 2019-01-01 RX ORDER — FUROSEMIDE 40 MG/1
40 TABLET ORAL DAILY
Status: DISCONTINUED | OUTPATIENT
Start: 2019-01-01 | End: 2019-01-01

## 2019-01-01 RX ORDER — ALBUMIN HUMAN 50 G/1000ML
25 SOLUTION INTRAVENOUS EVERY 6 HOURS
Status: COMPLETED | OUTPATIENT
Start: 2019-01-01 | End: 2019-01-01

## 2019-01-01 RX ORDER — SPIRONOLACTONE 25 MG/1
25 TABLET ORAL DAILY
Status: DISCONTINUED | OUTPATIENT
Start: 2019-01-01 | End: 2019-01-01

## 2019-01-01 RX ORDER — FUROSEMIDE 20 MG/1
20 TABLET ORAL DAILY
Qty: 30 TAB | Refills: 0 | Status: SHIPPED | OUTPATIENT
Start: 2019-01-01 | End: 2019-01-01

## 2019-01-01 RX ORDER — MAGNESIUM SULFATE HEPTAHYDRATE 40 MG/ML
2 INJECTION, SOLUTION INTRAVENOUS ONCE
Status: COMPLETED | OUTPATIENT
Start: 2019-01-01 | End: 2019-01-01

## 2019-01-01 RX ORDER — SODIUM CHLORIDE 0.9 % (FLUSH) 0.9 %
10-40 SYRINGE (ML) INJECTION AS NEEDED
Status: DISCONTINUED | OUTPATIENT
Start: 2019-01-01 | End: 2019-01-01 | Stop reason: HOSPADM

## 2019-01-01 RX ORDER — ONDANSETRON 2 MG/ML
4 INJECTION INTRAMUSCULAR; INTRAVENOUS
Status: DISCONTINUED | OUTPATIENT
Start: 2019-01-01 | End: 2019-01-01 | Stop reason: HOSPADM

## 2019-01-01 RX ORDER — SODIUM CHLORIDE 0.9 % (FLUSH) 0.9 %
5-40 SYRINGE (ML) INJECTION AS NEEDED
Status: DISCONTINUED | OUTPATIENT
Start: 2019-01-01 | End: 2019-01-01 | Stop reason: HOSPADM

## 2019-01-01 RX ORDER — SENNOSIDES 8.8 MG/5ML
5 LIQUID ORAL
Status: DISCONTINUED | OUTPATIENT
Start: 2019-01-01 | End: 2019-01-01 | Stop reason: HOSPADM

## 2019-01-01 RX ORDER — ONDANSETRON 4 MG/1
4 TABLET, ORALLY DISINTEGRATING ORAL
Status: DISCONTINUED | OUTPATIENT
Start: 2019-01-01 | End: 2019-01-01 | Stop reason: HOSPADM

## 2019-01-01 RX ORDER — PREDNISONE 20 MG/1
70 TABLET ORAL
COMMUNITY
End: 2019-01-01

## 2019-01-01 RX ORDER — HEPARIN SODIUM 200 [USP'U]/100ML
500 INJECTION, SOLUTION INTRAVENOUS ONCE
Status: COMPLETED | OUTPATIENT
Start: 2019-01-01 | End: 2019-01-01

## 2019-01-01 RX ORDER — SODIUM CHLORIDE 9 MG/ML
250 INJECTION, SOLUTION INTRAVENOUS AS NEEDED
Status: DISCONTINUED | OUTPATIENT
Start: 2019-01-01 | End: 2019-01-01 | Stop reason: HOSPADM

## 2019-01-01 RX ORDER — DOXYCYCLINE HYCLATE 100 MG
100 TABLET ORAL EVERY 12 HOURS
Status: DISCONTINUED | OUTPATIENT
Start: 2019-01-01 | End: 2019-01-01 | Stop reason: HOSPADM

## 2019-01-01 RX ORDER — ACETAMINOPHEN 325 MG/1
650 TABLET ORAL
Status: DISCONTINUED | OUTPATIENT
Start: 2019-01-01 | End: 2019-01-01 | Stop reason: HOSPADM

## 2019-01-01 RX ORDER — ALBUMIN HUMAN 250 G/1000ML
25 SOLUTION INTRAVENOUS ONCE
Status: ACTIVE | OUTPATIENT
Start: 2019-01-01 | End: 2019-01-01

## 2019-01-01 RX ORDER — HEPARIN 100 UNIT/ML
300 SYRINGE INTRAVENOUS
Status: DISCONTINUED | OUTPATIENT
Start: 2019-01-01 | End: 2019-01-01 | Stop reason: HOSPADM

## 2019-01-01 RX ORDER — MIDAZOLAM HYDROCHLORIDE 1 MG/ML
.5-5 INJECTION, SOLUTION INTRAMUSCULAR; INTRAVENOUS
Status: DISCONTINUED | OUTPATIENT
Start: 2019-01-01 | End: 2019-01-01 | Stop reason: HOSPADM

## 2019-01-01 RX ORDER — LIDOCAINE HYDROCHLORIDE 10 MG/ML
10-20 INJECTION INFILTRATION; PERINEURAL
Status: DISCONTINUED | OUTPATIENT
Start: 2019-01-01 | End: 2019-01-01 | Stop reason: HOSPADM

## 2019-01-01 RX ORDER — SODIUM CHLORIDE 9 MG/ML
250 INJECTION, SOLUTION INTRAVENOUS AS NEEDED
Status: DISCONTINUED | OUTPATIENT
Start: 2019-01-01 | End: 2019-01-01

## 2019-01-01 RX ORDER — CEFAZOLIN SODIUM 1 G/3ML
2 INJECTION, POWDER, FOR SOLUTION INTRAMUSCULAR; INTRAVENOUS ONCE
Status: DISCONTINUED | OUTPATIENT
Start: 2019-01-01 | End: 2019-01-01

## 2019-01-01 RX ORDER — ALBUMIN HUMAN 250 G/1000ML
25 SOLUTION INTRAVENOUS ONCE
Status: DISCONTINUED | OUTPATIENT
Start: 2019-01-01 | End: 2019-01-01

## 2019-01-01 RX ORDER — HEPARIN 100 UNIT/ML
300 SYRINGE INTRAVENOUS AS NEEDED
Status: DISCONTINUED | OUTPATIENT
Start: 2019-01-01 | End: 2019-01-01 | Stop reason: HOSPADM

## 2019-01-01 RX ORDER — LIDOCAINE HYDROCHLORIDE 10 MG/ML
5 INJECTION, SOLUTION EPIDURAL; INFILTRATION; INTRACAUDAL; PERINEURAL
Status: COMPLETED | OUTPATIENT
Start: 2019-01-01 | End: 2019-01-01

## 2019-01-01 RX ORDER — DIPHENHYDRAMINE HYDROCHLORIDE 50 MG/ML
12.5 INJECTION, SOLUTION INTRAMUSCULAR; INTRAVENOUS
Status: DISCONTINUED | OUTPATIENT
Start: 2019-01-01 | End: 2019-01-01 | Stop reason: HOSPADM

## 2019-01-01 RX ORDER — LIDOCAINE HYDROCHLORIDE AND EPINEPHRINE 10; 10 MG/ML; UG/ML
.5-5 INJECTION, SOLUTION INFILTRATION; PERINEURAL
Status: DISCONTINUED | OUTPATIENT
Start: 2019-01-01 | End: 2019-01-01 | Stop reason: HOSPADM

## 2019-01-01 RX ORDER — FENTANYL CITRATE 50 UG/ML
12.5-2 INJECTION, SOLUTION INTRAMUSCULAR; INTRAVENOUS
Status: DISCONTINUED | OUTPATIENT
Start: 2019-01-01 | End: 2019-01-01 | Stop reason: HOSPADM

## 2019-01-01 RX ORDER — MORPHINE SULFATE 20 MG/ML
5 SOLUTION ORAL
Status: DISCONTINUED | OUTPATIENT
Start: 2019-01-01 | End: 2019-01-01 | Stop reason: HOSPADM

## 2019-01-01 RX ORDER — SPIRONOLACTONE 50 MG/1
50 TABLET, FILM COATED ORAL DAILY
Qty: 30 TAB | Refills: 0 | Status: SHIPPED | OUTPATIENT
Start: 2019-01-01 | End: 2019-01-01

## 2019-01-01 RX ORDER — MAGNESIUM CHLORIDE 70 MG
64 TABLET, DELAYED RELEASE (ENTERIC COATED) ORAL 2 TIMES DAILY
Status: DISCONTINUED | OUTPATIENT
Start: 2019-01-01 | End: 2019-01-01 | Stop reason: HOSPADM

## 2019-01-01 RX ORDER — PANTOPRAZOLE SODIUM 40 MG/1
40 TABLET, DELAYED RELEASE ORAL
Status: DISCONTINUED | OUTPATIENT
Start: 2019-01-01 | End: 2019-01-01 | Stop reason: HOSPADM

## 2019-01-01 RX ORDER — GLYCOPYRROLATE 0.2 MG/ML
0.2 INJECTION INTRAMUSCULAR; INTRAVENOUS
Status: DISCONTINUED | OUTPATIENT
Start: 2019-01-01 | End: 2019-01-01 | Stop reason: HOSPADM

## 2019-01-01 RX ORDER — HEPARIN SODIUM 200 [USP'U]/100ML
500 INJECTION, SOLUTION INTRAVENOUS
Status: DISCONTINUED | OUTPATIENT
Start: 2019-01-01 | End: 2019-01-01 | Stop reason: HOSPADM

## 2019-01-01 RX ORDER — DOCUSATE SODIUM 100 MG/1
100 CAPSULE, LIQUID FILLED ORAL 2 TIMES DAILY
Status: DISCONTINUED | OUTPATIENT
Start: 2019-01-01 | End: 2019-01-01 | Stop reason: HOSPADM

## 2019-01-01 RX ORDER — PANTOPRAZOLE SODIUM 40 MG/1
40 TABLET, DELAYED RELEASE ORAL
Qty: 30 TAB | Refills: 0 | Status: SHIPPED | OUTPATIENT
Start: 2019-01-01

## 2019-01-01 RX ORDER — FENTANYL CITRATE 50 UG/ML
25-100 INJECTION, SOLUTION INTRAMUSCULAR; INTRAVENOUS
Status: DISCONTINUED | OUTPATIENT
Start: 2019-01-01 | End: 2019-01-01 | Stop reason: HOSPADM

## 2019-01-01 RX ORDER — CEFAZOLIN SODIUM/WATER 2 G/20 ML
2 SYRINGE (ML) INTRAVENOUS ONCE
Status: COMPLETED | OUTPATIENT
Start: 2019-01-01 | End: 2019-01-01

## 2019-01-01 RX ORDER — SODIUM CHLORIDE 0.9 % (FLUSH) 0.9 %
10 SYRINGE (ML) INJECTION AS NEEDED
Status: ACTIVE | OUTPATIENT
Start: 2019-01-01 | End: 2019-01-01

## 2019-01-01 RX ORDER — AMOXICILLIN 250 MG
2 CAPSULE ORAL
Status: DISCONTINUED | OUTPATIENT
Start: 2019-01-01 | End: 2019-01-01 | Stop reason: SDUPTHER

## 2019-01-01 RX ORDER — ALBUMIN HUMAN 250 G/1000ML
12.5 SOLUTION INTRAVENOUS EVERY 6 HOURS
Status: DISCONTINUED | OUTPATIENT
Start: 2019-01-01 | End: 2019-01-01

## 2019-01-01 RX ORDER — ALBUMIN HUMAN 250 G/1000ML
25 SOLUTION INTRAVENOUS
Status: DISCONTINUED | OUTPATIENT
Start: 2019-01-01 | End: 2019-01-01

## 2019-01-01 RX ORDER — SODIUM CHLORIDE 0.9 % (FLUSH) 0.9 %
5-40 SYRINGE (ML) INJECTION EVERY 8 HOURS
Status: DISCONTINUED | OUTPATIENT
Start: 2019-01-01 | End: 2019-01-01 | Stop reason: HOSPADM

## 2019-01-01 RX ORDER — MAGNESIUM CHLORIDE 70 MG
64 TABLET, DELAYED RELEASE (ENTERIC COATED) ORAL 2 TIMES DAILY
Qty: 60 TAB | Refills: 0 | Status: SHIPPED | OUTPATIENT
Start: 2019-01-01

## 2019-01-01 RX ORDER — SPIRONOLACTONE 100 MG/1
100 TABLET, FILM COATED ORAL DAILY
Status: DISCONTINUED | OUTPATIENT
Start: 2019-01-01 | End: 2019-01-01

## 2019-01-01 RX ORDER — ACETAMINOPHEN 650 MG/1
650 SUPPOSITORY RECTAL
Status: DISCONTINUED | OUTPATIENT
Start: 2019-01-01 | End: 2019-01-01 | Stop reason: HOSPADM

## 2019-01-01 RX ORDER — ALBUMIN HUMAN 250 G/1000ML
12.5 SOLUTION INTRAVENOUS EVERY 6 HOURS
Status: COMPLETED | OUTPATIENT
Start: 2019-01-01 | End: 2019-01-01

## 2019-01-01 RX ORDER — FACIAL-BODY WIPES
10 EACH TOPICAL DAILY PRN
Status: DISCONTINUED | OUTPATIENT
Start: 2019-01-01 | End: 2019-01-01 | Stop reason: HOSPADM

## 2019-01-01 RX ORDER — AMOXICILLIN 250 MG
2 CAPSULE ORAL
Status: DISCONTINUED | OUTPATIENT
Start: 2019-01-01 | End: 2019-01-01 | Stop reason: HOSPADM

## 2019-01-01 RX ORDER — HYDROMORPHONE HYDROCHLORIDE 2 MG/ML
0.5 INJECTION, SOLUTION INTRAMUSCULAR; INTRAVENOUS; SUBCUTANEOUS
Status: DISCONTINUED | OUTPATIENT
Start: 2019-01-01 | End: 2019-01-01 | Stop reason: HOSPADM

## 2019-01-01 RX ORDER — ALBUMIN HUMAN 250 G/1000ML
25 SOLUTION INTRAVENOUS EVERY 6 HOURS
Status: DISCONTINUED | OUTPATIENT
Start: 2019-01-01 | End: 2019-01-01 | Stop reason: HOSPADM

## 2019-01-01 RX ORDER — DOXYCYCLINE HYCLATE 100 MG
100 TABLET ORAL EVERY 12 HOURS
Qty: 14 TAB | Refills: 0 | Status: SHIPPED | OUTPATIENT
Start: 2019-01-01 | End: 2019-01-01

## 2019-01-01 RX ORDER — LORAZEPAM 2 MG/ML
1 CONCENTRATE ORAL
Status: DISCONTINUED | OUTPATIENT
Start: 2019-01-01 | End: 2019-01-01 | Stop reason: HOSPADM

## 2019-01-01 RX ADMIN — DOCUSATE SODIUM 100 MG: 100 CAPSULE, LIQUID FILLED ORAL at 09:11

## 2019-01-01 RX ADMIN — Medication 500 UNITS: at 08:34

## 2019-01-01 RX ADMIN — AZITHROMYCIN MONOHYDRATE 500 MG: 500 INJECTION, POWDER, LYOPHILIZED, FOR SOLUTION INTRAVENOUS at 12:42

## 2019-01-01 RX ADMIN — ALBUMIN (HUMAN) 25 G: 0.25 INJECTION, SOLUTION INTRAVENOUS at 11:55

## 2019-01-01 RX ADMIN — Medication 2 G: at 10:12

## 2019-01-01 RX ADMIN — WATER 2 G: 1 INJECTION INTRAMUSCULAR; INTRAVENOUS; SUBCUTANEOUS at 11:25

## 2019-01-01 RX ADMIN — DOXYCYCLINE HYCLATE 100 MG: 100 TABLET, COATED ORAL at 10:46

## 2019-01-01 RX ADMIN — Medication 10 ML: at 09:24

## 2019-01-01 RX ADMIN — LIDOCAINE HYDROCHLORIDE 10 ML: 10 INJECTION, SOLUTION EPIDURAL; INFILTRATION; INTRACAUDAL; PERINEURAL at 14:32

## 2019-01-01 RX ADMIN — MAGNESIUM SULFATE HEPTAHYDRATE 2 G: 40 INJECTION, SOLUTION INTRAVENOUS at 11:32

## 2019-01-01 RX ADMIN — MAGNESIUM SULFATE HEPTAHYDRATE 2 G: 40 INJECTION, SOLUTION INTRAVENOUS at 12:09

## 2019-01-01 RX ADMIN — SODIUM CHLORIDE 1000 ML: 900 INJECTION, SOLUTION INTRAVENOUS at 09:39

## 2019-01-01 RX ADMIN — ONDANSETRON 4 MG: 2 INJECTION INTRAMUSCULAR; INTRAVENOUS at 17:43

## 2019-01-01 RX ADMIN — DOXYCYCLINE HYCLATE 100 MG: 100 TABLET, COATED ORAL at 21:30

## 2019-01-01 RX ADMIN — Medication 5 ML: at 14:00

## 2019-01-01 RX ADMIN — MIDAZOLAM HYDROCHLORIDE 1 MG: 1 INJECTION, SOLUTION INTRAMUSCULAR; INTRAVENOUS at 10:29

## 2019-01-01 RX ADMIN — SODIUM CHLORIDE 10 ML: 9 INJECTION, SOLUTION INTRAMUSCULAR; INTRAVENOUS; SUBCUTANEOUS at 11:47

## 2019-01-01 RX ADMIN — DOCUSATE SODIUM 100 MG: 100 CAPSULE, LIQUID FILLED ORAL at 09:59

## 2019-01-01 RX ADMIN — ALBUMIN (HUMAN) 25 G: 0.25 INJECTION, SOLUTION INTRAVENOUS at 13:39

## 2019-01-01 RX ADMIN — Medication 10 ML: at 14:00

## 2019-01-01 RX ADMIN — HEPARIN SODIUM IN SODIUM CHLORIDE 1000 UNITS: 200 INJECTION INTRAVENOUS at 10:25

## 2019-01-01 RX ADMIN — Medication 10 ML: at 06:00

## 2019-01-01 RX ADMIN — Medication 500 UNITS: at 09:24

## 2019-01-01 RX ADMIN — SODIUM CHLORIDE 10 ML: 9 INJECTION, SOLUTION INTRAMUSCULAR; INTRAVENOUS; SUBCUTANEOUS at 11:00

## 2019-01-01 RX ADMIN — PANTOPRAZOLE SODIUM 40 MG: 40 TABLET, DELAYED RELEASE ORAL at 08:48

## 2019-01-01 RX ADMIN — WATER 2 G: 1 INJECTION INTRAMUSCULAR; INTRAVENOUS; SUBCUTANEOUS at 11:32

## 2019-01-01 RX ADMIN — Medication 10 ML: at 06:07

## 2019-01-01 RX ADMIN — Medication 10 ML: at 23:29

## 2019-01-01 RX ADMIN — MORPHINE SULFATE 5 MG: 20 SOLUTION ORAL at 19:39

## 2019-01-01 RX ADMIN — LIDOCAINE HYDROCHLORIDE 5 ML: 10 INJECTION, SOLUTION EPIDURAL; INFILTRATION; INTRACAUDAL; PERINEURAL at 12:04

## 2019-01-01 RX ADMIN — Medication 10 ML: at 14:50

## 2019-01-01 RX ADMIN — SPIRONOLACTONE 50 MG: 25 TABLET, FILM COATED ORAL at 09:17

## 2019-01-01 RX ADMIN — MAGNESIUM SULFATE HEPTAHYDRATE 2 G: 40 INJECTION, SOLUTION INTRAVENOUS at 08:55

## 2019-01-01 RX ADMIN — Medication 10 ML: at 09:09

## 2019-01-01 RX ADMIN — LORAZEPAM 1 MG: 2 SOLUTION, CONCENTRATE ORAL at 19:39

## 2019-01-01 RX ADMIN — DOXYCYCLINE HYCLATE 100 MG: 100 TABLET, COATED ORAL at 09:17

## 2019-01-01 RX ADMIN — WATER 1 G: 1 INJECTION INTRAMUSCULAR; INTRAVENOUS; SUBCUTANEOUS at 09:09

## 2019-01-01 RX ADMIN — SODIUM CHLORIDE 40 MG: 9 INJECTION INTRAMUSCULAR; INTRAVENOUS; SUBCUTANEOUS at 13:38

## 2019-01-01 RX ADMIN — SPIRONOLACTONE 25 MG: 25 TABLET, FILM COATED ORAL at 13:14

## 2019-01-01 RX ADMIN — WATER 2 G: 1 INJECTION INTRAMUSCULAR; INTRAVENOUS; SUBCUTANEOUS at 14:16

## 2019-01-01 RX ADMIN — DOXYCYCLINE 100 MG: 100 INJECTION, POWDER, LYOPHILIZED, FOR SOLUTION INTRAVENOUS at 22:02

## 2019-01-01 RX ADMIN — MORPHINE SULFATE 5 MG: 20 SOLUTION ORAL at 02:07

## 2019-01-01 RX ADMIN — Medication 10 ML: at 21:31

## 2019-01-01 RX ADMIN — SODIUM CHLORIDE 40 MG: 9 INJECTION INTRAMUSCULAR; INTRAVENOUS; SUBCUTANEOUS at 17:46

## 2019-01-01 RX ADMIN — DOCUSATE SODIUM 100 MG: 100 CAPSULE, LIQUID FILLED ORAL at 09:10

## 2019-01-01 RX ADMIN — Medication 10 ML: at 13:38

## 2019-01-01 RX ADMIN — ALBUMIN (HUMAN) 12.5 G: 0.25 INJECTION, SOLUTION INTRAVENOUS at 18:11

## 2019-01-01 RX ADMIN — Medication 64 MG: at 18:00

## 2019-01-01 RX ADMIN — SODIUM CHLORIDE 40 MG: 9 INJECTION INTRAMUSCULAR; INTRAVENOUS; SUBCUTANEOUS at 13:13

## 2019-01-01 RX ADMIN — DOXYCYCLINE HYCLATE 100 MG: 100 TABLET, COATED ORAL at 20:46

## 2019-01-01 RX ADMIN — FENTANYL CITRATE 25 MCG: 50 INJECTION, SOLUTION INTRAMUSCULAR; INTRAVENOUS at 14:57

## 2019-01-01 RX ADMIN — SODIUM CHLORIDE 250 ML: 900 INJECTION, SOLUTION INTRAVENOUS at 00:20

## 2019-01-01 RX ADMIN — PANTOPRAZOLE SODIUM 40 MG: 40 TABLET, DELAYED RELEASE ORAL at 06:19

## 2019-01-01 RX ADMIN — MORPHINE SULFATE 5 MG: 20 SOLUTION ORAL at 15:34

## 2019-01-01 RX ADMIN — DOCUSATE SODIUM 100 MG: 100 CAPSULE, LIQUID FILLED ORAL at 17:45

## 2019-01-01 RX ADMIN — DOCUSATE SODIUM 100 MG: 100 CAPSULE, LIQUID FILLED ORAL at 17:55

## 2019-01-01 RX ADMIN — PANTOPRAZOLE SODIUM 40 MG: 40 TABLET, DELAYED RELEASE ORAL at 06:47

## 2019-01-01 RX ADMIN — DOCUSATE SODIUM 100 MG: 100 CAPSULE, LIQUID FILLED ORAL at 18:11

## 2019-01-01 RX ADMIN — ONDANSETRON 4 MG: 2 INJECTION INTRAMUSCULAR; INTRAVENOUS at 14:45

## 2019-01-01 RX ADMIN — ALBUMIN (HUMAN) 50 G: 0.25 INJECTION, SOLUTION INTRAVENOUS at 01:35

## 2019-01-01 RX ADMIN — Medication 10 ML: at 22:00

## 2019-01-01 RX ADMIN — FUROSEMIDE 20 MG: 20 TABLET ORAL at 08:48

## 2019-01-01 RX ADMIN — SPIRONOLACTONE 50 MG: 25 TABLET, FILM COATED ORAL at 17:47

## 2019-01-01 RX ADMIN — MORPHINE SULFATE 5 MG: 20 SOLUTION ORAL at 08:17

## 2019-01-01 RX ADMIN — FENTANYL CITRATE 50 MCG: 50 INJECTION, SOLUTION INTRAMUSCULAR; INTRAVENOUS at 10:29

## 2019-01-01 RX ADMIN — Medication 64 MG: at 17:13

## 2019-01-01 RX ADMIN — SODIUM CHLORIDE: 900 INJECTION, SOLUTION INTRAVENOUS at 12:21

## 2019-01-01 RX ADMIN — DOXYCYCLINE 100 MG: 100 INJECTION, POWDER, LYOPHILIZED, FOR SOLUTION INTRAVENOUS at 14:50

## 2019-01-01 RX ADMIN — DOCUSATE SODIUM 100 MG: 100 CAPSULE, LIQUID FILLED ORAL at 18:00

## 2019-01-01 RX ADMIN — ALBUMIN (HUMAN) 25 G: 0.25 INJECTION, SOLUTION INTRAVENOUS at 08:44

## 2019-01-01 RX ADMIN — ALBUMIN (HUMAN) 12.5 G: 0.25 INJECTION, SOLUTION INTRAVENOUS at 00:04

## 2019-01-01 RX ADMIN — ALBUMIN (HUMAN) 25 G: 12.5 INJECTION, SOLUTION INTRAVENOUS at 18:58

## 2019-01-01 RX ADMIN — MIDAZOLAM HYDROCHLORIDE 1 MG: 1 INJECTION, SOLUTION INTRAMUSCULAR; INTRAVENOUS at 14:55

## 2019-01-01 RX ADMIN — FENTANYL CITRATE 25 MCG: 50 INJECTION, SOLUTION INTRAMUSCULAR; INTRAVENOUS at 10:20

## 2019-01-01 RX ADMIN — Medication 10 ML: at 06:46

## 2019-01-01 RX ADMIN — FUROSEMIDE 20 MG: 20 TABLET ORAL at 13:14

## 2019-01-01 RX ADMIN — Medication 500 UNITS: at 09:09

## 2019-01-01 RX ADMIN — PANTOPRAZOLE SODIUM 40 MG: 40 TABLET, DELAYED RELEASE ORAL at 06:39

## 2019-01-01 RX ADMIN — Medication 10 ML: at 22:49

## 2019-01-01 RX ADMIN — Medication 10 ML: at 06:39

## 2019-01-01 RX ADMIN — Medication 500 UNITS: at 11:47

## 2019-01-01 RX ADMIN — MAGNESIUM SULFATE HEPTAHYDRATE 2 G: 40 INJECTION, SOLUTION INTRAVENOUS at 09:41

## 2019-01-01 RX ADMIN — HEPARIN SODIUM 1000 UNITS: 200 INJECTION, SOLUTION INTRAVENOUS at 14:55

## 2019-01-01 RX ADMIN — Medication 500 UNITS: at 10:15

## 2019-01-01 RX ADMIN — ALBUMIN (HUMAN) 25 G: 12.5 INJECTION, SOLUTION INTRAVENOUS at 09:12

## 2019-01-01 RX ADMIN — Medication 64 MG: at 13:38

## 2019-01-01 RX ADMIN — MIDAZOLAM HYDROCHLORIDE 1 MG: 1 INJECTION, SOLUTION INTRAMUSCULAR; INTRAVENOUS at 10:20

## 2019-01-01 RX ADMIN — Medication 20 ML: at 04:40

## 2019-01-01 RX ADMIN — Medication 10 ML: at 08:35

## 2019-01-01 RX ADMIN — PREDNISONE 70 MG: 5 TABLET ORAL at 10:00

## 2019-01-01 RX ADMIN — Medication 2 G: at 14:55

## 2019-01-01 RX ADMIN — Medication 64 MG: at 08:48

## 2019-01-01 RX ADMIN — MORPHINE SULFATE 5 MG: 20 SOLUTION ORAL at 16:04

## 2019-01-01 RX ADMIN — DOXYCYCLINE HYCLATE 100 MG: 100 TABLET, COATED ORAL at 08:48

## 2019-01-01 RX ADMIN — SODIUM CHLORIDE 500 ML: 900 INJECTION, SOLUTION INTRAVENOUS at 00:25

## 2019-01-01 RX ADMIN — DOCUSATE SODIUM 100 MG: 100 CAPSULE, LIQUID FILLED ORAL at 08:59

## 2019-01-01 RX ADMIN — Medication 10 ML: at 08:34

## 2019-01-01 RX ADMIN — DOXYCYCLINE 100 MG: 100 INJECTION, POWDER, LYOPHILIZED, FOR SOLUTION INTRAVENOUS at 22:45

## 2019-01-01 RX ADMIN — Medication 10 ML: at 16:02

## 2019-01-01 RX ADMIN — Medication 500 UNITS: at 09:10

## 2019-01-01 RX ADMIN — Medication 10 ML: at 06:18

## 2019-01-01 RX ADMIN — LIDOCAINE HYDROCHLORIDE 15 ML: 10 INJECTION, SOLUTION INFILTRATION; PERINEURAL at 14:48

## 2019-01-01 RX ADMIN — DOXYCYCLINE 100 MG: 100 INJECTION, POWDER, LYOPHILIZED, FOR SOLUTION INTRAVENOUS at 13:12

## 2019-01-01 RX ADMIN — MORPHINE SULFATE 5 MG: 20 SOLUTION ORAL at 17:39

## 2019-01-01 RX ADMIN — PREDNISONE 70 MG: 5 TABLET ORAL at 08:59

## 2019-01-01 RX ADMIN — PREDNISONE 70 MG: 5 TABLET ORAL at 10:53

## 2019-01-01 RX ADMIN — ALBUMIN (HUMAN) 50 G: 0.25 INJECTION, SOLUTION INTRAVENOUS at 12:35

## 2019-01-01 RX ADMIN — MAGNESIUM SULFATE HEPTAHYDRATE 2 G: 40 INJECTION, SOLUTION INTRAVENOUS at 13:34

## 2019-01-01 RX ADMIN — Medication 10 ML: at 08:29

## 2019-01-01 RX ADMIN — Medication 10 ML: at 00:05

## 2019-01-01 RX ADMIN — MORPHINE SULFATE 5 MG: 20 SOLUTION ORAL at 01:32

## 2019-01-01 RX ADMIN — ALBUMIN (HUMAN) 25 G: 0.25 INJECTION, SOLUTION INTRAVENOUS at 12:09

## 2019-01-01 RX ADMIN — Medication 10 ML: at 10:15

## 2019-01-01 RX ADMIN — Medication 10 ML: at 19:15

## 2019-01-01 RX ADMIN — Medication 10 ML: at 16:25

## 2019-01-01 RX ADMIN — PANTOPRAZOLE SODIUM 40 MG: 40 TABLET, DELAYED RELEASE ORAL at 07:00

## 2019-01-01 RX ADMIN — Medication 500 UNITS: at 08:35

## 2019-01-01 RX ADMIN — CEFTRIAXONE SODIUM 1 G: 1 INJECTION, POWDER, FOR SOLUTION INTRAMUSCULAR; INTRAVENOUS at 06:39

## 2019-01-01 RX ADMIN — Medication 64 MG: at 10:46

## 2019-01-01 RX ADMIN — Medication 10 ML: at 17:43

## 2019-01-01 RX ADMIN — SENNOSIDES AND DOCUSATE SODIUM 2 TABLET: 8.6; 5 TABLET ORAL at 18:19

## 2019-01-01 RX ADMIN — FENTANYL CITRATE 25 MCG: 50 INJECTION, SOLUTION INTRAMUSCULAR; INTRAVENOUS at 14:55

## 2019-01-01 RX ADMIN — ALBUMIN (HUMAN) 12.5 G: 0.25 INJECTION, SOLUTION INTRAVENOUS at 13:17

## 2019-01-01 RX ADMIN — LORAZEPAM 1 MG: 2 SOLUTION, CONCENTRATE ORAL at 02:07

## 2019-01-01 RX ADMIN — Medication 10 ML: at 22:02

## 2019-01-01 RX ADMIN — LIDOCAINE HYDROCHLORIDE,EPINEPHRINE BITARTRATE 10 MG: 10; .01 INJECTION, SOLUTION INFILTRATION; PERINEURAL at 10:29

## 2019-01-01 RX ADMIN — SODIUM CHLORIDE 40 MG: 9 INJECTION INTRAMUSCULAR; INTRAVENOUS; SUBCUTANEOUS at 14:16

## 2019-01-01 RX ADMIN — Medication 10 ML: at 06:28

## 2019-01-01 RX ADMIN — WATER 2 G: 1 INJECTION INTRAMUSCULAR; INTRAVENOUS; SUBCUTANEOUS at 10:55

## 2019-01-01 RX ADMIN — LORAZEPAM 1 MG: 2 SOLUTION, CONCENTRATE ORAL at 13:45

## 2019-01-01 RX ADMIN — PREDNISONE 70 MG: 5 TABLET ORAL at 09:09

## 2019-01-01 RX ADMIN — FUROSEMIDE 20 MG: 20 TABLET ORAL at 09:17

## 2019-01-01 RX ADMIN — LIDOCAINE HYDROCHLORIDE 10 ML: 10 INJECTION, SOLUTION INFILTRATION; PERINEURAL at 10:25

## 2019-01-01 RX ADMIN — Medication 10 ML: at 09:10

## 2019-01-01 RX ADMIN — CEFTRIAXONE SODIUM 1 G: 1 INJECTION, POWDER, FOR SOLUTION INTRAMUSCULAR; INTRAVENOUS at 06:18

## 2019-01-01 RX ADMIN — PANTOPRAZOLE SODIUM 40 MG: 40 TABLET, DELAYED RELEASE ORAL at 09:11

## 2019-01-01 RX ADMIN — Medication 10 ML: at 13:22

## 2019-01-01 RX ADMIN — Medication 10 ML: at 22:45

## 2019-01-01 RX ADMIN — MAGNESIUM SULFATE HEPTAHYDRATE 2 G: 40 INJECTION, SOLUTION INTRAVENOUS at 08:48

## 2019-01-01 RX ADMIN — Medication 300 UNITS: at 16:58

## 2019-01-01 RX ADMIN — PREDNISONE 70 MG: 5 TABLET ORAL at 09:11

## 2019-01-01 RX ADMIN — DOCUSATE SODIUM 100 MG: 100 CAPSULE, LIQUID FILLED ORAL at 10:54

## 2019-01-01 RX ADMIN — Medication 10 ML: at 13:26

## 2019-01-01 RX ADMIN — ALBUMIN (HUMAN) 12.5 G: 0.25 INJECTION, SOLUTION INTRAVENOUS at 12:34

## 2019-01-01 RX ADMIN — LIDOCAINE HYDROCHLORIDE 5 ML: 10 INJECTION, SOLUTION EPIDURAL; INFILTRATION; INTRACAUDAL; PERINEURAL at 11:27

## 2019-01-01 RX ADMIN — FENTANYL CITRATE 25 MCG: 50 INJECTION, SOLUTION INTRAMUSCULAR; INTRAVENOUS at 10:36

## 2019-01-01 RX ADMIN — LIDOCAINE HYDROCHLORIDE 5 ML: 10 INJECTION, SOLUTION EPIDURAL; INFILTRATION; INTRACAUDAL; PERINEURAL at 11:13

## 2019-01-01 RX ADMIN — MORPHINE SULFATE 5 MG: 20 SOLUTION ORAL at 13:45

## 2019-01-02 NOTE — PROGRESS NOTES
0910 Pt did not call or show for scheduled appt at 0830 today. Call to pt cell number listed in demographics [phone] and did not get an answer. Spoke with Ramona Bauer @ Dr. Anamaria Kimbrough office to advise pt did not arrive for appt and this was very unusual as pt is always here early or calls if there is an issue. 1110 Call back to Dr. Anamaria Kimrbough office and spoke with Arnel Gayle to follow up on pt status. Per Arnel Gayle the office has not heard from pt. Lanemon Bobbi pt lives alone and has shared with staff in [facility] he does not have any family. Reminded pt had 2 units PRBC's on Monday for Hgb 7. Requested welfare check for patient.

## 2019-01-04 NOTE — PROGRESS NOTES
Problem: Infection - Risk of, Central Venous Catheter-Associated Bloodstream Infection Goal: *Absence of infection signs and symptoms Here today for picc line flush and dressing change

## 2019-01-04 NOTE — PROGRESS NOTES
[facility] VISIT NOTE Pt arrived at [facility] ambulatory and in no distress for PICC line flush and dressing change. Assessment completed, pt has no new complaints. Vital Signs were as follows: 
Patient Vitals for the past 12 hrs: 
 Temp Pulse Resp BP SpO2  
01/04/19 0822 97.8 °F (36.6 °C) 97 16 121/72 96 % Dressing change performed under sterile technique to the right upper arm PICC line. Positive blood return noted. Tolerated treatment well, no adverse reaction noted. D/C'd from [facility] ambulatory and in no distress accompanied by self. Next appointment is 01/07/19 at 0830.

## 2019-01-07 NOTE — PROGRESS NOTES
Outpatient Infusion Center Short Visit Progress Note 3357 Patient admitted to [facility] for labs and PICC flush ambulatory in stable condition. Assessment completed. No new concerns voiced. Patient Vitals for the past 12 hrs: 
 Temp Pulse Resp BP  
01/07/19 0848 97.4 °F (36.3 °C) 94 16 90/64 Recent Results (from the past 12 hour(s)) CBC WITH AUTOMATED DIFF Collection Time: 01/07/19  8:26 AM  
Result Value Ref Range WBC 3.2 (L) 4.1 - 11.1 K/uL  
 RBC 2.84 (L) 4.10 - 5.70 M/uL HGB 8.1 (L) 12.1 - 17.0 g/dL HCT 24.8 (L) 36.6 - 50.3 % MCV 87.3 80.0 - 99.0 FL  
 MCH 28.5 26.0 - 34.0 PG  
 MCHC 32.7 30.0 - 36.5 g/dL  
 RDW 14.9 (H) 11.5 - 14.5 % PLATELET 88 (L) 846 - 400 K/uL NRBC 0.0 0  WBC ABSOLUTE NRBC 0.00 0.00 - 0.01 K/uL NEUTROPHILS 54 32 - 75 % LYMPHOCYTES 28 12 - 49 % MONOCYTES 17 (H) 5 - 13 % EOSINOPHILS 0 0 - 7 % BASOPHILS 1 0 - 1 % IMMATURE GRANULOCYTES 0 0.0 - 0.5 % ABS. NEUTROPHILS 1.7 (L) 1.8 - 8.0 K/UL  
 ABS. LYMPHOCYTES 0.9 0.8 - 3.5 K/UL  
 ABS. MONOCYTES 0.5 0.0 - 1.0 K/UL  
 ABS. EOSINOPHILS 0.0 0.0 - 0.4 K/UL  
 ABS. BASOPHILS 0.0 0.0 - 0.1 K/UL  
 ABS. IMM. GRANS. 0.0 0.00 - 0.04 K/UL  
 DF AUTOMATED    
 
 
R arm PICC single lumen with positive blood return. Flushed per protocol. 0945 Patient tolerated treatment well. Patient discharged from [place] [address] ambulatory in no distress. Patient aware of next appointment scheduled for 1/9/18 at 0830.  
 
Reanna Gonzales RN

## 2019-01-07 NOTE — TELEPHONE ENCOUNTER
[address] at [facility]  [phone]    01/07/19- Phone call placed to patient advised him per Dorene Blake that he should follow up with [facility] ASAP then scheduled an appointment to see us after. He will call to scheduled fu at [facility] point location. Advised him lab/opic appointment will remain the same for now. Also new referral placed to hepatology- someone in our office will call to scheduled initial appointment. Patient verbalized understanding and denied any further questions or concerns.

## 2019-01-09 NOTE — PROGRESS NOTES
Outpatient Infusion Center Short Visit Progress Note 
 
025 Patient admitted to [facility] for PICC line flush ambulatory in stable condition. Assessment completed. No new concerns voiced. Patient Vitals for the past 12 hrs: 
 Temp Pulse Resp BP  
01/09/19 0826 97.7 °F (36.5 °C) (!) 103 16 115/66 R arm PICC with positive blood return. Flushes well. Flushed per protocol. 0430 Patient tolerated treatment well. Patient discharged from [place] [address] ambulatory in no distress. Patient aware of next appointment scheduled for 1/11/19 at 0830.  
 
Luis Enrique Shah RN

## 2019-01-11 NOTE — PROGRESS NOTES
[facility] VISIT NOTE Pt arrived at [facility] ambulatory and in no distress for PICC line flush and dressing change. Positive blood return. Added labs after speaking with [facility] as patient was concerned he may not be able to get here Monday related to weather. HGB was low, and received order to transfuse 1 unit. Assessment completed, pt has no new complaints. Vital Signs were as follows: 
Patient Vitals for the past 12 hrs: 
 Temp Pulse Resp BP  
01/11/19 1315 97.9 °F (36.6 °C) 79 14 114/69  
01/11/19 1245 97.8 °F (36.6 °C) 77 14 112/62  
01/11/19 1145 97.6 °F (36.4 °C) 80 14 103/63  
01/11/19 1115 97.7 °F (36.5 °C) 80 14 109/60  
01/11/19 1100 97.8 °F (36.6 °C) 85 14 103/56  
01/11/19 1044 97.8 °F (36.6 °C) 83 16 102/55  
01/11/19 0826 98.4 °F (36.9 °C) 91 14 103/70 Vital signs were taken according to [facility] protocol for blood transfusion. Patient showed no signs or symptoms of transfusion reaction. Lab values were as follows: 
Recent Results (from the past 12 hour(s)) CBC WITH AUTOMATED DIFF Collection Time: 01/11/19  8:31 AM  
Result Value Ref Range WBC 2.6 (L) 4.1 - 11.1 K/uL  
 RBC 2.47 (L) 4.10 - 5.70 M/uL HGB 7.0 (L) 12.1 - 17.0 g/dL HCT 21.2 (L) 36.6 - 50.3 % MCV 85.8 80.0 - 99.0 FL  
 MCH 28.3 26.0 - 34.0 PG  
 MCHC 33.0 30.0 - 36.5 g/dL  
 RDW 15.0 (H) 11.5 - 14.5 % PLATELET 83 (L) 799 - 400 K/uL NRBC 0.0 0  WBC ABSOLUTE NRBC 0.00 0.00 - 0.01 K/uL NEUTROPHILS 57 32 - 75 % LYMPHOCYTES 25 12 - 49 % MONOCYTES 18 (H) 5 - 13 % EOSINOPHILS 0 0 - 7 % BASOPHILS 0 0 - 1 % IMMATURE GRANULOCYTES 0 0.0 - 0.5 % ABS. NEUTROPHILS 1.4 (L) 1.8 - 8.0 K/UL  
 ABS. LYMPHOCYTES 0.7 (L) 0.8 - 3.5 K/UL  
 ABS. MONOCYTES 0.5 0.0 - 1.0 K/UL  
 ABS. EOSINOPHILS 0.0 0.0 - 0.4 K/UL  
 ABS. BASOPHILS 0.0 0.0 - 0.1 K/UL  
 ABS. IMM. GRANS. 0.0 0.00 - 0.04 K/UL  
 DF SMEAR SCANNED    
 RBC COMMENTS OVALOCYTES PRESENT 
    
 RBC COMMENTS MICROCYTOSIS 
1+ 
 RBC COMMENTS ANISOCYTOSIS 
1+ TYPE + CROSSMATCH Collection Time: 01/11/19  8:31 AM  
Result Value Ref Range Crossmatch Expiration 01/14/2019 ABO/Rh(D) A POSITIVE Antibody screen NEG Unit number [id number] Blood component type RC LR Unit division 00 Status of unit ISSUED Crossmatch result Compatible Transfusion ran from 1045 to 1315. No medication received this visit. Tolerated treatment well, no adverse reaction noted. Blood return noted, dressing dry and intact. D/C'd from [facility] ambulatory and in no distress accompanied by self. Next appointment is 01/11/19 at 0830.

## 2019-01-11 NOTE — PROGRESS NOTES
Problem: Infection - Risk of, Central Venous Catheter-Associated Bloodstream Infection Goal: *Absence of infection signs and symptoms Here today for labs, PICC dressing change and flush

## 2019-01-16 NOTE — PROGRESS NOTES
[facility] VISIT NOTE 
 
0815. Pt arrived at [facility] ambulatory and in no distress for PICC Line Care. Assessment completed, pt c/o a recent fall on Monday. Patient verbalized no injury from fall except some soreness with Left Shoulder. RN also noted swollen abdomen. This is not a new issue however RN notified Prerna Reilly NP. Right Single Lumen PICC flushed with positive blood return noted. Labs drawn per Prerna Reilly NP verbal order as patient did not come for 1/14 apt. Hemoglobin not within parameters to transfuse. PICC flushed per protocol. Positive blood return noted. Patient Vitals for the past 12 hrs: 
 Temp Pulse Resp BP  
01/16/19 0816 97.8 °F (36.6 °C) (!) 117 16 106/70 Recent Results (from the past 12 hour(s)) CBC WITH AUTOMATED DIFF Collection Time: 01/16/19  8:24 AM  
Result Value Ref Range WBC 3.1 (L) 4.1 - 11.1 K/uL  
 RBC 2.66 (L) 4.10 - 5.70 M/uL HGB 7.4 (L) 12.1 - 17.0 g/dL HCT 23.1 (L) 36.6 - 50.3 % MCV 86.8 80.0 - 99.0 FL  
 MCH 27.8 26.0 - 34.0 PG  
 MCHC 32.0 30.0 - 36.5 g/dL  
 RDW 15.0 (H) 11.5 - 14.5 % PLATELET 77 (L) 299 - 400 K/uL MPV ABNORMAL 8.9 - 12.9 FL  
 NRBC 0.0 0  WBC ABSOLUTE NRBC 0.00 0.00 - 0.01 K/uL NEUTROPHILS PENDING % LYMPHOCYTES PENDING % MONOCYTES PENDING % EOSINOPHILS PENDING % BASOPHILS PENDING % IMMATURE GRANULOCYTES PENDING %  
 ABS. NEUTROPHILS PENDING K/UL  
 ABS. LYMPHOCYTES PENDING K/UL  
 ABS. MONOCYTES PENDING K/UL  
 ABS. EOSINOPHILS PENDING K/UL  
 ABS. BASOPHILS PENDING K/UL  
 ABS. IMM. GRANS. PENDING K/UL  
 DF PENDING   
 
0910. D/C'd from [facility] ambulatory and in no distress.  Next appointment is 1/18/19 at 8:30 am.

## 2019-01-18 NOTE — PROGRESS NOTES
Outpatient Infusion Center Short Visit Progress Note 1382 Patient admitted to [facility] for PICC flush and dressing change ambulatory in stable condition. Assessment completed. No new concerns voiced. Abdomen noticeably distended. Pt agrees \"this thing keeps getting bigger\". Denies pain or any associated symptoms. Previous nurses note mentions fall on Monday at home. 2539 Call to Adeline Cotter at Dr. Roderick Delatorre office to report above findings. Visit Vitals /60 Pulse (!) 107 Temp 96.7 °F (35.9 °C) Resp 16 Medications: 
IV NS 
IV Heparin 0840 Patient tolerated treatment well. PICC flushed per protocol and dressing changed using sterile technique. Patient discharged from [place] [address] ambulatory in no distress. Patient aware of next appointment scheduled for 1/21 @ 0830.

## 2019-01-21 NOTE — PROGRESS NOTES
Pt received to US from infusion center post blood transfusion at the request of Dr Deng Armijo for a stat paracentesis. Pt does not have  and is unable to find one at this time. Spoke with Dr Art Gross who is OK for pt to have 3L removed and stop after this amount is obtained, pt is agreeable. Vitals taken, stable, consent obtained. Confirmed NKA, belly distended. Pt has R arm PICC line, CDI. Timeout at 1413, start time 1414. Pt draining clear yellow fluid from abdomen. 3L removed, clean dressing applied, stop time 1439. Pt tolerated well, feels mild relief. Orthostatics completed. Discharge directions reviewed with patient. Provided information on transportation access for future medical appointments as patient does not have anyone to drive him. Taken out to [place] for discharge.

## 2019-01-21 NOTE — TELEPHONE ENCOUNTER
[facility] at [facility]  [phone]    01/21/19- Spoke to [facility] in the [facility]- patient here for picc line dressing change- patient is not feeling well. Per Inocencia Cleaning. NP- recheck labs today with transfusion if needed. Labs returned hemoglobin 6.8 today Per Chuckie K. okay to transfuse 1 unit of blood today and recheck labs on Wednesday- Nicole Oh agreed-patient needs to get back in with [facility]- hematology urgently. [facility] appointment scheduled for 1/22/19 at 1:00pm with  at the downtime location. Yolanda Ojeda will inform patient.

## 2019-01-21 NOTE — PROGRESS NOTES
5397ET arrived at [facility] ambulatory and in no distress for labs. Pt complaining of extreme weakness and fatigue. NP notified, will draw labs today and transfuse if needed. PICC flushed with positive blood, labs sent per order. Hgb 6.8, NP notified, will admin 1 unit of PRBCs. Signs/symptoms of adverse blood reaction discussed with pt, voiced understanding. Medications received:  
NS @ Sylvie Hurtado Patient Vitals for the past 12 hrs: 
 Temp Pulse Resp BP  
01/21/19 1255 98.1 °F (36.7 °C) 89 18 103/61  
01/21/19 1155 97.6 °F (36.4 °C) 89 18 106/61  
01/21/19 1055 98 °F (36.7 °C) 93 18 93/57  
01/21/19 1025 98.1 °F (36.7 °C) 92 18 99/60  
01/21/19 1010 97.9 °F (36.6 °C) 91 18 95/59  
01/21/19 0949 97.5 °F (36.4 °C) 97 18 96/57  
01/21/19 0816 97.5 °F (36.4 °C) 99 18 113/69  
 
 
 
0955: 1st unit PRBCs started and infusing without difficulty, will monitor. 1145: 1st unit completed without adverse reaction noted, NS flushing line. 1310 Tolerated transfusion well, no adverse reaction noted. Pt to go to [facility] to have paracentesis at 1400. D/C instructions reviewed, copy to pt, voiced understanding. Patient declined 1 hour post transfusion observation. D/Cd from [facility] ambulatory and in no distress accompanied by self. Next appt 1/23/19.

## 2019-01-21 NOTE — DISCHARGE INSTRUCTIONS
Patient Education        Learning About Paracentesis  What is paracentesis? Paracentesis (say \"jdsl-ru-xfv-ERICK-arabella\") is a procedure that removes fluid from the belly. The buildup of fluid may be caused by infection, inflammation, an injury, or other problems. Swelling from too much fluid may cause pain or trouble breathing. The doctor will remove the extra fluid with a needle attached to a tube. Your doctor may remove the fluid to:  · Diagnose infection, injury, or other conditions. · Relieve pressure in your belly. How is the procedure done? Your doctor or nurse will clean the area of your belly where the needle will go in. Then he or she will put sterile towels around the area. You may get a shot of numbing medicine in your belly. Then your doctor will gently insert a needle where the fluid is. He or she may attach a tube (catheter) to the site to help collect the fluid. The procedure may take from a few minutes to 30 minutes or more. After the fluid has drained, your doctor will take out the needle or catheter and put a bandage on the site. What can you expect after the procedure? Your doctor will watch your pulse, blood pressure, and temperature for about an hour. If your doctor thinks that testing the fluid can help find the cause of a problem, he or she will send it to a lab. For up to 2 days after the procedure, you may have a small amount of clear fluid coming out of the site where the needle was inserted, especially if you had a lot of fluid removed. You may need to change the bandage on the site. You can do your normal activities after the procedure, unless your doctor tells you not to. If fluid builds up in your belly again, your doctor may repeat this procedure. When should you call for help? Call 911 anytime you think you may need emergency care. For example, call if:  · You passed out (lost consciousness).   Call your doctor now or seek immediate medical care if:  · You have symptoms of infection, such as:  ? Increased pain, swelling, warmth, or redness. ? Red streaks or pus. ? A fever. · You are dizzy or lightheaded, or you feel like you may faint. · You have new or worse belly pain. Watch closely for changes in your health, and be sure to contact your doctor if:  · Fluid builds up in your belly again. · You do not get better as expected. Where can you learn more? Go to http://xiang-lucho.info/. Enter X447 in the search box to learn more about \"Learning About Paracentesis. \"  Current as of: March 27, 2018  Content Version: 11.9  © 8552-2418 [employer]. Care instructions adapted under license by [employer] (which disclaims liability or warranty for this information). If you have questions about a medical condition or this instruction, always ask your healthcare professional. [address] any warranty or liability for your use of this information.

## 2019-01-21 NOTE — PROGRESS NOTES
Patient seen in infusion center due to c/o feeling unwell. Patient states he has been very weak and run down since last week, started about Thursday last week. Energy is very poor. Appetite is very poor. Eating very little at each meal due to abdominal distention. States abdomen is lazcano than it has ever been. He has not had f/u with [facility] since paracentesis completed there. No follow up with hematology at [facility]. States someone called him from [facility] to set up a follow up, but they were located in their [facility] location and he didn't understand why he would have to travel there. No follow up made. Our office has facilitated a f/u with [facility] for tomorrow, but patient states he isn't sure he wants to receive care downtown, it is difficult for him to travel there and he does not have any friends or relatives he can call on to assist. He feels more comfortable receiving care close to home and wants to stay at [address] [place] [address]. Discussed need to seek second opinion at [facility] as we are unsure what is the cause of his pancytopenia. Patient understands, but is very hesitant to travel to [facility]. On exam patient's abd is firm and protruding. No tenderness to palpation. Umbilicus protruding. Will facilitate repeat paracentesis today to relieve pressure of fluid and hope this leads to improved appetite. Transfuse 1 unit PRBC today as Hgb is 6.8. Repeat labs on Wed. Check CMP today. Recurrent ascites thought to be due to cirrhosis. Will check cytology. He has a new patient appointment with Dr. Yary Meadows, but this isn't scheduled until March. Will see if this can be moved up. For now, paracentesis today, transfusion today, repeat labs on Wed. He will attempt to make it to appointment tomorrow at [facility], but is unsure if he will make it. Will attempt to move up appointment with Dr. Yary Meadows in the meantime.

## 2019-01-23 NOTE — PROGRESS NOTES
[facility] VISIT NOTE 
 
0805. Pt arrived at [place] ambulatory and in no distress for PICC Care/Labs. Assessment completed, pt c/o weakness and fatigue and abdominal distention. Right Single Lumen PICC flushed with positive blood return noted and labs drawn. Verified with MD office that labs are NOT within parameters for transfusion. Patient notified and verbalized understanding. PICC flushed per protocol. Positive blood return noted. Patient Vitals for the past 12 hrs: 
 Temp Pulse Resp BP  
01/23/19 0808 97.5 °F (36.4 °C) (!) 113 20 102/57 Recent Results (from the past 12 hour(s)) CBC WITH AUTOMATED DIFF Collection Time: 01/23/19  8:16 AM  
Result Value Ref Range WBC 3.4 (L) 4.1 - 11.1 K/uL  
 RBC 2.78 (L) 4.10 - 5.70 M/uL HGB 8.0 (L) 12.1 - 17.0 g/dL HCT 24.1 (L) 36.6 - 50.3 % MCV 86.7 80.0 - 99.0 FL  
 MCH 28.8 26.0 - 34.0 PG  
 MCHC 33.2 30.0 - 36.5 g/dL  
 RDW 14.6 (H) 11.5 - 14.5 % PLATELET 76 (L) 434 - 400 K/uL NRBC 0.0 0  WBC ABSOLUTE NRBC 0.00 0.00 - 0.01 K/uL NEUTROPHILS 61 32 - 75 % LYMPHOCYTES 24 12 - 49 % MONOCYTES 14 (H) 5 - 13 % EOSINOPHILS 1 0 - 7 % BASOPHILS 0 0 - 1 % IMMATURE GRANULOCYTES 0 0.0 - 0.5 % ABS. NEUTROPHILS 2.1 1.8 - 8.0 K/UL  
 ABS. LYMPHOCYTES 0.8 0.8 - 3.5 K/UL  
 ABS. MONOCYTES 0.5 0.0 - 1.0 K/UL  
 ABS. EOSINOPHILS 0.0 0.0 - 0.4 K/UL  
 ABS. BASOPHILS 0.0 0.0 - 0.1 K/UL  
 ABS. IMM. GRANS. 0.0 0.00 - 0.04 K/UL  
 DF AUTOMATED    
 
0915. D/C'd from [facility] and in no distress.  Next appointment is 1/25/19 at 8:30 am.

## 2019-01-23 NOTE — TELEPHONE ENCOUNTER
[facility]  [phone]    01/23/19Emily Gary RN with the [facility] called to report patients Hbg today. She reports patient did not see [facility] yesterday due to not feeling well and not eating or drinking much. Per Amarjit Harris encouraged her patient needs to fu with [facility] ASAP and to see us in the office soon. No BT due today and no further questions or concerns.

## 2019-01-25 NOTE — PROGRESS NOTES
[employer]  Social Work Navigator Encounter     Patient Name:  Kassy Hudson    Advance Directives: on file    Narrative: Met with patient in the infusion center due to transportation barriers. Patient reports he has no family and friends are unavailable to assist with transportation to medical appointments. Patient currently drives  but is uncomfortable driving downtown. Provided patient with a list of transportation resources. Advised he will need transportation home if he is to get a port. Reminded patient that Dr. Mohsen Rendon strongly encourages him to follow up with hematology at [facility]. Patient voiced his appointment with [facility] is 2/5. Recommended patient utilize a transportation service if he is uncomfortable driving himself. Patient voiced understanding and appreciation. Barriers to Care: transportation    Plan:   1. Patient provided transportation resources.    2. Ongoing psychosocial support as needed    Thank you,  FRANCIA Chaudhari

## 2019-01-25 NOTE — PROGRESS NOTES
Outpatient Infusion Center Short Visit Progress Note 
 
0800 Patient admitted to [facility] for PICC flush and dressing change ambulatory in stable condition. Assessment completed. No new concerns voiced. Visit Vitals /60 Pulse 92 Temp 96.8 °F (36 °C) Resp 18 SpO2 96% PICC with positive blood return. 0900 Patient tolerated treatment well. Patient discharged from [place] [address] ambulatory in no distress at 0900 Patient aware of next appointment scheduled for 1/28/19 @ 0830

## 2019-01-30 PROBLEM — R06.00 DYSPNEA: Status: ACTIVE | Noted: 2019-01-01

## 2019-01-30 PROBLEM — E83.42 HYPOMAGNESEMIA: Status: ACTIVE | Noted: 2019-01-01

## 2019-01-30 PROBLEM — R60.0 LOWER EXTREMITY EDEMA: Status: ACTIVE | Noted: 2019-01-01

## 2019-01-30 PROBLEM — D64.9 SYMPTOMATIC ANEMIA: Status: ACTIVE | Noted: 2019-01-01

## 2019-01-30 NOTE — ED TRIAGE NOTES
Patient arrived by EMS. Complaint was shortness of breath, swelling in legs and his concern over hemoglobin which he monitors carefully. He had an infusion center appointment this morning at 830 but was too winded and weak to use his car to get there.

## 2019-01-30 NOTE — CONSULTS
Consult order placed by ED at 9:36AM. I was not notified about consult until 10:26AM. Full note to follow.

## 2019-01-30 NOTE — ED NOTES
Pt Throughput: Receiving 5th floor Charge RN made aware of patient's bed placement.   
 
Karina Grey, RN

## 2019-01-30 NOTE — PROGRESS NOTES
[facility]: MED RECONCILIATION Comments/Recommendations:  
-Patient is awake, alert, and knowledgeable of home medications. 
-Reports taking no prescription medications. 
-Clarified allergies 
-Updated preferred outpatient pharmacy in [facility] Medications added:  
 
· None Medications removed: 
 
· None Medications adjusted: 
 
· None Information obtained from: Patient Allergies: Patient has no known allergies. Prior to Admission Medications:  
 
Medication Documentation Review Audit Prior to Admission Medications Prescriptions Last Dose Informant Patient Reported? Taking?  
folic acid/multivit-min/lutein (CENTRUM SILVER PO) 1/29/2019 at am Self Yes Yes Sig: Take 1 Tab by mouth daily. Facility-Administered Medications: None Brittany Hand, Pharm. D. [address] [place] [address] Dr SOOD [facility]

## 2019-01-30 NOTE — H&P
[facility] [address] [facility], [facility] 19 
[phone] Hospitalist Admission Note NAME:  Fannie Raza :   1945 MRN:  [id number] PCP:  None  
 
Date/Time:  2019 1:39 PM 
 
 
  
Assessment / Plan:   
  
 Severe anemia: symptomatic anemia, denies melena, hematochezia. Chronic anemia, pancytopenia. Has been receiving outpatient transfusions, followed by Dr. Javan Lee. Agree with one unit of pRBC transfusion today. Goal Hg is >7. Suspect this is from cirrhosis. Pt did have BM Bx prior, and EGD/colonoscopy in September, showing no varices and only one small polyp. BMBx showed no evidence of malignancy. 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 Hem/onc and GI consult. Follow Hg Cirrhosis ([facility] [address] (): ?cryptogenic. No hx of HCV, rare ETOH use. +OSCAR with borderline AMA, ?PBC. With ascites, likely needing paracentesis again soon. Last one was ~on , incomplete. Will start on 1/2 step diuretics (Lasix 20mg / Aldactone 50mg) as pt has lower extremity edema/anasarca, and pleural effusion. GI consult, followed by Dr. Vladimir Kimble. Follow CMP, INR. Hyponatremia: suspect from cirrhosis. Follow closely, especially if diuretics are started Abnormal chest x-ray: suspect from pleural effusion, hepatic hydrothorax? Empiric abx for now, but lower suspicion for PNA. Send pneumonia workup. Repeat CXR tomorrow. Consult pulmonology Dyspnea: likely from severe anemia, but ruling out DVT and checking echo. Hypomagnesemia: replace IV and follow. Monitor BMP and Mg closely Code Status: FULL, d/w pt. He would not want to remain intubated and on mechanical ventilation but would want an attempt at resuscitation Surrogate decision maker: friend, Rosemarie Lu Previous notes and lab results reviewed, including hem/onc and GI notes Total time spent with patient: 70 Minutes Time spent in the care of this patient included reviewing records, discussing with nursing, obtaining history and examining the patient, and discussing treatment plans, with >50% time spent counseling/coordinating care Risk of deterioration: High Care Plan discussed with: ED provider, Patient, Nursing Staff and >50% of time spent in counseling and coordination of care Discussed:  Code Status, Care Plan and D/C Planning Prophylaxis:  SCD's (given severe anemia, elevated INR) Disposition:  Home w/Family Subjective: CHIEF COMPLAINT: dyspnea HISTORY OF PRESENT ILLNESS:    
Mr. Chayo Mcgregor is a 74 y.o. male w/ hx of cirrhosis, pancytopenia who presents with dyspnea. Has chronic dyspnea, but worse in the last 2 days, severe, constant, worse with minimal exertion. No CP. No cough. No fevers. Associated with BLE edema. ED workup showed Hg 6.8. CXR showed small right pleural effusion and right basilar consolidation. 
  
Mr. Yoon is admitted for further evaluation and management. Past Medical History:  
Diagnosis Date  Anemia  Cirrhosis ([address]  History of vascular access device 10/22/2018 [facility] VAT - 4 FR, R basilic, 38 cm for DIVA (outpatient infusions)  Pancytopenia ([address]  Patella fracture 2007 Right knee Past Surgical History:  
Procedure Laterality Date  COLONOSCOPY N/A 9/28/2018 COLONOSCOPY performed by Camille Caballero MD at [facility] [address]  HX HERNIA REPAIR Bilateral   
 HX RETINAL DETACHMENT REPAIR Social History Tobacco Use  Smoking status: Former Smoker  Smokeless tobacco: Never Used Substance Use Topics  Alcohol use: Yes Comment: rare Family History Problem Relation Age of Onset  Anemia Mother  Cancer Father 61 Lung No Known Allergies Prior to Admission medications Medication Sig Start Date End Date Taking? Authorizing Provider folic acid/multivit-min/lutein (CENTRUM SILVER PO) Take 1 Tab by mouth daily. Yes Provider, Historical  
 
 
Review of Systems: 
(bold if positive, if negative) Gen:  fatigueEyes:  ENT:  CVS:  edemaPulm:  dyspneaGI:  nausea :   
MS:  Skin:  Psych:  Endo:   
Hem:  Renal:   
Neuro:    
 
 
  
Objective: VITALS:   
Vital signs reviewed; most recent are: 
 
Visit Vitals /70 Pulse 87 Temp 98.1 °F (36.7 °C) Resp 18 Ht 5' 11\" (1.803 m) Wt 85.1 kg (187 lb 9.8 oz) SpO2 99% BMI 26.17 kg/m² SpO2 Readings from Last 6 Encounters:  
01/30/19 99% 01/25/19 96% 01/21/19 99% 01/04/19 96% 12/24/18 98% 12/21/18 96% No intake or output data in the 24 hours ending 01/30/19 1339 Exam:  
 
Physical Exam: 
 
Gen:  Thin/frail, chronically ill-appearing. Disheveled. Very pleasant HEENT:  No scleral icterus, hearing intact to voice, moist mucous membranes Neck:  Supple, without masses. Resp:  No accessory muscle use. Diminished R base. No wheezing or rales or rhonchi 
Card: RRR. Normal S1 and S2 without murmurs, rubs, or gallops. 2+ bilateral peripheral lower extremity edema. No JVD. Peripheral pulses in tact. Abd:  Normoactive bowel sounds. Soft, non-tender, distended, tympanic. No rebound, no guarding. Lymph:  No cervical adenopathy Musc:  No cyanosis or clubbing Skin:  No rashes or ulcers; turgor intact. Neuro:  Cranial nerves are grossly intact, no focal motor weakness, follows commands appropriately Psych:  Good insight, normal affect. Alert, oriented x 3. Answers questions appropriately Labs: 
 
Recent Labs  
  01/30/19 
0902 WBC 3.2* HGB 6.8* HCT 19.9* PLT 83* Recent Labs  
  01/30/19 
0902 *  
K 4.4 CL 95* CO2 26 * BUN 17 CREA 0.72  
CA 8.2* MG 1.4* ALB 2.1*  
SGOT 44* ALT 36 No components found for: [place] No results for input(s): PH, PCO2, PO2, HCO3, FIO2 in the last 72 hours. Recent Labs  
  01/30/19 0902  
INR 1.6* No results found for: SDES Lab Results Component Value Date/Time Culture result: MIXED SKIN AMBER ISOLATED 04/20/2016 10:34 AM  
 
All other current labs reviewed in the computer. Imaging/Studies: CXR: Small right pleural effusion and right basilar consolidation. Imaging personally reviewed EKG: ?sinus, no overt ST-T changes EKG personally reviewed 
 
___________________________________________________ Attending Physician: Candice Redman MD

## 2019-01-30 NOTE — ROUTINE PROCESS
TRANSFER - OUT REPORT: 
 
Verbal report given to Karlene Fulton RN (name) on Herminio People  being transferred to [facility] (unit) for routine progression of care Report consisted of patients Situation, Background, Assessment and  
Recommendations(SBAR). Information from the following report(s) SBAR, Kardex, Intake/Output, Recent Results and Cardiac Rhythm NSR was reviewed with the receiving nurse. Lines: PICC Single Lumen 04/76/85 Right;Basilic (Active) Central Line Being Utilized Yes 1/30/2019  4:00 PM  
Criteria for Appropriate Use Limited/no vessel suitable for conventional peripheral access 1/30/2019  4:00 PM  
Site Assessment Clean, dry, & intact 1/30/2019  4:00 PM  
Phlebitis Assessment 0 1/30/2019  4:00 PM  
Infiltration Assessment 0 1/30/2019  4:00 PM  
Dressing Status Clean, dry, & intact 1/30/2019  4:00 PM  
Hub Color/Line Status Flushed 1/30/2019  4:00 PM  
   
Peripheral IV 01/30/19 Left;Upper Forearm (Active) Site Assessment Clean, dry, & intact 1/30/2019  4:00 PM  
Phlebitis Assessment 0 1/30/2019  4:00 PM  
Infiltration Assessment 0 1/30/2019  4:00 PM  
Dressing Status Clean, dry, & intact 1/30/2019  4:00 PM  
Dressing Type Transparent;Tape 1/30/2019  4:00 PM  
Hub Color/Line Status Pink; Infiltrated 1/30/2019  4:00 PM  
  
 
Opportunity for questions and clarification was provided. Patient transported with: 
 [place]

## 2019-01-30 NOTE — CONSULTS
Name: [address] [place] [address] Avenue: [address]  
: 1945 Admit Date: 2019 Phone:   Room: ER13/13 PCP: None  MRN: [id number] Date: 2019  Code: Full Code HPI: 
 
 
Chart and notes reviewed. Data reviewed. I review the patient's current medications in the medical record at each encounter.  I have evaluated and examined the patient. 3:14 PM    
 
History was obtained from patient. 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 History of Present Illness:   is a very pleasant, 74year old gentleman that presented to [facility] today with weakness and progressive shortness of breath. He has a history of cirrhosis and pancytopenia. Reports that he is always short of breath with exertion, however in the last few days he has been more so. He had difficulty walking from the living room to his front door today without getting weak. Denies cough or sputum production. Denies fever or chills. Denies worsening edema in hands or feet. Denies sick contacts. His last paracentesis was on , at that time 3L were removed. Denies history of pleural effusion or thoracentesis. Images:  Personally reviewed. CXR:  Small right pleural effusion and consolidation on the right. WBC 3.4 Hgb 6.8; receiving 1 unit PRBCs now Plts 76 INR 1.6 Pro- Creat . 67 BLE dopplers: prelim negative Past Medical History:  
Diagnosis Date  Anemia  Cirrhosis ([address]  History of vascular access device 10/22/2018 [facility] VAT - 4 FR, R basilic, 38 cm for DIVA (outpatient infusions)  Pancytopenia ([address]  Patella fracture 2007 Right knee Past Surgical History:  
Procedure Laterality Date  COLONOSCOPY N/A 2018 COLONOSCOPY performed by Ernst Anderson MD at [address]  HX HERNIA REPAIR Bilateral   
  HX RETINAL DETACHMENT REPAIR Family History Problem Relation Age of Onset  Anemia Mother  Cancer Father 61 Lung Social History Tobacco Use  Smoking status: Former Smoker  Smokeless tobacco: Never Used Substance Use Topics  Alcohol use: Yes Comment: rare No Known Allergies Current Facility-Administered Medications Medication Dose Route Frequency  0.9% sodium chloride infusion 250 mL  250 mL IntraVENous PRN  
 sodium chloride (NS) flush 5-40 mL  5-40 mL IntraVENous Q8H  
 sodium chloride (NS) flush 5-40 mL  5-40 mL IntraVENous PRN  
 acetaminophen (TYLENOL) tablet 650 mg  650 mg Oral Q6H PRN  
 naloxone (NARCAN) injection 0.4 mg  0.4 mg IntraVENous PRN  
 cefTRIAXone (ROCEPHIN) 2 g in sterile water (preservative free) 20 mL IV syringe  2 g IntraVENous Q24H  
 [START ON 1/31/2019] doxycycline (VIBRAMYCIN) 100 mg in 0.9% sodium chloride (MBP/ADV) 100 mL  100 mg IntraVENous Q12H  
 ondansetron (ZOFRAN) injection 4 mg  4 mg IntraVENous Q6H PRN  pantoprazole (PROTONIX) 40 mg in sodium chloride 0.9% 10 mL injection  40 mg IntraVENous Q24H  
 [START ON 1/31/2019] furosemide (LASIX) tablet 20 mg  20 mg Oral DAILY  spironolactone (ALDACTONE) tablet 50 mg  50 mg Oral DAILY Current Outpatient Medications Medication Sig  
 folic acid/multivit-min/lutein (CENTRUM SILVER PO) Take 1 Tab by mouth daily. REVIEW OF SYSTEMS Negative except as stated in the HPI. Physical Exam:  
Visit Vitals /68 (BP 1 Location: Left arm) Pulse 89 Temp 98 °F (36.7 °C) Resp 20 Ht 5' 11\" (1.803 m) Wt 84.8 kg (187 lb) SpO2 99% BMI 26.08 kg/m² General:  Alert, cooperative, no distress, appears stated age. Head:  Normocephalic, without obvious abnormality, atraumatic. Eyes:  Conjunctivae/corneas clear. Nose: Nares normal. Septum midline.  Mucosa normal.  
Throat: Lips, mucosa, and tongue normal. Teeth and gums normal.  
 Neck: Supple, symmetrical, trachea midline, no adenopathy, thyroid: no enlargment/tenderness/nodules, no carotid bruit and no JVD. Lungs:   Decreased in right base, otherwise clear. Chest wall:  No tenderness or deformity. Heart:  Regular rate and rhythm, S1, S2 normal, no murmur, click, rub or gallop. Abdomen:   Soft, non-tender. Bowel sounds normal.   
Extremities: Extremities normal, atraumatic, no cyanosis, 2+ pitting edema in LEs Pulses: 2+ and symmetric all extremities. Skin: Skin color, texture, turgor normal.   
Lymph nodes: Cervical nodes normal.  
Neurologic: Grossly nonfocal  
 
 
Lab Results Component Value Date/Time Sodium 129 (L) 01/30/2019 09:02 AM  
 Potassium 4.4 01/30/2019 09:02 AM  
 Chloride 95 (L) 01/30/2019 09:02 AM  
 CO2 26 01/30/2019 09:02 AM  
 BUN 17 01/30/2019 09:02 AM  
 Creatinine 0.72 01/30/2019 09:02 AM  
 Glucose 132 (H) 01/30/2019 09:02 AM  
 Calcium 8.2 (L) 01/30/2019 09:02 AM  
 Magnesium 1.4 (L) 01/30/2019 09:02 AM  
 Phosphorus 3.2 10/25/2009 12:49 PM  
 Lactic acid 2.0 01/30/2019 09:50 AM  
 
 
Lab Results Component Value Date/Time WBC 3.2 (L) 01/30/2019 09:02 AM  
 HGB 6.8 (L) 01/30/2019 09:02 AM  
 PLATELET 83 (L) 95/57/9447 09:02 AM  
 MCV 85.8 01/30/2019 09:02 AM  
 
 
Lab Results Component Value Date/Time INR 1.6 (H) 01/30/2019 09:02 AM  
 aPTT 26.5 09/26/2018 10:15 AM  
 AST (SGOT) 44 (H) 01/30/2019 09:02 AM  
 Alk. phosphatase 293 (H) 01/30/2019 09:02 AM  
 Protein, total 6.4 01/30/2019 09:02 AM  
 Albumin 2.1 (L) 01/30/2019 09:02 AM  
 Globulin 4.3 (H) 01/30/2019 09:02 AM  
 
 
Lab Results Component Value Date/Time Iron 243 (H) 09/27/2018 04:14 AM  
 TIBC 252 09/27/2018 04:14 AM  
 Iron % saturation 96 (H) 09/27/2018 04:14 AM  
 Ferritin 537 (H) 09/27/2018 04:14 AM  
 
 
Lab Results Component Value Date/Time  TSH 4.45 (H) 09/27/2018 04:14 AM  
  
 
No results found for: PH, PHI, PCO2, PCO2I, PO2, PO2I, HCO3, HCO3I, FIO2, FIO2I 
 
 Lab Results Component Value Date/Time  (H) 04/23/2016 12:43 AM  
 CK-MB Index 0.7 04/20/2016 09:58 AM  
 Troponin-I, Qt. <0.05 01/30/2019 09:02 AM  
 BNP 50 10/25/2009 12:49 PM  
  
 
Lab Results Component Value Date/Time Culture result: MIXED SKIN AMBER ISOLATED 04/20/2016 10:34 AM  
 
 
Lab Results Component Value Date/Time Hepatitis B surface Ag 0.13 04/20/2016 09:58 AM  
 
 
Lab Results Component Value Date/Time  (H) 04/23/2016 12:43 AM  
  (H) 04/22/2016 06:37 PM  
 
 
Lab Results Component Value Date/Time Color DARK YELLOW 10/04/2018 06:11 PM  
 Appearance CLEAR 10/04/2018 06:11 PM  
 pH (UA) 6.0 10/04/2018 06:11 PM  
 Protein TRACE (A) 10/04/2018 06:11 PM  
 Glucose NEGATIVE  10/04/2018 06:11 PM  
 Ketone TRACE (A) 10/04/2018 06:11 PM  
 Blood NEGATIVE  10/04/2018 06:11 PM  
 Urobilinogen 1.0 10/04/2018 06:11 PM  
 Nitrites NEGATIVE  10/04/2018 06:11 PM  
 Leukocyte Esterase TRACE (A) 10/04/2018 06:11 PM  
 WBC 0-4 10/04/2018 06:11 PM  
 RBC 0-5 10/04/2018 06:11 PM  
 Bacteria NEGATIVE  10/04/2018 06:11 PM  
 
 
IMPRESSION 
· Dyspnea · Abnormal CXR; with right pleural effusion +/- consolidation · Cirrhosis · Anemia · Hyponatremia PLAN 
· Supplemental O2 if needed to keep sats >90% · Check chest CT to better visualize lung parenchyma and characterize fluid vs. infectious source, however effusion appears small on CXR and thoracentesis would not provide much relief · Continue with IV ABX for now. PNA panel pending · Check RVP · Diuresis per primary team 
· ECHO pending · Transfuse to keep hemoglobin >7 
· GI consult pending Thank you very much for the consult.  
 
 
Penny Brown NP

## 2019-01-30 NOTE — PROGRESS NOTES
1/30/2019 
4:36 PM 
Case management note Met with patient to discuss discharge planning. Confirmed demographics. Patient lives live in [facility] with 1 step to enter. He does not have a support system. He has been currently driving to [facility] for 3 times a week for treatment for his liver disease. He stated\" we cant get to the bottom of this\". Patient has appointment with Robby Dela Cruz specialist \" at [facility], he cant remember name or when, he has it at home. Marilyn Russell is his DWA 7  Emmanuel @ [facility] 23 Advance directive on file When patient is ready for discharge he wants to use [employer] for ride home. Reason for Admission:   Anemia, pneumonia RRAT Score:     15 Do you (patient/family) have any concerns for transition/discharge? Lives alone, no support syste Plan for utilizing home health: Would benefit on discharge Likelihood of readmission? Moderate/yellow   Higher due to disease process Transition of Care Plan:      Home with home health, out patient follow up Care Management Interventions PCP Verified by CM: No(gave list and patient has not made appointment yet.) Mode of Transport at Discharge: Self Transition of Care Consult (CM Consult): Discharge Planning Current Support Network: Lives Alone Confirm Follow Up Transport: Cab Plan discussed with Pt/Family/Caregiver: Yes Discharge Location Discharge Placement: Home with home health [address]

## 2019-01-30 NOTE — ED PROVIDER NOTES
74 yom with hx of cirrhosis, anemia, thrombocytopenia pw dyspnea on exertion. Reports he's had intermittent dyspnea for the past few months, being followed by oncology for his cirrhosis and noted to have anemia requiring transfusions on several occasions. Most recently had therapeutic paracentesis on [id number] and states his belly is about the same as after that procedure. Denies increase in leg swelling or unilateral change/pain. Denies hemoptysis, chest discomfort, nausea, diaphoresis, fever. Denies hx of VTE/CAD. States as recently as two weeks ago was able to go to the grocery store independently but over the weekend stayed inside and then this morning wasn't able to walk but a few steps without being short of breath and feeling his heart rate increase. The history is provided by the patient and the EMS personnel. Shortness of Breath This is a new problem. The average episode lasts 3 days. The problem occurs frequently. The current episode started more than 2 days ago. The problem has not changed since onset. Associated symptoms include leg swelling. Pertinent negatives include no fever, no rhinorrhea, no cough, no sputum production, no hemoptysis, no wheezing, no chest pain, no syncope, no vomiting, no abdominal pain and no leg pain. He has tried nothing for the symptoms. Associated medical issues do not include asthma, COPD, chronic lung disease, PE, CAD, heart failure, DVT or recent surgery. Past Medical History:  
Diagnosis Date  Anemia  Cirrhosis ([address]  History of vascular access device 10/22/2018 [facility] VAT - 4 FR, R basilic, 38 cm for DIVA (outpatient infusions)  Pancytopenia ([address]  Patella fracture 2007 Right knee Past Surgical History:  
Procedure Laterality Date  COLONOSCOPY N/A 9/28/2018 COLONOSCOPY performed by Jesus Alberto Riojas MD at [address] [place] [address] Ave  HX HERNIA REPAIR Bilateral   
 HX RETINAL DETACHMENT REPAIR    
 
   
 Family History:  
Problem Relation Age of Onset  Anemia Mother  Cancer Father 61 Lung Social History Socioeconomic History  Marital status: SINGLE Spouse name: Not on file  Number of children: Not on file  Years of education: Not on file  Highest education level: Not on file Social Needs  Financial resource strain: Not on file  Food insecurity - worry: Not on file  Food insecurity - inability: Not on file  Transportation needs - medical: Not on file  Transportation needs - non-medical: Not on file Occupational History  Not on file Tobacco Use  Smoking status: Former Smoker  Smokeless tobacco: Never Used Substance and Sexual Activity  Alcohol use: Yes Comment: rare  Drug use: No  
 Sexual activity: No  
Other Topics Concern [address] Service Not Asked  Blood Transfusions Not Asked  Caffeine Concern Not Asked  Occupational Exposure Not Asked [place] Hazards Not Asked  Sleep Concern Not Asked  Stress Concern Not Asked  Weight Concern Not Asked  Special Diet Not Asked  Back Care Not Asked  Exercise Not Asked  Bike Helmet Not Asked  Seat Belt Not Asked  Self-Exams Not Asked Social History Narrative  Not on file ALLERGIES: Patient has no known allergies. Review of Systems Constitutional: Negative for fever. HENT: Negative for rhinorrhea. Eyes: Negative for photophobia. Respiratory: Positive for shortness of breath. Negative for cough, hemoptysis, sputum production, chest tightness and wheezing. Cardiovascular: Positive for leg swelling. Negative for chest pain and syncope. Gastrointestinal: Positive for abdominal distention. Negative for abdominal pain, blood in stool, diarrhea, nausea and vomiting. Genitourinary: Negative for decreased urine volume. Musculoskeletal: Negative for myalgias. Neurological: Negative for syncope and weakness. Psychiatric/Behavioral: Negative for confusion. Vitals:  
 01/30/19 0830 BP: 132/78 Pulse: 97 Resp: 20 Temp: 98.1 °F (36.7 °C) SpO2: 100% Weight: 85.1 kg (187 lb 9.8 oz) Height: 5' 11\" (1.803 m) Physical Exam  
Constitutional: He is oriented to person, place, and time. He appears well-developed. No distress. Chronically ill but non-toxic HENT:  
Head: Normocephalic and atraumatic. Eyes: Conjunctivae are normal. Pupils are equal, round, and reactive to light. No scleral icterus. Neck: Normal range of motion. Neck supple. Cardiovascular: Normal rate, regular rhythm, normal heart sounds and intact distal pulses. Pulmonary/Chest: He has no wheezes. He has no rales. tachypnea on standing to get weight Abdominal: Soft. He exhibits distension. There is no tenderness. Musculoskeletal: He exhibits edema (3+ BLE). Neurological: He is alert and oriented to person, place, and time. He exhibits normal muscle tone. Skin: Skin is warm and dry. Capillary refill takes less than 2 seconds. He is not diaphoretic. There is pallor. Psychiatric: He has a normal mood and affect. His behavior is normal.  
Nursing note and vitals reviewed. MDM Number of Diagnoses or Management Options Diagnosis management comments: 10:25 AM stable, non-toxic appearing. Hgb low at 6.8; will transfuse 1 unit. Small effusion vs consolidation on CXR; will get cultures and cover with azithro/cefepime. Afebrile, WBC at his baseline. I do not suspect sepsis. Lactate pending. ECG non-ischemic, negative troponin and BNP. I do not suspect ACS. Not hypoxic on room air with alternative diagnosis. I do not suspect PE. Discussed with hospitalist, Dr. Glenroy Friedman, who agrees with plan for transfusion, abx and admission. Patient agrees to plan.  
 
The case was discussed with Dr. Leighann Myles during the course of the patient's evaluation, treatment and disposition including relevant history and physical exam findings, and laboratory and radiology results. EKG Date/Time: 1/30/2019 9:11 AM 
Performed by: Milton Pacheco PA-C Authorized by: Milton Pacheco PA-C  
 
ECG reviewed by ED Physician in the absence of a cardiologist: yes Interpretation: Interpretation: non-specific Rate:  
  ECG rate:  84 ECG rate assessment: normal   
Rhythm:  
  Rhythm: sinus rhythm Ectopy:  
  Ectopy: none QRS:  
  QRS axis:  Normal 
Conduction:  
  Conduction: normal   
ST segments:   ST segments:  Normal 
T waves:  
  T waves: normal

## 2019-01-30 NOTE — CONSULTS
[facility] at [address], [address] [place] [address] W: [phone]  F: [phone] Reason for Visit:  
Digna Kelsey is a 74 y.o. male who is seen in consultation at the request of Dr. Dylan Rosales for evaluation of anemia. History of Present Illness:  
Digna Kelsey is a pleasant 74 y.o. male who presents today for evaluation of anemia. I met him during his previous hospital stay for this, and I have been following him as an outpatient. The cause of his anemia has not been clear to me. We have been transfusing him regularly as an outpatient (about weekly), and referred him to [facility] to help us determine the etiology of his anemia, but he has not been compliant with follow up there. He missed his appointment with us recently for transfusion, as he was not feeling well. He presented to the ED today with progressive dyspnea. Workup in the ED demonstrated his anemia, as well as a small right effusion and right basilar consolidation. He was admitted for further evaluation. He has a history of cirrhosis. He was seen by GI at [facility] and had a paracentesis there. Recently had a repeat paracentesis ordered by my office on 1/21. He reports no ETOH in over a year. Past Medical History:  
Diagnosis Date  Anemia  Cirrhosis ([address]  History of vascular access device 10/22/2018 [facility] VAT - 4 FR, R basilic, 38 cm for DIVA (outpatient infusions)  Pancytopenia ([address]  Patella fracture 2007 Right knee Past Surgical History:  
Procedure Laterality Date  COLONOSCOPY N/A 9/28/2018 COLONOSCOPY performed by Westley Rivas MD at [address]  HX HERNIA REPAIR Bilateral   
 HX RETINAL DETACHMENT REPAIR Social History Tobacco Use  Smoking status: Former Smoker  Smokeless tobacco: Never Used Substance Use Topics  Alcohol use: Yes Comment: rare Family History Problem Relation Age of Onset  Anemia Mother  Cancer Father 61 Lung Current Facility-Administered Medications Medication Dose Route Frequency  0.9% sodium chloride infusion 250 mL  250 mL IntraVENous PRN  
 sodium chloride (NS) flush 5-40 mL  5-40 mL IntraVENous Q8H  
 sodium chloride (NS) flush 5-40 mL  5-40 mL IntraVENous PRN  
 acetaminophen (TYLENOL) tablet 650 mg  650 mg Oral Q6H PRN  
 naloxone (NARCAN) injection 0.4 mg  0.4 mg IntraVENous PRN  
 cefTRIAXone (ROCEPHIN) 2 g in sterile water (preservative free) 20 mL IV syringe  2 g IntraVENous Q24H  
 [START ON 1/31/2019] doxycycline (VIBRAMYCIN) 100 mg in 0.9% sodium chloride (MBP/ADV) 100 mL  100 mg IntraVENous Q12H  
 ondansetron (ZOFRAN) injection 4 mg  4 mg IntraVENous Q6H PRN  pantoprazole (PROTONIX) 40 mg in sodium chloride 0.9% 10 mL injection  40 mg IntraVENous Q24H  
 [START ON 1/31/2019] furosemide (LASIX) tablet 20 mg  20 mg Oral DAILY  spironolactone (ALDACTONE) tablet 50 mg  50 mg Oral DAILY No Known Allergies Review of Systems: A complete review of systems was obtained, negative except as described above. Physical Exam:  
 
Visit Vitals /58 Pulse 87 Temp 98.1 °F (36.7 °C) Resp 20 Ht 5' 11\" (1.803 m) Wt 187 lb (84.8 kg) SpO2 98% BMI 26.08 kg/m² General: No distress, frail, cachectic appearing Eyes: PERRLA, anicteric sclerae HENT: Atraumatic, OP clear Neck: Supple Lymphatic: No cervical, supraclavicular, or inguinal adenopathy Respiratory: CTAB, normal respiratory effort CV: Normal rate, regular rhythm, no murmurs, no peripheral edema GI: Tense ascites. No masses. Unable to palpate liver or spleen. MS: Normal gait and station. Digits without clubbing or cyanosis. Skin: No rashes, ecchymoses, or petechiae. Normal temperature, turgor, and texture. Psych: Alert, oriented, appropriate affect, normal judgment/insight Results:  
 
Lab Results Component Value Date/Time WBC 3.2 (L) 01/30/2019 09:02 AM  
 HGB 7.2 (L) 01/30/2019 06:03 PM  
 HCT 21.6 (L) 01/30/2019 06:03 PM  
 PLATELET 83 (L) 42/22/3511 09:02 AM  
 MCV 85.8 01/30/2019 09:02 AM  
 ABS. NEUTROPHILS 2.2 01/30/2019 09:02 AM  
 
Lab Results Component Value Date/Time Sodium 129 (L) 01/30/2019 09:02 AM  
 Potassium 4.4 01/30/2019 09:02 AM  
 Chloride 95 (L) 01/30/2019 09:02 AM  
 CO2 26 01/30/2019 09:02 AM  
 Glucose 132 (H) 01/30/2019 09:02 AM  
 BUN 17 01/30/2019 09:02 AM  
 Creatinine 0.72 01/30/2019 09:02 AM  
 GFR est AA >60 01/30/2019 09:02 AM  
 GFR est non-AA >60 01/30/2019 09:02 AM  
 Calcium 8.2 (L) 01/30/2019 09:02 AM  
 
Lab Results Component Value Date/Time Bilirubin, total 1.1 (H) 01/30/2019 09:02 AM  
 ALT (SGPT) 36 01/30/2019 09:02 AM  
 AST (SGOT) 44 (H) 01/30/2019 09:02 AM  
 Alk. phosphatase 293 (H) 01/30/2019 09:02 AM  
 Protein, total 6.4 01/30/2019 09:02 AM  
 Albumin 2.1 (L) 01/30/2019 09:02 AM  
 Globulin 4.3 (H) 01/30/2019 09:02 AM  
 
Lab Results Component Value Date/Time Reticulocyte count 0.2 (L) 10/04/2018 02:52 PM  
 Iron % saturation 96 (H) 09/27/2018 04:14 AM  
 TIBC 252 09/27/2018 04:14 AM  
 Ferritin 537 (H) 09/27/2018 04:14 AM  
 Vitamin B12 >2,000 (H) 09/26/2018 01:00 PM  
 Folate 19.1 09/26/2018 01:00 PM  
 Haptoglobin 105 09/26/2018 10:15 AM  
  09/26/2018 10:15 AM  
 TSH 4.45 (H) 09/27/2018 04:14 AM  
 OSCAR, Direct Positive (A) 09/27/2018 04:14 AM  
 Hep C  virus Ab Interp. NONREACTIVE 04/20/2016 09:58 AM  
 
Lab Results Component Value Date/Time INR 1.6 (H) 01/30/2019 09:02 AM  
 aPTT 26.5 09/26/2018 10:15 AM  
 
 
Peripheral smear 9/26/2018: There is a macrocytic, normochromic anemia. Granulocytes and  
lymphocytes are reduced and mature. Platelets are reduced and no  
blasts are seen. If cytopenias persist, a bone marrow biopsy  may be  
beneficial. Per Dr. Skyler Heaton MD. 
  
Abdominal US 9/27/2018 1. Cirrhosis. Liver protocol CT or MRI should be considered for more sensitive hepatocellular carcinoma screening. 2. Small volume of ascites and right pleural effusion. 
  
Bone marrow biopsy 9/27/2018: Cellular marrow with maturing trilineage hematopoiesis, no increase in blasts. Lymphoid aggregates, favor benign/reactive. Mild reticulin fibrosis. Increased storage iron. Records reviewed and summarized above. Assessment/Recommendations:  
1) Anemia, macrocytic Severe, transfusion dependent, but uncertain etiology. Labwork fairly unremarkable.  EGD and colonoscopy without bleeding.  Concern for production issue, given low retic and other cytopenias. However, bone marrow biopsy completed 9/27 was unrevealing. Possible early MDS, but I don't know that this would explain his considerable transfusion requirement. I have sent him to [facility] for evaluation, he is seeing Dr. Peace Rodríguez and we are awaiting their opinion, though he has been noncompliant with follow up there. He does have an appointment there next week which hopefully he can keep. For now, continue with transfusion support as needed for HGB <7. 
  
2) Thrombocytopenia Chronic since at least 2009.  Possibly related to cirrhosis, labwork otherwise unremarkable.  Spleen at upper limit of normal on imaging. Bone marrow without definite etiology, though perhaps early MDS as above. Monitor.   
3) Neutropenia Mild. As above, possibly related to cirrhosis vs early MDS. Monitor.   
4) Cirrhosis GI following. Seems to be worsening. Needing paracentesis. Seen by hepatology at [facility], but doesn't want to follow up there. We had placed referral to Dr. Mabel Green (hepatology at [facility]) as outpatient, but that appointment is not until March. This can be cancelled if Dr. Linda Cash or Dr. Sukumar Abebe plan to follow. 5) Poor venous access PICC line to right arm now.  Previously discussed port placement, but he has been reluctant due to lack of transportation. Consider placing during this hospital stay. 6) Dyspnea Pulmonary planning CT Chest 
 
Signed By: Karon Dawkins MD

## 2019-01-30 NOTE — CONSULTS
Gastroenterology Consult Referring Physician: Ishan Drake Consult Date: 1/30/2019 Subjective:  Real tired Chief Complaint: fatigue History of Present Illness: Joshua Monroe is a 74 y.o. male who is seen in consultation for liver cirrhosis. Personal evaluation Dr Kelli Munson 9/18; anemia evaluation negative sources GI blood loss. In spite of anemia recurring requiring blood transfusions has not had vomiting, diarrhea, visible blood loss. Recurrent ascites; on 1/21 had three liters ascites removed; has much larger volume ascites present. Volume fluid removed limited out concern of potential symptoms fluid removal as he was out patient. Marked abdominal distention, lower extremity swelling quickly returned. No fever Past Medical History:  
Diagnosis Date  Anemia  Cirrhosis ([address]  History of vascular access device 10/22/2018 [facility] VAT - 4 FR, R basilic, 38 cm for DIVA (outpatient infusions)  Pancytopenia ([address]  Patella fracture 2007 Right knee Past Surgical History:  
Procedure Laterality Date  COLONOSCOPY N/A 9/28/2018 COLONOSCOPY performed by Sedrick Santoyo MD at [address]  HX HERNIA REPAIR Bilateral   
 HX RETINAL DETACHMENT REPAIR Family History Problem Relation Age of Onset  Anemia Mother  Cancer Father 61 Lung Social History Tobacco Use  Smoking status: Former Smoker  Smokeless tobacco: Never Used Substance Use Topics  Alcohol use: Yes Comment: rare No Known Allergies Current Facility-Administered Medications Medication Dose Route Frequency  0.9% sodium chloride infusion 250 mL  250 mL IntraVENous PRN  
 sodium chloride (NS) flush 5-40 mL  5-40 mL IntraVENous Q8H  
 sodium chloride (NS) flush 5-40 mL  5-40 mL IntraVENous PRN  
 acetaminophen (TYLENOL) tablet 650 mg  650 mg Oral Q6H PRN  
 naloxone (NARCAN) injection 0.4 mg  0.4 mg IntraVENous PRN  
  cefTRIAXone (ROCEPHIN) 2 g in sterile water (preservative free) 20 mL IV syringe  2 g IntraVENous Q24H  
 [START ON 1/31/2019] doxycycline (VIBRAMYCIN) 100 mg in 0.9% sodium chloride (MBP/ADV) 100 mL  100 mg IntraVENous Q12H  
 ondansetron (ZOFRAN) injection 4 mg  4 mg IntraVENous Q6H PRN  pantoprazole (PROTONIX) 40 mg in sodium chloride 0.9% 10 mL injection  40 mg IntraVENous Q24H  
 [START ON 1/31/2019] furosemide (LASIX) tablet 20 mg  20 mg Oral DAILY  spironolactone (ALDACTONE) tablet 50 mg  50 mg Oral DAILY Current Outpatient Medications Medication Sig  
 folic acid/multivit-min/lutein (CENTRUM SILVER PO) Take 1 Tab by mouth daily. Review of Systems: A detailed 10 organ review of systems is obtained with pertinent positives as listed in the History of Present Illness and Past Medical History. All others are negative. Objective:  
 
Physical Exam: 
Visit Vitals /71 Pulse 87 Temp 98.3 °F (36.8 °C) Resp 20 Ht 5' 11\" (1.803 m) Wt 84.8 kg (187 lb) SpO2 95% BMI 26.08 kg/m² Skin:  Extremities and face reveal no rashes. HEENT: Sclerae anicteric. Extra-occular muscles are intact. Cardiovascular: Regular rate and rhythm. No murmurs, gallops, or rubs. Respiratory:  Comfortable breathing with no accessory muscle use. Clear breath sounds with no wheezes, rales, or rhonchi. GI:  Abdomen distended, and nontender. Normal active bowel sounds. Musculoskeletal:  Bilateral pitting edema of the lower legs. Extremities have good range of motion. Neurological:  Gross memory appears intact. Patient is alert and oriented. Psychiatric:  Mood appears appropriate with judgement intact. Lymphatic:  No cervical or supraclavicular adenopathy. Lab/Data Review: 
CMP:  
Lab Results Component Value Date/Time   (L) 01/30/2019 09:02 AM  
 K 4.4 01/30/2019 09:02 AM  
 CL 95 (L) 01/30/2019 09:02 AM  
 CO2 26 01/30/2019 09:02 AM  
 AGAP 8 01/30/2019 09:02 AM  
  (H) 01/30/2019 09:02 AM  
 BUN 17 01/30/2019 09:02 AM  
 CREA 0.72 01/30/2019 09:02 AM  
 GFRAA >60 01/30/2019 09:02 AM  
 GFRNA >60 01/30/2019 09:02 AM  
 CA 8.2 (L) 01/30/2019 09:02 AM  
 MG 1.4 (L) 01/30/2019 09:02 AM  
 ALB 2.1 (L) 01/30/2019 09:02 AM  
 TP 6.4 01/30/2019 09:02 AM  
 GLOB 4.3 (H) 01/30/2019 09:02 AM  
 AGRAT 0.5 (L) 01/30/2019 09:02 AM  
 SGOT 44 (H) 01/30/2019 09:02 AM  
 ALT 36 01/30/2019 09:02 AM  
 
CBC:  
Lab Results Component Value Date/Time WBC 3.2 (L) 01/30/2019 09:02 AM  
 HGB 6.8 (L) 01/30/2019 09:02 AM  
 HCT 19.9 (L) 01/30/2019 09:02 AM  
 PLT 83 (L) 01/30/2019 09:02 AM  
 
COAGS:  
Lab Results Component Value Date/Time PTP 15.8 (H) 01/30/2019 09:02 AM  
 INR 1.6 (H) 01/30/2019 09:02 AM  
 
 
 
Assessment/Plan: Active Problems: 
  Pancytopenia ([address] (9/26/2018) Cirrhosis ([address] () Hyponatremia (9/26/2018) Abnormal chest x-ray (9/26/2018) Dyspnea (1/30/2019) Lower extremity edema (1/30/2019) Hypomagnesemia (1/30/2019) Symptomatic anemia (1/30/2019) Recommend Large volume paracentesis removal as fluid as practical 
Appears may need weekly paracentesis Dr Sari Jung follow with you

## 2019-01-31 NOTE — PROGRESS NOTES
NUTRITION education Nutrition Assessment:  
Cirrhosis vs Leocadia Vicki. Pt had ~8.5L of fluid removed earlier today. Low Na diet consult received. Pt given Low Na MNT to help avoid fluid accumulation. Pt agreeable and seems to understand. Pt accepting of diet suggestions. Pt states appetite is returning. He had a good breakfast and light lunch. Weighed pt in bed, 177 lbs s/p paracentesis. Down 10 lbs from chart weight. Pt states he doesn't go out to restaurants very often because he's alone and doesn't frequent fast foods at all. Rarely eats frozen meals- instructed to get as low sodium frozen meals for the occasions he does eat frozen meal.  
 
Nutrition Diagnoses: 
Nutrition/food-related knowledge deficit Intervention: 1. Brief Nutrition education including identification of High vs Low Na foods, reading food labels and replacing high Na with low Na choices and adding other spices vs using salt to season foods. 2. Nutrition Counseling including strategies for behavior modification, goal setting and meal planning and spice information. Monitoring/Evaluation: Pt expressed understanding of education topics and acknowledged ability in applying diet goals. Pt answered questions posed to demonstrate learning. Arianne Roque, [address] [facility]

## 2019-01-31 NOTE — ROUTINE PROCESS
Bedside shift change report given to Fátima Arora (oncoming nurse) by Eli Enriquez (offgoing nurse). Report included the following information SBAR, Kardex, ED Summary, [facility] and Recent Results.

## 2019-01-31 NOTE — PROGRESS NOTES
Gastroenterology Progress Note January 31, 2019 Admit Date: 1/30/2019 Narrative Assessment and Plan · Ascites · Cirrhosis - working hypothesis due to previous alcohol use vs NIETO · Pancytopenia - oncology following For ascites management: 
- large volume paracentesis scheduled for today (ordered albumin 25% 50g to be given if >5L removed) 
- after paracentesis will need to start step 1 diuretic therapy for ascites management (lasix 40mg once daily and spironolactone 100mg once daily) - low Na diet 
- closely monitor electrolytes and kidney function 
- repeat labs in AM 
 
Cirrhosis:  
- patient is up to date with variceal and [address] screening Subjective:  
· Patient states that he is feeling much better than yesterday. Admits to abdominal fullness but denies pain. Tolerating breakfast this AM. Denies nausea or vomiting. ROS:  The previous review of systems on initial consultation / H&P is noted and reviewed. Specific changes noted above in HPI. Current Medications:  
 
Current Facility-Administered Medications Medication Dose Route Frequency  0.9% sodium chloride infusion 250 mL  250 mL IntraVENous PRN  
 furosemide (LASIX) tablet 20 mg  20 mg Oral DAILY  spironolactone (ALDACTONE) tablet 50 mg  50 mg Oral DAILY  magnesium sulfate 2 g/50 ml IVPB (premix or compounded)  2 g IntraVENous ONCE  
 0.9% sodium chloride infusion 250 mL  250 mL IntraVENous PRN  
 sodium chloride (NS) flush 5-40 mL  5-40 mL IntraVENous Q8H  
 sodium chloride (NS) flush 5-40 mL  5-40 mL IntraVENous PRN  
 acetaminophen (TYLENOL) tablet 650 mg  650 mg Oral Q6H PRN  
 naloxone (NARCAN) injection 0.4 mg  0.4 mg IntraVENous PRN  
 cefTRIAXone (ROCEPHIN) 2 g in sterile water (preservative free) 20 mL IV syringe  2 g IntraVENous Q24H  
 doxycycline (VIBRAMYCIN) 100 mg in 0.9% sodium chloride (MBP/ADV) 100 mL  100 mg IntraVENous Q12H  ondansetron (ZOFRAN) injection 4 mg  4 mg IntraVENous Q6H PRN  pantoprazole (PROTONIX) 40 mg in sodium chloride 0.9% 10 mL injection  40 mg IntraVENous Q24H Objective: VITALS:  
Last 24hrs VS reviewed since prior progress note. Most recent are: 
Visit Vitals BP 99/61 (BP 1 Location: Left arm, BP Patient Position: At rest) Pulse 87 Temp 98.3 °F (36.8 °C) Resp 16 Ht 5' 11\" (1.803 m) Wt 84.8 kg (187 lb) SpO2 94% BMI 26.08 kg/m² Temp (24hrs), Av.1 °F (36.7 °C), Min:97.6 °F (36.4 °C), Max:98.4 °F (36.9 °C) Intake/Output Summary (Last 24 hours) at 2019 1624 Last data filed at 2019 4933 Gross per 24 hour Intake 240 ml Output  Net 240 ml EXAM: 
General: Comfortable, no distress   
HEENT: Atraumatic skull, pupils equal 
Lungs:  No abnormal audible breath sounds. Speaking in complete sentences Heart:  No abnormal audible heart sounds. Well perfused. Bilateral lower                                    extremity pitting edema Abdomen: Taut, distended, nontender. No peritoneal signs. Musc:  No skeletal defects or deformities Neurologic:  Cranial nerves grossly intact, moves all 4 extremities Psych:   Good insight. Not anxious nor agitated Derm:  No jaundice Lab Data Reviewed:  
Recent Labs  
  19 
0427 19 
1803 19 
4763 WBC 2.6*  --  3.2* HGB 6.3* 7.2* 6.8* HCT 18.6* 21.6* 19.9*  
PLT 57*  --  83* Recent Labs  
  19 
0427 19 
0902 * 129*  
K 4.4 4.4 CL 97 95* CO2 27 26 GLU 95 132* BUN 17 17 CREA 0.65* 0.72  
CA 7.7* 8.2* MG 1.5* 1.4* PHOS 3.7  --   
ALB 1.8* 2.1* TBILI 1.2* 1.1*  
SGOT 30 44* ALT 28 36 INR 1.6* 1.6* No results found for: Elvianaif Gualberto No results for input(s): PH, PCO2, PO2, HCO3, FIO2 in the last 72 hours. Recent Labs  
  19 
0427 19 
0902 INR 1.6* 1.6* Assessment: (See above) Active Problems: 
  Pancytopenia ([facility] (2018) Cirrhosis ([facility] () Hyponatremia (9/26/2018) Abnormal chest x-ray (9/26/2018) Dyspnea (1/30/2019) Lower extremity edema (1/30/2019) Hypomagnesemia (1/30/2019) Symptomatic anemia (1/30/2019) Plan: (See above) Signed By: 
Miki Etienne PA-C 
1/31/2019 
9:12 AM 
 
James Anderson MD

## 2019-01-31 NOTE — PROGRESS NOTES
Problem: Falls - Risk of 
Goal: *Absence of Falls Document Ciera Clifford Fall Risk and appropriate interventions in the flowsheet. Outcome: Progressing Towards Goal 
Fall Risk Interventions: 
  
 
  
 
  
 
Elimination Interventions: Call light in reach, Patient to call for help with toileting needs, Toileting schedule/hourly rounds

## 2019-01-31 NOTE — PROGRESS NOTES
[facility] PROGRESS NOTEPulmonary, Critical Care, and Sleep Medicine Name: Aakash Fung MRN: [id number] : 1945 Hospital: [address] [place] [address] Date: 2019  Admission Date: 2019 Chart and notes reviewed. Data reviewed. I review the patient's current medications in the medical record at each encounter.  I have evaluated and examined the patient. .  
 
Overnight events reviewed: 
Afebrile overnight BP soft but stable O2 sats 98% on RA 
8450  mLs removed via paracentesis today Hgb 7.2; s/p 1 unit PRBCs this morning WBC 2.6 Mag 1.5; received repletion RVP negative INR 1.6 Na 130, improved Chest CT: moderate right pleural effusion with atlectasis, trace left pleural effusion ECHO:  Left atrial cavity mildly dilated, moderate aortic valve leaflet calcification, trace MR, mild TR, moderate left pleural effusion, EF 61.65% ROS:  Feels better since having paracentesis this morning. Breathing feelsa bout hte same, but he has not been up since the procedure. Denies further complaints, no cough/sputum production. Vital Signs: 
Visit Vitals /51 (BP 1 Location: Left arm, BP Patient Position: Sitting) Pulse 93 Temp 98.8 °F (37.1 °C) Resp 14 Ht 5' 11\" (1.803 m) Wt 84.8 kg (187 lb) SpO2 98% BMI 26.08 kg/m² O2 Device: Room air Temp (24hrs), Av.4 °F (36.9 °C), Min:98 °F (36.7 °C), Max:99.2 °F (37.3 °C) Intake/Output:  
Last shift:      701 -  1900 In: 240 [P.O.:240] Out: - Last 3 shifts: No intake/output data recorded. Intake/Output Summary (Last 24 hours) at 2019 1448 Last data filed at 2019 4442 Gross per 24 hour Intake 240 ml Output  Net 240 ml Telemetry:  
 
Physical Exam:  
General:  Alert, cooperative, no distress, appears stated age. Head:  Normocephalic, without obvious abnormality, atraumatic. Eyes:  Conjunctivae/corneas clear. Nose: Nares normal. Septum midline. Mucosa normal.   
Throat: Lips, mucosa, and tongue normal. Poor dentition. Neck: Supple, symmetrical, trachea midline, no adenopathy. Lungs:   Clear to auscultation bilaterally, slightly decreased lower right lung field. Chest wall:  No tenderness or deformity. Heart:  Regular rate and rhythm, S1, S2 normal, no murmur, click, rub or gallop. Abdomen:   Soft, non-tender. Bowel sounds normal. No masses,  No organomegaly. Abdomen markedly less protuberant today. Extremities: Extremities no cyanosis or edema, 2+ LE edema. Pulses: 2+ and symmetric all extremities. Skin: Skin color, texture, turgor normal. No rashes or lesions Lymph nodes: Cervical nodes normal.  
Neurologic: Grossly nonfocal  
 
DATA: 
MAR reviewed and pertinent medications noted or modified as needed Labs: 
Recent Labs  
  01/31/19 
1423 01/31/19 
0427 01/30/19 
1803 01/30/19 
7551 WBC  --  2.6*  --  3.2* HGB 7.2* 6.3* 7.2* 6.8* HCT 21.0* 18.6* 21.6* 19.9* PLT  --  57*  --  83* Recent Labs  
  01/31/19 
0427 01/30/19 
0902 * 129*  
K 4.4 4.4 CL 97 95* CO2 27 26 GLU 95 132* BUN 17 17 CREA 0.65* 0.72  
CA 7.7* 8.2* MG 1.5* 1.4* PHOS 3.7  --   
ALB 1.8* 2.1* TBILI 1.2* 1.1*  
SGOT 30 44* ALT 28 36 INR 1.6* 1.6* Imaging:  I have personally reviewed the patients radiographs and reports. Chest CT:  Moderate right pleural effusion with slight right basilar atelectasis, tiny left effusion. Large amount of ascites and changes if cirrhosis. IMPRESSION 
· Dyspnea · Abnormal CXR; with right pleural effusion +/- consolidation · Cirrhosis · Ascites, s/p paracentesis with 8,450 mLs removed · Pancytopenia · Hyponatremia; improved 
  
  
PLAN 
· Supplemental O2 if needed to keep sats >90% · Will hold off on thoracentesis given that he is symptomatically better following paratenesis, right effusion is moderate but his dyspnea was likely due to the large amount of fluid in his abdomen · Can deescalate ABX to Doxy for 5 days, low suspicion for pneumonia · F/U remaining PNA panel · ECHO pending · Transfuse to keep hemoglobin >7.  Hem-Onc following. · GI following, on step 1 duiresis · DVT Prophylaxis:  SCDs Lucas Libman, NP

## 2019-01-31 NOTE — ROUTINE PROCESS
Bedside and Verbal shift change report given to Umu Sanabria RN (oncoming nurse) by Jose Burgos RN (offgoing nurse). Report included the following information SBAR, Kardex, Intake/Output, Recent Results and Quality Measures

## 2019-01-31 NOTE — PROGRESS NOTES
[facility] [place] at [address], [address] W: [phone]  F: [phone] Reason for Visit:  
Donald Julian is a 74 y.o. male who is seen in consultation at the request of Dr. Nathan Hassan for evaluation of anemia. History of Present Illness:  
Donald Julian is a pleasant 74 y.o. male who presents today for evaluation of anemia. I met him during his previous hospital stay for this, and I have been following him as an outpatient. The cause of his anemia has not been clear to me. We have been transfusing him regularly as an outpatient (about weekly), and referred him to [facility] to help us determine the etiology of his anemia, but he has not been compliant with follow up there. He missed his appointment with us recently for transfusion, as he was not feeling well. He presented to the ED today with progressive dyspnea. Workup in the ED demonstrated his anemia, as well as a small right effusion and right basilar consolidation. He was admitted for further evaluation. He has a history of cirrhosis. He was seen by GI at [facility] and had a paracentesis there. Recently had a repeat paracentesis ordered by my office on 1/21. He reports no ETOH in over a year. Interval History:  
 
Reports feeling better; SOB has improved since fluid was removed. Appetite is better; actually hungry for dinner. Bowels irregular since last hospital stay. Denies any pain at present. No family at bedside. Past Medical History:  
Diagnosis Date  Anemia  Cirrhosis ([address]  History of vascular access device 10/22/2018 [facility] VAT - 4 FR, R basilic, 38 cm for DIVA (outpatient infusions)  Pancytopenia ([address]  Patella fracture 2007 Right knee Past Surgical History:  
Procedure Laterality Date  COLONOSCOPY N/A 9/28/2018 COLONOSCOPY performed by Crystal Montes MD at [address]  HX HERNIA REPAIR Bilateral   
  HX RETINAL DETACHMENT REPAIR Social History Tobacco Use  Smoking status: Former Smoker  Smokeless tobacco: Never Used Substance Use Topics  Alcohol use: Yes Comment: rare Family History Problem Relation Age of Onset  Anemia Mother  Cancer Father 61 Lung Current Facility-Administered Medications Medication Dose Route Frequency  0.9% sodium chloride infusion 250 mL  250 mL IntraVENous PRN  
 furosemide (LASIX) tablet 20 mg  20 mg Oral DAILY  spironolactone (ALDACTONE) tablet 50 mg  50 mg Oral DAILY  0.9% sodium chloride infusion 250 mL  250 mL IntraVENous PRN  
 sodium chloride (NS) flush 5-40 mL  5-40 mL IntraVENous Q8H  
 sodium chloride (NS) flush 5-40 mL  5-40 mL IntraVENous PRN  
 acetaminophen (TYLENOL) tablet 650 mg  650 mg Oral Q6H PRN  
 naloxone (NARCAN) injection 0.4 mg  0.4 mg IntraVENous PRN  
 cefTRIAXone (ROCEPHIN) 2 g in sterile water (preservative free) 20 mL IV syringe  2 g IntraVENous Q24H  
 doxycycline (VIBRAMYCIN) 100 mg in 0.9% sodium chloride (MBP/ADV) 100 mL  100 mg IntraVENous Q12H  
 ondansetron (ZOFRAN) injection 4 mg  4 mg IntraVENous Q6H PRN  pantoprazole (PROTONIX) 40 mg in sodium chloride 0.9% 10 mL injection  40 mg IntraVENous Q24H No Known Allergies Review of Systems: A complete review of systems was obtained, negative except as described above. Physical Exam:  
 
Visit Vitals BP 94/51 (BP 1 Location: Left arm, BP Patient Position: At rest) Pulse 90 Temp 98 °F (36.7 °C) Resp 16 Ht 5' 11\" (1.803 m) Wt 84.8 kg (187 lb) SpO2 98% BMI 26.08 kg/m² General: No distress, frail, cachectic appearing Eyes: PERRLA, anicteric sclerae HENT: Atraumatic, OP clear Neck: Supple Lymphatic: No cervical, supraclavicular, or inguinal adenopathy Respiratory: decreased rt base; , normal respiratory effort CV: Normal rate, regular rhythm, no murmurs, 2+ LE  edema GI: soft, non tender; No masses. Unable to palpate liver or spleen. MS: Digits without clubbing or cyanosis. Skin: No rashes, ecchymoses, or petechiae. Normal temperature, turgor, and texture. Psych: Alert, oriented, appropriate affect, normal judgment/insight Results:  
 
Lab Results Component Value Date/Time WBC 2.6 (L) 01/31/2019 04:27 AM  
 HGB 7.2 (L) 01/31/2019 02:23 PM  
 HCT 21.0 (L) 01/31/2019 02:23 PM  
 PLATELET 57 (L) 24/37/3733 04:27 AM  
 MCV 86.9 01/31/2019 04:27 AM  
 ABS. NEUTROPHILS 1.2 (L) 01/31/2019 04:27 AM  
 
Lab Results Component Value Date/Time Sodium 130 (L) 01/31/2019 04:27 AM  
 Potassium 4.4 01/31/2019 04:27 AM  
 Chloride 97 01/31/2019 04:27 AM  
 CO2 27 01/31/2019 04:27 AM  
 Glucose 95 01/31/2019 04:27 AM  
 BUN 17 01/31/2019 04:27 AM  
 Creatinine 0.65 (L) 01/31/2019 04:27 AM  
 GFR est AA >60 01/31/2019 04:27 AM  
 GFR est non-AA >60 01/31/2019 04:27 AM  
 Calcium 7.7 (L) 01/31/2019 04:27 AM  
 
Lab Results Component Value Date/Time Bilirubin, total 1.2 (H) 01/31/2019 04:27 AM  
 ALT (SGPT) 28 01/31/2019 04:27 AM  
 AST (SGOT) 30 01/31/2019 04:27 AM  
 Alk. phosphatase 231 (H) 01/31/2019 04:27 AM  
 Protein, total 5.5 (L) 01/31/2019 04:27 AM  
 Albumin 1.8 (L) 01/31/2019 04:27 AM  
 Globulin 3.7 01/31/2019 04:27 AM  
 
Lab Results Component Value Date/Time Reticulocyte count 0.3 (L) 01/31/2019 04:27 AM  
 Iron % saturation 96 (H) 09/27/2018 04:14 AM  
 TIBC 252 09/27/2018 04:14 AM  
 Ferritin 537 (H) 09/27/2018 04:14 AM  
 Vitamin B12 >2,000 (H) 09/26/2018 01:00 PM  
 Folate 19.1 09/26/2018 01:00 PM  
 Haptoglobin 105 09/26/2018 10:15 AM  
  09/26/2018 10:15 AM  
 TSH 4.45 (H) 09/27/2018 04:14 AM  
 OSCAR, Direct Positive (A) 09/27/2018 04:14 AM  
 Hep C  virus Ab Interp. NONREACTIVE 04/20/2016 09:58 AM  
 
Lab Results Component Value Date/Time INR 1.6 (H) 01/31/2019 04:27 AM  
 aPTT 26.5 09/26/2018 10:15 AM  
 
 
Peripheral smear 9/26/2018: There is a macrocytic, normochromic anemia. Granulocytes and  
lymphocytes are reduced and mature. Platelets are reduced and no  
blasts are seen. If cytopenias persist, a bone marrow biopsy  may be  
beneficial. Per Dr. Biju Quinn MD. 
  
Abdominal US 9/27/2018 1. Cirrhosis. Liver protocol CT or MRI should be considered for more sensitive hepatocellular carcinoma screening. 2. Small volume of ascites and right pleural effusion. 
  
Bone marrow biopsy 9/27/2018: Cellular marrow with maturing trilineage hematopoiesis, no increase in blasts. Lymphoid aggregates, favor benign/reactive. Mild reticulin fibrosis. Increased storage iron. Records reviewed and summarized above. 1/30/2019 CT CHEST WO CONT IMPRESSION: 
  
1. There is a moderate right effusion with slight right basilar atelectasis. There is a tiny left effusion. 
  
 There is a large amount of ascites in the upper abdomen. There are changes of 
cirrhosis. There is suspicion of gallstones. 1/30/2019 Duplex LE Interpretation Summary Bilateral lower extremity negative for deep vein thrombosis in the veins visualized. Assessment/Recommendations:  
1) Anemia, macrocytic (s/p 2 units PRBCs) Severe, transfusion dependent, but uncertain etiology. Labwork fairly unremarkable.  EGD and colonoscopy without bleeding.  Concern for production issue, given low retic and other cytopenias. However, bone marrow biopsy completed 9/27 was unrevealing. Possible early MDS, but I don't know that this would explain his considerable transfusion requirement. Has been seen at [facility] for evaluation, he is seeing Dr. Abner Brady and we are awaiting their opinion, though he has been noncompliant with follow up there. He does have an appointment there next week which hopefully he can keep. Hgb 7.2 this pm; continue to monitor and  transfusion support as needed for HGB <7. 
  
2) Thrombocytopenia Chronic since at least 2009.  Possibly related to cirrhosis, labwork otherwise unremarkable.  Spleen at upper limit of normal on imaging. Bone marrow without definite etiology, though perhaps early MDS as above. Monitor.   
3) Neutropenia Mild. As above, possibly related to cirrhosis vs early MDS. Monitor.   
4) Cirrhosis GI following. Seems to be worsening. Needing paracentesis. Seen by hepatology at [address] [place] [address], but doesn't want to follow up there. We had placed referral to Dr. Mabel Green (hepatology at [facility]) as outpatient, but that appointment is not until March. This can be cancelled if Dr. Linda Cash or Dr. Sukumar Abebe plan to follow. --GI eval: paracentesis 1/31 (8450 cc yellow fluid removed) 5) Poor venous access PICC line to right arm now. Previously discussed port placement, but he has been reluctant due to lack of transportation. Consider placing during this hospital stay. 6) Dyspnea/Rt pleural effusion Pulmonary following:  Holding on thoracentesis due to symptomatic resolution after paracentesis Plan reviewed with Dr Radha Tierney Signed By: Dawit Orr NP

## 2019-01-31 NOTE — PROGRESS NOTES
1115 Up to patient's room and attempting PIV placement for Albumin infusion with US paracentesis. Right arm 4 FR single PICC with PRBC's infusing. # 22 PIV placed in left forearm with positive blood return. Albumin obtained to be given with paracentesis per GI. Patient transported via stretcher by me with PRBC's infusing. 1145 Patient brought to ultrasound and placed on dynamap and scanned per ultrasound NELSON Faulkner. Consent reviewed. 1200 Dr. Daron Andres at bedside for consent with review of risks and benefits. Time out 1201 Start time 1202 End time 1315. PRBC's finished infusing with 50 cc NS flush programmed. Blood slip signed. 1233 5.9 liters of fluid removed with paracentesis. Albumin 25% 50 grams started infusing. 1315 8450 hazy yellow fluid removed with paracentesis. Report called to [facility] for transfer back to room 518 with questions asked and answered. Dressing CDI to right lower abdomen. Patient with drop in BP with sitting up but not an elevation in pulse. Denies dizziness. Encouraged to maintain safety with going from laying to sitting/walking position.

## 2019-01-31 NOTE — PROGRESS NOTES
Hilario Patel [facility] 
[address] [facility], [address] [place] [address] 
[phone] Medical Progress Note NAME: Aakash Fung :  1945 MRM:  [id number] Date/Time: 2019 Assessment / Plan:  
 
Severe anemia: chronic anemia at baseline requiring transfusions. Symptomatic. Denies melena, hematochezia. Has pancytopenia. Followed by Dr. Dago Dumas. Received another unit of pRBC this morning. Transfusion goal Hg >7. Suspect this is from cirrhosis. Pt did have BM Bx prior, and EGD/colonoscopy in September, showing no varices and only one small polyp. BMBx showed no evidence of malignancy. question possible early MDS. Hem/onc following Cirrhosis ([facility] (): ?cryptogenic vs ETOH vs NIETO. +OSCAR with borderline AMA, ? PBC, will discuss with GI. Underwent LVP today, outputting >8L. Received IV albumin. Increase diuretics to Lasix 40mg / Aldactone 100mg at [facility]. Follow CMP, INR.  
  
  Hyponatremia: suspect from cirrhosis. Follow closely, especially on diuretics.  
  
  Abnormal chest x-ray: suspect from pleural effusion, hepatic hydrothorax? Stopped CTX, remains on doxy. Appreciate pulm. CT chest reviewed. 
  
  Dyspnea: likely from severe anemia and large ascites/pleural effusion. No DVT. No CHF. 
  
  Hypomagnesemia: replace IV and follow. Monitor BMP and Mg Total time spent:35 minutes Time spent in the care of this patient including reviewing records, discussing with nursing and/or other providers on the treatment team, obtaining history and examining the patient, and discussing treatment plans. Care Plan discussed with: Patient, Nursing Staff and >50% of time spent in counseling and coordination of care Discussed:  Care Plan and D/C Planning Prophylaxis:  SCD's Disposition:  Home w/Family Subjective: Chief Complaint:  Follow up dyspnea Chart/notes/labs/studies reviewed, patient examined at bedside. No SOB at rest. Abdomen much less distended after paracentesis. Feels better. No f/C. No cough Objective:  
 
 
Vitals:  
 
  
Last 24hrs VS reviewed since prior progress note. Most recent are: 
 
Visit Vitals BP 94/51 (BP 1 Location: Left arm, BP Patient Position: At rest) Pulse 90 Temp 98 °F (36.7 °C) Resp 16 Ht 5' 11\" (1.803 m) Wt 84.8 kg (187 lb) SpO2 98% BMI 26.08 kg/m² SpO2 Readings from Last 6 Encounters:  
01/31/19 98% 01/25/19 96% 01/21/19 99% 01/04/19 96% 12/24/18 98% 12/21/18 96% Intake/Output Summary (Last 24 hours) at 1/31/2019 1613 Last data filed at 1/31/2019 7346 Gross per 24 hour Intake 240 ml Output  Net 240 ml Exam:  
 
Physical Exam: 
 
Gen: thin, frail, chronically ill-appearing. HEENT:  Sclerae nonicteric, hearing intact to voice, mucous membranes moist 
Neck:  Supple, without masses. Resp:  No accessory muscle use, diminished R base. Without wheezes, rales, or rhonchi 
Card: RRR, without m/r/g. No LE edema. Abd:  +bowel sounds, soft, NTTP, less distended. Neuro: Face symmetric, tongue midline, speech fluent, follows commands appropriately Psych:  Alert, oriented x 3. Good insight Medications Reviewed: (see below) Lab Data Reviewed: (see below) 
 
______________________________________________________________________ Medications:  
 
Current Facility-Administered Medications Medication Dose Route Frequency  0.9% sodium chloride infusion 250 mL  250 mL IntraVENous PRN  
 furosemide (LASIX) tablet 20 mg  20 mg Oral DAILY  spironolactone (ALDACTONE) tablet 50 mg  50 mg Oral DAILY  0.9% sodium chloride infusion 250 mL  250 mL IntraVENous PRN  
 sodium chloride (NS) flush 5-40 mL  5-40 mL IntraVENous Q8H  
 sodium chloride (NS) flush 5-40 mL  5-40 mL IntraVENous PRN  
 acetaminophen (TYLENOL) tablet 650 mg  650 mg Oral Q6H PRN  
 naloxone (NARCAN) injection 0.4 mg  0.4 mg IntraVENous PRN  
  cefTRIAXone (ROCEPHIN) 2 g in sterile water (preservative free) 20 mL IV syringe  2 g IntraVENous Q24H  
 doxycycline (VIBRAMYCIN) 100 mg in 0.9% sodium chloride (MBP/ADV) 100 mL  100 mg IntraVENous Q12H  
 ondansetron (ZOFRAN) injection 4 mg  4 mg IntraVENous Q6H PRN  pantoprazole (PROTONIX) 40 mg in sodium chloride 0.9% 10 mL injection  40 mg IntraVENous Q24H Lab Review:  
 
Recent Labs  
  01/31/19 
1423 01/31/19 
0427 01/30/19 
1803 01/30/19 
5160 WBC  --  2.6*  --  3.2* HGB 7.2* 6.3* 7.2* 6.8* HCT 21.0* 18.6* 21.6* 19.9* PLT  --  57*  --  83* Recent Labs  
  01/31/19 
0427 01/30/19 
0902 * 129*  
K 4.4 4.4 CL 97 95* CO2 27 26 GLU 95 132* BUN 17 17 CREA 0.65* 0.72  
CA 7.7* 8.2* MG 1.5* 1.4* PHOS 3.7  --   
ALB 1.8* 2.1*  
SGOT 30 44* ALT 28 36 INR 1.6* 1.6* No components found for: [place] No results for input(s): PH, PCO2, PO2, HCO3, FIO2 in the last 72 hours. Recent Labs  
  01/31/19 
0427 01/30/19 
0902 INR 1.6* 1.6* No results found for: SDES Lab Results Component Value Date/Time Culture result: NO GROWTH AFTER 19 HOURS 01/30/2019 10:43 AM  
 Culture result: MIXED SKIN AMBER ISOLATED 04/20/2016 10:34 AM  
 
        
___________________________________________________ Attending Physician: Jessie Pabon MD

## 2019-01-31 NOTE — PROGRESS NOTES
1845 TRANSFER - IN REPORT: 
 
Verbal report received from Mindy(name) on [address]  being received from ED(unit) for routine progression of care Report consisted of patients Situation, Background, Assessment and  
Recommendations(SBAR). Information from the following report(s) SBAR, Kardex, ED Summary, [facility] and Recent Results was reviewed with the receiving nurse. Opportunity for questions and clarification was provided. Assessment completed upon patients arrival to unit and care assumed. Primary Nurse Jessie Francois and Veronica Robison RN performed a dual skin assessment on this patient Impairment noted- see wound doc flow sheet Jaden score is 20 Stage 1 on sacrum

## 2019-02-01 NOTE — PROGRESS NOTES
Gastroenterology Progress Note February 1, 2019 Admit Date: 1/30/2019 Narrative Assessment and Plan · Ascites · Cirrhosis - working hypothesis due to previous alcohol use vs NIETO · Per office visit note 10/2018 with Dr. Curry Geiger - although positive OSCAR and mild elevation in AMA doubt autoimmune hepatitis or primary biliary cirrhosis. The plan was to repeat labs in 6 months. · Pancytopenia - oncology following Plan 
- continue low Na diet 
- start step 1 diuretics when patient BP stable (lasix 40mg once daily and spironolactone 100mg once daily) 
- continue to closely monitor labs - patient is up to date with variceal and [address] screening 
---------------------- 
I've completed his serologic workup for liver disease as well as endoscopic testing for anemia without a definitive diagnosis. His AMA was borderline but his ALP and bilirubin are not high enough to warrant consideration for a diagnosis of PBC. Now ascites and anemia continue to be a real problem for him. Status post paracentesis and is on low sodium diet and diuretics on hold for low BP after paracentesis. Once hemodynamics stablize could resume diuretics. I agree his case is curious enough to warrant his referral to a tertiary hepatologist and although he's been seen at [facility] doesn't want to follow there. I suspect he might benefit from transjugular liver biopsy with portal pressure measurements but I'll defer that decision to Dr. Wiliam Kwan. Subjective: No complaints this AM. Asking about port placement. Feels much better after paracentesis. Had ~8L removed. Received albumin during procedure. He developed hypotension overnight and required additional dose of albumin. Denies abdominal pain, nausea or vomiting. ROS:  The previous review of systems on initial consultation / H&P is noted and reviewed. Specific changes noted above in HPI. Current Medications:  
 
Current Facility-Administered Medications Medication Dose Route Frequency  0.9% sodium chloride infusion 250 mL  250 mL IntraVENous PRN  
 0.9% sodium chloride infusion 250 mL  250 mL IntraVENous PRN  
 sodium chloride (NS) flush 5-40 mL  5-40 mL IntraVENous Q8H  
 sodium chloride (NS) flush 5-40 mL  5-40 mL IntraVENous PRN  
 acetaminophen (TYLENOL) tablet 650 mg  650 mg Oral Q6H PRN  
 naloxone (NARCAN) injection 0.4 mg  0.4 mg IntraVENous PRN  
 cefTRIAXone (ROCEPHIN) 2 g in sterile water (preservative free) 20 mL IV syringe  2 g IntraVENous Q24H  
 doxycycline (VIBRAMYCIN) 100 mg in 0.9% sodium chloride (MBP/ADV) 100 mL  100 mg IntraVENous Q12H  
 ondansetron (ZOFRAN) injection 4 mg  4 mg IntraVENous Q6H PRN  pantoprazole (PROTONIX) 40 mg in sodium chloride 0.9% 10 mL injection  40 mg IntraVENous Q24H Objective: VITALS:  
Last 24hrs VS reviewed since prior progress note. Most recent are: 
Visit Vitals BP 96/55 (BP 1 Location: Left arm, BP Patient Position: At rest) Pulse 78 Temp 98.6 °F (37 °C) Resp 17 Ht 5' 11\" (1.803 m) Wt 84.8 kg (187 lb) SpO2 96% BMI 26.08 kg/m² Temp (24hrs), Av.7 °F (37.1 °C), Min:98 °F (36.7 °C), Max:99.2 °F (37.3 °C) Intake/Output Summary (Last 24 hours) at 2019 Last data filed at 2019 2997 Gross per 24 hour Intake 250 ml Output 200 ml Net 50 ml EXAM: 
General: Comfortable, no distress   
HEENT: Atraumatic skull, pupils equal 
Lungs:  No respiratory distress Abdomen: Bowel sounds present, soft, significant improvement in distention,                         nontender Musc:  No skeletal defects or deformities Neurologic:  Cranial nerves grossly intact, moves all 4 extremities Extremities:     Decreased edema Psych:   Good insight. Not anxious nor agitated Derm:  No jaundice Lab Data Reviewed:  
Recent Labs 19 
7603 19 
1423 19 
0427  19 
0901 WBC 2.8*  --  2.6*  --  3.2* HGB 7.5* 7.2* 6.3*   < > 6.8*  
 HCT 21.9* 21.0* 18.6*   < > 19.9*  
PLT 53*  --  57*  --  83*  
 < > = values in this interval not displayed. Recent Labs 02/01/19 
5790 01/31/19 
3466 01/30/19 
3972 * 130* 129*  
K 4.2 4.4 4.4 CL 98 97 95* CO2 27 27 26 * 95 132* BUN 17 17 17 CREA 0.58* 0.65* 0.72  
CA 7.6* 7.7* 8.2* MG 1.5* 1.5* 1.4* PHOS 3.3 3.7  --   
ALB 2.0* 1.8* 2.1* TBILI 1.1* 1.2* 1.1*  
SGOT 18 30 44* ALT 19 28 36 INR 1.7* 1.6* 1.6* No results found for: [place] No results for input(s): PH, PCO2, PO2, HCO3, FIO2 in the last 72 hours. Recent Labs 02/01/19 
6522 01/31/19 
9902 01/30/19 
2807 INR 1.7* 1.6* 1.6* Assessment: (See above) Active Problems: 
  Pancytopenia ([facility] (9/26/2018) Cirrhosis ([facility] () Hyponatremia (9/26/2018) Abnormal chest x-ray (9/26/2018) Dyspnea (1/30/2019) Lower extremity edema (1/30/2019) Hypomagnesemia (1/30/2019) Symptomatic anemia (1/30/2019) Plan: (See above) Signed By: 
Verna Cleary PA-C 
2/1/2019 
8:35 AM

## 2019-02-01 NOTE — PROGRESS NOTES
Hypotensive overnight. Given IVF and albumin by nocturnist. 
Will decrease Lasix to 20mg and Aldactone to 25mg, with holding parameters (hold if SBP <90). For port placement today, per hem/onc.

## 2019-02-01 NOTE — PROGRESS NOTES
Occupational Therapy: 
 
Orders received, chart reviewed. Patient currently off the floor for line placement. Will continue to follow and re-attempt for OT evaluation as able. Thank you. Arlyn Walters

## 2019-02-01 NOTE — PROGRESS NOTES
Hilario Patel [facility] 
[address] [facility], [address] [place] [address] 
[phone] Medical Progress Note NAME: Jay Mcmahan :  1945 MRM:  [id number] Date/Time: 2019 Assessment / Plan:  
 
Severe anemia: chronic anemia at baseline requiring transfusions. Symptomatic. Denies melena, hematochezia. Has pancytopenia. Followed by Dr. Maria Teresa Burks. Received another unit of pRBC this morning. Transfusion goal Hg >7. Suspect this is from cirrhosis. Pt did have BM Bx prior, and EGD/colonoscopy in September, showing no varices and only one small polyp. BMBx showed no evidence of malignancy. Question possible early MDS. Hem/onc following Pt had a port placed per hem/onc today (due to poor IV access). He needs to have his PICC removed prior to discharge Cirrhosis ([facility] [address] (): ?cryptogenic vs ETOH vs NIETO. +OSCAR with borderline AMA but low suspicion for PBC per GI. Underwent LVP yesterday, outputting >8L ascitic fluid. Did not tolerate step 1 diuretics, decrease dose to Lasix 20mg / Aldactone 50mg. His BP is always going to be somewhat low due to cirrhosis, but holding parameters were added for diuretics. Follow CMP, INR.  
  
  Hyponatremia: suspect from cirrhosis. Follow closely, especially on diuretics. Stable  
  
  Abnormal chest x-ray: suspect from pleural effusion, hepatic hydrothorax? Stopped CTX, remains on doxy. Appreciate pulm. CT chest reviewed. 
  
  Dyspnea: likely from severe anemia and large ascites/pleural effusion. No DVT. No CHF. 
  
  Hypomagnesemia: replace IV and follow. Monitor BMP and Mg Total time spent: 35 minutes Time spent in the care of this patient including reviewing records, discussing with nursing and/or other providers on the treatment team, obtaining history and examining the patient, and discussing treatment plans.  
               
Care Plan discussed with: Patient, Nursing Staff and >50% of time spent in counseling and coordination of care Discussed:  Care Plan and D/C Planning Prophylaxis:  SCD's Disposition:  Home w/Family Subjective: Chief Complaint:  Follow up dyspnea Chart/notes/labs/studies reviewed, patient examined at bedside. No SOB at rest. Abdomen distended, but better overall. No abd pain. No f/c 
 
  
Objective:  
 
 
Vitals:  
 
  
Last 24hrs VS reviewed since prior progress note. Most recent are: 
 
Visit Vitals BP 91/55 (BP 1 Location: Left arm, BP Patient Position: Sitting) Pulse 86 Temp 98.3 °F (36.8 °C) Resp 18 Ht 5' 11\" (1.803 m) Wt 84.8 kg (187 lb) SpO2 100% BMI 26.08 kg/m² SpO2 Readings from Last 6 Encounters:  
02/01/19 100% 01/25/19 96% 01/21/19 99% 01/04/19 96% 12/24/18 98% 12/21/18 96% Intake/Output Summary (Last 24 hours) at 2/1/2019 1614 Last data filed at 2/1/2019 9411 Gross per 24 hour Intake 250 ml Output 200 ml Net 50 ml Exam:  
 
Physical Exam: 
 
Gen: thin, frail, chronically ill-appearing. HEENT:  Sclerae nonicteric, hearing intact to voice, mucous membranes moist 
Neck:  Supple, without masses. Resp:  No accessory muscle use, diminished R base. Without wheezes, rales, or rhonchi 
Card: RRR, without m/r/g. No LE edema. Abd:  +bowel sounds, soft, NTTP, distended. Neuro: Face symmetric, tongue midline, speech fluent, follows commands appropriately Psych:  Alert, oriented x 3. Good insight Medications Reviewed: (see below) Lab Data Reviewed: (see below) 
 
______________________________________________________________________ Medications:  
 
Current Facility-Administered Medications Medication Dose Route Frequency  furosemide (LASIX) tablet 20 mg  20 mg Oral DAILY  spironolactone (ALDACTONE) tablet 25 mg  25 mg Oral DAILY  0.9% sodium chloride infusion 250 mL  250 mL IntraVENous PRN  
 0.9% sodium chloride infusion 250 mL  250 mL IntraVENous PRN  
  sodium chloride (NS) flush 5-40 mL  5-40 mL IntraVENous Q8H  
 sodium chloride (NS) flush 5-40 mL  5-40 mL IntraVENous PRN  
 acetaminophen (TYLENOL) tablet 650 mg  650 mg Oral Q6H PRN  
 naloxone (NARCAN) injection 0.4 mg  0.4 mg IntraVENous PRN  
 cefTRIAXone (ROCEPHIN) 2 g in sterile water (preservative free) 20 mL IV syringe  2 g IntraVENous Q24H  
 doxycycline (VIBRAMYCIN) 100 mg in 0.9% sodium chloride (MBP/ADV) 100 mL  100 mg IntraVENous Q12H  
 ondansetron (ZOFRAN) injection 4 mg  4 mg IntraVENous Q6H PRN  pantoprazole (PROTONIX) 40 mg in sodium chloride 0.9% 10 mL injection  40 mg IntraVENous Q24H Lab Review:  
 
Recent Labs 02/01/19 
2983 01/31/19 
1423 01/31/19 
0427  01/30/19 
5881 WBC 2.8*  --  2.6*  --  3.2* HGB 7.5* 7.2* 6.3*   < > 6.8* HCT 21.9* 21.0* 18.6*   < > 19.9*  
PLT 53*  --  57*  --  83*  
 < > = values in this interval not displayed. Recent Labs 02/01/19 
8913 01/31/19 
0283 01/30/19 
6926 * 130* 129*  
K 4.2 4.4 4.4 CL 98 97 95* CO2 27 27 26 * 95 132* BUN 17 17 17 CREA 0.58* 0.65* 0.72  
CA 7.6* 7.7* 8.2* MG 1.5* 1.5* 1.4* PHOS 3.3 3.7  --   
ALB 2.0* 1.8* 2.1*  
SGOT 18 30 44* ALT 19 28 36 INR 1.7* 1.6* 1.6* No components found for: [place] No results for input(s): PH, PCO2, PO2, HCO3, FIO2 in the last 72 hours. Recent Labs 02/01/19 
1269 01/31/19 
4420 01/30/19 
9677 INR 1.7* 1.6* 1.6* No results found for: SDES Lab Results Component Value Date/Time Culture result: NO GROWTH 2 DAYS 01/30/2019 10:43 AM  
 Culture result: MIXED SKIN AMBER ISOLATED 04/20/2016 10:34 AM  
 
        
___________________________________________________ Attending Physician: Arin Schaefer MD

## 2019-02-01 NOTE — PROGRESS NOTES
TRANSFER - IN REPORT: 
 
Verbal report received from [facility] RN(name) on Foote Cassette  being received from [facility](unit) for routine post - op Report consisted of patients Situation, Background, Assessment and  
Recommendations(SBAR). Information from the following report(s) Procedure Summary was reviewed with the receiving nurse. Opportunity for questions and clarification was provided. Assessment completed upon patients arrival to unit and care assumed. Rt neck dressing modrately soaked with sanguinous drainage. dressing removed. New Quik Clot applied with manual pressure till hemostasis achieved. Bioclussive dressing applied. 11:35 AM 
TRANSFER - OUT REPORT: 
 
Verbal report given to [facility]  RN(name) on Foote Cassette  being transferred to [facility](unit) for routine post - op Report consisted of patients Situation, Background, Assessment and  
Recommendations(SBAR). Information from the following report(s) SBAR, Procedure Summary, Intake/Output and Recent Results was reviewed with the receiving nurse. Lines: PICC Single Lumen 37/38/06 Right;Basilic (Active) Central Line Being Utilized Yes 1/31/2019 11:55 PM  
Criteria for Appropriate Use Limited/no vessel suitable for conventional peripheral access 1/31/2019 11:55 PM  
Site Assessment Clean, dry, & intact 1/31/2019 11:55 PM  
Phlebitis Assessment 0 1/31/2019 11:55 PM  
Infiltration Assessment 0 1/31/2019 11:55 PM  
Date of Last Dressing Change 01/31/19 1/31/2019 11:55 PM  
Dressing Status Clean, dry, & intact 1/31/2019 11:55 PM  
Dressing Type Disk with Chlorhexadine gluconate (CHG) 1/31/2019 11:55 PM  
Hub Color/Line Status Purple 1/31/2019 11:55 PM  
Action Taken Open ports on tubing capped 1/31/2019 11:55 PM  
Positive Blood Return (Site #1) Yes 1/31/2019 11:55 PM  
Alcohol Cap Used Yes 1/31/2019 11:55 PM  
   
Venous Access Device Angiodynamics Port 02/01/19 Upper chest (subclavicular area, right (Active) Peripheral IV 01/31/19 Left Forearm (Active) Site Assessment Clean, dry, & intact 1/31/2019 11:55 PM  
Phlebitis Assessment 0 1/31/2019 11:55 PM  
Infiltration Assessment 0 1/31/2019 11:55 PM  
Dressing Status Clean, dry, & intact 1/31/2019 11:55 PM  
Dressing Type Tape;Transparent 1/31/2019 11:55 PM  
Hub Color/Line Status Blue;Capped;Flushed 1/31/2019 11:55 PM  
Action Taken Open ports on tubing capped 1/31/2019 11:55 PM  
Alcohol Cap Used Yes 1/31/2019 11:55 PM  
  
 
Opportunity for questions and clarification was provided. Patient transported with: 
 Registered Nurse Tech

## 2019-02-01 NOTE — PROGRESS NOTES
Problem: Mobility Impaired (Adult and Pediatric) Goal: *Acute Goals and Plan of Care (Insert Text) Physical Therapy Goals Initiated 2/1/2019 1. Patient will move from supine to sit and sit to supine  in bed with independence within 7 day(s). 2.  Patient will transfer from bed to chair and chair to bed with supervision/set-up using the least restrictive device within 7 day(s). 3.  Patient will perform sit to stand with supervision/set-up within 7 day(s). 4.  Patient will ambulate with supervision/set-up for 100 feet with the least restrictive device within 7 day(s). 5.  Patient will ascend/descend 8 stairs with 1 handrail(s) with minimal assistance/contact guard assist within 7 day(s). Comments:  
physical Therapy EVALUATION Patient: Chris Johnson (71 y.o. male) Date: 2/1/2019 Primary Diagnosis: Symptomatic anemia Precautions: falls ASSESSMENT :Pt presents with decreased strength, decreased endurance, and decreased functional mobility after being admitted to hospital for SOB and complications related to chronic anemia and cirrhosis. Prior to admission patient was independent for ADLs and ambulated with a single point cane. Patient lives alone and does not have a support system. Patient intially presented with anemia and SOB and is now SP 2 units PRBC, paracentesis, port placement this AM. Pt is stable and cleared to work with therapy. Patient tolerated session well with vitals stable, see below. Patient is CGA for gait with a FWW, and CGA for transfers with the use of a FWW. Pt would benefit from home health services at discharge in order to improve endurance and strength secondary and patient lives alone without support system. Patient will benefit from skilled intervention to address the above impairments. Patients rehabilitation potential is considered to be good Factors which may influence rehabilitation potential include:  
[]         None noted []         Mental ability/status []         Medical condition 
[x]         Home/family situation and support systems 
[]         Safety awareness 
[]         Pain tolerance/management 
[]         Other: PLAN :5x wk Recommendations and Planned Interventions: 
[x]           Bed Mobility Training             []    Neuromuscular Re-Education 
[x]           Transfer Training                   []    Orthotic/Prosthetic Training 
[x]           Gait Training                          []    Modalities [x]           Therapeutic Exercises          []    Edema Management/Control 
[x]           Therapeutic Activities            []    Patient and Family Training/Education 
[]           Other (comment): Frequency/Duration: Patient will be followed by physical therapy 3 times a week to address goals. Discharge Recommendations: Home Health Further Equipment Recommendations for Discharge: TBD SUBJECTIVE:  
Patient stated  I feel much better since they drained that fluid.  OBJECTIVE DATA SUMMARY:  
 
Past Medical History:  
Diagnosis Date  Anemia  Cirrhosis ([facility] [address]  History of vascular access device 10/22/2018 [facility] VAT - 4 FR, R basilic, 38 cm for DIVA (outpatient infusions)  Pancytopenia ([facility] [address]  Patella fracture 2007 Right knee Past Surgical History:  
Procedure Laterality Date  COLONOSCOPY N/A 9/28/2018 COLONOSCOPY performed by Vicki Lopez MD at  [address]  HX HERNIA REPAIR Bilateral   
 HX RETINAL DETACHMENT REPAIR    
 IR INSERT TUNL CVC W PORT OVER 5 YEARS  2/1/2019 Barriers to Learning/Limitations: None Compensate with: visual, verbal, tactile, kinesthetic cues/model Prior Level of Function/Home Situation: Independent with use of cane Home Situation Home Environment: Apartment # Steps to Enter: 8 Rails to Enter: Yes Hand Rails : Left One/Two Story Residence: One story Living Alone: Yes Support Systems: None Patient [facility]s [facility] Used/Available at Home: Cane, straight Vitals:  
 02/01/19 1454 02/01/19 1458 02/01/19 1505 02/01/19 1520 BP: 91/54 92/53 100/52 91/55 BP 1 Location: Left arm Left arm Left arm Left arm BP Patient Position: At rest Sitting Post activity Sitting Pulse: 81 92 88 86 Resp:    18 Temp:    98.3 °F (36.8 °C) SpO2: 98% 96% 97% 100% Weight:      
Height:      
  
  
  
  
Psychosocial 
Purposeful Interaction: Yes Pt Identified Daily Priority: Clinical issues (comment) Caritas Process: Nurture loving kindness Caring Interventions: Reassure; Therapeutic modalities Reassure: Therapeutic listening Therapeutic Modalities: Intentional therapeutic touchSkin Condition/Temp: Warm Skin Integrity: Wound (add Wound LDA) Skin Integumentary Skin Color: Appropriate for ethnicity Skin Condition/Temp: Warm Skin Integrity: Wound (add Wound LDA) Turgor: Non-tenting Hair Growth: Present Varicosities: Absent Strength:   
Strength: Generally decreased, functional 
  
  
  
  
  
  
Tone & Sensation:  
Tone: Normal 
  
  
  
  
Sensation: Intact Range Of Motion: 
AROM: Within functional limits PROM: Within functional limits Functional Mobility: 
Bed Mobility: 
Rolling: Modified independent Supine to Sit: Modified independent Transfers: 
Sit to Stand: Contact guard assistance Stand to Sit: Contact guard assistance Bed to Chair: Contact guard assistance Balance:  
Sitting: Intact Standing: Intact; With support Ambulation/Gait Training: 
Distance (ft): 25 Feet (ft) Assistive Device: Walker, rolling Ambulation - Level of Assistance: Contact guard assistance Therapeutic Exercises:  
 
Functional Measure: 
Tinetti test: 
 
Sitting Balance: 1 Arises: 1 Attempts to Rise: 2 Immediate Standing Balance: 1 Standing Balance: 1 Nudged: 2 Eyes Closed: 1 Turn 360 Degrees - Continuous/Discontinuous: 0 Turn 360 Degrees - Steady/Unsteady: 1 Sitting Down: 1 Balance Score: 11 Indication of Gait: 1 
R Step Length/Height: 1 L Step Length/Height: 1 
R Foot Clearance: 1 L Foot Clearance: 1 Step Symmetry: 1 Step Continuity: 1 Path: 1 Trunk: 2 Walking Time: 0 Gait Score: 10 Total Score: 21 Tinetti Tool Score Risk of Falls 
<19 = High Fall Risk 19-24 = Moderate Fall Risk 25-28 = Low Fall Risk Tinetti [place]. Performance-Oriented Assessment of Mobility Problems in Elderly Patients. [facility] 66; [id number]. (Scoring Description: PT Bulletin Feb. 10, 1993) Older adults: Crow Kline et al, 2009; [facility] with ABC, BLAIR, ADL, and IADL) · Mean BLAIR score for males aged 69-68 years = 26.21(3.40) · Mean BLAIR score for females age 69-68 years = 25.16(4.30) · Mean BLAIR score for males over 80 years = 23.29(6.02) · Mean BLAIR score for females over 80 years = 17.20(8.32) Physical Therapy Evaluation Charge Determination History Examination Presentation Decision-Making HIGH Complexity :3+ comorbidities / personal factors will impact the outcome/ POC  MEDIUM Complexity : 3 Standardized tests and measures addressing body structure, function, activity limitation and / or participation in recreation  MEDIUM Complexity : Evolving with changing characteristics  Other outcome measures Tinetti  Medium Based on the above components, the patient evaluation is determined to be of the following complexity level: MEDIUM Pain: 
Pain Scale 1: Numeric (0 - 10) Pain Intensity 1: 0 Activity Tolerance:  
Patient demonstrates good activity tolerance with no reports of dyspnea on exertion. Please refer to the flowsheet for vital signs taken during this treatment. Vital Signs Temp: 98.3 °F (36.8 °C) Pulse (Heart Rate): 86    
BP: 91/55 Resp Rate: 18    
O2 Sat (%): 100 % After treatment: [x]         Patient left in no apparent distress sitting up in chair 
[]         Patient left in no apparent distress in bed 
[x]         Call bell left within reach [x]         Nursing notified 
[]         Caregiver present 
[]         Bed alarm activated COMMUNICATION/EDUCATION:  
[x]         Fall prevention education was provided and the patient/caregiver indicated understanding. [x]         Patient/family have participated as able in goal setting and plan of care. [x]         Patient/family agree to work toward stated goals and plan of care. []         Patient understands intent and goals of therapy, but is neutral about his/her participation. []         Patient is unable to participate in goal setting and plan of care. Thank you for this referral. 
Belkys Hand, PT Time Calculation: 30 mins

## 2019-02-01 NOTE — PROGRESS NOTES
Bedside and Verbal shift change report given to Cora (oncoming nurse) by Bob Hamlin (offgoing nurse). Report included the following information SBAR, Kardex, ED Summary, Intake/Output, MAR, Accordion and Recent Results.

## 2019-02-01 NOTE — PROGRESS NOTES
Pt's bp 90/52 p 90, afebrile. Pt has prn orders for 250ml of Ns not specified. Called, informed Dr. Angely Dacosta of the above. Reviewed pt's MAP 65 as requested and recent paracentesis volume removal and replenishment of Albumin. Will monitor pt's vs per unit and monitor pt's MAP. New orders to be received to discontinue pt's am dose of Lasix and Aldactone re: bp.

## 2019-02-01 NOTE — PROGRESS NOTES
[facility] PROGRESS NOTEPulmonary, Critical Care, and Sleep Medicine Name: Chrissy Jones MRN: [id number] : 1945 Hospital: [address] [facility] Date: 2019  Admission Date: 2019 Chart and notes reviewed. Data reviewed. I review the patient's current medications in the medical record at each encounter.  I have evaluated and examined the patient. .  
 
Overnight events reviewed: 
Afebrile overnight BP soft but O2 sats 96% on RA 
8450  mLs removed via paracentesis  Hgb 7.2; s/p 1 unit PRBCs this morning WBC 2.6 Mag 1.5; received repletion INR 1.7 Na 131, improved Alk phos 174; improved Strep pneumo negative Chest CT: moderate right pleural effusion with atlectasis, trace left pleural effusion ECHO:  Left atrial cavity mildly dilated, moderate aortic valve leaflet calcification, trace MR, mild TR, moderate left pleural effusion, EF 61.65% ROS:  Feels well. Does not feel SOB at rest, however has not done any physical activity yet. About to work with PT. Denies complaints. Vital Signs: 
Visit Vitals BP 92/53 (BP 1 Location: Left arm, BP Patient Position: Sitting) Pulse 92 Temp 98.8 °F (37.1 °C) Resp 19 Ht 5' 11\" (1.803 m) Wt 84.8 kg (187 lb) SpO2 96% BMI 26.08 kg/m² O2 Device: Room air Temp (24hrs), Av.6 °F (37 °C), Min:98 °F (36.7 °C), Max:98.9 °F (37.2 °C) Intake/Output:  
Last shift:      No intake/output data recorded. Last 3 shifts:  1901 -  0700 In: 56 [P.O.:240; I.V.:250] Out: 200 [Urine:200] Intake/Output Summary (Last 24 hours) at 2019 1511 Last data filed at 2019 5647 Gross per 24 hour Intake 250 ml Output 200 ml Net 50 ml Telemetry:  
 
Physical Exam:  
General:  Alert, cooperative, no distress, very pleasant, appears stated age. Head:  Normocephalic, without obvious abnormality, atraumatic. Eyes:  Conjunctivae/corneas clear. Nose: Nares normal. Septum midline. Mucosa normal.   
Throat: Lips, mucosa, and tongue normal. Poor dentition. Neck: Supple, symmetrical, trachea midline, no adenopathy. Lungs:   Clear to auscultation bilaterally, slightly decreased lower right lung field. Chest wall:  No tenderness or deformity. Heart:  Regular rate and rhythm, S1, S2 normal, no murmur, click, rub or gallop. Abdomen:   Soft, non-tender. Bowel sounds normal. No masses,  No organomegaly. Abdomen markedly less protuberant today. Extremities: Extremities no cyanosis or edema, 2+ LE edema. Pulses: 2+ and symmetric all extremities. Skin: Skin color, texture, turgor normal. No rashes or lesions Lymph nodes: Cervical nodes normal.  
Neurologic: Grossly nonfocal  
 
DATA: 
MAR reviewed and pertinent medications noted or modified as needed Labs: 
Recent Labs 02/01/19 
2394 01/31/19 
1423 01/31/19 
0427  01/30/19 
8552 WBC 2.8*  --  2.6*  --  3.2* HGB 7.5* 7.2* 6.3*   < > 6.8* HCT 21.9* 21.0* 18.6*   < > 19.9*  
PLT 53*  --  57*  --  83*  
 < > = values in this interval not displayed. Recent Labs 02/01/19 
9877 01/31/19 
7627 01/30/19 
4660 * 130* 129*  
K 4.2 4.4 4.4 CL 98 97 95* CO2 27 27 26 * 95 132* BUN 17 17 17 CREA 0.58* 0.65* 0.72  
CA 7.6* 7.7* 8.2* MG 1.5* 1.5* 1.4* PHOS 3.3 3.7  --   
ALB 2.0* 1.8* 2.1* TBILI 1.1* 1.2* 1.1*  
SGOT 18 30 44* ALT 19 28 36 INR 1.7* 1.6* 1.6* Imaging:  I have personally reviewed the patients radiographs and reports. Chest CT:  Moderate right pleural effusion with slight right basilar atelectasis, tiny left effusion. Large amount of ascites and changes if cirrhosis. IMPRESSION 
· Dyspnea · Abnormal CXR; with right pleural effusion +/- consolidation · Cirrhosis · Ascites, s/p paracentesis with 8,450 mLs removed · Pancytopenia · Hyponatremia; improved 
  
  
PLAN 
· Supplemental O2 if needed to keep sats >90% · Will hold off on thoracentesis given that he is symptomatically better following paratenesis, right effusion is moderate but his dyspnea was likely due to the large amount of fluid in his abdomen · Can deescalate ABX to Doxy for 5 days, low suspicion for pneumonia · F/U remaining PNA panel · Transfuse to keep hemoglobin >7.  Hem-Onc following. · GI following, on step 1 duiresis · DVT Prophylaxis:  SCDs  is stable from a pulmonary perspective. We will sign of and be available as needed for questions or concerns . Thank you for allowing us to participate in 's care.  
 
 
Rubens Xavier NP

## 2019-02-01 NOTE — PROGRESS NOTES
0008: RN contacted Md George about pt's MAP of 59. Orders given for a 250mL bolus. 0130: Pt BP after bolus is 98/50.  
 
0150: 50 mg albumin started. 0415: Pt has received 25 mg of 50 mg dose of albumin. Current BP is 94/48. Pt is asymptomatic and in NAD. 1936: 50 mg albumin has been administered. Pt bp is now 98/56.

## 2019-02-01 NOTE — PROGRESS NOTES
TRANSFER - OUT REPORT: 
 
Verbal report given to Saundra Anderson RN on Byron Jin being transferred to [facility](unit) for routine progression of care Report consisted of patient's Situation, Background, Assessment and  
Recommendations(SBAR). Information from the following report(s) SBAR was reviewed with the receiving nurse. Opportunity for questions and clarification was provided. Patient transported with: 
 Registered Nurse

## 2019-02-01 NOTE — PROGRESS NOTES
Bedside and Verbal shift change report given to [facility] (oncoming nurse) by Hugh Schumacher (offgoing nurse). Report included the following information SBAR, Kardex and MAR.

## 2019-02-01 NOTE — PROGRESS NOTES
Physical Therapy Orders received and chart reviewed. Patient currently off the floor for line placement. We will continue to follow and re-attempt later as able. Thank you.  
Jose Garcia PT,DPT,NCS

## 2019-02-01 NOTE — PROGRESS NOTES
2/1/2019   CARE MANAGEMENT NOTE:  CM received consult from MD re: Ron Montesinos will need  PT/OT. \"  PT/OT evals are pending with recs. Pt is without a PCP but he was provided with a list.  A tentative referral was made to [facility] however they have declined because pt does not have an established PCP at this time. Ester

## 2019-02-01 NOTE — PROGRESS NOTES
[facility] [place] at [address], [address] [facility] [facility] W: [phone]  F: [phone] Reason for Visit:  
Chris Bazzi is a 74 y.o. male who is seen in consultation at the request of Dr. Jefferson Coffey for evaluation of anemia. History of Present Illness:  
Chris Bazzi is a pleasant 74 y.o. male who presents today for evaluation of anemia. I met him during his previous hospital stay for this, and I have been following him as an outpatient. The cause of his anemia has not been clear to me. We have been transfusing him regularly as an outpatient (about weekly), and referred him to [facility] to help us determine the etiology of his anemia, but he has not been compliant with follow up there. He missed his appointment with us recently for transfusion, as he was not feeling well. He presented to the ED today with progressive dyspnea. Workup in the ED demonstrated his anemia, as well as a small right effusion and right basilar consolidation. He was admitted for further evaluation. He has a history of cirrhosis. He was seen by GI at [facility] and had a paracentesis there. Recently had a repeat paracentesis ordered by my office on 1/21. He reports no ETOH in over a year. Interval History:  
 
Feels better; breathing better;abde feel better;  has appetite which he has not had for awhile. Hoping to get port placed due to transportation being an issue. No family at bedside. Current Facility-Administered Medications Medication Dose Route Frequency  magnesium sulfate 2 g/50 ml IVPB (premix or compounded)  2 g IntraVENous ONCE  
 furosemide (LASIX) tablet 20 mg  20 mg Oral DAILY  spironolactone (ALDACTONE) tablet 25 mg  25 mg Oral DAILY  lidocaine-EPINEPHrine (XYLOCAINE) 1 %-1:100,000 injection 5-50 mg  0.5-5 mL IntraDERMal Multiple  fentaNYL citrate (PF) injection  mcg   mcg IntraVENous Multiple  midazolam (VERSED) injection 0.5-5 mg  0.5-5 mg IntraVENous Multiple  lidocaine (XYLOCAINE) 10 mg/mL (1 %) injection 10-20 mL  10-20 mL SubCUTAneous Multiple  0.9% sodium chloride infusion 250 mL  250 mL IntraVENous PRN  
 0.9% sodium chloride infusion 250 mL  250 mL IntraVENous PRN  
 sodium chloride (NS) flush 5-40 mL  5-40 mL IntraVENous Q8H  
 sodium chloride (NS) flush 5-40 mL  5-40 mL IntraVENous PRN  
 acetaminophen (TYLENOL) tablet 650 mg  650 mg Oral Q6H PRN  
 naloxone (NARCAN) injection 0.4 mg  0.4 mg IntraVENous PRN  
 cefTRIAXone (ROCEPHIN) 2 g in sterile water (preservative free) 20 mL IV syringe  2 g IntraVENous Q24H  
 doxycycline (VIBRAMYCIN) 100 mg in 0.9% sodium chloride (MBP/ADV) 100 mL  100 mg IntraVENous Q12H  
 ondansetron (ZOFRAN) injection 4 mg  4 mg IntraVENous Q6H PRN  pantoprazole (PROTONIX) 40 mg in sodium chloride 0.9% 10 mL injection  40 mg IntraVENous Q24H No Known Allergies Review of Systems: A complete review of systems was obtained, negative except as described above. Physical Exam:  
 
Visit Vitals BP 96/55 (BP 1 Location: Left arm, BP Patient Position: At rest) Pulse 78 Temp 98.6 °F (37 °C) Resp 17 Ht 5' 11\" (1.803 m) Wt 84.8 kg (187 lb) SpO2 96% BMI 26.08 kg/m² General: No distress, frail, cachectic appearing Eyes: PERRLA, anicteric sclerae HENT: Atraumatic, OP clear Neck: Supple Lymphatic: No cervical, supraclavicular, or inguinal adenopathy Respiratory: decreased rt base; , normal respiratory effort CV: PICC line to rt upper arm; Normal rate, regular rhythm, no murmurs, 1+ LE  edema GI: soft, non tender; No masses. Unable to palpate liver or spleen. MS: Digits without clubbing or cyanosis. Skin: No rashes, ecchymoses, or petechiae. Normal temperature, turgor, and texture. Psych: Alert, oriented, appropriate affect, normal judgment/insight Results:  
 
Lab Results Component Value Date/Time WBC 2.8 (L) 02/01/2019 03:05 AM  
 HGB 7.5 (L) 02/01/2019 03:05 AM  
 HCT 21.9 (L) 02/01/2019 03:05 AM  
 PLATELET 53 (L) 50/03/5257 03:05 AM  
 MCV 85.9 02/01/2019 03:05 AM  
 ABS. NEUTROPHILS 1.3 (L) 02/01/2019 03:05 AM  
 
Lab Results Component Value Date/Time Sodium 131 (L) 02/01/2019 03:05 AM  
 Potassium 4.2 02/01/2019 03:05 AM  
 Chloride 98 02/01/2019 03:05 AM  
 CO2 27 02/01/2019 03:05 AM  
 Glucose 112 (H) 02/01/2019 03:05 AM  
 BUN 17 02/01/2019 03:05 AM  
 Creatinine 0.58 (L) 02/01/2019 03:05 AM  
 GFR est AA >60 02/01/2019 03:05 AM  
 GFR est non-AA >60 02/01/2019 03:05 AM  
 Calcium 7.6 (L) 02/01/2019 03:05 AM  
 
Lab Results Component Value Date/Time Bilirubin, total 1.1 (H) 02/01/2019 03:05 AM  
 ALT (SGPT) 19 02/01/2019 03:05 AM  
 AST (SGOT) 18 02/01/2019 03:05 AM  
 Alk. phosphatase 174 (H) 02/01/2019 03:05 AM  
 Protein, total 4.9 (L) 02/01/2019 03:05 AM  
 Albumin 2.0 (L) 02/01/2019 03:05 AM  
 Globulin 2.9 02/01/2019 03:05 AM  
 
Lab Results Component Value Date/Time Reticulocyte count 0.3 (L) 01/31/2019 04:27 AM  
 Iron % saturation 96 (H) 09/27/2018 04:14 AM  
 TIBC 252 09/27/2018 04:14 AM  
 Ferritin 537 (H) 09/27/2018 04:14 AM  
 Vitamin B12 >2,000 (H) 09/26/2018 01:00 PM  
 Folate 19.1 09/26/2018 01:00 PM  
 Haptoglobin 105 09/26/2018 10:15 AM  
  09/26/2018 10:15 AM  
 TSH 4.45 (H) 09/27/2018 04:14 AM  
 OSCAR, Direct Positive (A) 09/27/2018 04:14 AM  
 Hep C  virus Ab Interp. NONREACTIVE 04/20/2016 09:58 AM  
 
Lab Results Component Value Date/Time INR 1.7 (H) 02/01/2019 03:05 AM  
 aPTT 26.5 09/26/2018 10:15 AM  
 
 
Peripheral smear 9/26/2018: There is a macrocytic, normochromic anemia. Granulocytes and  
lymphocytes are reduced and mature. Platelets are reduced and no  
blasts are seen. If cytopenias persist, a bone marrow biopsy  may be  
beneficial. Per Dr. Biju Quinn MD. 
  
Abdominal US 9/27/2018 1. Cirrhosis. Liver protocol CT or MRI should be considered for more sensitive hepatocellular carcinoma screening. 2. Small volume of ascites and right pleural effusion. 
  
Bone marrow biopsy 9/27/2018: Cellular marrow with maturing trilineage hematopoiesis, no increase in blasts. Lymphoid aggregates, favor benign/reactive. Mild reticulin fibrosis. Increased storage iron. Records reviewed and summarized above. 1/30/2019 CT CHEST WO CONT IMPRESSION: 
  
1. There is a moderate right effusion with slight right basilar atelectasis. There is a tiny left effusion. 
  
 There is a large amount of ascites in the upper abdomen. There are changes of 
cirrhosis. There is suspicion of gallstones. 1/30/2019 Duplex LE Interpretation Summary Bilateral lower extremity negative for deep vein thrombosis in the veins visualized. Assessment/Recommendations:  
1) Anemia, macrocytic (s/p 2 units PRBCs) Severe, transfusion dependent, but uncertain etiology. Labwork fairly unremarkable.  EGD and colonoscopy without bleeding.  Concern for production issue, given low retic and other cytopenias. However, bone marrow biopsy completed 9/27 was unrevealing. Possible early MDS, but I don't know that this would explain his considerable transfusion requirement. Has been seen at [address] [place] [address] for evaluation, he is seeing Dr. Giacomo Bridges and we are awaiting their opinion, though he has been noncompliant with follow up there. He does have an appointment there next week which hopefully he can keep. Hgb 7.2 this pm; continue to monitor and  transfusion support as needed for HGB <7. 
  
2) Thrombocytopenia Chronic since at least 2009.  Possibly related to cirrhosis, labwork otherwise unremarkable.  Spleen at upper limit of normal on imaging. Bone marrow without definite etiology, though perhaps early MDS as above. Monitor.   
3) Neutropenia Mild. As above, possibly related to cirrhosis vs early MDS. Monitor.   
4) Cirrhosis GI following. Seems to be worsening. Needing paracentesis. Seen by hepatology at [address] [place] [address], but doesn't want to follow up there. We had placed referral to Dr. Niki Wills (hepatology at [facility]) as outpatient, but that appointment is not until March. This can be cancelled if Dr. Jennifer San or Dr. Darci Tee plan to follow. --GI eval: paracentesis 1/31 (8450 cc yellow fluid removed) 5) Poor venous access PICC line to right arm now.  
--having port placed today while in the hospital: discussed with radiology; discussed with nursing. 
--reviewed port placement with pt 
--will leave PICC line in and d/c prior to discharge 6) Dyspnea/Rt pleural effusion Pulmonary following:  Holding on thoracentesis due to symptomatic resolution after paracentesis Plan reviewed with Dr Maria Teresa Burks Signed By: Karla Diaz NP

## 2019-02-02 NOTE — PROGRESS NOTES
Spiritual Care Partner Volunteer visited patient in 5 Med Surg on 2/2/19. Documented by:Chaplain Henry Cardenas., MS., [address] [place] [address] ([id number])

## 2019-02-02 NOTE — ROUTINE PROCESS
Bedside and Verbal shift change report given to Taz Cortez (oncoming nurse) by Chastity Hart RN (offgoing nurse). Report included the following information SBAR, Kardex, Intake/Output and Quality Measures.

## 2019-02-02 NOTE — PROGRESS NOTES
Hilario Patel [facility] 
[address] [facility], [place], [address] [place] [address] 
[phone] Medical Progress Note NAME: Chrissy Jones :  1945 MRM:  [id number] Date/Time: 2019 Subjective: Chief Complaint:  \"I'm okay\" Chart/notes/labs/studies reviewed, patient examined at bedside. Pt seen and examined. No complaints today. Does not feel he can safely d/c to home as he has no friend or family support at home. Objective:  
 
 
Vitals:  
 
  
Last 24hrs VS reviewed since prior progress note. Most recent are: 
 
Visit Vitals /60 (BP 1 Location: Left arm, BP Patient Position: At rest) Pulse 93 Temp 98.4 °F (36.9 °C) Resp 18 Ht 5' 11\" (1.803 m) Wt 77.2 kg (170 lb 3.1 oz) SpO2 97% BMI 23.74 kg/m² SpO2 Readings from Last 6 Encounters:  
19 97% 19 96% 19 99% 19 96% 18 98% 18 96% Intake/Output Summary (Last 24 hours) at 2019 1147 Last data filed at 2019 7492 Gross per 24 hour Intake 360 ml Output 450 ml Net -90 ml Exam:  
 
Physical Exam: 
 
Gen: thin, frail, chronically ill-appearing. HEENT:  Sclerae nonicteric, hearing intact to voice, mucous membranes moist 
Neck:  Supple, without masses. Resp:  No accessory muscle use, diminished R base. Without wheezes, rales, or rhonchi 
Card: RRR, without m/r/g. No LE edema. Abd:  +bowel sounds, soft, NTTP, distended but not tense Neuro: Face symmetric, tongue midline, speech fluent, follows commands appropriately Psych:  Alert, oriented x 3. Good insight Medications Reviewed: (see below) Lab Data Reviewed: (see below) 
 
______________________________________________________________________ Medications:  
 
Current Facility-Administered Medications Medication Dose Route Frequency  doxycycline (VIBRA-TABS) tablet 100 mg  100 mg Oral Q12H  furosemide (LASIX) tablet 20 mg  20 mg Oral DAILY  spironolactone (ALDACTONE) tablet 50 mg  50 mg Oral DAILY  0.9% sodium chloride infusion 250 mL  250 mL IntraVENous PRN  
 0.9% sodium chloride infusion 250 mL  250 mL IntraVENous PRN  
 sodium chloride (NS) flush 5-40 mL  5-40 mL IntraVENous Q8H  
 sodium chloride (NS) flush 5-40 mL  5-40 mL IntraVENous PRN  
 acetaminophen (TYLENOL) tablet 650 mg  650 mg Oral Q6H PRN  
 naloxone (NARCAN) injection 0.4 mg  0.4 mg IntraVENous PRN  
 cefTRIAXone (ROCEPHIN) 2 g in sterile water (preservative free) 20 mL IV syringe  2 g IntraVENous Q24H  
 ondansetron (ZOFRAN) injection 4 mg  4 mg IntraVENous Q6H PRN  pantoprazole (PROTONIX) 40 mg in sodium chloride 0.9% 10 mL injection  40 mg IntraVENous Q24H Lab Review:  
 
Recent Labs 02/02/19 
5436 02/01/19 
4700 01/31/19 
1423 01/31/19 
1432 WBC 2.6* 2.8*  --  2.6* HGB 7.3* 7.5* 7.2* 6.3* HCT 21.6* 21.9* 21.0* 18.6*  
PLT 54* 53*  --  57* Recent Labs 02/02/19 
3835 02/01/19 
3346 01/31/19 
7365 * 131* 130*  
K 4.4 4.2 4.4  
CL 97 98 97 CO2 27 27 27 * 112* 95 BUN 15 17 17 CREA 0.58* 0.58* 0.65* CA 7.6* 7.6* 7.7* MG 1.4* 1.5* 1.5* PHOS 3.2 3.3 3.7 ALB 2.4* 2.0* 1.8* SGOT 25 18 30 ALT 18 19 28 INR 1.6* 1.7* 1.6* No components found for: [place] No results for input(s): PH, PCO2, PO2, HCO3, FIO2 in the last 72 hours. Recent Labs 02/02/19 
4313 02/01/19 
0770 01/31/19 
6807 INR 1.6* 1.7* 1.6* No results found for: SDES Lab Results Component Value Date/Time Culture result: NO GROWTH 3 DAYS 01/30/2019 10:43 AM  
 Culture result: MIXED SKIN AMBER ISOLATED 04/20/2016 10:34 AM  
 
    
  
Assessment / Plan:  
Severe anemia: chronic anemia at baseline requiring transfusions. Symptomatic. Denies melena, hematochezia. Has pancytopenia. Followed by Dr. Amy Smith. Transfusion goal Hg >7. Suspect this is from cirrhosis.  Pt did have BM Bx prior, and EGD/colonoscopy in September, showing no varices and only one small polyp. BMBx showed no evidence of malignancy. Question possible early MDS. Hem/onc on board Pt had a port placed per hem/onc today (due to poor IV access). He needs to have his PICC removed prior to discharge Cirrhosis ([facility] (): ?cryptogenic vs ETOH vs NIETO. +OSCAR with borderline AMA but low suspicion for PBC per GI. S/p para with removal of 8L fluid  
-continue low dose lasix and spironolactone as BP's tolerate  
  
  Hyponatremia: suspect from cirrhosis 
-monitor while on diuretics; did downtrend slightly from 2/1  
  
Abnormal chest x-ray: suspect from pleural effusion, hepatic hydrothorax? Stopped CTX, remains on doxy. Appreciate pulm. CT chest reviewed. 
  
  Dyspnea: likely from severe anemia and large ascites/pleural effusion. No DVT. No CHF. 
  
  Hypomagnesemia: replete Total time spent: 35 minutes Care Plan discussed with: Patient, Nursing Staff and >50% of time spent in counseling and coordination of care Discussed:  Care Plan and D/C Planning Prophylaxis:  SCD's Disposition:  Pt has no family to assist him at home. ?[facility] placement  
 
     
___________________________________________________ Attending Physician: Tess Ferrara MD

## 2019-02-02 NOTE — PROGRESS NOTES
2/2/2019  
4:36 PM  
Pt is refusing to go to [facility] and would like to go home with [facility]. Referral sent via [employer] to [facility]. Pending approval.  
 
 
10:01 AM 
Received call back from [facility] requesting OT evaluation. Spoke with OT and they will be evaluating today. CM to follow up.  
 
 
9:26 AM 
 
CM received request from MD to send referral to [facility] for rehab. Sent referral via [employer]. Called [facility] and spoke to [address]ons and stated they would review referral and will advise if they can accept. Jordan Sawyer, 
Care Management

## 2019-02-02 NOTE — PROGRESS NOTES
Physical Therapy: 
Received phone call from nursing regarding patient's ambulation. Indicated to nurse that we evaluated the patient yesterday, he is CGA with a RW and is cleared to ambulate in the hallway with nursing assistance and using a RW. Encouarged patient to be out of bed 3x/day and ambulating to bathroom for improved upright activity.  
Herson Elias PT,DPT,NCS

## 2019-02-02 NOTE — PROGRESS NOTES
Problem: Self Care Deficits Care Plan (Adult) Goal: *Acute Goals and Plan of Care (Insert Text) Occupational Therapy Goals Initiated 2/2/2019 1. Patient will participate in upper extremity therapeutic exercise/activities with modified independence for 15 minutes within 7 day(s). 2.  Patient will perform grooming in standing with modified independence within 7 day(s). 3.  Patient will perform lower body dressing with modified independence within 7 day(s). 4.  Patient will perform toilet transfers with modified independence within 7 day(s). 5.  Patient will perform all aspects of toileting with modified independence within 7 day(s). Occupational Therapy EVALUATION Patient: Fannie Raza (20 y.o. male) Date: 2/2/2019 Primary Diagnosis: Symptomatic anemia Precautions:     
 
ASSESSMENT : 
 
Based on the objective data described below, the patient presents with ADL and functional mobility performance at the supervision level due to decreased balance and activity tolerance. ADLS were performed today using both RW (used in PT yesterday) and SPC. Patient uses SPC at home and states this is his preferred AD. It seems appropriate for him to use this at home. Patient's blood pressure low during evaluation (85/50) and with HR of 113 bpm. However patient not symptomatic and RN aware. Patient lives alone and said that he prefers to return home with home health therapy. Patient was encouraged to continue to mobilize around room (toileting, bed>chair) with assistance and patient agrees to do this and to use callbell. He stated he did not know that he could get out of bed and he was just nervous after being in bed for so long. Recommend home health OT to maximize ADL independence. Patient will benefit from skilled intervention to address the above impairments. Patients rehabilitation potential is considered to be Excellent Factors which may influence rehabilitation potential include: [x]             None noted []             Mental ability/status []             Medical condition []             Home/family situation and support systems []             Safety awareness []             Pain tolerance/management 
[]             Other: PLAN : 
Recommendations and Planned Interventions: 
[x]               Self Care Training                  [x]        Therapeutic Activities [x]               Functional Mobility Training    []        Cognitive Retraining 
[x]               Therapeutic Exercises           [x]        Endurance Activities []               Balance Training                   []        Neuromuscular Re-Education []               Visual/Perceptual Training     [x]   Home Safety Training 
[x]               Patient Education                 [x]        Family Training/Education []               Other (comment): Frequency/Duration: Patient will be followed by occupational therapy 5 times a week to address goals. Discharge Recommendations: Home Health Further Equipment Recommendations for Discharge: none SUBJECTIVE:  
Patient stated I'm not dizzy.  OBJECTIVE DATA SUMMARY:  
HISTORY:  
Past Medical History:  
Diagnosis Date  Anemia  Cirrhosis ([address]  History of vascular access device 10/22/2018 [facility] VAT - 4 FR, R basilic, 38 cm for DIVA (outpatient infusions)  Pancytopenia ([address]  Patella fracture 2007 Right knee Past Surgical History:  
Procedure Laterality Date  COLONOSCOPY N/A 9/28/2018 COLONOSCOPY performed by Luis Majano MD at [address] [place] [address] Av  HX HERNIA REPAIR Bilateral   
 HX RETINAL DETACHMENT REPAIR    
 IR INSERT TUNL CVC W PORT OVER 5 YEARS  2/1/2019 Prior Level of Function/Environment/Context: lives alone in a 1 bedroom apt, Mod I in [place] PTA, uses SPC Occupations in which the patient is/was successful, what are the barriers preventing that success: Performance Patterns (routines, roles, habits, and rituals):  
Personal Interests and/or values:  
Expanded or extensive additional review of patient history:  
 
Home Situation Home Environment: Apartment # Steps to Enter: 8 Rails to Enter: Yes Hand Rails : Left One/Two Story Residence: One story Living Alone: Yes Support Systems: None Patient Expects to be Discharged to[de-identified] [id number] Current DME Used/Available at Home: Cane, straight Hand dominance: RightEXAMINATION OF PERFORMANCE DEFICITS: 
Cognitive/Behavioral Status: 
  
Orientation Level: Oriented X4 Cognition: Appropriate decision making; Appropriate safety awareness Perseveration: No perseveration noted Safety/Judgement: Awareness of environment; Insight into deficits Skin: intact Edema: none in the uppers Hearing: Auditory Auditory Impairment: None Vision/Perceptual:   
    
   No changes Corrective Lenses: Glasses Range of Motion: 
WNL Strength: 
WNL Coordination: 
  
Fine Motor Skills-Upper: Left Intact; Right Intact Gross Motor Skills-Upper: Left Intact; Right Intact Tone & Sensation: 
WNL Functional Mobility and Transfers for ADLs:Bed Mobility: Mod I with HOB raised Transfers: 
Sit to Stand: Supervision Stand to Sit: Supervision Bed to Chair: Supervision Bathroom Mobility: Supervision/set up Toilet Transfer : Supervision Shower Transfer: Supervision ADL Assessment: 
Feeding: Independent Oral Facial Hygiene/Grooming: Independent Bathing: Supervision Upper Body Dressing: Independent Lower Body Dressing: Supervision (socks) Toileting: Supervision, patient incontinent of urine during toilet transfer. He was able to don/doff a new pair of briefs with supervision. ADL Intervention and task modifications: 
  
 
  
Bed > chair transfer, bed mobility, toilet transfer, toileting, and LB dressing performed - all with supervision. Cognitive Retraining Safety/Judgement: Awareness of environment; Insight into deficits Functional Measure: 
Barthel Index: 
 
Bathin Bladder: 5 Bowels: 10 
Groomin Dressing: 10 Feeding: 10 Mobility: 10 Stairs: 5 Toilet Use: 10 Transfer (Bed to Chair and Back): 10 Total: 75 The Barthel ADL Index: Guidelines 1. The index should be used as a record of what a patient does, not as a record of what a patient could do. 2. The main aim is to establish degree of independence from any help, physical or verbal, however minor and for whatever reason. 3. The need for supervision renders the patient not independent. 4. A patient's performance should be established using the best available evidence. Asking the patient, friends/relatives and nurses are the usual sources, but direct observation and common sense are also important. However direct testing is not needed. 5. Usually the patient's performance over the preceding 24-48 hours is important, but occasionally longer periods will be relevant. 6. Middle categories imply that the patient supplies over 50 per cent of the effort. 7. Use of aids to be independent is allowed. Sarah Butler., Barthel, D.W. (7882). Functional evaluation: the Barthel Index. [facility] (14)2. Mario Ramos subha AHSAN Cabrales, Nichole Lopez., Júnior Steele., Griselda, [address] [place] [address] (). Measuring the change indisability after inpatient rehabilitation; comparison of the responsiveness of the Barthel Index and Functional [place] Measure. Journal of Neurology, Neurosurgery, and Psychiatry, 66(4), 609-254. Lucius Harvey, [place].ELINA, TASH Pantoja, & Gideon Koroma MKarolA. (2004.) Assessment of post-stroke quality of life in cost-effectiveness studies: The usefulness of the Barthel Index and the EuroQoL-5D. [facility], 13, 479-40 Occupational Therapy Evaluation Charge Determination History Examination Decision-Making LOW Complexity : Brief history review  LOW Complexity : 1-3 performance deficits relating to physical, cognitive , or psychosocial skils that result in activity limitations and / or participation restrictions  LOW Complexity : No comorbidities that affect functional and no verbal or physical assistance needed to complete eval tasks Based on the above components, the patient evaluation is determined to be of the following complexity level: LOW Pain: 
Pain Scale 1: Numeric (0 - 10) Pain Intensity 1: 0 Activity Tolerance:  
Good Please refer to the flowsheet for vital signs taken during this treatment. After treatment:  
[x] Patient left in no apparent distress sitting up in chair 
[] Patient left in no apparent distress in bed 
[x] Call bell left within reach [x] Nursing notified 
[] Caregiver present 
[] Bed alarm activated COMMUNICATION/EDUCATION:  
The patients plan of care was discussed with: Physical Therapist and Registered Nurse. [x] Home safety education was provided and the patient/caregiver indicated understanding. [x] Patient/family have participated as able in goal setting and plan of care. [x] Patient/family agree to work toward stated goals and plan of care. [] Patient understands intent and goals of therapy, but is neutral about his/her participation. [] Patient is unable to participate in goal setting and plan of care. This patients plan of care is appropriate for delegation to [facility]. Thank you for this referral. 
Francisco J Torres OT Time Calculation: 29 mins

## 2019-02-02 NOTE — PROGRESS NOTES
13:10 OT approached RN about patient's soft BP. BP 85/50 with heart rate of 113. Patient not symptomatic at this time. Left a message with Dr. Kiarra Mota. 13:22 OT retook BP with patient sitting in chair. New BP is within normal limits at 107/58.

## 2019-02-02 NOTE — ROUTINE PROCESS
Bedside shift change report given to [facility] (oncoming nurse) by Francine Geano (offgoing nurse). Report included the following information SBAR, Kardex, Intake/Output, MAR, Accordion, Recent Results and Med Rec Status.

## 2019-02-02 NOTE — PROGRESS NOTES
Problem: Falls - Risk of 
Goal: *Absence of Falls Document Hamp Alexandra Fall Risk and appropriate interventions in the flowsheet. Outcome: Progressing Towards Goal 
Fall Risk Interventions: 
Mobility Interventions: Communicate number of staff needed for ambulation/transfer, OT consult for ADLs, Patient to call before getting OOB, PT Consult for mobility concerns Elimination Interventions: Bed/chair exit alarm, Call light in reach, Patient to call for help with toileting needs, Urinal in reach

## 2019-02-02 NOTE — PROGRESS NOTES
Henry Castrejon M.D. 
[phone] GI PROGRESS NOTE 
 
 
NAME: Chrissy Jones :  1945 MRN:  [id number] Subjective:  
Sitting in chair, comfortable, denies abdominal pain or any complaints. Was able to eat well today. Objective: In nAD VITALS:  
Last 24hrs VS reviewed since prior progress note. Most recent are: 
Visit Vitals /58 (BP Patient Position: Sitting;Post activity) Pulse 91 Temp 98.8 °F (37.1 °C) Resp 18 Ht 5' 11\" (1.803 m) Wt 77.2 kg (170 lb 3.1 oz) SpO2 95% BMI 23.74 kg/m² Intake/Output Summary (Last 24 hours) at 2019 1421 Last data filed at 2019 3740 Gross per 24 hour Intake 360 ml Output 450 ml Net -90 ml PHYSICAL EXAM: 
General: Alert, in no acute distress HEENT: Anicteric sclerae. Lungs:            CTA Bilaterally. Heart:  Regular  rhythm, Abdomen: Soft,  distended, Non tender.  (+)Bowel sounds, (+) ascites Extremities: Trace edema Lab Data Reviewed:  
Recent Labs 19 
0622 19 
2519 WBC 2.6* 2.8* HGB 7.3* 7.5* HCT 21.6* 21.9*  
PLT 54* 53* Recent Labs 19 
0622 19 
1129 * 131* K 4.4 4.2 CL 97 98 CO2 27 27 BUN 15 17 CREA 0.58* 0.58* * 112* PHOS 3.2 3.3 CA 7.6* 7.6* Recent Labs 19 
0622 19 
8409 SGOT 25 18 * 174* TP 5.2* 4.9* ALB 2.4* 2.0*  
GLOB 2.8 2.9  
 
 
________________________________________________________________________ Patient Active Problem List  
Diagnosis Code  UTI (urinary tract infection) N39.0  Fall W19. Ojeda Leann  Elevated creatine kinase R74.8  Right tibial fracture S82.201A  Pancytop[facility] ([facility] D61.818  
 Cirrhosis ([facility] K74.60  Hyponatremia E87.1  Elevated troponin R74.8  Abnormal chest x-ray R93.89  Dyspnea R06.00  Lower extremity edema R60.0  Hypomagnesemia E83.42  Symptomatic anemia D64.9 Assessment and Plan: 
Decompensated liver cirrhosis with ascites, hyponatremia and coagulopathy. Clinically better after paracentesis and started on diuretics today with furosemide 20 mg and spironolactone 50. Continue with 2 g Na diet and increase furosemide to 40 mg daily and spironolactone 100 mg daily if BP allows. Will need close monitoring of electrolytes and renal function. Please call us if needed for the rest of this weekend.  
 
  
Signed By: Melinda Madsen MD   
 2/2/2019  2:21 PM

## 2019-02-02 NOTE — PROGRESS NOTES
[facility] Dosing Services: IV to PO Conversion The pharmacist has determined that this patient meets P & T approved criteria for conversion from IV to oral therapy for the following medication:Doxycycline The pharmacist has written the following order for the patient: Doxycycline 100mg PO twice daily The pharmacist will continue to monitor the patient's status and advise the physician if conversion back to IV therapy is recommended. Signed Matthew Saavedra [address] information:  [phone]

## 2019-02-03 NOTE — ROUTINE PROCESS
Bedside and Verbal shift change report given to Quinton Metzger (oncoming nurse) by Javad Loja RN (offgoing nurse). Report included the following information SBAR, Kardex, MAR and Accordion.

## 2019-02-03 NOTE — PROGRESS NOTES
17:40 Patient was being prepared for discharge when critical lab came back for patient's platelet count (37). Dr. Lou Kaufman notified. Discharge discontinued. Sadly, lines were already pulled for patient to be discharged. MD notified. Patient has port that can be accessed if need be. Not accessing at this time 17:45 attempted to get a hold of lyft to discontinue transportation at 18:00. Call was not answered. Bedside and Verbal shift change report given to Holly West RN (oncoming nurse) by Estrellita Elaine RN (offgoing nurse). Report included the following information SBAR, Kardex, Intake/Output, MAR and Recent Results.

## 2019-02-03 NOTE — PROGRESS NOTES
Spiritual Care Assessment/Progress Note [address] 
 
 
NAME: Qamar Kline      MRN: [id number] AGE: 74 y.o. SEX: male Gnosticist Affiliation: Methodist Language: [place] 2/3/2019     Total Time (in minutes): 9 Spiritual Assessment begun in [facility] 5M1 MED SURG 1 through conversation with: 
  
    [x]Patient        [] Family    [] Friend(s) Reason for Consult: Initial/Spiritual assessment, patient floor Spiritual beliefs: (Please include comment if needed) [x] Identifies with a moi tradition: Methodist     
   [] Supported by a moi community:        
   [] Claims no spiritual orientation:       
   [] Seeking spiritual identity:            
   [] Adheres to an individual form of spirituality:       
   [] Not able to assess:                   
 
    
Identified resources for coping:  
   [] Prayer                           
   [] Music                  [] Guided Imagery [x] Family/friends                 [] Pet visits [] Devotional reading                         [] Unknown 
   [] Other:                                          
 
 
Interventions offered during this visit: (See comments for more details) Patient Interventions: Affirmation of emotions/emotional suffering, Affirmation of moi, Coping skills reviewed/reinforced, Catharsis/review of pertinent events in supportive environment, Iconic (affirming the presence of God/Higher Power) Plan of Care: 
 
 [x] Support spiritual and/or cultural needs  
 [] Support AMD and/or advance care planning process    
 [] Support grieving process 
 [] Coordinate Rites and/or Rituals  
 [] Coordination with community clergy [] No spiritual needs identified at this time 
 [] Detailed Plan of Care below (See Comments)  [] Make referral to Music Therapy 
[] Make referral to Pet Therapy    
[] Make referral to Addiction services 
[] Make referral to [facility] 
[] Make referral to Spiritual Care Partner [] No future visits requested       
[x] Follow up visits as needed Comments: Patient sitting up in bed watching television and resting. Good eye contact, friendly, smiling. Says he is feeling a little better today. Spoke briefly about current thoughts, feelings, and concerns. Spoke about his current health as well as his past while [place]. Says he lives alone with good support from close friends. His hope is to be able to return home as soon as able. He appeared encouraged as a result of thsi visit and expressed gratitude for this visit. Visited by Rev. Curry Horan, [facility]  paging service: [phone])

## 2019-02-03 NOTE — PROGRESS NOTES
Problem: Falls - Risk of 
Goal: *Absence of Falls Document Demetria Macdonald Fall Risk and appropriate interventions in the flowsheet. Outcome: Progressing Towards Goal 
Fall Risk Interventions: 
Mobility Interventions: Bed/chair exit alarm, Communicate number of staff needed for ambulation/transfer, Patient to call before getting OOB Elimination Interventions: Call light in reach, Patient to call for help with toileting needs, Urinal in reach

## 2019-02-03 NOTE — PROGRESS NOTES
Damian Muñiz M.D. 
[phone] GI PROGRESS NOTE 
 
 
NAME: Qamar Kline :  1945 MRN:  [id number] Subjective:  
Denies any complaints, no abdominal pain. Reports good appetite. Objective: In nAD VITALS:  
Last 24hrs VS reviewed since prior progress note. Most recent are: 
Visit Vitals /59 (BP 1 Location: Left arm, BP Patient Position: At rest) Pulse 99 Temp 99 °F (37.2 °C) Resp 16 Ht 5' 11\" (1.803 m) Wt 78.4 kg (172 lb 13.5 oz) SpO2 95% BMI 24.11 kg/m² Intake/Output Summary (Last 24 hours) at 2/3/2019 1158 Last data filed at 2/3/2019 2891 Gross per 24 hour Intake  Output 640 ml Net -640 ml PHYSICAL EXAM: 
General: Alert, in no acute distress HEENT: Anicteric sclerae. Lungs:            CTA Bilaterally. Heart:  Regular  rhythm, Abdomen: Soft,  distended, Non tender.  (+)Bowel sounds, (+) ascites Extremities: Trace edema Lab Data Reviewed:  
Recent Labs 19 
6859 19 
3973 WBC 2.4* 2.6* HGB 6.8* 7.3* HCT 19.8* 21.6*  
PLT 51* 54* Recent Labs 19 
4628 19 
0622 19 
8178 * 129* 131* K 4.2 4.4 4.2 CL 96* 97 98 CO2 26 27 27 BUN 16 15 17 CREA 0.53* 0.58* 0.58* * 112* 112* PHOS  --  3.2 3.3 CA 7.6* 7.6* 7.6* Recent Labs 19 
0622 19 
2188 SGOT 25 18 * 174* TP 5.2* 4.9* ALB 2.4* 2.0*  
GLOB 2.8 2.9  
 
 
________________________________________________________________________ Patient Active Problem List  
Diagnosis Code  UTI (urinary tract infection) N39.0  Fall W19. Lacinda Sly  Elevated creatine kinase R74.8  Right tibial fracture S82.201A  Pancytopenia ([facility] D61.818  
 Cirrhosis ([facility] K74.60  Hyponatremia E87.1  Elevated troponin R74.8  Abnormal chest x-ray R93.89  Dyspnea R06.00  Lower extremity edema R60.0  Hypomagnesemia E83.42  Symptomatic anemia D64.9 Assessment and Plan: 
Decompensated liver cirrhosis with ascites, hyponatremia and coagulopathy. Clinically better after paracentesis and started on diuretics today with furosemide 20 mg and spironolactone 50. Continue with 2 g Na diet and transfuse 1 unit PRBC today. Discussed with Dr. Maria Luisa Lopez, based on his hemodynamics, would continue with above diuretic dose. Ok to discharge home with close monitoring of labs this coming week. If he remains in the hospital until tomorrow, consider repeat paracentesis with IV albumin prior to discharge.  
 
  
Signed By: Lorette Bloch, MD   
 2/3/2019  12:00 PM

## 2019-02-03 NOTE — PROGRESS NOTES
Bedside and Verbal shift change report given to OTILIO Sim (oncoming nurse) by Erica Upton RN (offgoing nurse). Report included the following information SBAR, Kardex, Intake/Output, MAR and Recent Results.

## 2019-02-04 NOTE — PROGRESS NOTES
Pt in [facility] [facility] for scheduled paracentesis. Edis Poole MD at the bedside reviewing consent and allergies. 1122 - Procedure started. VS being monitored. 1139 - 2750 mls of clear yellow fluid removed so far. Pt without complaint. Pain 0/10. 
1151 - Pt tolerating procedure well. 5750 mls total removed so far. VS being monitored. 1219 - Fluid flow slowing. Pt doing well and without complaints. VS being monitored. 1222 - Reviewed procedure instructions with Pt and instructed to call for RN assist due to possible dizziness or lightheadedness following the procedure. Pt able to verbalize understanding. He is familiar with the procedure from prior paracenteses. 1224 - Catheter removed and band aid applied by Nieves Quiñones. Procedure site (lower right abdomen) c/d/i. A total of 9250 mls of clear yellow fluid have been removed. 1226 - Report called to [facility], RN in [facility]. Pt put in for transport back to the floor.

## 2019-02-04 NOTE — PROGRESS NOTES
[facility] [place] at [address], [address] [place] [address] 1007 [facility] W: [phone]  F: [phone] Reason for Visit:  
Jorge Tavarez is a 74 y.o. male who is seen in consultation at the request of Dr. Karan Mackenzie for evaluation of anemia. History of Present Illness:  
Jorge Tavarez is a pleasant 74 y.o. male who presents today for evaluation of anemia. I met him during his previous hospital stay for this, and I have been following him as an outpatient. The cause of his anemia has not been clear to me. We have been transfusing him regularly as an outpatient (about weekly), and referred him to [facility] to help us determine the etiology of his anemia, but he has not been compliant with follow up there. He missed his appointment with us recently for transfusion, as he was not feeling well. He presented to the ED today with progressive dyspnea. Workup in the ED demonstrated his anemia, as well as a small right effusion and right basilar consolidation. He was admitted for further evaluation. He has a history of cirrhosis. He was seen by GI at [facility] and had a paracentesis there. Recently had a repeat paracentesis ordered by my office on 1/21. He reports no ETOH in over a year. Interval History: On his way for paracentesis; states abd is full again. Denies N/V. No family at bedside. Current Facility-Administered Medications Medication Dose Route Frequency  albumin human 25% (BUMINATE) solution 25 g  25 g IntraVENous Q6H  
 pantoprazole (PROTONIX) tablet 40 mg  40 mg Oral ACB  doxycycline (VIBRA-TABS) tablet 100 mg  100 mg Oral Q12H  
 magnesium chloride (MAG DELAY) delayed release tablet 64 mg  64 mg Oral BID  
 0.9% sodium chloride infusion 250 mL  250 mL IntraVENous PRN  
 0.9% sodium chloride infusion 250 mL  250 mL IntraVENous PRN  
 sodium chloride (NS) flush 5-40 mL  5-40 mL IntraVENous Q8H  
  sodium chloride (NS) flush 5-40 mL  5-40 mL IntraVENous PRN  
 acetaminophen (TYLENOL) tablet 650 mg  650 mg Oral Q6H PRN  
 naloxone (NARCAN) injection 0.4 mg  0.4 mg IntraVENous PRN  
 ondansetron (ZOFRAN) injection 4 mg  4 mg IntraVENous Q6H PRN No Known Allergies Review of Systems: A complete review of systems was obtained, negative except as described above. Physical Exam:  
 
Visit Vitals /51 (BP 1 Location: Left arm, BP Patient Position: Head of bed elevated (Comment degrees)) Pulse 80 Temp 98.2 °F (36.8 °C) Resp 16 Ht 5' 11\" (1.803 m) Wt 80.4 kg (177 lb 4 oz) SpO2 98% BMI 24.72 kg/m² General: No distress, frail, cachectic appearing Eyes: PERRLA, anicteric sclerae HENT: Atraumatic, OP clear Neck: Supple Lymphatic: No cervical, supraclavicular, or inguinal adenopathy Respiratory: decreased rt base; , normal respiratory effort CV:  Normal rate, regular rhythm, no murmurs, 1+ LE  edema GI: soft, non tender; No masses. Unable to palpate liver or spleen. MS: Digits without clubbing or cyanosis. Skin: No rashes, ecchymoses, or petechiae. Normal temperature, turgor, and texture. Psych: Alert, oriented, appropriate affect, normal judgment/insight Results:  
 
Lab Results Component Value Date/Time WBC 2.4 (L) 02/04/2019 02:58 AM  
 HGB 7.4 (L) 02/04/2019 02:58 AM  
 HCT 21.4 (L) 02/04/2019 02:58 AM  
 PLATELET 38 (LL) 43/17/6959 02:58 AM  
 MCV 85.9 02/04/2019 02:58 AM  
 ABS. NEUTROPHILS 1.2 (L) 02/04/2019 02:58 AM  
 
Lab Results Component Value Date/Time Sodium 127 (L) 02/04/2019 02:58 AM  
 Potassium 4.3 02/04/2019 02:58 AM  
 Chloride 96 (L) 02/04/2019 02:58 AM  
 CO2 26 02/04/2019 02:58 AM  
 Glucose 111 (H) 02/04/2019 02:58 AM  
 BUN 17 02/04/2019 02:58 AM  
 Creatinine 0.57 (L) 02/04/2019 02:58 AM  
 GFR est AA >60 02/04/2019 02:58 AM  
 GFR est non-AA >60 02/04/2019 02:58 AM  
 Calcium 7.7 (L) 02/04/2019 02:58 AM  
 
Lab Results Component Value Date/Time Bilirubin, total 0.9 02/02/2019 06:22 AM  
 ALT (SGPT) 18 02/02/2019 06:22 AM  
 AST (SGOT) 25 02/02/2019 06:22 AM  
 Alk. phosphatase 175 (H) 02/02/2019 06:22 AM  
 Protein, total 5.2 (L) 02/02/2019 06:22 AM  
 Albumin 2.4 (L) 02/02/2019 06:22 AM  
 Globulin 2.8 02/02/2019 06:22 AM  
 
Lab Results Component Value Date/Time Reticulocyte count 0.3 (L) 01/31/2019 04:27 AM  
 Iron % saturation 96 (H) 09/27/2018 04:14 AM  
 TIBC 252 09/27/2018 04:14 AM  
 Ferritin 537 (H) 09/27/2018 04:14 AM  
 Vitamin B12 >2,000 (H) 09/26/2018 01:00 PM  
 Folate 19.1 09/26/2018 01:00 PM  
 Haptoglobin 105 09/26/2018 10:15 AM  
  09/26/2018 10:15 AM  
 TSH 4.45 (H) 09/27/2018 04:14 AM  
 OSCAR, Direct Positive (A) 09/27/2018 04:14 AM  
 Hep C  virus Ab Interp. NONREACTIVE 04/20/2016 09:58 AM  
 
Lab Results Component Value Date/Time INR 1.5 (H) 02/04/2019 02:58 AM  
 aPTT 26.5 09/26/2018 10:15 AM  
 
 
Peripheral smear 9/26/2018: There is a macrocytic, normochromic anemia. Granulocytes and  
lymphocytes are reduced and mature. Platelets are reduced and no  
blasts are seen. If cytopenias persist, a bone marrow biopsy  may be  
beneficial. Per Dr. Franca Pérez MD. 
  
Abdominal US 9/27/2018 1. Cirrhosis. Liver protocol CT or MRI should be considered for more sensitive hepatocellular carcinoma screening. 2. Small volume of ascites and right pleural effusion. 
  
Bone marrow biopsy 9/27/2018: Cellular marrow with maturing trilineage hematopoiesis, no increase in blasts. Lymphoid aggregates, favor benign/reactive. Mild reticulin fibrosis. Increased storage iron. Records reviewed and summarized above. 1/30/2019 CT CHEST WO CONT IMPRESSION: 
  
1. There is a moderate right effusion with slight right basilar atelectasis. There is a tiny left effusion. 
  
 There is a large amount of ascites in the upper abdomen. There are changes of 
cirrhosis. There is suspicion of gallstones. 1/30/2019 Duplex LE Interpretation Summary Bilateral lower extremity negative for deep vein thrombosis in the veins visualized. Assessment/Recommendations:  
1) Anemia, macrocytic (s/p 2 units PRBCs) Severe, transfusion dependent, but uncertain etiology. Labwork fairly unremarkable.  EGD and colonoscopy without bleeding.  Concern for production issue, given low retic and other cytopenias. However, bone marrow biopsy completed 9/27 was unrevealing. Possible early MDS, but I don't know that this would explain his considerable transfusion requirement. Has been seen at [facility] for evaluation, he is seeing Dr. Manan Lester and we are awaiting their opinion, though he has been noncompliant with follow up there. He did have a follow up appt today but unfortunately he will not be able to make that appt today. Continue to monitor and  transfusion  as needed for HGB <7. 
  
2) Thrombocytopenia Chronic since at least 2009.  Possibly related to cirrhosis, labwork otherwise unremarkable.  Spleen at upper limit of normal on imaging. Bone marrow without definite etiology, though perhaps early MDS as above. Monitor.   
3) Neutropenia Mild. As above, possibly related to cirrhosis vs early MDS. Monitor.   
4) Cirrhosis GI following. Seems to be worsening. Needing paracentesis. Seen by hepatology at [facility], but doesn't want to follow up there. We had placed referral to Dr. Rocio Nielson (hepatology at [address] [place] [address]) as outpatient, but that appointment is not until March. --Discussed with GI PA; will follow up in 1 week as out patient 
--GI eval: paracentesis 1/31 (8450 cc yellow fluid removed) and 2/4 (9250 ml) 5) Poor venous access PICC line to right arm now.  
--2/1 port placed  
--will leave PICC line in and d/c prior to discharge 6) Dyspnea/Rt pleural effusion Pulmonary following:  Holding on thoracentesis due to symptomatic resolution after paracentesis Office will call to confirm follow up appt with our office. Plan reviewed with Dr Luis Courser Signed By: Love Weinstein NP

## 2019-02-04 NOTE — ACP (ADVANCE CARE PLANNING)
Primary Decision Maker (Health Care Agent): Lizz Mckeon Relationship to patient:friend Phone [phone] [x] Named in a scanned document  
[] Legal Next of Kin 
[] Guardian ACP documents you current have include: 
[x] Advance Directive or Living Will 
[] Durable Do Not Resuscitate 
[] Physician Orders for Scope of Treatment (POST) [] Medical Power of  
[] Other

## 2019-02-04 NOTE — PROGRESS NOTES
Problem: Mobility Impaired (Adult and Pediatric) Goal: *Acute Goals and Plan of Care (Insert Text) physical Therapy TREATMENT Patient: Trinidad Grady (1 y.o. male) Date: 2/4/2019 Diagnosis: Symptomatic anemia <principal problem not specified> Precautions:   
Chart, physical therapy assessment, plan of care and goals were reviewed. ASSESSMENT: 
Pt comes to sit with CGA. Pt to stand with CGA. Pt ambulated 130ft with single point cane CGA. Pts balance fair to good on level surface. Pt CGA back to bed. Pt progressing with mobility. Continue goals. Progression toward goals: 
[]    Improving appropriately and progressing toward goals [x]    Improving slowly and progressing toward goals 
[]    Not making progress toward goals and plan of care will be adjusted PLAN: 
Patient continues to benefit from skilled intervention to address the above impairments. Continue treatment per established plan of care. Discharge Recommendations:  Home Health Further Equipment Recommendations for Discharge: SUBJECTIVE:  
 
 
OBJECTIVE DATA SUMMARY:  
Critical Behavior: 
Neurologic State: Sleeping Orientation Level: Oriented X4 Cognition: Follows commands, Appropriate decision making, Appropriate for age attention/concentration, Appropriate safety awareness Safety/Judgement: Awareness of environment, Insight into deficits Functional Mobility Training: 
Bed Mobility: 
  
Supine to Sit: Contact guard assistance Transfers: 
Sit to Stand: Contact guard assistance Stand to Sit: Contact guard assistance Balance: 
Sitting: Intact Standing: With supportAmbulation/Gait Training: 
Distance (ft): 130 Feet (ft) Assistive Device: Cane, straight;Gait belt Ambulation - Level of Assistance: Contact guard assistance Gait Abnormalities: Trunk sway increased Step Length: Left shortened;Right shortened Pain: 
Pain Scale 1: Numeric (0 - 10) Pain Intensity 1: 0 Activity Tolerance:  
Pt tolerated treatment well. Please refer to the flowsheet for vital signs taken during this treatment. After treatment:  
[]    Patient left in no apparent distress sitting up in chair 
[]    Patient left in no apparent distress in bed 
[]    Call bell left within reach 
[]    Nursing notified 
[]    Caregiver present 
[]    Bed alarm activated COMMUNICATION/COLLABORATION:  
The patients plan of care was discussed with: Physical Therapist 
 
Stefany Biswas PTA Time Calculation: 23 mins

## 2019-02-04 NOTE — PROGRESS NOTES
VAT called and spoke to José Luis Mercado RN to acknowledge receipt of the order for an US guided paracentesis. Procedure time TBD based on IR MD and UST availability but tentatively planning on before 1200 hrs. Pt has a working Sitrion.

## 2019-02-04 NOTE — PROGRESS NOTES
Discharge instructions and prescriptions reviewed with the patient and he verbalized understanding. Peripheral IV removed. Patient to be discharged via Lyft when ride arrives.

## 2019-02-04 NOTE — CONSULTS
Palliative Medicine Consult Patient Name: Chris Bazzi YOB: 1945 Date of Initial Consult: 2/4/19 Reason for Consult: care decisions Requesting Provider: Dr. Chanda Cox Primary Care Physician: None SUMMARY:  
Chris Bazzi is a 74 y.o. with a past history of cirrhosis, anemia(unclear source followed by Dr. Amy Smith), transfusion dependent(every 10 days or so), recurrent ascites, Pancytopenia, hyponatremia, who was admitted on 1/30/2019 from home with a diagnosis of anemia. Current medical issues leading to Palliative Medicine involvement include: care decisions. Chart reviewed/HPI-patient admitted to the hospital with issues with both ascites(recurrent) and symptomatic anemia(followed by Dr. Amy Smith). He has had 2 paracentesis since being admitted on 1/30/19 and transfusion. Has been seen at [facility] as well. There has been some issues with compliance although patient states he is still able to drive and takes himself to appointments SH-lives alone. No siblings. Limited social interactions. Retired  from [employer]. [employer] who now lives in [place] is listed as his MPOA. Does admit to alcohol use(admits to not counting his intake) and tobacco abuse. Essentially quit both New Years 2017 PALLIATIVE DIAGNOSES:  
1. GOC discussion 2. DNR review 3. ACP review 4. Recurrent ascites related to cirrhosis-he may have abused alcohol up until 2017 5. Recurrent anemia-transfusion dependent PLAN:  
1. Met with patient and reviewed our role in his care. Discussed his current medical issues and he seems to understand the issues with recurrent ascites and recurrent anemia. 2. GOC-discussed benefits/burdens going forward. For now, he understands he may need to come weekly to the hospital/outpatient clinic for paracentesis and transfusions. He is willing to accept that burden for now. He feels like he can continue to make appointments for these ongoing/chronic issues 3. AMD-he has a living will in the chart which assigns Chauncey Nuñez as MPOA. Charlie Escoto lives in [place] and patient understands we would need to call him if he is unable to make medical decisions. He talks with Charlie Escoto and feels like he understands his medical wishes 4. Code status-reviewed resuscitation and he wants to remain full code. He would not want to remain on live support for any extended time if he shows no signs of recovery and could not return to his current self 5. Symptom management-inpatient team managing 6. Psychosocial-I worry about his support system which appears limited. No family. He really enjoys reading. Nos spiritual concerns 7. Discussed with bedside nurse and Dr. Angely Dacosta 8. Initial consult note routed to primary continuity provider 9. Communicated plan of care with: Palliative IDT 
 
 
 GOALS OF CARE / TREATMENT PREFERENCES:  
[====Goals of Care====] GOALS OF CARE: 
Patient/Health Care Proxy Stated Goals: Prolong life TREATMENT PREFERENCES:  
Code Status: Full Code Advance Care Planning: 
Advance Care Planning 1/30/2019 Patient's Healthcare Decision Maker is: -  
Primary Decision Maker Name -  
Primary Decision Maker Relationship to Patient -  
Confirm Advance Directive Yes, on file Patient Would Like to Complete Advance Directive - Medical Interventions: Full interventions Other Instructions: The palliative care team has discussed with patient / health care proxy about goals of care / treatment preferences for patient. 
[====Goals of Care====] HISTORY:  
 
History obtained from: chart, patient CHIEF COMPLAINT: weakness HPI/SUBJECTIVE: The patient is:  
[x] Verbal and participatory [] Non-participatory due to:  
Patient is feeling better. Hoping to go home today. Denies any pain Clinical Pain Assessment (nonverbal scale for severity on nonverbal patients):  
Clinical Pain Assessment Severity: 0  FUNCTIONAL ASSESSMENT:  
 
 Palliative Performance Scale (PPS): PPS: 70 PSYCHOSOCIAL/SPIRITUAL SCREENING:  
 
Advance Care Planning: 
Advance Care Planning 1/30/2019 Patient's Healthcare Decision Maker is: -  
Primary Decision Maker Name -  
Primary Decision Maker Relationship to Patient -  
Confirm Advance Directive Yes, on file Patient Would Like to Complete Advance Directive - Any spiritual / Cheondoism concerns: 
[] Yes /  [x] No 
 
Caregiver Burnout: 
[] Yes /  [] No /  [x] No Caregiver Present Anticipatory grief assessment:  
[x] Normal  / [] Maladaptive ESAS Anxiety: Anxiety: 0 
 
ESAS Depression: Depression: 0 REVIEW OF SYSTEMS:  
 
Positive and pertinent negative findings in ROS are noted above in HPI. The following systems were [] reviewed / [] unable to be reviewed as noted in HPI Other findings are noted below. Systems: constitutional, ears/nose/mouth/throat, respiratory, gastrointestinal, genitourinary, musculoskeletal, integumentary, neurologic, psychiatric, endocrine. Positive findings noted below. Modified ESAS Completed by: provider Fatigue: 1 Drowsiness: 0 Depression: 0 Pain: 0 Anxiety: 0 Nausea: 0 Anorexia: 0 Dyspnea: 0 Constipation: No  
     
 
 
 PHYSICAL EXAM:  
 
From RN flowsheet: 
Wt Readings from Last 3 Encounters:  
02/04/19 177 lb 4 oz (80.4 kg) 01/21/19 190 lb (86.2 kg) 12/13/18 188 lb (85.3 kg) Blood pressure 101/51, pulse 80, temperature 98.2 °F (36.8 °C), resp. rate 16, height 5' 11\" (1.803 m), weight 177 lb 4 oz (80.4 kg), SpO2 98 %. Pain Scale 1: Numeric (0 - 10) Pain Intensity 1: 0 Last bowel movement, if known:  
 
Constitutional: alert and oriented, NAD Eyes: pupils equal, anicteric ENMT: no nasal discharge, moist mucous membranes Cardiovascular: regular rhythm, distal pulses intact Respiratory: breathing not labored, symmetric Gastrointestinal: soft non-tender, +bowel sounds, mild ascites Musculoskeletal: no deformity, no tenderness to palpation Skin: warm, dry Neurologic: following commands, moving all extremities Psychiatric: full affect, no hallucinations Other: 
 
 
 HISTORY:  
 
Active Problems: 
  Pancytopenia ([address] (9/26/2018) Cirrhosis ([address] () Hyponatremia (9/26/2018) Abnormal chest x-ray (9/26/2018) Dyspnea (1/30/2019) Lower extremity edema (1/30/2019) Hypomagnesemia (1/30/2019) Symptomatic anemia (1/30/2019) Past Medical History:  
Diagnosis Date  Anemia  Cirrhosis ([address]  History of vascular access device 10/22/2018 [facility] VAT - 4 FR, R basilic, 38 cm for DIVA (outpatient infusions)  Pancytopenia ([address]  Patella fracture 2007 Right knee Past Surgical History:  
Procedure Laterality Date  COLONOSCOPY N/A 9/28/2018 COLONOSCOPY performed by Wilbur Bales MD at [address] [place] [address] Ave  HX HERNIA REPAIR Bilateral   
 HX RETINAL DETACHMENT REPAIR    
 IR INSERT TUNL CVC W PORT OVER 5 YEARS  2/1/2019 Family History Problem Relation Age of Onset  Anemia Mother  Cancer Father 61 Lung History reviewed, no pertinent family history. Social History Tobacco Use  Smoking status: Former Smoker  Smokeless tobacco: Never Used Substance Use Topics  Alcohol use: Yes Comment: rare No Known Allergies Current Facility-Administered Medications Medication Dose Route Frequency  albumin human 25% (BUMINATE) solution 25 g  25 g IntraVENous Q6H  
 pantoprazole (PROTONIX) tablet 40 mg  40 mg Oral ACB  doxycycline (VIBRA-TABS) tablet 100 mg  100 mg Oral Q12H  
 magnesium chloride (MAG DELAY) delayed release tablet 64 mg  64 mg Oral BID  
 0.9% sodium chloride infusion 250 mL  250 mL IntraVENous PRN  
 0.9% sodium chloride infusion 250 mL  250 mL IntraVENous PRN  
 sodium chloride (NS) flush 5-40 mL  5-40 mL IntraVENous Q8H  
  sodium chloride (NS) flush 5-40 mL  5-40 mL IntraVENous PRN  
 acetaminophen (TYLENOL) tablet 650 mg  650 mg Oral Q6H PRN  
 naloxone (NARCAN) injection 0.4 mg  0.4 mg IntraVENous PRN  
 ondansetron (ZOFRAN) injection 4 mg  4 mg IntraVENous Q6H PRN  
 
 
 
 LAB AND IMAGING FINDINGS:  
 
Lab Results Component Value Date/Time WBC 2.4 (L) 02/04/2019 02:58 AM  
 HGB 7.4 (L) 02/04/2019 02:58 AM  
 PLATELET 38 (LL) 45/52/4412 02:58 AM  
 
Lab Results Component Value Date/Time Sodium 127 (L) 02/04/2019 02:58 AM  
 Potassium 4.3 02/04/2019 02:58 AM  
 Chloride 96 (L) 02/04/2019 02:58 AM  
 CO2 26 02/04/2019 02:58 AM  
 BUN 17 02/04/2019 02:58 AM  
 Creatinine 0.57 (L) 02/04/2019 02:58 AM  
 Calcium 7.7 (L) 02/04/2019 02:58 AM  
 Magnesium 1.4 (L) 02/04/2019 02:58 AM  
 Phosphorus 3.2 02/02/2019 06:22 AM  
  
Lab Results Component Value Date/Time AST (SGOT) 25 02/02/2019 06:22 AM  
 Alk. phosphatase 175 (H) 02/02/2019 06:22 AM  
 Protein, total 5.2 (L) 02/02/2019 06:22 AM  
 Albumin 2.4 (L) 02/02/2019 06:22 AM  
 Globulin 2.8 02/02/2019 06:22 AM  
 
Lab Results Component Value Date/Time INR 1.5 (H) 02/04/2019 02:58 AM  
 Prothrombin time 15.1 (H) 02/04/2019 02:58 AM  
 aPTT 26.5 09/26/2018 10:15 AM  
  
Lab Results Component Value Date/Time Iron 243 (H) 09/27/2018 04:14 AM  
 TIBC 252 09/27/2018 04:14 AM  
 Iron % saturation 96 (H) 09/27/2018 04:14 AM  
 Ferritin 537 (H) 09/27/2018 04:14 AM  
  
No results found for: PH, PCO2, PO2 No components found for: [place] Lab Results Component Value Date/Time  (H) 04/23/2016 12:43 AM  
 CK - MB 8.5 (H) 04/20/2016 09:58 AM  
  
 
 
   
 
Total time: 50 
Counseling / coordination time, spent as noted above: 50 
> 50% counseling / coordination?: yes Prolonged service was provided for  []30 min   []75 min in face to face time in the presence of the patient, spent as noted above. Time Start:  
Time End: Note: this can only be billed with 66611 (initial) or 10357 (follow up). If multiple start / stop times, list each separately.

## 2019-02-04 NOTE — ROUTINE PROCESS
Bedside shift change report given to Tremaine Amor (oncoming nurse) by Anastasia Vela (offgoing nurse). Report included the following information SBAR, Kardex, Procedure Summary, Intake/Output, MAR, Accordion, Recent Results and Med Rec Status.

## 2019-02-04 NOTE — PROGRESS NOTES
2/4/2019   CARE MANAGEMENT NOTE:  CM notified [facility] of pt's discharge today and faxed AVS to agency. Pt asked for transportation assistance so [facility] cab was arranged with 3:05 pickup requested. Ester

## 2019-02-04 NOTE — PROGRESS NOTES
Hilario Patel [facility] 
[address] [facility], [place], [id number] [place] [address] 
[phone] Medical Progress Note NAME: Dominica Severe :  1945 MRM:  [id number] Date/Time: 2/3/2019 Subjective: Chief Complaint:  \"I'm okay\" Chart/notes/labs/studies reviewed, patient examined at bedside. Pt seen and examined. No complaints. Objective:  
 
 
Vitals:  
 
  
Last 24hrs VS reviewed since prior progress note. Most recent are: 
 
Visit Vitals /58 (BP 1 Location: Left arm, BP Patient Position: At rest) Pulse 85 Temp 98.2 °F (36.8 °C) Resp 17 Ht 5' 11\" (1.803 m) Wt 78.4 kg (172 lb 13.5 oz) SpO2 96% BMI 24.11 kg/m² SpO2 Readings from Last 6 Encounters:  
19 96% 19 96% 19 99% 19 96% 18 98% 18 96% Intake/Output Summary (Last 24 hours) at 2/3/2019 1949 Last data filed at 2/3/2019 7032 Gross per 24 hour Intake 377.5 ml Output 540 ml Net -162.5 ml Exam:  
 
Physical Exam: 
 
Gen: thin, frail, chronically ill-appearing. HEENT:  Sclerae nonicteric, hearing intact to voice, mucous membranes moist 
Neck:  Supple, without masses. Resp:  No accessory muscle use, diminished R base. Without wheezes, rales, or rhonchi 
Card: RRR, without m/r/g. No LE edema. Abd:  +bowel sounds, soft, NTTP, distended but not tense Neuro: Face symmetric, tongue midline, speech fluent, follows commands appropriately Psych:  Alert, oriented x 3. Good insight Medications Reviewed: (see below) Lab Data Reviewed: (see below) 
 
______________________________________________________________________ Medications:  
 
Current Facility-Administered Medications Medication Dose Route Frequency  pantoprazole (PROTONIX) tablet 40 mg  40 mg Oral ACB  
 0.9% sodium chloride infusion 250 mL  250 mL IntraVENous PRN  
 doxycycline (VIBRA-TABS) tablet 100 mg  100 mg Oral Q12H  magnesium chloride (MAG DELAY) delayed release tablet 64 mg  64 mg Oral BID  
 0.9% sodium chloride infusion 250 mL  250 mL IntraVENous PRN  
 0.9% sodium chloride infusion 250 mL  250 mL IntraVENous PRN  
 sodium chloride (NS) flush 5-40 mL  5-40 mL IntraVENous Q8H  
 sodium chloride (NS) flush 5-40 mL  5-40 mL IntraVENous PRN  
 acetaminophen (TYLENOL) tablet 650 mg  650 mg Oral Q6H PRN  
 naloxone (NARCAN) injection 0.4 mg  0.4 mg IntraVENous PRN  
 ondansetron (ZOFRAN) injection 4 mg  4 mg IntraVENous Q6H PRN Lab Review:  
 
Recent Labs 02/03/19 
1650 02/03/19 
7176 02/02/19 
3902 WBC 2.8* 2.4* 2.6* HGB 7.7* 6.8* 7.3* HCT 22.1* 19.8* 21.6*  
PLT 37* 51* 54* Recent Labs 02/03/19 
1650 02/03/19 
6710 02/02/19 
0622 02/01/19 
9501 * 128* 129* 131* K 4.5 4.2 4.4 4.2 CL 95* 96* 97 98 CO2 27 26 27 27 * 111* 112* 112* BUN 17 16 15 17 CREA 0.57* 0.53* 0.58* 0.58* CA 7.6* 7.6* 7.6* 7.6*  
MG  --  1.4* 1.4* 1.5* PHOS  --   --  3.2 3.3 ALB  --   --  2.4* 2.0*  
SGOT  --   --  25 18 ALT  --   --  18 19 INR  --   --  1.6* 1.7* No components found for: [place] No results for input(s): PH, PCO2, PO2, HCO3, FIO2 in the last 72 hours. Recent Labs 02/02/19 
0622 02/01/19 
2316 INR 1.6* 1.7* No results found for: SDES Lab Results Component Value Date/Time Culture result: NO GROWTH 4 DAYS 01/30/2019 10:43 AM  
 Culture result: MIXED SKIN AMBER ISOLATED 04/20/2016 10:34 AM  
 
    
  
Assessment / Plan:  
Severe anemia: chronic anemia at baseline requiring transfusions. Symptomatic. Denies melena, hematochezia. Has pancytopenia. Followed by Dr. Deng Armijo. Transfusion goal Hg >7. Suspect this is from cirrhosis. Pt did have BM Bx prior, and EGD/colonoscopy in September, showing no varices and only one small polyp. BMBx showed no evidence of malignancy. Question possible early MDS. Hem/onc on board  
 -s/p port placement -required PRBC's today  
-platelets dropping and this is concerning considering concerns for underlying MDS and cirrhosis Cirrhosis ([facility] [address] (): ?cryptogenic vs ETOH vs NIETO. +OSCAR with borderline AMA but low suspicion for PBC per GI. S/p para with removal of 8L fluid  
-since lasix and spironolactone has been started pt has had a rapidly downtrending Na+, transient marked hypotension (SBP's in the 80's) and despite the diuretics appears to have rapid re-accumulation of fluid. Gi on board but suspect pt is likely not going to fare well and may need hospice. Will ask palliative to eval as unclear end point for pt  
  
  Hyponatremia: suspect from cirrhosis 
-rapidly downtrending on diuretics. Hold for now  
  
Abnormal chest x-ray: suspect from pleural effusion, hepatic hydrothorax? Stopped CTX, remains on doxy. Appreciate pulm. CT chest reviewed. 
  
  Dyspnea: likely from severe anemia and large ascites/pleural effusion. No DVT. No CHF. 
  
  Hypomagnesemia: replete Total time spent: 35 minutes Care Plan discussed with: Patient, Nursing Staff and >50% of time spent in counseling and coordination of care Discussed:  Care Plan and D/C Planning Prophylaxis:  SCD's Disposition:  Pt has no family to assist him at home. He lives alone 
     
___________________________________________________ Attending Physician: Malena Danielle MD

## 2019-02-04 NOTE — PROGRESS NOTES
Gastrointestinal Progress Note 2/4/2019 Admit Date: 1/30/2019 Subjective:  
 
Patient hopes to return home soon. Denies abdominal pain, nausea or vomiting. Tolerating diet. His abdominal ascites has returned since paracentesis 1/31/2019. He has been tolerating diuretics well due to hyponatremia and hypotension. Current Facility-Administered Medications Medication Dose Route Frequency  magnesium sulfate 2 g/50 ml IVPB (premix or compounded)  2 g IntraVENous ONCE  
 albumin human 25% (BUMINATE) solution 25 g  25 g IntraVENous Q6H  
 pantoprazole (PROTONIX) tablet 40 mg  40 mg Oral ACB  doxycycline (VIBRA-TABS) tablet 100 mg  100 mg Oral Q12H  
 magnesium chloride (MAG DELAY) delayed release tablet 64 mg  64 mg Oral BID  
 0.9% sodium chloride infusion 250 mL  250 mL IntraVENous PRN  
 0.9% sodium chloride infusion 250 mL  250 mL IntraVENous PRN  
 sodium chloride (NS) flush 5-40 mL  5-40 mL IntraVENous Q8H  
 sodium chloride (NS) flush 5-40 mL  5-40 mL IntraVENous PRN  
 acetaminophen (TYLENOL) tablet 650 mg  650 mg Oral Q6H PRN  
 naloxone (NARCAN) injection 0.4 mg  0.4 mg IntraVENous PRN  
 ondansetron (ZOFRAN) injection 4 mg  4 mg IntraVENous Q6H PRN Objective:  
 
Blood pressure 98/62, pulse 82, temperature 98 °F (36.7 °C), resp. rate 18, height 5' 11\" (1.803 m), weight 80.4 kg (177 lb 4 oz), SpO2 98 %. No intake/output data recorded. 02/02 1901 - 02/04 0700 In: 377.5 Out: [address] [facility] EXAM:  GENERAL: alert, cooperative, no distress, HEART: regular rate and rhythm, S1, S2 normal, no murmur, click, rub or gallop, LUNGS: chest clear, no wheezing, rales, normal symmetric air entry, ABDOMEN: soft, distended, nontender EXTREMITY: pedal and ankle edema Data Review Recent Results (from the past 24 hour(s)) TYPE + CROSSMATCH Collection Time: 02/03/19 11:29 AM  
Result Value Ref Range Crossmatch Expiration 02/06/2019  ABO/Rh(D) A POSITIVE   
 Antibody screen NEG Unit number [id number] Blood component type  LR Unit division 00 Status of unit TRANSFUSED Crossmatch result Compatible METABOLIC PANEL, BASIC Collection Time: 02/03/19  4:50 PM  
Result Value Ref Range Sodium 126 (L) 136 - 145 mmol/L Potassium 4.5 3.5 - 5.1 mmol/L Chloride 95 (L) 97 - 108 mmol/L  
 CO2 27 21 - 32 mmol/L Anion gap 4 (L) 5 - 15 mmol/L Glucose 114 (H) 65 - 100 mg/dL BUN 17 6 - 20 MG/DL Creatinine 0.57 (L) 0.70 - 1.30 MG/DL  
 BUN/Creatinine ratio 30 (H) 12 - 20 GFR est AA >60 >60 ml/min/1.73m2 GFR est non-AA >60 >60 ml/min/1.73m2 Calcium 7.6 (L) 8.5 - 10.1 MG/DL  
CBC WITH AUTOMATED DIFF Collection Time: 02/03/19  4:50 PM  
Result Value Ref Range WBC 2.8 (L) 4.1 - 11.1 K/uL  
 RBC 2.55 (L) 4.10 - 5.70 M/uL HGB 7.7 (L) 12.1 - 17.0 g/dL HCT 22.1 (L) 36.6 - 50.3 % MCV 86.7 80.0 - 99.0 FL  
 MCH 30.2 26.0 - 34.0 PG  
 MCHC 34.8 30.0 - 36.5 g/dL  
 RDW 14.8 (H) 11.5 - 14.5 % PLATELET 37 (LL) 099 - 400 K/uL NRBC 0.0 0  WBC ABSOLUTE NRBC 0.00 0.00 - 0.01 K/uL NEUTROPHILS 50 32 - 75 % LYMPHOCYTES 32 12 - 49 % MONOCYTES 16 (H) 5 - 13 % EOSINOPHILS 1 0 - 7 % BASOPHILS 0 0 - 1 % IMMATURE GRANULOCYTES 0 0.0 - 0.5 % ABS. NEUTROPHILS 1.4 (L) 1.8 - 8.0 K/UL  
 ABS. LYMPHOCYTES 0.9 0.8 - 3.5 K/UL  
 ABS. MONOCYTES 0.4 0.0 - 1.0 K/UL  
 ABS. EOSINOPHILS 0.0 0.0 - 0.4 K/UL  
 ABS. BASOPHILS 0.0 0.0 - 0.1 K/UL  
 ABS. IMM. GRANS. 0.0 0.00 - 0.04 K/UL  
 DF AUTOMATED    
CBC WITH AUTOMATED DIFF Collection Time: 02/04/19  2:58 AM  
Result Value Ref Range WBC 2.4 (L) 4.1 - 11.1 K/uL  
 RBC 2.49 (L) 4.10 - 5.70 M/uL HGB 7.4 (L) 12.1 - 17.0 g/dL HCT 21.4 (L) 36.6 - 50.3 % MCV 85.9 80.0 - 99.0 FL  
 MCH 29.7 26.0 - 34.0 PG  
 MCHC 34.6 30.0 - 36.5 g/dL  
 RDW 14.6 (H) 11.5 - 14.5 % PLATELET 38 (LL) 134 - 400 K/uL NRBC 0.0 0  WBC ABSOLUTE NRBC 0.00 0.00 - 0.01 K/uL NEUTROPHILS 47 32 - 75 % BAND NEUTROPHILS 1 0 - 6 % LYMPHOCYTES 39 12 - 49 % MONOCYTES 13 5 - 13 % EOSINOPHILS 0 0 - 7 % BASOPHILS 0 0 - 1 % IMMATURE GRANULOCYTES 0 %  
 ABS. NEUTROPHILS 1.2 (L) 1.8 - 8.0 K/UL  
 ABS. LYMPHOCYTES 0.9 0.8 - 3.5 K/UL  
 ABS. MONOCYTES 0.3 0.0 - 1.0 K/UL  
 ABS. EOSINOPHILS 0.0 0.0 - 0.4 K/UL  
 ABS. BASOPHILS 0.0 0.0 - 0.1 K/UL  
 ABS. IMM. GRANS. 0.0 K/UL  
 DF MANUAL    
 RBC COMMENTS NORMOCYTIC, NORMOCHROMIC METABOLIC PANEL, BASIC Collection Time: 02/04/19  2:58 AM  
Result Value Ref Range Sodium 127 (L) 136 - 145 mmol/L Potassium 4.3 3.5 - 5.1 mmol/L Chloride 96 (L) 97 - 108 mmol/L  
 CO2 26 21 - 32 mmol/L Anion gap 5 5 - 15 mmol/L Glucose 111 (H) 65 - 100 mg/dL BUN 17 6 - 20 MG/DL Creatinine 0.57 (L) 0.70 - 1.30 MG/DL  
 BUN/Creatinine ratio 30 (H) 12 - 20 GFR est AA >60 >60 ml/min/1.73m2 GFR est non-AA >60 >60 ml/min/1.73m2 Calcium 7.7 (L) 8.5 - 10.1 MG/DL MAGNESIUM Collection Time: 02/04/19  2:58 AM  
Result Value Ref Range Magnesium 1.4 (L) 1.6 - 2.4 mg/dL PROTHROMBIN TIME + INR Collection Time: 02/04/19  2:58 AM  
Result Value Ref Range INR 1.5 (H) 0.9 - 1.1 Prothrombin time 15.1 (H) 9.0 - 11.1 sec Assessment:  
 
Active Problems: 
  Pancytopenia ([facility] [address] (9/26/2018) Cirrhosis ([facility] [address] () Hyponatremia (9/26/2018) Abnormal chest x-ray (9/26/2018) Dyspnea (1/30/2019) Lower extremity edema (1/30/2019) Hypomagnesemia (1/30/2019) Symptomatic anemia (1/30/2019) Plan: - Scheduled for repeat paracentesis with IV albumin today 
- Continue to hold diuretics as BP and Na level improved since medication held (discussed with Dr. Rojas Ards that unfortunately patient has not been tolerating even low dose diuretic regimen well and concern for him going with on this regimen).  He may need week paracentesis for ascites management rather than diuretics. - continue low Na+ diet 
- agree with palliative care consult 
- Dr. Deepali Landeros attestation to follow Chuy Pierce PA-C 
02/04/19 I have interviewed and examined patient with addendum to note above and formulation care plan to reflect my evaluation Pavan De Guzman M.D. 
9:08 AM

## 2019-02-05 NOTE — TELEPHONE ENCOUNTER
[address] [facility]  at [place]  [phone]    I spoke to the patient and discussed with him my concerns about his severe medical issues and his compliance. He reports cancelling the [facility] appt because he was tired and didn't want to go downtown. I reminded him that we had discussed the importance of this visit daily while he was in the hospital, and daily he had reassured me that he would go. He is now willing to reschedule this. I will order the MRI abdomen to be done in our system. He confirms plans to see Dr. Terrance Garcia next week (GI).

## 2019-02-05 NOTE — TELEPHONE ENCOUNTER
[address] at [facility]  [phone]    02/05/19- Per  second opinion needed for path specimen #  from [address] [place] [address]. Request sent to [facility] path department via fax and I spoke to Ilda Zachary [phone] who confirmed new request was received and will be sent out to [address] [place] [address]. Request sent to to [facility]- to prepare them along with  notes. 02/06/19- Per  path department patients slides are with [employer]. Spoke to Stephanie Arnaldo [phone]- she reported request can be faxed to [phone] and they can forward slides to [facility]. Fax confirmation delivery successful. 02/07/19- Phone call placed to [employer] was advised request not received via fax. Can send via email to client. [email or web address] Payment plugin. Email sent. 2/11/19- Phone call placed to [employer]- was told no request received- despite sending it via fax and email. Was informed to refax today as stat and it will be taken care of.      2/12/19- Fax received from [employer] stating \" request was received last week but they haven't been able to reach [facility] to obtain pre-printed label or fed ex information\". Phone call placed to [address] [place] [address] path [phone] spoke to Kelle Sheth she reported she will call [employer] and return phone call with update. 02/14/19- Phone courtney placed to [facility] path- spoke to Kelle Sheth she reported this the request was sent to their intake group. Per Kelle Sheth she spoke to Carl Sauceda at [phone] with intake and the specimen should be received in about 1-2 days.

## 2019-02-05 NOTE — TELEPHONE ENCOUNTER
[address] at [place] [address]  [phone]    I received a call from Dr. Jeanette Ibarra at [facility]. The patient called and cancelled his hematology appointment with them, apparently stating that he just wants to follow with me. This is despite my daily conversations with him during his recent hospital stay about the importance of him making that appointment, as I have been unable to determine the cause of his anemia. He had reassured me daily that he would go. In discussing with Dr. Jeanette Ibarra, they have apparently been unable to get his prior bone marrow slides. I'm not clear why. We will try to help facilitate this. He is recommending a repeat BMBx to be done at [facility]. They have also recommended an MRI of his abdomen, as there was a concerning liver lesion on their US, but they have been unable to get the patient to schedule this. I called the patient to discuss. No answer, left a message for him to call me back. I worry that Mr. Patterson Settle noncompliance is resulting in significant worsening of his disease. I suspect he may ultimately die of these medical problems in the coming months, if he does not take the necessary action to follow up with medical recommendations.

## 2019-02-05 NOTE — ED TRIAGE NOTES
Pt c/o increased lethargy with a Hgb 5.5 per Dr. Lady Mccormick office. Pt denies blood in stool, denies pain, no dizziness
Elizabeth Hilton(PA)

## 2019-02-06 NOTE — PROGRESS NOTES
[facility] Progress Note Date: 2019 Name: Adelia Gonzales MRN: [id number] : 1945 
 
 
0810:  Pt arrived ambulatory and in no distress, for lab visit. Labs drawn via right chest port, port positive for a brisk blood return, patient tolerated well. Visit Vitals BP (!) 89/57 (BP 1 Location: Left arm, BP Patient Position: Sitting) Pulse 97 Temp 96.8 °F (36 °C) Resp 18 SpO2 99% Lab results were obtained and reviewed. Recent Results (from the past 12 hour(s)) CBC WITH AUTOMATED DIFF Collection Time: 19  8:24 AM  
Result Value Ref Range WBC 2.4 (L) 4.1 - 11.1 K/uL  
 RBC 2.72 (L) 4.10 - 5.70 M/uL HGB 8.1 (L) 12.1 - 17.0 g/dL HCT 24.0 (L) 36.6 - 50.3 % MCV 88.2 80.0 - 99.0 FL  
 MCH 29.8 26.0 - 34.0 PG  
 MCHC 33.8 30.0 - 36.5 g/dL  
 RDW 15.4 (H) 11.5 - 14.5 % PLATELET 48 (LL) 198 - 400 K/uL MPV ABNORMAL 8.9 - 12.9 FL  
 NRBC 0.0 0  WBC ABSOLUTE NRBC 0.00 0.00 - 0.01 K/uL NEUTROPHILS 55 32 - 75 % LYMPHOCYTES 24 12 - 49 % MONOCYTES 19 (H) 5 - 13 % EOSINOPHILS 2 0 - 7 % BASOPHILS 0 0 - 1 % IMMATURE GRANULOCYTES 0 0.0 - 0.5 % ABS. NEUTROPHILS 1.3 (L) 1.8 - 8.0 K/UL  
 ABS. LYMPHOCYTES 0.6 (L) 0.8 - 3.5 K/UL  
 ABS. MONOCYTES 0.5 0.0 - 1.0 K/UL  
 ABS. EOSINOPHILS 0.0 0.0 - 0.4 K/UL  
 ABS. BASOPHILS 0.0 0.0 - 0.1 K/UL  
 ABS. IMM. GRANS. 0.0 0.00 - 0.04 K/UL  
 DF SMEAR SCANNED    
 RBC COMMENTS NORMOCYTIC, NORMOCHROMIC    
0918:  Platelet results called to Floyd Rice RN, no new orders received.   
 
 
 
4165:  Port flushed and de accessed. Departed [facility] ambulatory and in no distress. Future Appointments Date Time Provider Wilner Foster 2019  8:30 AM SS INF1 CH4 <1H [facility]  
2019  9:15 AM Sue Whitmore  [address]  
2019  8:30 AM SS INF2 CH4 <1H [facility]  
2019  8:30 AM SS INF2 CH4 <1H [facility] 3/15/2019  2:00 PM Loree Primrose, MD [address] [facility] Patient aware of all future appointments. Pasquale Vanegas RN February 6, 2019

## 2019-02-06 NOTE — PROGRESS NOTES
Problem: Knowledge Deficit Goal: *Verbalizes understanding of procedures and medications Outcome: Progressing Towards Goal 
Patient aware of reason and understanding for today's infusion visit.

## 2019-02-12 NOTE — DISCHARGE SUMMARY
Physician Discharge Summary Patient ID: 
Dolly Arnold [id number] 
14 y.o. 
1945 Admit date: 1/30/2019 Discharge date and time: 2/4/2019 Admission Diagnoses: Symptomatic anemia Discharge Diagnoses/Hospital Course Mr. Haley Kidd is a 65 yo male with multiple medical problems including cirrhosis and severe anemia admitted with the following: Anemia: acute on chronic anemia at baseline requiring transfusions. Symptomatic. Denies melena, hematochezia. Has pancytopenia. Pt did have BM Bx prior, and EGD/colonoscopy in September, showing no varices and only one small polyp. BMBx showed no evidence of malignancy. Question possible early MDS. Now has a port. Question goals of care in the setting of #1 
  
Cirrhosis ([facility] [address] (): ?cryptogenic vs ETOH vs NIETO. +OSCAR with borderline AMA but low suspicion for PBC per GI. S/p para with removal of 8L fluid x 2 while admitted. Unable to tolerate lasix or spironolactone due to worsening hyponatremia and hypotension. In the setting of lack of ability to tolerate diuretics, need for frequent paracentesis in combination with #1, pt has a very poor prognosis and would likely benefit from Hospice. Palliative did meet with the patient on this admission  
  
Abnormal chest x-ray: suspect from pleural effusion, hepatic hydrothorax? Stopped CTX, remains on doxy. CT chest reviewed by pulmonary  
  Dyspnea: likely from severe anemia and large ascites/pleural effusion. No DVT. No CHF. 
  
Hyponatremia: likely 2/2 cirrhosis. Unable to tolerate diuretics due to worsening PCP: None Consults: Palliative, GI, heme Discharge Exam: 
Visit Vitals /51 (BP 1 Location: Left arm, BP Patient Position: Head of bed elevated (Comment degrees)) Pulse 80 Temp 98.2 °F (36.8 °C) Resp 16 Ht 5' 11\" (1.803 m) Wt 80.4 kg (177 lb 4 oz) SpO2 98% BMI 24.72 kg/m² Gen: thin, frail, chronically ill-appearing. HEENT:  Sclerae nonicteric, hearing intact to voice, mucous membranes moist 
Neck:  Supple, without masses. Resp:  No accessory muscle use, diminished R base. Without wheezes, rales, or rhonchi 
Card: RRR, without m/r/g. No LE edema. Abd:  +bowel sounds, soft, NTTP, distended but not tense Neuro: Face symmetric, tongue midline, speech fluent, follows commands appropriately Psych:  Alert, oriented x 3. Good insight Disposition: home Patient Instructions:  
Discharge Medication List as of 2/4/2019  2:10 PM  
  
START taking these medications Details  
doxycycline (VIBRA-TABS) 100 mg tablet Take 1 Tab by mouth every twelve (12) hours for 7 days. , Print, Disp-14 Tab, R-0  
  
magnesium chloride (MAG DELAY) 64 mg delayed release tablet Take 1 Tab by mouth two (2) times a day., Print, Disp-60 Tab, R-0  
  
pantoprazole (PROTONIX) 40 mg tablet Take 1 Tab by mouth Daily (before breakfast). , Print, Disp-30 Tab, R-0  
  
  
CONTINUE these medications which have NOT CHANGED Details  
folic acid/multivit-min/lutein (CENTRUM SILVER PO) Take 1 Tab by mouth daily. , Historical Med  
  
  
STOP taking these medications  
  
 furosemide (LASIX) 20 mg tablet Comments:  
Reason for Stopping:   
   
 spironolactone (ALDACTONE) 50 mg tablet Comments:  
Reason for Stopping:   
   
  
 
Activity: Activity as tolerated Diet: Cardiac Diet Wound Care: None needed Follow-up with GI and heme/onc ASAP Follow-up Information Follow up With Specialties Details Why Contact Info None    None [phone] Patient stated that they have no PCP [facility] for PT and OT. Please call agency directly with any questions. Laurel Nelson [address] [place] [phone] 
[phone] Approximate time spent in patient care on day of discharge: 35 minutes Signed: 
Siva Ann MD 
2/12/2019 
12:38 AM

## 2019-02-27 NOTE — PROGRESS NOTES
Problem: Knowledge Deficit Goal: *Verbalizes understanding of procedures and medications Outcome: Progressing Towards Goal 
Pthere for labs and possible blood transfusion.

## 2019-02-27 NOTE — PROGRESS NOTES
Patient visited by [facility] Partner Volunteer on [facility] on 2/27/2019. Rev. Yash Dent, [facility]  paging service: [phone])

## 2019-02-27 NOTE — PROGRESS NOTES
[facility] VISIT NOTE Pt arrived at [facility] ambulatory and in no distress for Labs and possible blood transfusion per standing orders. Assessment completed, pt had no complaints . Patient Vitals for the past 12 hrs: 
 Temp Pulse Resp BP SpO2  
02/27/19 1606 97.5 °F (36.4 °C) (!) 58 16 108/66   
02/27/19 1600 97.5 °F (36.4 °C) 61 16 99/62   
02/27/19 1500 97.2 °F (36.2 °C) 62 16 98/65   
02/27/19 1430 97.5 °F (36.4 °C) 61 16 92/58   
02/27/19 1415 97.6 °F (36.4 °C) 64 16 94/56   
02/27/19 1355 97.5 °F (36.4 °C) 63 16 96/57   
02/27/19 1330 97.5 °F (36.4 °C) 67 16 (!) 89/56   
02/27/19 1230 97.7 °F (36.5 °C) 69 16 (!) 87/53   
02/27/19 1200 97.7 °F (36.5 °C) 73 16 (!) 87/51   
02/27/19 1145 97.5 °F (36.4 °C) 73 16 (!) 86/50   
02/27/19 1125  73 18 (!) 77/46   
02/27/19 0816  77 18 99/54 100 % Right chest port accessed with  in gracia no difficulty. Positive blood return noted and labs drawn. Recent Results (from the past 12 hour(s)) CBC WITH AUTOMATED DIFF Collection Time: 02/27/19  8:31 AM  
Result Value Ref Range WBC 3.2 (L) 4.1 - 11.1 K/uL  
 RBC 2.27 (L) 4.10 - 5.70 M/uL HGB 6.8 (L) 12.1 - 17.0 g/dL HCT 20.3 (L) 36.6 - 50.3 % MCV 89.4 80.0 - 99.0 FL  
 MCH 30.0 26.0 - 34.0 PG  
 MCHC 33.5 30.0 - 36.5 g/dL  
 RDW 15.8 (H) 11.5 - 14.5 % PLATELET 56 (L) 584 - 400 K/uL NRBC 0.0 0  WBC ABSOLUTE NRBC 0.00 0.00 - 0.01 K/uL NEUTROPHILS 71 32 - 75 % LYMPHOCYTES 17 12 - 49 % MONOCYTES 11 5 - 13 % EOSINOPHILS 0 0 - 7 % BASOPHILS 0 0 - 1 % IMMATURE GRANULOCYTES 1 (H) 0.0 - 0.5 % ABS. NEUTROPHILS 2.3 1.8 - 8.0 K/UL  
 ABS. LYMPHOCYTES 0.5 (L) 0.8 - 3.5 K/UL  
 ABS. MONOCYTES 0.4 0.0 - 1.0 K/UL  
 ABS. EOSINOPHILS 0.0 0.0 - 0.4 K/UL  
 ABS. BASOPHILS 0.0 0.0 - 0.1 K/UL  
 ABS. IMM. GRANS. 0.0 0.00 - 0.04 K/UL  
 DF SMEAR SCANNED    
 RBC COMMENTS OVALOCYTES PRESENT 
    
 RBC COMMENTS HYPOCHROMIA 2+ TYPE & SCREEN  
 Collection Time: 02/27/19  8:31 AM  
Result Value Ref Range Crossmatch Expiration 03/02/2019 ABO/Rh(D) A POSITIVE Antibody screen NEG Unit number [id number] Blood component type  LR Unit division 00 Status of unit ISSUED Crossmatch result Compatible Unit number [id number] Blood component type  LR Unit division 00 Status of unit ISSUED Crossmatch result Compatible Signs/symptoms of adverse blood reaction discussed with pt, voiced understanding. Medications received: 
NS @ [facility] Tylenol 650 mg po Benadryl 25 mg IV 1st unit PRBCs started and infusing without difficulty, observed x 15 minutes 1st unit completed without adverse reaction noted, NS flushing 2nd unit PRBCs started and infusing without difficulty, observed x 15 minutes 2nd unit completed without adverse reaction noted, NS flushing line. Tolerated transfusion  well, no adverse reaction noted. D/C instructions reviewed, copy to pt, voiced understanding. Patient declined 1 hour post transfusion observation. D/Cd from [facility] with cane and in no distress. Next appt 3/6/19 at 8:30.

## 2019-03-04 NOTE — PROGRESS NOTES
hospitals Progress Note Date: 2019 Name: Yehuda Anne MRN: [id number] : 1945 
 
1050. Mr. Nancie Benedict Arrived ambulatory and in no distress for Burak Breen Flush/Labs. Assessment was completed, patient with +2 edema to BL ankles and fatigue today- MD office notified. R chest wall port accessed without difficulty using 0.75 inch gracia needle, labs drawn and in process. Patient Vitals for the past 12 hrs: 
 Temp Pulse Resp BP SpO2  
19 1106 97.7 °F (36.5 °C) 80 18 98/64 100 % Recent Results (from the past 12 hour(s)) CBC WITH AUTOMATED DIFF Collection Time: 19 10:54 AM  
Result Value Ref Range WBC 3.9 (L) 4.1 - 11.1 K/uL  
 RBC 2.68 (L) 4.10 - 5.70 M/uL HGB 8.0 (L) 12.1 - 17.0 g/dL HCT 24.1 (L) 36.6 - 50.3 % MCV 89.9 80.0 - 99.0 FL  
 MCH 29.9 26.0 - 34.0 PG  
 MCHC 33.2 30.0 - 36.5 g/dL  
 RDW 15.3 (H) 11.5 - 14.5 % PLATELET 51 (L) 845 - 400 K/uL NRBC 0.0 0  WBC ABSOLUTE NRBC 0.00 0.00 - 0.01 K/uL NEUTROPHILS 71 32 - 75 % LYMPHOCYTES 14 12 - 49 % MONOCYTES 14 (H) 5 - 13 % EOSINOPHILS 0 0 - 7 % BASOPHILS 0 0 - 1 % IMMATURE GRANULOCYTES 1 (H) 0.0 - 0.5 % ABS. NEUTROPHILS 2.9 1.8 - 8.0 K/UL  
 ABS. LYMPHOCYTES 0.5 (L) 0.8 - 3.5 K/UL  
 ABS. MONOCYTES 0.5 0.0 - 1.0 K/UL  
 ABS. EOSINOPHILS 0.0 0.0 - 0.4 K/UL  
 ABS. BASOPHILS 0.0 0.0 - 0.1 K/UL  
 ABS. IMM. GRANS. 0.0 0.00 - 0.04 K/UL  
 DF SMEAR SCANNED    
 RBC COMMENTS ANISOCYTOSIS 1+ 
    
 RBC COMMENTS HYPOCHROMIA 1+ 
    
 
 
 
 
1150. Mr. Nancie Benedict tolerated treatment well and was discharged from [place] [address] in stable condition. Port de-accessed, flushed & heparinized per protocol. Confirmed with MD office that patient dose not need an appointment sooner than 19. He is to return on 19 for his next appointment. Estelle Ogden RN 
2019

## 2019-03-17 PROBLEM — R18.8 ASCITES: Status: ACTIVE | Noted: 2019-01-01

## 2019-03-17 PROBLEM — J90 PLEURAL EFFUSION, RIGHT: Status: ACTIVE | Noted: 2019-01-01

## 2019-03-17 PROBLEM — D64.9 ANEMIA: Status: ACTIVE | Noted: 2019-01-01

## 2019-03-17 PROBLEM — D69.6 THROMBOCYTOPENIA (HCC): Status: ACTIVE | Noted: 2019-01-01

## 2019-03-17 NOTE — H&P
Hilario Patel [facility] [address]  [address]  [phone]    Admission History and Physical      NAME:  Pasha Vásquez   :   1945   MRN:  [id number]     PCP:  None     Date/Time:  3/17/2019         Subjective:     CHIEF COMPLAINT: weakness     HISTORY OF PRESENT ILLNESS:     The patient is a 65 yo hx of cryptogenic cirrhosis, ascites, pleural effusion, pancytopenia, presented w/ weakness, severe anemia, thrombocytopenia, ascites, malnutrition. The patient has been \"feeling ill\" for several days, associated with fatigue, weakness, and trouble ambulating. He denied chest pain, SOB, fevers, chills, nausea, vomiting, diarrhea. The patient also noticed worsening abdominal distention. Per [facility], the patient was admitted last month for anemia, cirrhosis, ascites. He was seen by GI who referred the patient to Dr. Meme Rao. Hematology was also following and referred the patient to [facility] hematology (Dr. Brenda Cantrell). The patient saw Dr. Brenda Cantrell one time and refused to go back. He was subsequently started on prednisone 70mg daily. In the ED, Hgb was 5.8, plts 37. No Known Allergies    Prior to Admission medications    Medication Sig Start Date End Date Taking? Authorizing Provider   predniSONE (DELTASONE) 20 mg tablet Take 70 mg by mouth daily (with breakfast). Yes Provider, Historical   magnesium chloride (MAG DELAY) 64 mg delayed release tablet Take 1 Tab by mouth two (2) times a day. 2/3/19  Yes Shayy Larios MD   pantoprazole (PROTONIX) 40 mg tablet Take 1 Tab by mouth Daily (before breakfast).  19  Yes Shayy Larios MD       Past Medical History:   Diagnosis Date    Anemia     Cirrhosis ([address]     History of vascular access device 10/22/2018    [facility] VAT - 4 FR, R basilic, 38 cm for DIVA (outpatient infusions)    Pancytopenia ([address]     Patella fracture 2007    Right knee        Past Surgical History:   Procedure Laterality Date    COLONOSCOPY N/A 9/28/2018    COL[facility]marie [facility]una [facility] at [facility] 112 HX APPENDECTOMY      HX HERNIA REPAIR Bilateral     HX RETINAL DETACHMENT REPAIR      IR INSERT TUNL CVC W PORT OVER 5 YEARS  2/1/2019       Social History     Tobacco Use    Smoking status: Former Smoker    Smokeless tobacco: Never Used   Substance Use Topics    Alcohol use: Yes     Comment: rare        Family History   Problem Relation Age of Onset    Anemia Mother     Cancer Father 61        Lung        Review of Systems:  (bold if positive, if negative)    Gen:   fatigueEyes:  ENT:  CVS:  Pulm:  GI:    :    MS:  , weakness,Skin:  Psych:  Endo:    Hem:  Renal:    Neuro:          Objective:      VITALS:    Vital signs reviewed; most recent are:    Visit Vitals  /60   Pulse 81   Temp 97.8 °F (36.6 °C)   Resp 18   SpO2 94%     SpO2 Readings from Last 6 Encounters:   03/17/19 94%   03/04/19 100%   02/27/19 100%   02/06/19 99%   02/04/19 98%   01/25/19 96%        No intake or output data in the 24 hours ending 03/17/19 1120     Exam:     Physical Exam:    Gen:  Disheveled, ill-appearing, cachectic, mild distress  HEENT:  Scleral icterus, PERRL, hearing intact to voice, dry mucous membranes  Neck:  Supple, without masses, thyroid non-tender  Resp:  No accessory muscle use, decreased BS on right side  Card:  No murmurs, normal S1, S2 without thrills, bruits or peripheral edema  Abd:  Distended, large ascites  Lymph:  No cervical adenopathy  Musc:  No cyanosis or clubbing  Skin:  +jaundice  Neuro:  Cranial nerves 3-12 are grossly intact, diffused weakness, follows commands appropriately  Psych:  Alert with fair insight.   Oriented to person, place, and time    Labs:    Recent Labs     03/17/19  1004   WBC 4.9   HGB 5.8*   HCT 16.6*   PLT 37*     Recent Labs     03/17/19  1004      K 4.4      CO2 28   *   BUN 47*   CREA 0.64*   CA 8.4*   ALB 2.2*   TBILI 4.3*   SGOT 36   ALT 94*     No results found for: GLUCPOC  No results for input(s): PH, PCO2, PO2, HCO3, FIO2 in the last 72 hours. No results for input(s): INR in the last 72 hours. No lab exists for component: INREXT    Radiology and EKG reviewed:   CXR w/ right pleural effusion    **Old Records reviewed in [facility]**       Assessment/Plan:       Active Problems:    67 yo hx of cryptogenic cirrhosis, ascites, pleural effusion, pancytopenia, presented w/ weakness, severe anemia, thrombocytopenia, ascites, malnutrition    1) Decompensated Cirrhosis/ascites: cryptogenic, likely as etoh cirrhosis. Received a 8L paracentesis in January. Has not seen a hepatologist since last month's admission. BP too low for aldactone/lasix. Will re-consult GI. Check ammonia, LFTs, coags. Will need another large volume paracentesis. Will need IV albumin before and after procedure    2) Severe Anemia/severe thrombocytopenia: persistent, likely related to cirrhosis vs early MDS. Patient saw [facility] hematology (Dr. Marivel Montoya) once, but refused to go back. He was started on prednisone 70mg daily. Will transfuse 2u RBCs today, monitor Hgb closely. Re-consult Hematology    3) R pleural effusion: large on CXR. Will consult pulm for possible thoracentesis    4) Esophageal varices: cont PPI    5) Severe pro-courtney malnutrition: severe muscle loss. Due to cirrhosis.   Will consult nutrition    Code: DNR, will re-consult palliative care for possible hospice    Risk of deterioration: high      Total time spent with patient: [address] discussed with: Patient, nursing, ED    Discussed:  Care Plan    Prophylaxis:  SCD's    Probable Disposition:  SNF/LTC           ___________________________________________________    Attending Physician: Damian Izquierdo MD

## 2019-03-17 NOTE — PROGRESS NOTES
Bedside shift change report given to Williams Waller, RN (oncoming nurse) by Angela Elkins RN (offgoing nurse). Report included the following information SBAR, Kardex, Intake/Output and Recent Results. 1930: Second unit of PRBC completed at this time. Pt asked for denture cup to put dentures in so that he could eat dinner. Only lower denture was in cup. Pt swears the upper was in there as well when he left home. Has not opened his denture cup since he arrived to the ED. Looked everywhere in room, uppers not found. Unsure if they may still be at home or not.

## 2019-03-17 NOTE — CONSULTS
[facility] [place] [facility] [place]. [facility] [address]  [phone]                GASTROENTEROLOGY CONSULTATION NOTE        NAME:  Fannie Raza   :   1945   MRN:   [id number]       Requesting Physician:    Dr. Arlene Black    PCP: None    Consult Date: 3/17/2019 4:26 PM    Chief Complaint:  weakness     History of Present Illness:  Patient is a 66 y.o. 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 Arlene Black for evaluation and treatment of decompensated cirrhosis, large volume ascites and symptomatic anemia. He has chronic pancytopenia and states he has been getting outpatient blood transfusions periodically and he believes the last one was a couple of weeks ago. He is on prednisone 70 mg daily but he is not sure why. He has seen a hematologist (records not available). He states that in the last few days he has felt general malaise and weakness so he came to the ER where he was found to have Hgb 5.8 down from 8.0 on 3/4/19. He denies overt bleeding ie melena, hematochezia, hematemesis. No nausea or vomiting. He is getting a unit of blood now. He notes increased abdominal distention and has required LVPs in the past. He has been seen in consultation by Dr. Vladimir Kimble and Low Peng in September and he has seen Dr. Jose Dickson. Apparently he has not been able to tolerate diuretics in the past (? Hypotension) and he is not following a low sodium diet. He had an EGD and colonoscopy by Dr. Vladimir Kimble in Sept which was negative for esophageal varices and he had just 1 small polyp removed from the colon. He states he stopped drinking alcohol  as a New Years resolution. Has GERD and states it is well controlled on PPI. No CP.       PMH:  Past Medical History:   Diagnosis Date    Anemia     Cirrhosis ([address]     History of vascular access device 10/22/2018    [facility] VAT - 4 FR, R basilic, 38 cm for DIVA (outpatient infusions)    [address] [address]     Patella fracture 2007    Right knee       PSH:  Past Surgical History:   Procedure Laterality Date    COLONOSCOPY N/A 9/28/2018    COLONOSCOPY performed by Marissa Cooley MD at [address] [facility] HX APPENDECTOMY      HX HERNIA REPAIR Bilateral     HX RETINAL DETACHMENT REPAIR      IR INSERT TUNL CVC W PORT OVER 5 YEARS  2/1/2019       Allergies:  No Known Allergies    Home Medications:  Prior to Admission Medications   Prescriptions Last Dose Informant Patient Reported? Taking?   magnesium chloride (MAG DELAY) 64 mg delayed release tablet   No Yes   Sig: Take 1 Tab by mouth two (2) times a day. pantoprazole (PROTONIX) 40 mg tablet   No Yes   Sig: Take 1 Tab by mouth Daily (before breakfast). predniSONE (DELTASONE) 20 mg tablet 3/16/2019 at AM  Yes Yes   Sig: Take 70 mg by mouth daily (with breakfast). Facility-Administered Medications: None       Hospital Medications:  Current Facility-Administered Medications   Medication Dose Route Frequency    0.9% sodium chloride infusion 250 mL  250 mL IntraVENous PRN     Current Outpatient Medications   Medication Sig    predniSONE (DELTASONE) 20 mg tablet Take 70 mg by mouth daily (with breakfast).  magnesium chloride (MAG DELAY) 64 mg delayed release tablet Take 1 Tab by mouth two (2) times a day.  pantoprazole (PROTONIX) 40 mg tablet Take 1 Tab by mouth Daily (before breakfast). Social History:  Pt denies any history of IV drug use, blood transfusions, tattoos. Social History     Tobacco Use    Smoking status: Former Smoker    Smokeless tobacco: Never Used   Substance Use Topics    Alcohol use: Previous heavy drinker     Comment: Reports being sober since Jan 1st 2019       Family History:  Family History   Problem Relation Age of Onset    Anemia Mother     Cancer Father 61        Lung       Review of Systems:  10 point ROS was completed and is negative aside from mentioned in the HPI. Objective:     Patient Vitals for the past 8 hrs:   BP Temp Pulse Resp SpO2   03/17/19 1600 117/64    97 %   03/17/19 1545 103/59    100 %   03/17/19 1530 110/62    98 %   03/17/19 1515 100/53 97.5 °F (36.4 °C)   100 %   03/17/19 1445 110/60    97 %   03/17/19 1430 109/60 97.4 °F (36.3 °C)   98 %   03/17/19 1415 106/60    98 %   03/17/19 1402 104/59 97.4 °F (36.3 °C) 69 18 97 %   03/17/19 1352 103/58 97.4 °F (36.3 °C) 69 18 97 %   03/17/19 1347 98/59 97.4 °F (36.3 °C) 69 18 99 %   03/17/19 1339 102/58 97.4 °F (36.3 °C) 69 18 98 %   03/17/19 1330 104/60 97.4 °F (36.3 °C) 72 18 98 %   03/17/19 1300 105/61    98 %   03/17/19 1245 99/60       03/17/19 1230 100/59    100 %   03/17/19 1000 111/60    94 %   03/17/19 0946     96 %   03/17/19 0945 108/59       03/17/19 0931     95 %   03/17/19 0930 111/69       03/17/19 0912 95/59 97.8 °F (36.6 °C) 81 18 97 %     03/17 0701 - 03/17 1900  In: 592 [I.V.:995]  Out: -   No intake/output data recorded. EXAM:   GEN- Cirrhotic morphology with bitemp wasting and muscle atrophy of the extremities as well as distended abdomen. Ryan Lent and oriented. Slightly slow cognition but no asterixis. HEENT- mild scleral icterus   LUNGS-Clear to ausculation bilaterally. CARD-Rate regular and rhythym, S1 S2. No murmur. ABD-Soft , non-tender. Distended with significant fluid wave. no masses. EXT-No edema, warm to touch. Muscle wasting. Mild bilateral palmar erythema   PSYCH - Pleasant and cooperative to care. Data Review     Recent Labs     03/17/19  1004   WBC 4.9   HGB 5.8*   HCT 16.6*   PLT 37*     Recent Labs     03/17/19  1004      K 4.4      CO2 28   BUN 47*   CREA 0.64*   *   CA 8.4*     Recent Labs     03/17/19  1004   SGOT 36   *   TP 5.3*   ALB 2.2*   GLOB 3.1     No results for input(s): INR, PTP, APTT in the last 72 hours.     No lab exists for component: INREXT    GI CONSULT NOTES, PROCEDURES NOTES, IMAGING REPORTS AND DISCHARGE SUMMARIES REVIEWED IN EMR. Problem List:     Patient Active Problem List   Diagnosis Code    UTI (urinary tract infection) N39.0    Fall W19. XXXA    Elevated creatine kinase R74.8    Right tibial fracture S82.201A    Pancytopenia (HCC) D61.818    Cirrhosis (HCC) K74.60    Hyponatremia E87.1    Elevated troponin R74.8    Abnormal chest x-ray R93.89    Dyspnea R06.00    Lower extremity edema R60.0    Hypomagnesemia E83.42    Symptomatic anemia D64.9    Anemia D64.9    Thrombocytopenia (HCC) D69.6    Pleural effusion, right J90    Ascites R18.8     Assessment/Plan:     Alcohol related cirrhosis complicated by large volume ascites:  Reports being sober since Jan 1st. Not on diuretics per chart review because he could not tolerate them (? Hypotension) and he is not on a low sodium diet. No significant encephalopathy on exam. He does have large volume ascites. No varices seen during EGD with Dr. Son Regalado in Sept.    -Complete abdominal ultrasound to rule out PVT. -Agree with LVP tomorrow. He will need ascites sent for cell count and gram stain and culture. -If 5 liters or more of ascites is removed, I recommend giving a 6-8 g of 25% IV albumin per liter of ascites removed (minimum of 50 g). -Low sodium diet, < 2g daily. Needs to be emphasized with patient. Consider nutrition consult.   -Consider trial of spironolactone 100 mg daily.   -Avoid NSAIDs. Symptomatic anemia, pancytopenia: At least partially secondary to cirrhosis/portal HTN. No overt GI bleeding. No varices or portal hypertensive gastropathy seen during EGD in Sept. ? Why he is on pred 70 mg daily ---- was this started by outpatient hematology? ?   -Agree with heme consult.   -Agree with transfusion to goal Hgb > 7.   -Continue home dose PPI. Thank you for this consult. Dr. Son Regalado to follow in the morning.      Signed By: Mikel Shabazz MD     3/17/2019  4:26 PM

## 2019-03-17 NOTE — ED NOTES
Assisted patient to remove soiled pant and underwear. Pericare provided and mesh underwear applied to patient, who did not wish to wear a disposable brief. Wet wipes used to clean patient's bilateral hands and fingernails, where dried fecal material had collected. Patient repositioned for comfort, and is now resting in bed in no apparent distress. Call bell in reach, bed in low locked position.

## 2019-03-17 NOTE — PROGRESS NOTES
[facility]: MED RECONCILIATION      Medications removed:  · MVI 1 tab daily    Medications adjusted:  · Prednisone- previously 20mg daily    Information obtained from: Patient, medication list, RxQuery, recent/previous medical records (both [facility] and external records from [facility])    Allergies: Patient has no known allergies. Prior to Admission Medications:     Medication Documentation Review Audit       Reviewed by Vanessa Colbert PHARMD (Pharmacist) on 03/17/19 at 1123      Medication Sig Documenting Provider Last Dose Status Taking?   magnesium chloride (MAG DELAY) 64 mg delayed release tablet Take 1 Tab by mouth two (2) times a day. Sean Wise MD  Active Yes   pantoprazole (PROTONIX) 40 mg tablet Take 1 Tab by mouth Daily (before breakfast). Sean Wise MD  Active Yes   predniSONE (DELTASONE) 20 mg tablet Take 70 mg by mouth daily (with breakfast). Provider, Historical 3/16/2019 AM Active Yes                  Kiko Pereyra

## 2019-03-17 NOTE — ED TRIAGE NOTES
Pt arrives via EMS from private residence, reporting that he has been unable to get out of bed for approx 3 days, unable to eat and drink since onset of weakness. A&Ox4, walks with cane at baseline, abd is distended 2/2 Hep A cirrhosis. Pt appears dehydrated, dry mucous menmranes, non-tenting skin. Pt has no family around. Per EMS, pt reported that he \"is ready to give up\".

## 2019-03-17 NOTE — ED PROVIDER NOTES
Cally Castano is a 66 y.o. male who presents to the ED via EMS with a c/o dehydration and generalized weakness today. Pt reports that for the past 2-3 days he has been unable to get out of bed or ambulate with his cane. He also states that he has not eaten anything since the onset of his weakness. Pt additionally notes that he has some bilateral LE swelling and his abdomen is also very distended. He states that he has Hepatitis A and is in need of a paracentesis. Pt specifically denies chest pain, shortness of breath, n/v,d , fever, chills, numbness, tingling, abdominal pain, back pain, cough, dizziness or any other acute sx. Pt denies any recent travel, known sick contacts, or recent illness. PCP: None  PMHx significant for: Rt Patella Fracture, Cirrhosis, Pancytopenia, Anemia  PSHx significant for: Appendectomy, Bilateral Hernia Repair, Retinal Detachment Repair, Colonoscopy  Social Hx: Tobacco: Former Smoker EtOH: rare Illicit drug use: none    There are no further complaints or symptoms at this time. Signed by: Estefania Osei, scribing for Radha Newton MD on March 17th, 2019 at 09:07am.               Past Medical History:   Diagnosis Date    Anemia     Cirrhosis ([facility] [address]     History of vascular access device 10/22/2018    [facility] VAT - 4 FR, R basilic, 38 cm for DIVA (outpatient infusions)    Pancytopenia ([facility] [address]     Patella fracture 2007    Right knee       Past Surgical History:   Procedure Laterality Date    COLONOSCOPY N/A 9/28/2018    [facility] HX APPENDECTOMY      HX HERNIA REPAIR Bilateral     HX RETINAL DETACHMENT REPAIR      IR INSERT TUNL CVC W PORT OVER 5 YEARS  2/1/2019         Family History:   Problem Relation Age of Onset    Anemia Mother     Cancer Father 61        Lung       Social History     Socioeconomic History    Marital status: SINGLE     Spouse name: Not on file    Number of children: Not on file  Years of education: Not on file    Highest education level: Not on file   Social Needs    Financial resource strain: Not on file    Food insecurity - worry: Not on file    Food insecurity - inability: Not on file    Transportation needs - medical: Not on file    Transportation needs - non-medical: Not on file   Occupational History    Not on file   Tobacco Use    Smoking status: Former Smoker    Smokeless tobacco: Never Used   Substance and Sexual Activity    Alcohol use: Yes     Comment: rare    Drug use: No    Sexual activity: No   Other Topics Concern     Service Not Asked    Blood Transfusions Not Asked    Caffeine Concern Not Asked    Occupational Exposure Not Asked    Hobby Hazards Not Asked    Sleep Concern Not Asked    Stress Concern Not Asked    Weight Concern Not Asked    Special Diet Not Asked    Back Care Not Asked    Exercise Not Asked    Bike Helmet Not Asked    Seat Belt Not Asked    Self-Exams Not Asked   Social History Narrative    Not on file         ALLERGIES: Patient has no known allergies. Review of Systems   Constitutional: Positive for appetite change and fatigue. Negative for chills and fever. HENT: Negative. Respiratory: Negative for cough and shortness of breath. Cardiovascular: Positive for leg swelling. Negative for chest pain. Gastrointestinal: Negative for abdominal pain, diarrhea, nausea and vomiting. Musculoskeletal: Negative for back pain and neck pain. Neurological: Positive for weakness (generalized). Negative for numbness. All other systems reviewed and are negative. There were no vitals filed for this visit. Physical Exam   Constitutional: He is oriented to person, place, and time. He appears well-developed and well-nourished. No distress. HENT:   Head: Normocephalic and atraumatic. Mouth/Throat: Mucous membranes are pale and dry.    Dry mouth with thrush noted on tongue   Eyes: Conjunctivae and EOM are normal. No scleral icterus. Neck: Normal range of motion. Neck supple. No tracheal deviation present. Cardiovascular: Normal rate, regular rhythm, normal heart sounds and intact distal pulses. No murmur heard. Pulmonary/Chest: Effort normal and breath sounds normal. No stridor. No respiratory distress. Abdominal: Soft. Bowel sounds are normal. He exhibits distension. He exhibits no mass. There is no tenderness. There is no guarding. Genitourinary: Rectum normal.   Genitourinary Comments: Brown stool   Musculoskeletal: Normal range of motion. He exhibits edema. He exhibits no tenderness or deformity. Neurological: He is alert and oriented to person, place, and time. Skin: Skin is warm and dry. He is not diaphoretic. Psychiatric: He has a normal mood and affect. Nursing note and vitals reviewed. MDM  Number of Diagnoses or Management Options  Cirrhosis of liver with ascites, unspecified hepatic cirrhosis type ([facility] [address]):   Symptomatic anemia:   Weakness:   Diagnosis management comments: Patient with weakness and fatigue - unable to get out of bed for 3 days - clinically appears dehydrated. Check labs, give some IVF and re-eval.  Patient reportedly lives alone - likely will need admit for placement - unsafe for him to go home alone.        Amount and/or Complexity of Data Reviewed  Clinical lab tests: reviewed and ordered  Tests in the radiology section of CPT®: ordered and reviewed  Discuss the patient with other providers: yes (10:40AM spoke to Dr. Oleksandr Spivey - hospitalist to admit)  Independent visualization of images, tracings, or specimens: yes (CXR -  right pleural effusion)    Risk of Complications, Morbidity, and/or Mortality  Presenting problems: high  Diagnostic procedures: high  Management options: high           Procedures           VITALS:   Patient Vitals for the past 8 hrs:   Temp Pulse Resp BP SpO2   03/17/19 1000    111/60 94 %   03/17/19 0946     96 %   03/17/19 0945    108/59    03/17/19 0931     95 %   03/17/19 0930    111/69    03/17/19 0912 97.8 °F (36.6 °C) 81 18 95/59 97 %                  Recent Results (from the past 24 hour(s))   CBC WITH AUTOMATED DIFF    Collection Time: 03/17/19 10:04 AM   Result Value Ref Range    WBC 4.9 4.1 - 11.1 K/uL    RBC 1.96 (L) 4.10 - 5.70 M/uL    HGB 5.8 (LL) 12.1 - 17.0 g/dL    HCT 16.6 (LL) 36.6 - 50.3 %    MCV 84.7 80.0 - 99.0 FL    MCH 29.6 26.0 - 34.0 PG    MCHC 34.9 30.0 - 36.5 g/dL    RDW 15.0 (H) 11.5 - 14.5 %    PLATELET 37 (LL) 555 - 400 K/uL    NRBC 0.0 0  WBC    ABSOLUTE NRBC 0.00 0.00 - 0.01 K/uL    NEUTROPHILS PENDING %    LYMPHOCYTES PENDING %    MONOCYTES PENDING %    EOSINOPHILS PENDING %    BASOPHILS PENDING %    IMMATURE GRANULOCYTES PENDING %    ABS. NEUTROPHILS PENDING K/UL    ABS. LYMPHOCYTES PENDING K/UL    ABS. MONOCYTES PENDING K/UL    ABS. EOSINOPHILS PENDING K/UL    ABS. BASOPHILS PENDING K/UL    ABS. IMM. GRANS. PENDING K/UL    DF PENDING    METABOLIC PANEL, COMPREHENSIVE    Collection Time: 03/17/19 10:04 AM   Result Value Ref Range    Sodium 136 136 - 145 mmol/L    Potassium 4.4 3.5 - 5.1 mmol/L    Chloride 103 97 - 108 mmol/L    CO2 28 21 - 32 mmol/L    Anion gap 5 5 - 15 mmol/L    Glucose 118 (H) 65 - 100 mg/dL    BUN 47 (H) 6 - 20 MG/DL    Creatinine 0.64 (L) 0.70 - 1.30 MG/DL    BUN/Creatinine ratio 73 (H) 12 - 20      GFR est AA >60 >60 ml/min/1.73m2    GFR est non-AA >60 >60 ml/min/1.73m2    Calcium 8.4 (L) 8.5 - 10.1 MG/DL    Bilirubin, total 4.3 (H) 0.2 - 1.0 MG/DL    ALT (SGPT) 94 (H) 12 - 78 U/L    AST (SGOT) 36 15 - 37 U/L    Alk.  phosphatase 361 (H) 45 - 117 U/L    Protein, total 5.3 (L) 6.4 - 8.2 g/dL    Albumin 2.2 (L) 3.5 - 5.0 g/dL    Globulin 3.1 2.0 - 4.0 g/dL    A-G Ratio 0.7 (L) 1.1 - 2.2     SAMPLES BEING HELD    Collection Time: 03/17/19 10:04 AM   Result Value Ref Range    SAMPLES BEING HELD red,miriam,sst     COMMENT        Add-on orders for these samples will be processed based on acceptable specimen integrity and analyte stability, which may vary by analyte. Xr Chest Port    Result Date: 3/17/2019  PORTABLE CHEST RADIOGRAPH/S: 3/17/2019 9:54 AM Clinical history: Weakness INDICATION:  Weakness COMPARISON: 1/31/2019 FINDINGS: AP portable upright view of the chest demonstrates stable  cardiopericardial silhouette. The lungs are diminished in volume. The increased loculated right-sided effusion. Right sided port catheter with catheter tip at atriocaval junction. . The osseous structures are unremarkable. Patient is on a cardiac monitor. IMPRESSION: Increased right-sided pleural effusion and atelectasis with diminished lung volumes on the right in comparison to the previous exam..

## 2019-03-18 NOTE — PROGRESS NOTES
Fadumo Toussaint. Bree Jay MD  [phone] office  [phone] voicemail     Gastroenterology Progress Note    March 18, 2019  Admit Date: 3/17/2019         Narrative Assessment and Plan   · Cirrhosis  · Ascites  · Protein/muscle wasting - weakness. Probably the catabolic state of cirrhosis / liver failure at play here. · Anemia  · Thrombocytopenia    His liver problem alone gives MELD of about 23 which is 15% 90 day mortality but his trajectory and subjective appearance would suggest a prognosis worse than that since he has lost quite a bit of weight/muscle since I've last seen him.    -ascites - low sodium diet and intermittent paracentesis. Not sure he'd tolerate diuretics with his pressure as it stands  -nutrition status - he needs to be encouraged to eat a low sodium diet with enough protein  -debility - PT? Hospice? No evidence of GI bleeding, had EGD for varices screening and that was negative. Had [address] screening this admission (ultrasound showed no masses). Subjective:   · I'm so weak    Location:  All over  Duration: weeks  Timing: constant  Radiation: none   Associated Symptoms: weight loss  Aggravating/Alleviating factors:     ROS:  The previous review of systems on initial consultation / H&P is noted and reviewed. Specific changes noted above in HPI.     Current Medications:     Current Facility-Administered Medications   Medication Dose Route Frequency    [START ON 3/19/2019] cefTRIAXone (ROCEPHIN) 1 g in 0.9% sodium chloride (MBP/ADV) 50 mL  1 g IntraVENous Q24H    albumin human 25% (BUMINATE) solution 12.5 g  12.5 g IntraVENous Q6H    pantoprazole (PROTONIX) tablet 40 mg  40 mg Oral ACB    sodium chloride (NS) flush 5-40 mL  5-40 mL IntraVENous Q8H    sodium chloride (NS) flush 5-40 mL  5-40 mL IntraVENous PRN    HYDROmorphone (PF) (DILAUDID) injection 0.5 mg  0.5 mg IntraVENous Q4H PRN    naloxone (NARCAN) injection 0.4 mg  0.4 mg IntraVENous PRN    diphenhydrAMINE (BENADRYL) injection 12.5 mg  12.5 mg IntraVENous Q4H PRN    ondansetron (ZOFRAN) injection 4 mg  4 mg IntraVENous Q6H PRN    docusate sodium (COLACE) capsule 100 mg  100 mg Oral BID    predniSONE (DELTASONE) tablet 70 mg  70 mg Oral DAILY WITH BREAKFAST       Objective:     VITALS:   Last 24hrs VS reviewed since prior progress note. Most recent are:  Visit Vitals  BP (!) 89/50 (BP 1 Location: Left arm, BP Patient Position: Supine)   Pulse 77   Temp 98 °F (36.7 °C)   Resp 16   Ht 5' 10\" (1.778 m)   Wt 71.3 kg (157 lb 3.2 oz)   SpO2 95%   BMI 22.56 kg/m²     Temp (24hrs), Av.6 °F (36.4 °C), Min:97.4 °F (36.3 °C), Max:98.2 °F (36.8 °C)      Intake/Output Summary (Last 24 hours) at 3/18/2019 1646  Last data filed at 3/18/2019 0815  Gross per 24 hour   Intake 875.8 ml   Output 350 ml   Net 525.8 ml       EXAM:  General:  Comfortable, no distress    HEENT:  Atraumatic skull, pupils equal  Lungs:   No abnormal audible breath sounds. Speaking in complete sentences  Heart:   No abnormal audible heart sounds. Well perfused  Abdomen:   Nondistended, nontender. No mass, guarding or rebound  Musc:   No skeletal defects or deformities  Neurologic:   Cranial nerves grossly intact, moves all 4 extremities  Psych:    Good insight. Not anxious nor agitated  Derm:   No rash, jaundice, nor palpable dermatologic mass    Lab Data Reviewed:   Recent Labs     19  0243 19  1004   WBC 4.5 4.9   HGB 8.3* 5.8*   HCT 23.6* 16.6*   PLT 30* 37*     Recent Labs     19  0243 19  1004   * 136   K 4.2 4.4   CL 98 103   CO2 25 28   * 118*   BUN 44* 47*   CREA 0.57* 0.64*   CA 8.1* 8.4*   MG 2.0  --    PHOS 3.1  --    ALB 2.2* 2.2*   TBILI 5.0* 4.3*   SGOT 38* 36   ALT 92* 94*   INR 1.7*  --      No results found for: GLUCPOC  No results for input(s): PH, PCO2, PO2, HCO3, FIO2 in the last 72 hours.   Recent Labs     19  0243   INR 1.7*           Assessment:   (See above)  Active Problems:    Cirrhosis ([facility] [address] ()      Anemia (3/17/2019)      Thrombocytopenia ([facility] [address] (3/17/2019)      Pleural effusion, right (3/17/2019)      Ascites (3/17/2019)        Plan:   (See above)      Signed By: Dhruv Conteh MD     3/18/2019  4:46 PM

## 2019-03-18 NOTE — PROGRESS NOTES
Problem: Self Care Deficits Care Plan (Adult)  Goal: *Acute Goals and Plan of Care (Insert Text)  Occupational Therapy Goals  Initiated 3/18/2019  1. Patient will perform bathing with minimal assistance/contact guard assist within 7 day(s). 2.  Patient will perform lower body dressing with modified independence within 7 day(s). 3.  Patient will perform grooming standing at sink with supervision/set-up within 7 day(s). 4.  Patient will perform toilet transfers with modified independence within 7 day(s). 5.  Patient will perform all aspects of toileting with modified independence within 7 day(s). 6.  Patient will participate in upper extremity therapeutic exercise/activities with supervision/set-up for 10 minutes within 7 day(s). 7.  Patient will utilize energy conservation techniques during functional activities with verbal cues within 7 day(s). Occupational Therapy EVALUATION  Patient: Herminio Hardin (64 y.o. male)  Date: 3/18/2019  Primary Diagnosis: Anemia [D64.9]       Precautions: Falls, low BP       ASSESSMENT :  Based on the objective data described below, the patient presents at min to mod assist level with self-care and mobiliy. Pt experiencing low BPs and performed minimal activity. Please refer vitals at 14:45. Pt lives home alone in one story apt, however, has 8 steps from parking lot to his apt. Per pt he has a rollator that he uses when he goes to/from the grocery store. Today, pt had a paracentesis and 11L removed. PTA, pt was independent, driving and taking care of self, however, has become progressively weaker. Uncertain of pt's prognosis, but will con't to follow and address listed goal once a more definitive decision has been made about his level of care. Patient will benefit from skilled intervention to address the above impairments.   Patients rehabilitation potential is considered to be Fair  Factors which may influence rehabilitation potential include:   [] None noted  []             Mental ability/status  [x]             Medical condition  []             Home/family situation and support systems  []             Safety awareness  []             Pain tolerance/management  []             Other:      PLAN :  Recommendations and Planned Interventions:  [x]               Self Care Training                  [x]        Therapeutic Activities  [x]               Functional Mobility Training    []        Cognitive Retraining  [x]               Therapeutic Exercises           [x]        Endurance Activities  [x]               Balance Training                   []        Neuromuscular Re-Education  []               Visual/Perceptual Training     [x]   Home Safety Training  [x]               Patient Education                 []        Family Training/Education  []               Other (comment):    Frequency/Duration: Patient will be followed by occupational therapy 3 times a week to address goals. Discharge Recommendations: To Be Determined  Further Equipment Recommendations for Discharge: rollator     SUBJECTIVE:   Patient stated I've been sponge bathing.     OBJECTIVE DATA SUMMARY:   HISTORY:   Past Medical History:   Diagnosis Date    Anemia     [facility] [address]     History of vascular access device 10/22/2018    [facility] VAT - 4 FR, R basilic, 38 cm for DIVA (outpatient infusions)    Pancytopenia ([facility] [address]     Patella fracture 2007    Right knee     Past Surgical History:   Procedure Laterality Date    COLONOSCOPY N/A 9/28/2018    [facility]ra [facility] HX APPENDECTOMY      HX HERNIA REPAIR Bilateral     HX RETINAL DETACHMENT REPAIR      IR INSERT TUNL CVC W PORT OVER 5 YEARS  2/1/2019       Prior Level of Function/Environment/Context: mod I to independent level, still driving   Occupations in which the patient is/was successful, what are the barriers preventing that success:   Performance Patterns (routines, roles, habits, and rituals):   Personal Interests and/or values:   Expanded or extensive additional review of patient history:     Home Situation  Home Environment: Apartment  # Steps to Enter: 8  Rails to Enter: Yes  One/Two Story Residence: One story  Living Alone: Yes  Support Systems: None  Patient Expects to be Discharged to[de-identified] Apartment  Current DME Used/Available at Home: amanda Ly        EXAMINATION OF PERFORMANCE DEFICITS:  Cognitive/Behavioral Status:  Neurologic State: Alert  Orientation Level: Appropriate for age  Cognition: Appropriate decision making        Safety/Judgement: Awareness of environment    Skin: intact    Edema: none noted    Hearing: Auditory  Auditory Impairment: None    Vision/Perceptual:                                     Range of Motion:    AROM: Within functional limits                         Strength:    Strength: Generally decreased, functional                Coordination:  Coordination: Within functional limits  Fine Motor Skills-Upper: Left Intact; Right Intact    Gross Motor Skills-Upper: Left Intact; Right Intact    Tone & Sensation:    Tone: Normal  Sensation: Intact                      Balance:       Functional Mobility and Transfers for ADLs:  Bed Mobility:  Supine to Sit: Minimum assistance  Sit to Supine: Minimum assistance    Transfers:  Sit to Stand: Minimum assistance  Stand to Sit: Minimum assistance    ADL Assessment:  Feeding: Setup    Oral Facial Hygiene/Grooming: Setup    Bathing: Moderate assistance    Upper Body Dressing: Supervision    Lower Body Dressing:  Moderate assistance    Toileting: Minimum assistance                ADL Intervention and task modifications:               Cognitive Retraining  Safety/Judgement: Awareness of environment        Functional Measure:  Barthel Index:    Bathin  Bladder: 10  Bowels: 10  Groomin  Dressin  Feeding: 10  Mobility: 0  Stairs: 0  Toilet Use: 5  Transfer (Bed to Chair and Back): 10  Total: 55/100        Percentage of impairment   0%   1-19%   20-39%   40-59%   60-79%   80-99%   100%   Barthel Score 0-100 100 99-80 79-60 59-40 20-39 1-19   0     The Barthel ADL Index: Guidelines  1. The index should be used as a record of what a patient does, not as a record of what a patient could do. 2. The main aim is to establish degree of independence from any help, physical or verbal, however minor and for whatever reason. 3. The need for supervision renders the patient not independent. 4. A patient's performance should be established using the best available evidence. Asking the patient, friends/relatives and nurses are the usual sources, but direct observation and common sense are also important. However direct testing is not needed. 5. Usually the patient's performance over the preceding 24-48 hours is important, but occasionally longer periods will be relevant. 6. Middle categories imply that the patient supplies over 50 per cent of the effort. 7. Use of aids to be independent is allowed. Tello Singletary., Barthel, D.W. (5797). Functional evaluation: the Barthel Index. [facility] (14)2. Nicko Agosto subha Keron, VICENTAF, Elen Richards., Anu Denney., [place], [address] [place] [address] (1999). Measuring the change indisability after inpatient rehabilitation; comparison of the responsiveness of the Barthel Index and Functional [place] Measure. Journal of Neurology, Neurosurgery, and Psychiatry, 66(4), 685-885. Selene Ji, N.J.A, Andrzej Fung  [email or web address].[email or web address], & Rose King, M.A. (2004.) Assessment of post-stroke quality of life in cost-effectiveness studies: The usefulness of the Barthel Index and the EuroQoL-5D.  Quality of Life Research, 15, 326-34       Occupational Therapy Evaluation Charge Determination   History Examination Decision-Making   LOW Complexity : Brief history review  LOW Complexity : 1-3 performance deficits relating to physical, cognitive , or psychosocial skils that result in activity limitations and / or participation restrictions  LOW Complexity : No comorbidities that affect functional and no verbal or physical assistance needed to complete eval tasks       Based on the above components, the patient evaluation is determined to be of the following complexity level: LOW   Pain:  Pain Scale 1: Numeric (0 - 10)  Pain Intensity 1: 0              Activity Tolerance:   Fair  Please refer to the flowsheet for vital signs taken during this treatment. After treatment:   [] Patient left in no apparent distress sitting up in chair  [x] Patient left in no apparent distress in bed  [x] Call bell left within reach  [] Nursing notified  [] Caregiver present  [x] Bed alarm activated    COMMUNICATION/EDUCATION:   The patients plan of care was discussed with: Registered Nurse. [x] Home safety education was provided and the patient/caregiver indicated understanding. [x] Patient/family have participated as able in goal setting and plan of care. [x] Patient/family agree to work toward stated goals and plan of care. [] Patient understands intent and goals of therapy, but is neutral about his/her participation. [] Patient is unable to participate in goal setting and plan of care. This patients plan of care is appropriate for delegation to \[facility]\"".     Thank you for this referral.  Pamela Sprague, OTR/L  Time Calculation: 13 mins

## 2019-03-18 NOTE — ED NOTES
TRANSFER - OUT REPORT:    Verbal report given to Consuelo Mariscal RN(name) on Dolly Arnold  being transferred to ED Bed 20(unit) for routine progression of care       Report consisted of patients Situation, Background, Assessment and   Recommendations(SBAR). Information from the following report(s) SBAR, ED Summary, [facility] and Recent Results was reviewed with the receiving nurse. Lines:   Venous Access Device Angiodynamics Port 02/01/19 Upper chest (subclavicular area, right (Active)   Central Line Being Utilized Yes 3/17/2019  7:30 PM   Criteria for Appropriate Use Limited/no vessel suitable for conventional peripheral access 3/17/2019  7:30 PM   Site Assessment Clean, dry, & intact 3/17/2019  7:30 PM   Date of Last Dressing Change 03/17/19 3/17/2019  7:30 PM   Dressing Status Clean, dry, & intact 3/17/2019  7:30 PM   Dressing Type Transparent 3/17/2019  7:30 PM   Date Accessed (Medial Site) 03/17/19 3/17/2019  7:30 PM   Access Time (Medial Site) [id number] 3/17/2019  9:37 AM   Access Needle Size (Site #1) 20 G 3/17/2019  9:37 AM   Access Needle Length (Medial Site) 1 inch 3/17/2019  9:37 AM   Positive Blood Return (Medial Site) Yes 3/17/2019  7:30 PM   Action Taken (Medial Site) Flushed 3/17/2019  7:30 PM        Opportunity for questions and clarification was provided.       Patient transported with:   Registered Nurse

## 2019-03-18 NOTE — PROGRESS NOTES
Palliative Medicine      Code Status: DNR    Advance Care Planning:  Advance Care Planning 3/18/2019   Patient's Healthcare Decision Maker is: Named in scanned ACP document   Primary Decision Maker Name Lucia Barnes III ([phone])   Primary Decision Maker Relationship to Patient Friend   Confirm Advance Directive Yes, on file   Patient Would Like to Complete Advance Directive In place   Does the patient have other document types Do Not Resuscitate     Patient / Family Encounter Documentation    Participants (names): Pt, Palliative Medicine (Dr. Ivonne Hendrickson)    Narrative:  Pt was awake in bed, is known to Dr. Tay Arango from Feb 2122 hospitalization. Pt lives alone, is not , does not have children. Pt retired from his career as an  for [employer] 20+ yrs ago, relocated and traveled extensively for his work. Pt reports he is able to care for himself at home though reportedly stated upon arrival in ED that he had not been able to get out of bed x 3 days, was not eating or drinking. Pt's primary support is a friend Nic Moffett who resides in [place], pt reports they talk at least once a week by phone. Pt stated his understanding of current medical condition is that \"test results are pending,\" which will provide more information. Pt stated he sees Dr. Jude Izquierdo outside of the hospital, did not wish to follow at [facility] due to parking issues. Pt had paracentesis earlier today, reports 11 liters were drained. Hospice had been presented as an option by Attending Physician. Dr. Tay Arango provided additional information re: hospice philosophy, services, and goals. Discussed having drain placed for ascites if decision is made to pursue hospice. Pt has AMD on file, friend Lucia Barnes is surrogate decision maker if pt is unable to speak for himself. Pt confirmed that he would not want attempts at resuscitation in the event of cardiac/respiratory arrest; DDNR was reviewed and completed. Psychosocial Issues Identified/ Resilience Factors:  Pt lives alone, has limited social support, dearest friend resides in [address] [place] [address]. Pt did not elaborate re: current finances though indicated that he did well for himself in the past.    Goals of Care / Plan:  Pt was receptive to speaking with hospice representative for additional information though stated he is waiting to hear about test results before making decisions re: his care. DDNR was completed on this date. Dr. Sudhir Torres spoke with pt's Chace Bartlett by phone with pt's permission, please see note for details. Palliative team will follow for support and ongoing goals of care conversations. Discussed with John Ontiveros RN, who will provide information session. Thank you for including Palliative Medicine in the care of Mr. Nurys Betancourt.     Romi Obrien LCSW, [facility]  [phone])

## 2019-03-18 NOTE — CONSULTS
Palliative Medicine Consult    Patient Name: Aakash Fung  YOB: 1945    Date of Initial Consult: 3/18/19  Reason for Consult: care decisions   Requesting Provider: Dr. Camden Lora   Primary Care Physician: None      SUMMARY:   Aakash Fung is a 66 y.o. with a past history of cirrhosis(likely alcoholic rather than cryptogenic), pancytopenia(been evaluated by Dr. Dago Dumas and Dr. Leo German)), recurrent ascites, prior alcohol and tobacco abuse, who was admitted on 3/17/2019 from home with a diagnosis of ascites/weakness/panytopenia. Current medical issues leading to Palliative Medicine involvement include: care decisions. Chart reviewed/HPI-patient admitted to the hospital for similar issues as admission in February with weakness, anemia, ascites. Patient had been seen at [facility](notes in the chart under media) and started on high dose steroids(1 mg/kg) on 2/26/19 by Dr. 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 Patient states has been weaker over the past 2 weeks but was still driving, fixing his meals and doing his laundry. SH-lives alone. Retired from [employer] at age 46. No family. Friends will check on him. His MPOA(Dheeraj Eddy) lives in [facility]:   1. GOC discussion  2. DNR review  3. ACP review  4. Cirrhosis with recurrent ascites  5. Pancytopenia  6. Debility       PLAN:   1. Letty([facility]) and I met with patient and reviewed our role in his care. Our team had met with him during last admission and he remembers us. Discussed his current medical issues and he seems to have a decent understanding of the medical problems. He states \"waiting for more results from today\" and did have 11 liters removed via paracentesis earlier today. He does understand that there is no cure for his medical issues and even states his days may be numbered. 2. GOC we discussed options going forward. Dr. Camden Lora had discussed Hospice with him so we did review what Hospice does and does not do.  If he feels the burden of continuing to return to the hospital for transfusions and paracentesis, then transition to Hospice certainly would be appropriate. In addition, we could have a drain placed to provide some relief of his ascites at home. He is interested in more information so will ask for a Hospice informational session. We do worry about his ongoing ability to care for him. He thinks Ra Hong may be traveling from [place] to see him soon. He does give us permission to talk with Dheeraj  3. Derrick Soudaryl and updated him on current situation. He appreciated the update and is considering flying here this weekend. Told him I would update him tomorrow after Hospice meets with him. 4. AMD-in the chart with Delilah Contreras as MPOA  5. Code status-reviewed with him and he is clear on DNAR/DNI. We completed a DDNR today. Copy scanned into chart  6. Symptom management-feels better after removal of 11 liters but denies pain. 7. Psychosocial-he states has had a good life with his travels with [employer]. Limited social interactions at this time. No spiritual concerns  8. Discussed with Dr. Mango Hansen, CM, Yuliana(Hospice-told her can wait until tomorrow for informational session), bedside nurse  9. Initial consult note routed to primary continuity provider  10. Communicated plan of care with: Palliative IDT       GOALS OF CARE / TREATMENT PREFERENCES:   [====Goals of Care====]  GOALS OF CARE:  Patient/Health Care Proxy Stated Goals: Other (comment)(considering hospice care)      TREATMENT PREFERENCES:   Code Status: DNR    Advance Care Planning:  Advance Care Planning 3/17/2019   Patient's Healthcare Decision Maker is: -   Primary Decision Maker Name -   Primary Decision Maker Relationship to Patient -   Confirm Advance Directive Yes, on file   Patient Would Like to Complete Advance Directive -       Medical Interventions: Limited additional interventions  Other Instructions:           The palliative care team has discussed with patient / health care proxy about goals of care / treatment preferences for patient.  [====Goals of Care====]         HISTORY:     History obtained from: chart, patient,     CHIEF COMPLAINT: weakness    HPI/SUBJECTIVE:    The patient is:   [x] Verbal and participatory  [] Non-participatory due to:   Patient feels better since paracentesis, denies any pain     Clinical Pain Assessment (nonverbal scale for severity on nonverbal patients):   Clinical Pain Assessment  Severity: 0            FUNCTIONAL ASSESSMENT:     Palliative Performance Scale (PPS):  PPS: 50       PSYCHOSOCIAL/SPIRITUAL SCREENING:     Advance Care Planning:  Advance Care Planning 3/17/2019   Patient's Healthcare Decision Maker is: -   Primary Decision Maker Name -   Primary Decision Maker Relationship to Patient -   Confirm Advance Directive Yes, on file   Patient Would Like to Complete Advance Directive -        Any spiritual / Moravian concerns:  [] Yes /  [x] No    Caregiver Burnout:  [] Yes /  [] No /  [x] No Caregiver Present      Anticipatory grief assessment:   [x] Normal  / [] Maladaptive       ESAS Anxiety: Anxiety: 0    ESAS Depression: Depression: 0        REVIEW OF SYSTEMS:     Positive and pertinent negative findings in ROS are noted above in HPI. The following systems were [x] reviewed / [] unable to be reviewed as noted in HPI  Other findings are noted below. Systems: constitutional, ears/nose/mouth/throat, respiratory, gastrointestinal, genitourinary, musculoskeletal, integumentary, neurologic, psychiatric, endocrine. Positive findings noted below.   Modified ESAS Completed by: provider   Fatigue: 3 Drowsiness: 0   Depression: 0 Pain: 0   Anxiety: 0 Nausea: 0   Anorexia: 0 Dyspnea: 1     Constipation: No              PHYSICAL EXAM:     From RN flowsheet:  Wt Readings from Last 3 Encounters:   03/18/19 157 lb 3.2 oz (71.3 kg)   02/04/19 177 lb 4 oz (80.4 kg)   01/21/19 190 lb (86.2 kg)     Blood pressure (!) 89/50, pulse 77, temperature 98 °F (36.7 °C), resp. rate 16, height 5' 10\" (1.778 m), weight 157 lb 3.2 oz (71.3 kg), SpO2 95 %. Pain Scale 1: Numeric (0 - 10)  Pain Intensity 1: 0                 Last bowel movement, if known:     Constitutional: thin, tired appearing, slightly slow to respond but alert and oriented  Eyes: pupils equal, anicteric  ENMT: no nasal discharge, moist mucous membranes  Cardiovascular: regular rhythm, distal pulses intact  Respiratory: breathing not labored, symmetric  Gastrointestinal: soft non-tender, mild ascites remains,+bowel sounds  Musculoskeletal: no deformity, no tenderness to palpation, atropy  Skin: warm, dry  Neurologic: following commands, moving all extremities  Psychiatric: full affect, no hallucinations  Other:       HISTORY:     Active Problems:    Cirrhosis (HCC) ()      Anemia (3/17/2019)      Thrombocytopenia (HCC) (3/17/2019)      Pleural effusion, right (3/17/2019)      Ascites (3/17/2019)      Past Medical History:   Diagnosis Date    Anemia     Cirrhosis ([address]     History of vascular access device 10/22/2018    [facility] VAT - 4 FR, R basilic, 38 cm for DIVA (outpatient infusions)    Pancytopenia ([address]     Patella fracture 2007    Right knee      Past Surgical History:   Procedure Laterality Date    COLONOSCOPY N/A 9/28/2018    [facility] HX APPENDECTOMY      HX HERNIA REPAIR Bilateral     HX RETINAL DETACHMENT REPAIR      IR INSERT TUNL CVC W PORT OVER 5 YEARS  2/1/2019      Family History   Problem Relation Age of Onset    Anemia Mother     Cancer Father 61        Lung      History reviewed, no pertinent family history.   Social History     Tobacco Use    Smoking status: Former Smoker    Smokeless tobacco: Never Used   Substance Use Topics    Alcohol use: Yes     Comment: rare     No Known Allergies   Current Facility-Administered Medications   Medication Dose Route Frequency    [START ON 3/19/2019] cefTRIAXone (ROCEPHIN) 1 g in 0.9% sodium chloride (MBP/ADV) 50 mL  1 g IntraVENous Q24H    albumin human 25% (BUMINATE) solution 12.5 g  12.5 g IntraVENous Q6H    pantoprazole (PROTONIX) tablet 40 mg  40 mg Oral ACB    sodium chloride (NS) flush 5-40 mL  5-40 mL IntraVENous Q8H    sodium chloride (NS) flush 5-40 mL  5-40 mL IntraVENous PRN    HYDROmorphone (PF) (DILAUDID) injection 0.5 mg  0.5 mg IntraVENous Q4H PRN    naloxone (NARCAN) injection 0.4 mg  0.4 mg IntraVENous PRN    diphenhydrAMINE (BENADRYL) injection 12.5 mg  12.5 mg IntraVENous Q4H PRN    ondansetron (ZOFRAN) injection 4 mg  4 mg IntraVENous Q6H PRN    docusate sodium (COLACE) capsule 100 mg  100 mg Oral BID    predniSONE (DELTASONE) tablet 70 mg  70 mg Oral DAILY WITH BREAKFAST          LAB AND IMAGING FINDINGS:     Lab Results   Component Value Date/Time    WBC 4.5 03/18/2019 02:43 AM    HGB 8.3 (L) 03/18/2019 02:43 AM    PLATELET 30 (LL) 46/77/0879 02:43 AM     Lab Results   Component Value Date/Time    Sodium 131 (L) 03/18/2019 02:43 AM    Potassium 4.2 03/18/2019 02:43 AM    Chloride 98 03/18/2019 02:43 AM    CO2 25 03/18/2019 02:43 AM    BUN 44 (H) 03/18/2019 02:43 AM    Creatinine 0.57 (L) 03/18/2019 02:43 AM    Calcium 8.1 (L) 03/18/2019 02:43 AM    Magnesium 2.0 03/18/2019 02:43 AM    Phosphorus 3.1 03/18/2019 02:43 AM      Lab Results   Component Value Date/Time    AST (SGOT) 38 (H) 03/18/2019 02:43 AM    Alk.  phosphatase 361 (H) 03/18/2019 02:43 AM    Protein, total 5.4 (L) 03/18/2019 02:43 AM    Albumin 2.2 (L) 03/18/2019 02:43 AM    Globulin 3.2 03/18/2019 02:43 AM     Lab Results   Component Value Date/Time    INR 1.7 (H) 03/18/2019 02:43 AM    Prothrombin time 16.8 (H) 03/18/2019 02:43 AM    aPTT 33.9 (H) 03/18/2019 02:43 AM      Lab Results   Component Value Date/Time    Iron 243 (H) 09/27/2018 04:14 AM    TIBC 252 09/27/2018 04:14 AM    Iron % saturation 96 (H) 09/27/2018 04:14 AM    Ferritin 537 (H) 09/27/2018 04:14 AM      No results found for: PH, PCO2, PO2  No components found for: [place]   Lab Results   Component Value Date/Time     (H) 04/23/2016 12:43 AM    CK - MB 8.5 (H) 04/20/2016 09:58 AM                Total time: 70  Counseling / coordination time, spent as noted above: 65  > 50% counseling / coordination?: yes    Prolonged service was provided for  []30 min   []75 min in face to face time in the presence of the patient, spent as noted above. Time Start:   Time End:   Note: this can only be billed with 51125 (initial) or 26312 (follow up). If multiple start / stop times, list each separately.

## 2019-03-18 NOTE — PROGRESS NOTES
03/18/19 1308   Vital Signs   BP (!) 84/48  (Dr. Jenae Garcia aware--stated to administer albumin)   Dr. Jenae Garcia notified. Stated to administer albumin and continue as ordered. Will continue to monitor.

## 2019-03-18 NOTE — ED NOTES
Pt Throughput: Based on patient's overnight status and improvement of HGB, pt downgraded to Medical by Dr. Arlene Black. Primary RN made aware.    Betito Chun RN

## 2019-03-18 NOTE — CONSULTS
[facility] [place] at [address], [address] [facility]  Hu[place] Dealys: [phone]  F: [phone]      Reason for Visit:   Batr Garcia is a 66 y.o. male who is seen in consultation at the request of   for evaluation of severe anemia, thrombocytopenia      Hematology / Oncology Treatment History:   Followed at [facility]: Review of records as below. Recent hospitalization at [facility]: admitted on 2/12/2019- 2/19/2019; admitted with symptomatic anemia; expedited workup for pancytopenia; consults included: endocrinology, hepatology, ; underwent 2 bone marrow bxs (2/13/19 and 2/19/2019)    2/26/2019 Hem/Onc visit; Dr Lulu Pallas; pancytopenia: no obvious etiology; no obvious evidence of MDS, there was incidental evidence hemophagocytosis but not enough to convincingly call [place]. Red cell aplasia is on the differential as is possible autoimmune pancytopenia based on a low OSCAR titer or cytopenias due to cirrhosis. Initiated an empiric course of prednisone 1 mg/kg to see if counts improve as bone marrow studies were being reviewed. Cirrhosis: presumably cryptogenic vs ETOH induced; dedicated liver phase CT scan negative for hepatic mass    3/06/2019 Hepatology visit/ Dr Guy/ Cirrhosis: significantly decompensated alcoholic cirrhotic. Overall, his prognosis is not good. He is not a transplant candidate. There is no medication that we can do to fix this. He is going to be paracentesis dependent unless his sodium should improve. Should be considered for palliative care. Following with labs and if Na improved would try diuretics or not. History of Present Illness:     Bart Garcia was admitted on 3/17/2019 from the ED when he presented with c/o weakness, fatigue and difficulty ambulating associated with worsening abd distention. Seen by Dr Lulu Pallas at [facility]. ED labs Hgb 5.8  plts 37 CXR rt pleural effusion. Admitted for further eval and management.    3/18 /19 Paracentesis 11,060 ml removed. Mr Francesca Chadwick reports no energy at home. Occasionally SOB. Denies any pain at present. Feeling much better since fluid removed from abd. Lives alone. Fixes own meals; vegetables and chicken. Tries to eat 2 meals per day. Uses cane at home. Energy usually \"remains parked\". \" I have had a good 74 yrs\". No family at present.      Past Medical History:   Diagnosis Date    Anemia     [facility]     History of vascular access device 10/22/2018    [facility] VAT - 4 FR, R basilic, 38 cm for DIVA (outpatient infusions)    Pancytopenia ([facility]     Patella fracture 2007    Right knee      Past Surgical History:   Procedure Laterality Date    COLONOSCOPY N/A 9/28/2018    COLONOSCOPY performed by James Anderson MD at [address] [facility] HX APPENDECTOMY      HX HERNIA REPAIR Bilateral     HX RETINAL DETACHMENT REPAIR      IR INSERT TUNL CVC W PORT OVER 5 YEARS  2/1/2019      Social History     Tobacco Use    Smoking status: Former Smoker    Smokeless tobacco: Never Used   Substance Use Topics    Alcohol use: Yes     Comment: rare      Family History   Problem Relation Age of Onset    Anemia Mother     Cancer Father 61        Lung     Current Facility-Administered Medications   Medication Dose Route Frequency    albumin human 5% (BUMINATE) solution 25 g  25 g IntraVENous Q6H    0.9% sodium chloride infusion 250 mL  250 mL IntraVENous PRN    cefTRIAXone (ROCEPHIN) 1 g in 0.9% sodium chloride (MBP/ADV) 50 mL  1 g IntraVENous Q24H    pantoprazole (PROTONIX) tablet 40 mg  40 mg Oral ACB    sodium chloride (NS) flush 5-40 mL  5-40 mL IntraVENous Q8H    sodium chloride (NS) flush 5-40 mL  5-40 mL IntraVENous PRN    HYDROmorphone (PF) (DILAUDID) injection 0.5 mg  0.5 mg IntraVENous Q4H PRN    naloxone (NARCAN) injection 0.4 mg  0.4 mg IntraVENous PRN    diphenhydrAMINE (BENADRYL) injection 12.5 mg  12.5 mg IntraVENous Q4H PRN    ondansetron (ZOFRAN) injection 4 mg  4 mg IntraVENous Q6H PRN    docusate sodium (COLACE) capsule 100 mg  100 mg Oral BID    predniSONE (DELTASONE) tablet 70 mg  70 mg Oral DAILY WITH BREAKFAST      No Known Allergies     Review of Systems: A complete review of systems was obtained, negative except as described above. Physical Exam:     Visit Vitals  BP 98/54 (BP 1 Location: Right arm, BP Patient Position: Sitting)   Pulse 76   Temp 97.7 °F (36.5 °C)   Resp 18   Ht 5' 10\" (1.778 m)   Wt 157 lb 3.2 oz (71.3 kg)   SpO2 99%   BMI 22.56 kg/m²     ECOG PS: 3  General: disheveled; cachetic; No distress  Eyes:  icteric sclerae PERRLA,  HENT: Atraumatic with normal appearance of ears and nose; OP clear  Neck: Supple; no thyromegaly   Lymphatic: No cervical, supraclavicular, or axillary adenopathy  Respiratory: anteriorly CTAB, normal respiratory effort  CV: Normal rate, regular rhythm, no murmurs, no peripheral edema  GI: Soft, nontender, nondistended, no masses, no hepatomegaly, no splenomegaly  MS:  Digits without clubbing or cyanosis. Skin: No rashes, ecchymoses, or petechiae. Normal temperature, turgor, and texture. Neuro/Psych: Alert, oriented, appropriate affect, normal judgment/insight      Results:     Lab Results   Component Value Date/Time    WBC 2.2 (L) 03/19/2019 03:39 AM    HGB 6.7 (L) 03/19/2019 03:39 AM    HCT 18.9 (L) 03/19/2019 03:39 AM    PLATELET 18 (LL) 98/72/0583 03:39 AM    MCV 82.5 03/19/2019 03:39 AM    ABS.  NEUTROPHILS 3.9 03/17/2019 10:04 AM     Lab Results   Component Value Date/Time    Sodium 131 (L) 03/19/2019 03:39 AM    Potassium 3.9 03/19/2019 03:39 AM    Chloride 100 03/19/2019 03:39 AM    CO2 25 03/19/2019 03:39 AM    Glucose 118 (H) 03/19/2019 03:39 AM    BUN 33 (H) 03/19/2019 03:39 AM    Creatinine 0.55 (L) 03/19/2019 03:39 AM    GFR est AA >60 03/19/2019 03:39 AM    GFR est non-AA >60 03/19/2019 03:39 AM    Calcium 7.6 (L) 03/19/2019 03:39 AM     Lab Results   Component Value Date/Time    Bilirubin, total 5.0 (H) 03/18/2019 02:43 AM    ALT (SGPT) 92 (H) 03/18/2019 02:43 AM    AST (SGOT) 38 (H) 03/18/2019 02:43 AM    Alk. phosphatase 361 (H) 03/18/2019 02:43 AM    Protein, total 5.4 (L) 03/18/2019 02:43 AM    Albumin 2.2 (L) 03/18/2019 02:43 AM    Globulin 3.2 03/18/2019 02:43 AM     Lab Results   Component Value Date/Time    Reticulocyte count 0.3 (L) 01/31/2019 04:27 AM    Iron % saturation 96 (H) 09/27/2018 04:14 AM    TIBC 252 09/27/2018 04:14 AM    Ferritin 537 (H) 09/27/2018 04:14 AM    Vitamin B12 >2,000 (H) 09/26/2018 01:00 PM    Folate 19.1 09/26/2018 01:00 PM    Haptoglobin 105 09/26/2018 10:15 AM     09/26/2018 10:15 AM    TSH 4.45 (H) 09/27/2018 04:14 AM    OSCAR, Direct Positive (A) 09/27/2018 04:14 AM    Hep C  virus Ab Interp. NONREACTIVE 04/20/2016 09:58 AM     Lab Results   Component Value Date/Time    INR 1.7 (H) 03/18/2019 02:43 AM    aPTT 33.9 (H) 03/18/2019 02:43 AM     Lab Results   Component Value Date/Time     09/26/2018 10:15 AM    AFP, Serum, Tumor Marker 3.3 09/27/2018 07:05 PM     3/17/2019 XR CHEST  IMPRESSION:  Increased right-sided pleural effusion and atelectasis with diminished lung  volumes on the right in comparison to the previous exam..       3/18/2019 US GUIDE PARACENTESIS  IMPRESSION:   Successful ultrasound guided right lower quadrant paracentesis. 11,060 mL fluid  drained. Albumin administered. Laboratory results pending. 3/19/2019 US ABD   IMPRESSION:   1. No evidence for portal venous thrombosis.     2. Cirrhotic liver morphology. Small amount of right upper quadrant ascites. Right pleural effusion.      3. Small adherent nonshadowing gallstones versus polyps measuring up to 3 mm.     3/19/2019 CXR  pending  Assessment and Recommendations:     1.  Anemia, normocytic  Remains idiopathic  Seen at [facility]/ Dr Tanesha Ross repeat BM bx 2/19/19 with no obvious etiology;no obvious evidence of MDS; discussed incidental evidence hemophagocytosis but not enough to call [place].  initiated empiric course of Prednisone 1 mg/kg to see if counts improve   --Recommend continuation of prednisone and transfusion support for Hgb < 7      2. Thrombocytopenia  Chronic since at least 2009.  Likely related to cirrhosis, labwork and bone marrow biopsy otherwise unremarkable.  Spleen at upper limit of normal on imaging. Continue to monitor        3. Cirrhosis /Ascites  3/18  Paracentesis 11,060 ml  Seen by hepatology at [facility]/Dr Guy: decompensated alcoholic cirrhotic; not a transplant candidate; paracentesis dependent unless sodium should improve  GI following  Continue supportive care; paracentesis prn      4. Failure to thrive  Agree would benefit for palliative team consult for goals of care. Hospice seems appropriate, though he has limited to no support at home.         Signed By: Ab Palacios MD

## 2019-03-18 NOTE — ACP (ADVANCE CARE PLANNING)
Advance Care Planning 3/18/2019   Patient's Healthcare Decision Maker is: Named in scanned ACP document   Primary Decision Maker Name Rosemarie Lu ( [phone])   Primary Decision Maker Relationship to Patient Friend   Confirm Advance Directive Yes, on file   Patient Would Like to Complete Advance Directive Already in place   Does the patient have other document types Durable Do Not Resuscitate     Pt has AMD on file dated Feb 19, 2005 which states friend Rosemarie Lu III is authorized to make medical decisions on pt's behalf if pt is unable to speak for himself. Pt expressed that he would not want attempts at resuscitation in the event of cardiac/respiratory arrest.  DDNR was reviewed and completed on this date. Original was placed on chart to be sent out with pt at time of discharge, copy was placed on chart to be scanned into medical record, copy was returned to pt. Pt has pre-registered with the [address] [place] [address] for body donation at time of death.

## 2019-03-18 NOTE — PROGRESS NOTES
1045 Pt in [address] [facility] [facility] for Paracentesis. 18 Dr Muller Alu in [address] [facility] [facility] to explain procedure  to patient. Allergies reviewed. Consent obtained. 1110 Time out performed. Procedure started at 1110 and ended at  1230. Pt drained of 11L of yellow fluid. Pt received a total of 62.5g of albumin. Pt tolerated well. 1243 Albumin finishing and report called to [employer] on 5th floor. 1250 Albumin complete and port flushed. Transported pt to Rm 533.

## 2019-03-18 NOTE — WOUND CARE
Wound care consult:  Initial visit for skin assessment alert no distress. Assessment  All skin folds and bony prominences assessed, turned with staff assistance. Lower extremities edematous, pink, intact and blanching. Sacral area intact, large area of dark red and purple chronic discoloration - present on  Admission. Treatment  Repositioned in bed   Heels floated    Recommendations/Plan  Low air loss surface obtained for patient. Staff to transfer. Turn, reposition every 2 hours as tolerated, float heels  Incontinent care with comfort shields. Apply Zinc to all open areas, moisture barrier as needed. Will follow, reconsult as needed.   Dany Concepcion

## 2019-03-18 NOTE — PROGRESS NOTES
Hilario Patel [facility] 79  [address] [facility], [place], [address] [place] [address]  [phone]      Medical Progress Note      NAME: Angel Loza   :  1945  MRM:  [id number]    Date/Time: 3/18/2019         Subjective:     Chief Complaint:  Patient was seen and examined by me. Chart reviewed. Feeling less fatigue today       Objective:       Vitals:       Last 24hrs VS reviewed since prior progress note. Most recent are:    Visit Vitals  /58 (BP 1 Location: Left arm, BP Patient Position: At rest)   Pulse 86   Temp 98.2 °F (36.8 °C)   Resp 16   SpO2 95%     SpO2 Readings from Last 6 Encounters:   19 95%   19 100%   19 100%   19 99%   19 98%   19 96%            Intake/Output Summary (Last 24 hours) at 3/18/2019 0845  Last data filed at 3/18/2019 0815  Gross per 24 hour   Intake 1870.8 ml   Output 350 ml   Net 1520.8 ml        Exam:     Physical Exam:    Gen:  Disheveled, ill-appearing, cachectic, NAD  HEENT:  Scleral icterus, PERRL, hearing intact to voice, dry mucous membranes  Neck:  Supple, without masses, thyroid non-tender  Resp:  No accessory muscle use, decreased BS on right side  Card:  No murmurs, normal S1, S2 without thrills, bruits or peripheral edema  Abd:  Distended, large ascites  Lymph:  No cervical adenopathy  Musc:  No cyanosis or clubbing  Skin:  +jaundice  Neuro:  Cranial nerves 3-12 are grossly intact, diffused weakness, follows commands appropriately  Psych:  Alert with fair insight.   Oriented to person, place, and time    Medications Reviewed: (see below)    Lab Data Reviewed: (see below)    ______________________________________________________________________    Medications:     Current Facility-Administered Medications   Medication Dose Route Frequency    cefTRIAXone (ROCEPHIN) 1 g in sterile water (preservative free) 10 mL IV syringe  1 g IntraVENous ONCE    [START ON 3/19/2019] cefTRIAXone (ROCEPHIN) 1 g in 0.9% sodium chloride (MBP/ADV) 50 mL  1 g IntraVENous Q24H    pantoprazole (PROTONIX) tablet 40 mg  40 mg Oral ACB    sodium chloride (NS) flush 5-40 mL  5-40 mL IntraVENous Q8H    sodium chloride (NS) flush 5-40 mL  5-40 mL IntraVENous PRN    HYDROmorphone (PF) (DILAUDID) injection 0.5 mg  0.5 mg IntraVENous Q4H PRN    naloxone (NARCAN) injection 0.4 mg  0.4 mg IntraVENous PRN    diphenhydrAMINE (BENADRYL) injection 12.5 mg  12.5 mg IntraVENous Q4H PRN    ondansetron (ZOFRAN) injection 4 mg  4 mg IntraVENous Q6H PRN    docusate sodium (COLACE) capsule 100 mg  100 mg Oral BID    predniSONE (DELTASONE) tablet 70 mg  70 mg Oral DAILY WITH BREAKFAST     Current Outpatient Medications   Medication Sig    predniSONE (DELTASONE) 20 mg tablet Take 70 mg by mouth daily (with breakfast).  magnesium chloride (MAG DELAY) 64 mg delayed release tablet Take 1 Tab by mouth two (2) times a day.  pantoprazole (PROTONIX) 40 mg tablet Take 1 Tab by mouth Daily (before breakfast). Lab Review:     Recent Labs     03/18/19  0243 03/17/19  1004   WBC 4.5 4.9   HGB 8.3* 5.8*   HCT 23.6* 16.6*   PLT 30* 37*     Recent Labs     03/18/19  0243 03/17/19  1004   * 136   K 4.2 4.4   CL 98 103   CO2 25 28   * 118*   BUN 44* 47*   CREA 0.57* 0.64*   CA 8.1* 8.4*   MG 2.0  --    PHOS 3.1  --    ALB 2.2* 2.2*   TBILI 5.0* 4.3*   SGOT 38* 36   ALT 92* 94*   INR 1.7*  --      No results found for: GLUCPOC       Assessment / Plan:     67 yo hx of cryptogenic cirrhosis, ascites, pleural effusion, pancytopenia, presented w/ weakness, severe anemia, thrombocytopenia, ascites, malnutrition     1) Decompensated Cirrhosis/ascites: cryptogenic, likely as etoh cirrhosis. Received a 8L paracentesis in January. Has not seen a hepatologist since last month's admission. BP too low for aldactone/lasix. GI now following. Will need another large volume paracentesis. Will need IV albumin before and after procedure.   Given poor prognosis, I have spoken to patient about hospice. He is thinking about it. Palliative care to see     2) Severe Anemia/severe thrombocytopenia: persistent, likely related to cirrhosis vs early MDS. Patient saw [facility] hematology (Dr. Colton Contreras) once, but refused to go back. He was started on prednisone 70mg daily. Now s/p 2u RBCs, will cont to monitor Hgb closely. Hematology consulted     3) R pleural effusion: large on CXR. Pulm to see for possible thoracentesis     4) Esophageal varices: cont PPI     5) Severe pro-courtney malnutrition: severe muscle loss. Due to cirrhosis. Nutrition consulted    6) UTI: monitor urine Cx.   Start IV CTX     Code: DNR, awaiting palliative care eval    Total time spent with patient: [address] discussed with: Patient, nursing    Discussed:  Care Plan    Prophylaxis:  SCD's    Disposition:  SNF vs hospice           ___________________________________________________    Attending Physician: iRch Villa MD

## 2019-03-18 NOTE — PROGRESS NOTES
Nutrition Assessment:    RECOMMENDATIONS/INTERVENTION(S):   Continue Regular diet- soft solids. Monitor PO intakes, weight, fluid accumulation. Added Ensure Enlive for added kcal.   If FR needed, change ONS to Ensure compact    ASSESSMENT:   3/18: 66 yr old male admitted for weakness. PMHx: anemia, cirrhosis, pancytopenia. Pt states appetite is fair. Ordered tray- tomato soup, pudding, tea. Pt states he needs softer foods 2/2 not having top dentures. Pt states he usually weighs 180 lbs. Bed scale with linens reports 157.2 lbs. Down 20 lbs since last month, s/p 11 L paracentesis which accounts for this weight loss. Pt with obvious severe subcu muscle /fat wasting. Pt denies n/v, c/d. Unsure last BM. Pt with 3+ pitting, poor PO PTA x 5+ days. Pt meets malnutrition criteria. Monitor lytes. Na 131. , 118 mg/dL. Meets Criteria for Severe Acute Malnutrition as evidenced by:   [x] Moderate muscle wasting, loss of subcutaneous fat   [] Nutritional intake of <50% of recommended intake for >5 days   [x] Weight loss of >1-2% in 1 week, >5% in 1 month, >7.5% in 3 months, or >10% in 6 months   [x] Moderate-severe edema        SUBJECTIVE/OBJECTIVE:   Diet Order: Cardiac  % Eaten:    Patient Vitals for the past 168 hrs:   % Diet Eaten   03/17/19 1930 75 %       Pertinent Medications: [x] Reviewed    Labs reviewed:  [x]     Anthropometrics: Height: 5' 10\" (177.8 cm) Weight: 71.3 kg (157 lb 3.2 oz)    IBW (%IBW):   ( ) UBW (%UBW):   (  %)    BMI: Body mass index is 22.56 kg/m². This BMI is indicative of:     [x] Underweight for age   [] Normal    [] Overweight    []  Obesity    []  Extreme Obesity (BMI>40)    Estimated Nutrition Needs (Based on): 1885 Kcals/day(BMR(1450x1.3)) , 86 g(-100g/day(1.2-1.4g/kg)) Protein  Carbohydrate:  At Least 130 g/day  Fluids: 8381-4796 mL/day if FR needed    Last BM: PTA   []Active     []Hyperactive  []Hypoactive       [] Absent   BS  Skin:    [x] Intact   [] Incision  [] Breakdown [] DTI   [] Tears/Excoriation/Abrasion  []Edema [] Other: Wt Readings from Last 30 Encounters:   03/18/19 71.3 kg (157 lb 3.2 oz)   02/04/19 80.4 kg (177 lb 4 oz)   01/21/19 86.2 kg (190 lb)   12/13/18 85.3 kg (188 lb)   10/04/18 87.5 kg (193 lb)   10/04/18 87.5 kg (193 lb)   09/26/18 77.1 kg (170 lb)   04/20/16 72.6 kg (160 lb)      NUTRITION DIAGNOSES:   Problem:  Underweight     Etiology: related to decreased ability to consume sufficient energy to maitain weight     Signs/Symptoms: as evidenced by BMI < 23, age >71.  Poor PO intakes, ascities, poor appetite      NUTRITION INTERVENTIONS:  Meals/Snacks: General/healthful diet   Supplements: Commercial supplement              GOAL:   pt will consume >50% of meals and ONS within 3-5 days    Cultural, Caodaism, or Ethnic Dietary Needs: None     LEARNING NEEDS (Diet, Food/Nutrient-Drug Interaction):    [x] None Identified   [] Identified and Education Provided/Documented   [] Identified and Pt declined/was not appropriate      [x] Interdisciplinary Care Plan Reviewed/Documented    [x] Discharge Needs: high kcal diet, cardiac   [] No Nutrition Related Discharge Needs    NUTRITION RISK:   Pt Is At Nutrition Risk  [x]     No Nutrition Risk Identified  []       PT SEEN FOR:    []  MD Consult: []Calorie Count      []Diabetic Diet Education        []Diet Education     []Electrolyte Management     []General Nutrition Management and Supplements     []Management of Tube Feeding     []TPN Recommendations    [x]  RN Referral:  [x]MST score >=2     []Enteral/Parenteral Nutrition PTA     []Pregnant: Gestational DM or Multigestation                 [] Pressure Ulcer      []  Low BMI      []  Length of Stay       [] Dysphagia Diet     [] Ventilator      [] Follow-Up        Previous Recommendations:   [] Implemented          [] Not Implemented          [x] Not Applicable    Previous Goal:   [] Met              [] Progressing Towards Goal              [] Not Progressing Towards Goal [x] Not Applicable              Carrie Cabrera  Pager: [phone]  Office: [phone]

## 2019-03-18 NOTE — PROGRESS NOTES
Problem: Mobility Impaired (Adult and Pediatric)  Goal: *Acute Goals and Plan of Care (Insert Text)  Physical Therapy Goals  Initiated 3/18/2019  1. Patient will move from supine to sit and sit to supine  in bed with modified independence within 7 day(s). 2.  Patient will transfer from bed to chair and chair to bed with modified independence using the least restrictive device within 7 day(s). 3.  Patient will perform sit to stand with modified independence within 7 day(s). 4.  Patient will ambulate with modified independence for 100 feet with the least restrictive device within 7 day(s). 5.  Patient will ascend/descend 4 stairs with 1 handrail(s) with minimal assistance/contact guard assist within 7 day(s). physical Therapy EVALUATION  Patient: Angel Loza (4 y.o. male)  Date: 3/18/2019  Primary Diagnosis: Anemia [D64.9]       Precautions:   Fall    ASSESSMENT :  Based on the objective data described below, the patient presents with generalized weakness, decreased bed mobility, transfers and gait following admission for anemia. Patient came to ER on 3/17/19 with complaints of 3 days of increasing weakness and inability to ambulate. Hgb 5.8 upon arrival, currently 8.3. Chest x-ray showed right pleural effusion. Patient with history of alcoholic related cirrhosis with ascites. He had paracentesis earlier today and had 11 liters of fluid drained. He was able to stand and ambulate with rolling walker and minimal assist,  about 3 feet to transfer to a specialty bed. Patient fatigues easily but no complaints of dizziness when up. BP 92/51. Declined to ambulate further at this time. Patient lives in his own apartment and used a cane at baseline. He is being followed now by Hospice. PT will continue to see him to improve mobility as able. Not safe to go home alone at this time as he is at brendan risk for falls. Patient will benefit from skilled intervention to address the above impairments.   Patients rehabilitation potential is considered to be Guarded  Factors which may influence rehabilitation potential include:   []         None noted  []         Mental ability/status  [x]         Medical condition  [x]         Home/family situation and support systems  []         Safety awareness  []         Pain tolerance/management  []         Other:      PLAN :  Recommendations and Planned Interventions:  [x]           Bed Mobility Training             []    Neuromuscular Re-Education  [x]           Transfer Training                   []    Orthotic/Prosthetic Training  [x]           Gait Training                         []    Modalities  []           Therapeutic Exercises           []    Edema Management/Control  []           Therapeutic Activities            []    Patient and Family Training/Education  []           Other (comment):    Frequency/Duration: Patient will be followed by physical therapy  5 times a week to address goals. Discharge Recommendations: To Be Determined  Further Equipment Recommendations for Discharge: to be determined depending on discharge plan     SUBJECTIVE:   Patient stated I am tired.     OBJECTIVE DATA SUMMARY:   HISTORY:    Past Medical History:   Diagnosis Date    Anemia     [facility] ([facility] [place] [address]     History of vascular access device 10/22/2018    [facility] VAT - 4 FR, R basilic, 38 cm for DIVA (outpatient infusions)    Pancytopenia ([facility] [place] [address]     Patella fracture 2007    Right knee     Past Surgical History:   Procedure Laterality Date    COLONOSCOPY N/A 9/28/2018    COLONOSCOPY performed by Vicki Lopez MD at [address] [facility] HX APPENDECTOMY      HX HERNIA REPAIR Bilateral     HX RETINAL DETACHMENT REPAIR      IR INSERT TUNL CVC W PORT OVER 5 YEARS  2/1/2019     Prior Level of Function/Home Situation: used a cane at baseline  Personal factors and/or comorbidities impacting plan of care: medical history and prognosis; lives alone    Home Situation  Home Environment: Apartment  # Steps to Enter: 8  Rails to [facility]: Yes  One/Two Story Residence: One story  Living Alone: Yes  Support Systems: None  Patient Expects to be Discharged to[de-identified] Apartment  Current DME Used/Available at Home: Cane, straight    EXAMINATION/PRESENTATION/DECISION MAKING:   Critical Behavior:  Neurologic State: Alert  Orientation Level: Appropriate for age  Cognition: Appropriate decision making  Safety/Judgement: Awareness of environment  Hearing: Auditory  Auditory Impairment: None  Skin:  Not fully observed  Range Of Motion:  AROM: Within functional limits                       Strength:    Strength: Generally decreased, functional                    Tone & Sensation:   Tone: Normal              Sensation: Intact               Coordination:  Coordination: Within functional limits       Functional Mobility:  Bed Mobility:     Supine to Sit: Minimum assistance  Sit to Supine: Minimum assistance     Transfers:  Sit to Stand: Minimum assistance  Stand to Sit: Minimum assistance                       Balance:   Sitting: Intact  Standing: Intact; With support  Ambulation/Gait Training:  Distance (ft): 3 Feet (ft)  Assistive Device: Walker, rolling;Gait belt  Ambulation - Level of Assistance: Minimal assistance        Gait Abnormalities: Decreased step clearance              Speed/Jojo: Pace decreased (<100 feet/min)  Step Length: Right shortened;Left shortened                                  Functional Measure:  Barthel Index:    Bathin  Bladder: 10  Bowels: 10  Groomin  Dressin  Feeding: 10  Mobility: 0  Stairs: 0  Toilet Use: 5  Transfer (Bed to Chair and Back): 10  Total: 55/100       Percentage of impairment   0%   1-19%   20-39%   40-59%   60-79%   80-99%   100%   Barthel Score 0-100 100 99-80 79-60 59-40 20-39 1-19   0     The Barthel ADL Index: Guidelines  1. The index should be used as a record of what a patient does, not as a record of what a patient could do.   2. The main aim is to establish degree of independence from any help, physical or verbal, however minor and for whatever reason. 3. The need for supervision renders the patient not independent. 4. A patient's performance should be established using the best available evidence. Asking the patient, friends/relatives and nurses are the usual sources, but direct observation and common sense are also important. However direct testing is not needed. 5. Usually the patient's performance over the preceding 24-48 hours is important, but occasionally longer periods will be relevant. 6. Middle categories imply that the patient supplies over 50 per cent of the effort. 7. Use of aids to be independent is allowed. Melody Mitchell., BarthelMOISEW. (3032). Functional evaluation: the Barthel Index. [address] [facility] (14)2. Yolie Isaac subha AHSAN Cabrales, Martine Kirkland., Charis Apley., [place], [address] (1999). Measuring the change indisability after inpatient rehabilitation; comparison of the responsiveness of the Barthel Index and Functional [place] Measure. Journal of Neurology, Neurosurgery, and Psychiatry, 66(4), 306-510. Leland Lennox, N.J.A, TASH Pantoja, & Jacqueline Capellan, M.A. (2004.) Assessment of post-stroke quality of life in cost-effectiveness studies: The usefulness of the Barthel Index and the EuroQoL-5D.  Quality of Life Research, 15, 285-85          Physical Therapy Evaluation Charge Determination   History Examination Presentation Decision-Making   HIGH Complexity :3+ comorbidities / personal factors will impact the outcome/ POC  LOW Complexity : 1-2 Standardized tests and measures addressing body structure, function, activity limitation and / or participation in recreation  LOW Complexity : Stable, uncomplicated  Other outcome measures Barthel  MEDIUM      Based on the above components, the patient evaluation is determined to be of the following complexity level: LOW     Pain:  Pain Scale 1: Numeric (0 - 10)  Pain Intensity 1: 0              Activity Tolerance:   Fatigues easily. Please refer to the flowsheet for vital signs taken during this treatment. After treatment:   []         Patient left in no apparent distress sitting up in chair  [x]         Patient left in no apparent distress in bed  [x]         Call bell left within reach  [x]         Nursing notified  []         Caregiver present  []         Bed alarm activated    COMMUNICATION/EDUCATION:   The patients plan of care was discussed with: Registered Nurse. [x]         Fall prevention education was provided and the patient/caregiver indicated understanding. []         Patient/family have participated as able in goal setting and plan of care. [x]         Patient/family agree to work toward stated goals and plan of care. []         Patient understands intent and goals of therapy, but is neutral about his/her participation. []         Patient is unable to participate in goal setting and plan of care.     Thank you for this referral.  Deric Nunez, PT   Time Calculation: 12 mins

## 2019-03-18 NOTE — HOSPICE
[facility]  [phone]     Patient Name: Byron Jin  YOB: 1945  Age: 66 y.o. [employer] RN Note:  Hospice consult received, reviewing chart. Will follow up with Unit Nurse and Care Manager to discuss plan of care, patient status and discharge disposition. Thank you for the opportunity to be of service to this patient.

## 2019-03-18 NOTE — ROUTINE PROCESS
TRANSFER - OUT REPORT:    Verbal report given to [facility] RN(name) on Byron Jin  being transferred to 5th floor(unit) for routine progression of care       Report consisted of patients Situation, Background, Assessment and   Recommendations(SBAR). Information from the following report(s) SBAR, Kardex, ED Summary, Procedure Summary, Intake/Output, MAR, Accordion and Recent Results was reviewed with the receiving nurse. Lines:   Venous Access Device Angiodynamics Port 02/01/19 Upper chest (subclavicular area, right (Active)   Central Line Being Utilized Yes 3/18/2019  8:15 AM   Criteria for Appropriate Use Limited/no vessel suitable for conventional peripheral access 3/18/2019  8:15 AM   Site Assessment Clean, dry, & intact 3/18/2019  8:15 AM   Date of Last Dressing Change 03/17/19 3/18/2019  8:15 AM   Dressing Status Clean, dry, & intact 3/18/2019  8:15 AM   Dressing Type Disk with Chlorhexadine gluconate (CHG); Transparent 3/18/2019  8:15 AM   Action Taken Open ports on tubing capped 3/18/2019  8:15 AM   Date Accessed (Medial Site) 03/17/19 3/17/2019  7:30 PM   Access Time (Medial Site) 8465 3/17/2019  9:37 AM   Access Needle Size (Site #1) 20 G 3/17/2019  9:37 AM   Access Needle Length (Medial Site) 1 inch 3/17/2019  9:37 AM   Positive Blood Return (Medial Site) Yes 3/17/2019  7:30 PM   Action Taken (Medial Site) Flushed 3/17/2019  7:30 PM        Opportunity for questions and clarification was provided.       Patient transported with:   Tech (will be transferred after paracentesis from IR)

## 2019-03-18 NOTE — PROGRESS NOTES
Spiritual Care Assessment/Progress Note  [address]      NAME: Corky Cowart      MRN: [id number]  AGE: 66 y.o. SEX: male  Faith Affiliation: Episcopalian   Language: English     3/18/2019     Total Time (in minutes): 12     Spiritual Assessment begun in [facility]  MED SURG 2 through conversation with:         []Patient        [] Family    [] Friend(s)        Reason for Consult: Palliative Care, Initial/Spiritual Assessment     Spiritual beliefs: (Please include comment if needed)     [] Identifies with a moi tradition:         [] Supported by a moi community:            [] Claims no spiritual orientation:           [] Seeking spiritual identity:                [] Adheres to an individual form of spirituality:           [x] Not able to assess:                           Identified resources for coping:      [] Prayer                               [] Music                  [] Guided Imagery     [] Family/friends                 [] Pet visits     [] Devotional reading                         [] Unknown     [] Other:                                          Interventions offered during this visit: (See comments for more details)    Patient Interventions: Initial/Spiritual assessment, patient floor           Plan of Care:     [] Support spiritual and/or cultural needs    [] Support AMD and/or advance care planning process      [] Support grieving process   [] Coordinate Rites and/or Rituals    [] Coordination with community clergy   [] No spiritual needs identified at this time   [] Detailed Plan of Care below (See Comments)  [] Make referral to Music Therapy  [] Make referral to Pet Therapy     [] Make referral to Addiction services  [] Make referral to [employer]  [] Make referral to Spiritual Care Partner  [] No future visits requested        [x] Follow up visits as needed     Comments: This visit was for purposes of completing a spiritual assessment.  Upon arrival Mr. Cheyanne Pineda was with other staff, who asked for time to complete their visit. Following their visit  Jazmín Carbajal asked if the  visit could wait until tomorrow. Pastoral Care will follow -up tomorrow. If needed prior to our visit, please page.     Linda Wright., [address] [address] [facility] Spiritual Care Services v

## 2019-03-18 NOTE — ED NOTES
Pt Throughput: Receiving 5th Charge RN made aware of patient's bed placement.      Anne Arredondo, RN

## 2019-03-18 NOTE — PROGRESS NOTES
CM Note:  Order for hospice noted. A referral was sent in [address] [place] [address] to [facility].   OTILIO Wright

## 2019-03-19 NOTE — PROGRESS NOTES
Problem: Falls - Risk of  Goal: *Absence of Falls  Document Anna Fall Risk and appropriate interventions in the flowsheet.   Outcome: Progressing Towards Goal  Fall Risk Interventions:  Mobility Interventions: Bed/chair exit alarm, Communicate number of staff needed for ambulation/transfer, Patient to call before getting OOB         Medication Interventions: Bed/chair exit alarm, Evaluate medications/consider consulting pharmacy, Teach patient to arise slowly, Patient to call before getting OOB    Elimination Interventions: Bed/chair exit alarm, Call light in reach, Patient to call for help with toileting needs, Toilet paper/wipes in reach, Toileting schedule/hourly rounds, Urinal in reach

## 2019-03-19 NOTE — PROGRESS NOTES
03/19/19 0015   Vitals   Pulse (Heart Rate) 74   Heart Rate Source Monitor   Level of Consciousness Alert   BP (!) 86/43   MAP (Calculated) (!) 57   BP 1 Location Right arm   BP 1 Method Automatic   BP Patient Position At rest;Supine       Dr. Rufino Robbins notified of above. Patient asymptomatic. Order received for a 500ml NS bolus. Will adminster now and continue to monitor. 0100- Bedside and Verbal shift change report given to Marce Fitzgerald RN (oncoming nurse) by Lilian Mejia RN (offgoing nurse). Report included the following information SBAR and Kardex.

## 2019-03-19 NOTE — PROGRESS NOTES
[facility] [place] at [address], [address] [facility] [address] [facility] W: [phone]  F: [phone] Reason for Visit:  
Jorge Tavarez is a 66 y.o. male who is seen in consultation at the request of   for evaluation of severe anemia, thrombocytopenia Hematology / Oncology Treatment History:  
Followed at [facility]: Review of records as below. Recent hospitalization at [facility]: admitted on 2/12/2019- 2/19/2019; admitted with symptomatic anemia; expedited workup for pancytopenia; consults included: endocrinology, hepatology, ; underwent 2 bone marrow bxs (2/13/19 and 2/19/2019) 
 
2/26/2019 Hem/Onc visit; Dr Gallo Morse; pancytopenia: no obvious etiology; no obvious evidence of MDS, there was incidental evidence hemophagocytosis but not enough to convincingly call [place]. Red cell aplasia is on the differential as is possible autoimmune pancytopenia based on a low OSCAR titer or cytopenias due to cirrhosis. Initiated an empiric course of prednisone 1 mg/kg to see if counts improve as bone marrow studies were being reviewed. Cirrhosis: presumably cryptogenic vs ETOH induced; dedicated liver phase CT scan negative for hepatic mass 3/06/2019 Hepatology visit/ Dr Guy/ Cirrhosis: significantly decompensated alcoholic cirrhotic. Overall, his prognosis is not good. He is not a transplant candidate. There is no medication that we can do to fix this. He is going to be paracentesis dependent unless his sodium should improve. Should be considered for palliative care. Following with labs and if Na improved would try diuretics or not. History of Present Illness:  
 
Jorge Tavarez was admitted on 3/17/2019 from the ED when he presented with c/o weakness, fatigue and difficulty ambulating associated with worsening abd distention. Seen by Dr Gallo Morse at [facility]. ED labs Hgb 5.8  plts 37 CXR rt pleural effusion. Admitted for further eval and management. 3/18 /19 Paracentesis 11,060 ml removed. Mr Nicole Gunderson reports no energy at home. Occasionally SOB. Denies any pain at present. Feeling much better since fluid removed from abd. Lives alone. Fixes own meals; vegetables and chicken. Tries to eat 2 meals per day. Uses cane at home. Energy usually \"remains parked\". \" I have had a good 76 yrs\". No family at present. Interval History:  
 
Siting up in chair; feeling better. Tolerating diet. Denies any pain. SOB has improved. Was up with PT yesterday with walker. No family at bedside. Hospice nurse entered room for info session as I was finishing exam.  
 
 
Past Medical History:  
Diagnosis Date  Anemia  Cirrhosis ([address]  History of vascular access device 10/22/2018 [facility] VAT - 4 FR, R basilic, 38 cm for DIVA (outpatient infusions)  Pancytopenia ([address]  Patella fracture 2007 Right knee Past Surgical History:  
Procedure Laterality Date  COLONOSCOPY N/A 9/28/2018 COLONOSCOPY performed by Mulu Dillard MD at [address] [place] [address] Drive  HX HERNIA REPAIR Bilateral   
 HX RETINAL DETACHMENT REPAIR    
 IR INSERT TUNL CVC W PORT OVER 5 YEARS  2/1/2019 Social History Tobacco Use  Smoking status: Former Smoker  Smokeless tobacco: Never Used Substance Use Topics  Alcohol use: Yes Comment: rare Family History Problem Relation Age of Onset  Anemia Mother  Cancer Father 61 Lung Current Facility-Administered Medications Medication Dose Route Frequency  albumin human 5% (BUMINATE) solution 25 g  25 g IntraVENous Q6H  
 0.9% sodium chloride infusion 250 mL  250 mL IntraVENous PRN  
 cefTRIAXone (ROCEPHIN) 1 g in 0.9% sodium chloride (MBP/ADV) 50 mL  1 g IntraVENous Q24H  pantoprazole (PROTONIX) tablet 40 mg  40 mg Oral ACB  sodium chloride (NS) flush 5-40 mL  5-40 mL IntraVENous Q8H  
 sodium chloride (NS) flush 5-40 mL  5-40 mL IntraVENous PRN  
  HYDROmorphone (PF) (DILAUDID) injection 0.5 mg  0.5 mg IntraVENous Q4H PRN  
 naloxone (NARCAN) injection 0.4 mg  0.4 mg IntraVENous PRN  
 diphenhydrAMINE (BENADRYL) injection 12.5 mg  12.5 mg IntraVENous Q4H PRN  
 ondansetron (ZOFRAN) injection 4 mg  4 mg IntraVENous Q6H PRN  
 docusate sodium (COLACE) capsule 100 mg  100 mg Oral BID  predniSONE (DELTASONE) tablet 70 mg  70 mg Oral DAILY WITH BREAKFAST No Known Allergies Review of Systems: A complete review of systems was obtained, negative except as described above. Physical Exam:  
 
Visit Vitals BP 98/54 (BP 1 Location: Right arm, BP Patient Position: Sitting) Pulse 76 Temp 97.7 °F (36.5 °C) Resp 18 Ht 5' 10\" (1.778 m) Wt 71.3 kg (157 lb 3.2 oz) SpO2 99% BMI 22.56 kg/m² ECOG PS: 3 General: disheveled; cachetic; No distress Eyes:  icteric sclerae PERRLA, HENT: Atraumatic with normal appearance of ears and nose; OP clear Neck: Supple; no thyromegaly Lymphatic: No cervical, supraclavicular, or axillary adenopathy Respiratory: anteriorly CTAB, normal respiratory effort CV: Normal rate, regular rhythm, no murmurs, no peripheral edema GI: Soft, nontender, nondistended, no masses, no hepatomegaly, no splenomegaly MS:  Digits without clubbing or cyanosis. Skin: No rashes, ecchymoses, or petechiae. Normal temperature, turgor, and texture. Neuro/Psych: Alert, oriented, appropriate affect, normal judgment/insight Results:  
 
Lab Results Component Value Date/Time WBC 2.2 (L) 03/19/2019 03:39 AM  
 HGB 6.7 (L) 03/19/2019 03:39 AM  
 HCT 18.9 (L) 03/19/2019 03:39 AM  
 PLATELET 18 (LL) 70/83/3456 03:39 AM  
 MCV 82.5 03/19/2019 03:39 AM  
 ABS. NEUTROPHILS 3.9 03/17/2019 10:04 AM  
 
Lab Results Component Value Date/Time  Sodium 131 (L) 03/19/2019 03:39 AM  
 Potassium 3.9 03/19/2019 03:39 AM  
 Chloride 100 03/19/2019 03:39 AM  
 CO2 25 03/19/2019 03:39 AM  
 Glucose 118 (H) 03/19/2019 03:39 AM  
 BUN 33 (H) 03/19/2019 03:39 AM  
 Creatinine 0.55 (L) 03/19/2019 03:39 AM  
 GFR est AA >60 03/19/2019 03:39 AM  
 GFR est non-AA >60 03/19/2019 03:39 AM  
 Calcium 7.6 (L) 03/19/2019 03:39 AM  
 
Lab Results Component Value Date/Time Bilirubin, total 5.0 (H) 03/18/2019 02:43 AM  
 ALT (SGPT) 92 (H) 03/18/2019 02:43 AM  
 AST (SGOT) 38 (H) 03/18/2019 02:43 AM  
 Alk. phosphatase 361 (H) 03/18/2019 02:43 AM  
 Protein, total 5.4 (L) 03/18/2019 02:43 AM  
 Albumin 2.2 (L) 03/18/2019 02:43 AM  
 Globulin 3.2 03/18/2019 02:43 AM  
 
Lab Results Component Value Date/Time Reticulocyte count 0.3 (L) 01/31/2019 04:27 AM  
 Iron % saturation 96 (H) 09/27/2018 04:14 AM  
 TIBC 252 09/27/2018 04:14 AM  
 Ferritin 537 (H) 09/27/2018 04:14 AM  
 Vitamin B12 >2,000 (H) 09/26/2018 01:00 PM  
 Folate 19.1 09/26/2018 01:00 PM  
 Haptoglobin 105 09/26/2018 10:15 AM  
  09/26/2018 10:15 AM  
 TSH 4.45 (H) 09/27/2018 04:14 AM  
 OSCAR, Direct Positive (A) 09/27/2018 04:14 AM  
 Hep C  virus Ab Interp. NONREACTIVE 04/20/2016 09:58 AM  
 
Lab Results Component Value Date/Time INR 1.7 (H) 03/18/2019 02:43 AM  
 aPTT 33.9 (H) 03/18/2019 02:43 AM  
 
Lab Results Component Value Date/Time  09/26/2018 10:15 AM  
 AFP, Serum, Tumor Marker 3.3 09/27/2018 07:05 PM  
 
3/17/2019 XR CHEST IMPRESSION: 
Increased right-sided pleural effusion and atelectasis with diminished lung 
volumes on the right in comparison to the previous exam.. 3/18/2019 US GUIDE PARACENTESIS IMPRESSION:  
Successful ultrasound guided right lower quadrant paracentesis. 11,060 mL fluid 
drained. Albumin administered. Laboratory results pending. 3/19/2019 US ABD IMPRESSION:  
1. No evidence for portal venous thrombosis. 
  
2. Cirrhotic liver morphology. Small amount of right upper quadrant ascites.  
Right pleural effusion.  
  
3. Small adherent nonshadowing gallstones versus polyps measuring up to 3 mm.  
 
3/19/2019 CXR 
 IMPRESSION: 
Diminished effusion and atelectasis on the right. .  
  
Assessment and Recommendations:  
 
1. Anemia, normocytic (s/p 2 units PRBCs) Remains idiopathic [facility]/ Dr Neftali De Luna repeat BM bx 2/19/19 with no obvious etiology;no obvious evidence of MDS; discussed incidental evidence hemophagocytosis but not enough to call [place].  initiated empiric course of Prednisone 1 mg/kg to see if counts improve --Hgb 6.7 ; scheduled for 1 unit PRBCs today 
--Recommend continuation of prednisone and transfusion support for Hgb < 7 
 
 
2. Thrombocytopenia Chronic since at least 2009.  Likely related to cirrhosis, labwork and bone marrow biopsy otherwise unremarkable.  Spleen at upper limit of normal on imaging. Continue to monitor 3. Cirrhosis Alcario Gilford 3/18  Paracentesis 11,060 ml Se[facility] by hepatology at [facility]/Dr Guy: decompensated alcoholic cirrhotic; not a transplant candidate; paracentesis dependent unless sodium should improve GI following Continue supportive care; paracentesis prn 
 
 
4. Failure to thrive 
palliative team following for goals of care. Hospice seems appropriate, though he has limited to no support at home. Plan reviewed with Dr Ofe Valdez Signed By: Anyi Palacios NP

## 2019-03-19 NOTE — PROGRESS NOTES
[address], [id number] [place] [address] 
[phone] Medical Progress Note NAME: Marco Bueno :  1945 MRM:  [id number] Date/Time: 3/19/2019 Subjective: Chief Complaint:  Patient was seen and examined by me. Chart reviewed. Abdomen less distended after paracentesis. Blood pressure running low Objective:  
 
 
Vitals:  
 
 
Last 24hrs VS reviewed since prior progress note. Most recent are: 
 
Visit Vitals BP 98/54 (BP 1 Location: Right arm, BP Patient Position: Sitting) Pulse 76 Temp 97.7 °F (36.5 °C) Resp 18 Ht 5' 10\" (1.778 m) Wt 71.3 kg (157 lb 3.2 oz) SpO2 99% BMI 22.56 kg/m² SpO2 Readings from Last 6 Encounters:  
19 99% 19 100% 19 100% 19 99% 19 98% 19 96% Intake/Output Summary (Last 24 hours) at 3/19/2019 1314 Last data filed at 3/19/2019 1155 Gross per 24 hour Intake 1200 ml Output 1000 ml Net 200 ml Exam:  
 
Physical Exam: 
 
Gen:  Disheveled, ill-appearing, cachectic, NAD HEENT:  Scleral icterus, PERRL, hearing intact to voice, dry mucous membranes Neck:  Supple, without masses, thyroid non-tender Resp:  No accessory muscle use, decreased BS on right side Card:  No murmurs, normal S1, S2 without thrills, bruits or peripheral edema Abd:  less distended. Still has ascites Lymph:  No cervical adenopathy Musc:  No cyanosis or clubbing Skin:  +jaundice Neuro:  Cranial nerves 3-12 are grossly intact, diffused weakness, follows commands appropriately Psych:  Alert with fair insight. Oriented to person, place, and time Medications Reviewed: (see below) Lab Data Reviewed: (see below) 
 
______________________________________________________________________ Medications:  
 
Current Facility-Administered Medications Medication Dose Route Frequency  albumin human 5% (BUMINATE) solution 25 g  25 g IntraVENous Q6H  
  0.9% sodium chloride infusion 250 mL  250 mL IntraVENous PRN  
 cefTRIAXone (ROCEPHIN) 1 g in 0.9% sodium chloride (MBP/ADV) 50 mL  1 g IntraVENous Q24H  pantoprazole (PROTONIX) tablet 40 mg  40 mg Oral ACB  sodium chloride (NS) flush 5-40 mL  5-40 mL IntraVENous Q8H  
 sodium chloride (NS) flush 5-40 mL  5-40 mL IntraVENous PRN  
 HYDROmorphone (PF) (DILAUDID) injection 0.5 mg  0.5 mg IntraVENous Q4H PRN  
 naloxone (NARCAN) injection 0.4 mg  0.4 mg IntraVENous PRN  
 diphenhydrAMINE (BENADRYL) injection 12.5 mg  12.5 mg IntraVENous Q4H PRN  
 ondansetron (ZOFRAN) injection 4 mg  4 mg IntraVENous Q6H PRN  
 docusate sodium (COLACE) capsule 100 mg  100 mg Oral BID  predniSONE (DELTASONE) tablet 70 mg  70 mg Oral DAILY WITH BREAKFAST Lab Review:  
 
Recent Labs  
  03/19/19 0339 03/18/19 
0243 03/17/19 
1004 WBC 2.2* 4.5 4.9 HGB 6.7* 8.3* 5.8* HCT 18.9* 23.6* 16.6*  
PLT 18* 30* 37* Recent Labs  
  03/19/19 0339 03/18/19 0243 03/17/19 
1004 * 131* 136  
K 3.9 4.2 4.4  
 98 103 CO2 25 25 28 * 103* 118* BUN 33* 44* 47* CREA 0.55* 0.57* 0.64* CA 7.6* 8.1* 8.4* MG 1.8 2.0  --   
PHOS 2.3* 3.1  --   
ALB  --  2.2* 2.2* TBILI  --  5.0* 4.3* SGOT  --  38* 36 ALT  --  92* 94* INR  --  1.7*  -- No results found for: [facility] Assessment / Plan:  
 
67 yo hx of cryptogenic cirrhosis, ascites, pleural effusion, pancytopenia, presented w/ weakness, severe anemia, thrombocytopenia, ascites, malnutrition   
1) Decompensated Cirrhosis/ascites: cryptogenic, likely as etoh cirrhosis. Has not seen a hepatologist since last month's admission. BP too low for aldactone/lasix. s/p 11L paracentesis on 03/18. Cont IV albumin. GI and palliative care following. Given poor prognosis, patient is considering hospice care   
2) Severe Anemia/severe thrombocytopenia: persistent, likely related to cirrhosis vs early MDS. Patient saw [facility] hematology (Dr. Gallo Morse) once, but refused to go back. He was started on prednisone 70mg daily. Now s/p 2u RBCs. Will transfuse 1 more unit of RBCs today. Will cont to monitor Hgb closely. Hematology following 
  
3) R pleural effusion:   Pulm indicated that thoracentesis is not needed   
4) Esophageal varices: cont PPI 
  
5) Severe pro-courtney malnutrition: severe muscle loss. Due to cirrhosis. Nutrition following 6) UTI: monitor urine Cx. Cont IV CTX 
  
Code: DNR, awaiting hospice eval 
 
Total time spent with patient: 35 Minutes Care Plan discussed with: Patient, nursing Discussed:  Care Plan Prophylaxis:  SCD's Disposition:  SNF vs hospice 
        
___________________________________________________ Attending Physician: Blanka Soto MD

## 2019-03-19 NOTE — PROGRESS NOTES
Problem: Mobility Impaired (Adult and Pediatric) Goal: *Acute Goals and Plan of Care (Insert Text) Physical Therapy Goals Initiated 3/18/2019 1. Patient will move from supine to sit and sit to supine  in bed with modified independence within 7 day(s). 2.  Patient will transfer from bed to chair and chair to bed with modified independence using the least restrictive device within 7 day(s). 3.  Patient will perform sit to stand with modified independence within 7 day(s). 4.  Patient will ambulate with modified independence for 100 feet with the least restrictive device within 7 day(s). 5.  Patient will ascend/descend 4 stairs with 1 handrail(s) with minimal assistance/contact guard assist within 7 day(s). physical Therapy TREATMENT Patient: Aakash Fung (66 y.o. male) Date: 3/19/2019 Diagnosis: Anemia [D64.9] <principal problem not specified> Precautions: Fall Chart, physical therapy assessment, plan of care and goals were reviewed. ASSESSMENT: 
Pt comes to stand with CGA. Pt ambulated 30ft at slow pace CGA to min assist.Pt is unsteady at times needing cues to direct RW. Pt would benefit from SNF for rehab before going home. Pt progressing slowly. Continue goals. Progression toward goals: 
[]    Improving appropriately and progressing toward goals [x]    Improving slowly and progressing toward goals 
[]    Not making progress toward goals and plan of care will be adjusted PLAN: 
Patient continues to benefit from skilled intervention to address the above impairments. Continue treatment per established plan of care. Discharge Recommendations:SNF for  Rehab Further Equipment Recommendations for Discharge:  rolling walker SUBJECTIVE:  
 
 
OBJECTIVE DATA SUMMARY:  
Critical Behavior: 
Neurologic State: Alert Orientation Level: Oriented X4 Cognition: Follows commands Safety/Judgement: Awareness of environment Functional Mobility Training: 
  
Transfers: 
  
 Pt CGA sit to stand. Balance: 
Sitting: Intact Standing: Intact; With supportAmbulation/Gait Training: 
Distance (ft): 30 Feet (ft) Assistive Device: Walker, rolling;Gait belt Ambulation - Level of Assistance: Contact guard assistance Gait Abnormalities: Decreased step clearance Speed/Jojo: Pace decreased (<100 feet/min) Step Length: Left shortened;Right shortened Pain: 
Pain Scale 1: Numeric (0 - 10) Pain Intensity 1: 0 Activity Tolerance:  
Pt tolerated treatment well. Please refer to the flowsheet for vital signs taken during this treatment. After treatment:  
[x]    Patient left in no apparent distress sitting up in chair 
[]    Patient left in no apparent distress in bed 
[]    Call bell left within reach 
[]    Nursing notified 
[]    Caregiver present 
[]    Bed alarm activated COMMUNICATION/COLLABORATION:  
The patients plan of care was discussed with: Physical Therapist 
 
Sabi Dias PTA Time Calculation: 23 mins

## 2019-03-19 NOTE — PROGRESS NOTES
Spiritual Care Assessment/Progress Note [facility] 
 
 
NAME: Zach Hinds      MRN: [id number] AGE: 66 y.o. SEX: male Advent Affiliation: Church Language: [place] 3/19/2019     Total Time (in minutes): 10 Spiritual Assessment begun in [facility]  MED SURG 2 through conversation with: 
  
    [x]Patient        [] Family    [] Friend(s) Reason for Consult: Initial/Spiritual assessment, patient floor Spiritual beliefs: (Please include comment if needed) [x] Identifies with a moi tradition: Church    
   [] Supported by a moi community:        
   [] Claims no spiritual orientation:       
   [] Seeking spiritual identity:            
   [] Adheres to an individual form of spirituality:       
   [] Not able to assess:                   
 
    
Identified resources for coping:  
   [] Prayer                           
   [] Music                  [] Guided Imagery [x] Family/friends                 [] Pet visits [] Devotional reading                         [] Unknown 
   [] Other:                                          
 
 
Interventions offered during this visit: (See comments for more details) Patient Interventions: Affirmation of emotions/emotional suffering, Affirmation of moi, Catharsis/review of pertinent events in supportive environment, Coping skills reviewed/reinforced, Iconic (affirming the presence of God/Higher Power) Plan of Care: 
 
 [] Support spiritual and/or cultural needs  
 [] Support AMD and/or advance care planning process    
 [] Support grieving process 
 [] Coordinate Rites and/or Rituals  
 [] Coordination with community clergy [] No spiritual needs identified at this time 
 [] Detailed Plan of Care below (See Comments)  [] Make referral to Music Therapy 
[] Make referral to Pet Therapy    
[] Make referral to Addiction services 
[] Make referral to [facility] 
[] Make referral to Spiritual Care Partner [] No future visits requested       
[] Follow up visits as needed Comments:  visit for initial spiritual assessment. Patient sitting in chair at bedside, eating lunch. Good eye contact, smiling, friendly. Says he is feeling a little better today. Provided spiritual presence and listening as he spoke of his current thoughts, feelings, and concerns. Hid desire is to regain his health to return home as soon as able. He appeared encouraged as a result of this visit and expressed gratitude for this visit. Visited by Rev. Daniela Greco, [facility]  paging service: [phone])

## 2019-03-19 NOTE — PROGRESS NOTES
Alexis Mckee. Ora Johnson MD  [phone] office  [phone] voicemail     Gastroenterology Progress Note    March 19, 2019  Admit Date: 3/17/2019         Narrative Assessment and Plan   · Cirrhosis  · Ascites - s/p large volume paracentesis  · Hypotension  · Protein/muscle wasting - weakness. Probably the catabolic state of cirrhosis / liver failure at play here. · Anemia  · Thrombocytopenia    Plan  - reviewed palliative care note, pt to meet with hospice this AM  - continue low sodium diet  - no plans for diuretics at this time due to hypotension   - agree with IV albumin  - monitor H/H and transfuse as needed, no overt signs of GI blood loss      Subjective:   · Pt in bed eating breakfast. Tolerating regular diet without difficulty. Reports that he met Dr. Ligia Melissa and is meeting with hospice today. Gets tearful when he recalls a conversation with his friend Gopal Garcia. Also states he is scared of falling because he lives alone and can't call out for help. ROS:  The previous review of systems on initial consultation / H&P is noted and reviewed. Specific changes noted above in HPI.     Current Medications:     Current Facility-Administered Medications   Medication Dose Route Frequency    albumin human 5% (BUMINATE) solution 25 g  25 g IntraVENous Q6H    0.9% sodium chloride infusion 250 mL  250 mL IntraVENous PRN    cefTRIAXone (ROCEPHIN) 1 g in 0.9% sodium chloride (MBP/ADV) 50 mL  1 g IntraVENous Q24H    pantoprazole (PROTONIX) tablet 40 mg  40 mg Oral ACB    sodium chloride (NS) flush 5-40 mL  5-40 mL IntraVENous Q8H    sodium chloride (NS) flush 5-40 mL  5-40 mL IntraVENous PRN    HYDROmorphone (PF) (DILAUDID) injection 0.5 mg  0.5 mg IntraVENous Q4H PRN    naloxone (NARCAN) injection 0.4 mg  0.4 mg IntraVENous PRN    diphenhydrAMINE (BENADRYL) injection 12.5 mg  12.5 mg IntraVENous Q4H PRN    ondansetron (ZOFRAN) injection 4 mg  4 mg IntraVENous Q6H PRN    docusate sodium (COLACE) capsule 100 mg  100 mg Oral BID    predniSONE (DELTASONE) tablet 70 mg  70 mg Oral DAILY WITH BREAKFAST       Objective:     VITALS:   Last 24hrs VS reviewed since prior progress note. Most recent are:  Visit Vitals  BP (!) 89/46 (BP 1 Location: Right arm, BP Patient Position: At rest)   Pulse 83   Temp 98 °F (36.7 °C)   Resp 18   Ht 5' 10\" (1.778 m)   Wt 71.3 kg (157 lb 3.2 oz)   SpO2 100%   BMI 22.56 kg/m²     Temp (24hrs), Av.8 °F (36.6 °C), Min:97.3 °F (36.3 °C), Max:98.2 °F (36.8 °C)      Intake/Output Summary (Last 24 hours) at 3/19/2019 0859  Last data filed at 3/19/2019 0835  Gross per 24 hour   Intake 480 ml   Output 900 ml   Net -420 ml       EXAM:  General:  Comfortable, no distress    HEENT:  Atraumatic skull, pupils equal  Lungs:   No abnormal audible breath sounds. Speaking in complete sentences  Abdomen:  soft, less distended, nontender; RLQ dressing C/D/I  Extremities:  No edema  Neurologic:   Cranial nerves grossly intact, moves all 4 extremities  Psych:    Not anxious nor agitated    Lab Data Reviewed:   Recent Labs     19  0243 19  1004   WBC 2.2* 4.5 4.9   HGB 6.7* 8.3* 5.8*   HCT 18.9* 23.6* 16.6*   PLT 18* 30* 37*     Recent Labs     19  0243 19  1004   * 131* 136   K 3.9 4.2 4.4    98 103   CO2 25 25 28   * 103* 118*   BUN 33* 44* 47*   CREA 0.55* 0.57* 0.64*   CA 7.6* 8.1* 8.4*   MG 1.8 2.0  --    PHOS 2.3* 3.1  --    ALB  --  2.2* 2.2*   TBILI  --  5.0* 4.3*   SGOT  --  38* 36   ALT  --  92* 94*   INR  --  1.7*  --      No results found for: GLUCPOC  No results for input(s): PH, PCO2, PO2, HCO3, FIO2 in the last 72 hours.   Recent Labs     19  0243   INR 1.7*           Assessment:   (See above)  Active Problems:    Cirrhosis (HCC) ()      Anemia (3/17/2019)      Thrombocytopenia (HCC) (3/17/2019)      Pleural effusion, right (3/17/2019)      Ascites (3/17/2019)        Plan:   (See above)      Signed By: Chiki Ramírez PA-C     3/19/2019  8:59 AM     Jay Chacko MD

## 2019-03-19 NOTE — ROUTINE PROCESS
Bedside and Verbal shift change report given to Luz Kennedy RN (oncoming nurse) by Merrill Fulton RN (offgoing nurse). Report included the following information SBAR, Kardex, ED Summary, Intake/Output, MAR and Recent Results.

## 2019-03-19 NOTE — PROGRESS NOTES
[facility] Initial Patient Consult Name: Kerline Felipe MRN: [id number] : 1945 Hospital: [address] [facility] Date: 3/19/2019 IMPRESSION:  
· Right pleural effusion:  Probably small R pleural effusion on 3/19 cxr. Taking a much better inspiration on today's film · Cirrhosis · Pancytopenia · Ascites:  S/p 11 liter paracentesis RECOMMENDATIONS:  
· Does not need thoracentesis at this time · Palliative care working on ACP · Will see again as needed Subjective:  
 
Pt alert- looks and feels better with paracentesis Past Medical History:  
Diagnosis Date  Anemia  Cirrhosis ([facility] [address]  History of vascular access device 10/22/2018 [facility] VAT - 4 FR, R basilic, 38 cm for DIVA (outpatient infusions)  Pancytopenia ([facility] [address]  Patella fracture 2007 Right knee Past Surgical History:  
Procedure Laterality Date  COLONOSCOPY N/A 2018 COLONOSCOPY performed by iNrav Starks MD at [address] [place] [address] Bl  HX HERNIA REPAIR Bilateral   
 HX RETINAL DETACHMENT REPAIR    
 IR INSERT TUNL CVC W PORT OVER 5 YEARS  2019 Prior to Admission medications Medication Sig Start Date End Date Taking? Authorizing Provider  
predniSONE (DELTASONE) 20 mg tablet Take 70 mg by mouth daily (with breakfast). Yes Provider, Historical  
magnesium chloride (MAG DELAY) 64 mg delayed release tablet Take 1 Tab by mouth two (2) times a day. 2/3/19  Yes Naida Estes MD  
pantoprazole (PROTONIX) 40 mg tablet Take 1 Tab by mouth Daily (before breakfast). 19  Yes Naida Estes MD  
 
No Known Allergies Social History Tobacco Use  Smoking status: Former Smoker  Smokeless tobacco: Never Used Substance Use Topics  Alcohol use: Yes Comment: rare Family History Problem Relation Age of Onset  Anemia Mother  Cancer Father 61 Lung Current Facility-Administered Medications Medication Dose Route Frequency  albumin human 5% (BUMINATE) solution 25 g  25 g IntraVENous Q6H  
 cefTRIAXone (ROCEPHIN) 1 g in 0.9% sodium chloride (MBP/ADV) 50 mL  1 g IntraVENous Q24H  pantoprazole (PROTONIX) tablet 40 mg  40 mg Oral ACB  sodium chloride (NS) flush 5-40 mL  5-40 mL IntraVENous Q8H  
 docusate sodium (COLACE) capsule 100 mg  100 mg Oral BID  predniSONE (DELTASONE) tablet 70 mg  70 mg Oral DAILY WITH BREAKFAST Review of Systems: A comprehensive review of systems was negative except for that written in the HPI. Objective: 
Vital Signs:   
Visit Vitals BP 96/51 Pulse 78 Temp 98 °F (36.7 °C) Resp 18 Ht 5' 10\" (1.778 m) Wt 71.3 kg (157 lb 3.2 oz) SpO2 100% BMI 22.56 kg/m² O2 Device: Room air Temp (24hrs), Av.8 °F (36.6 °C), Min:97.3 °F (36.3 °C), Max:98.2 °F (36.8 °C) Intake/Output:  
Last shift:       0701 -  1900 In: 240 [P.O.:240] Out: 200 [Urine:200] Last 3 shifts:  1901 -  0700 In: 1035.4 [P.O.:720] Out: 0190 [PFFRT:9139] Intake/Output Summary (Last 24 hours) at 3/19/2019 1031 Last data filed at 3/19/2019 7622 Gross per 24 hour Intake 720 ml Output 900 ml Net -180 ml Physical Exam:  
General:  cachectic Head:  Normocephalic, without obvious abnormality, atraumatic. Eyes:  Conjunctivae/corneas clear. PERRL, EOMs intact. Nose: Nares normal. Septum midline. Mucosa normal. No drainage or sinus tenderness. Throat: Lips, mucosa, and tongue normal. Teeth and gums normal.  
Neck: Supple, symmetrical, trachea midline, no adenopathy, thyroid: no enlargment/tenderness/nodules, no carotid bruit and no JVD. Back:   Symmetric, no curvature. ROM normal.  
Lungs:   Improved insp effort. Decreased bs at lung bases Chest wall:  No tenderness or deformity. Heart:  Regular rate and rhythm, S1, S2 normal, no murmur, click, rub or gallop. Abdomen:   Tense ascites Extremities: Muscle wasting, no edema Pulses: 2+ and symmetric all extremities. Skin: Skin color, texture, turgor normal. No rashes or lesions Lymph nodes: Cervical, supraclavicular, and axillary nodes normal.  
Neurologic: Grossly nonfocal  
 
Data review:  
 
Recent Results (from the past 24 hour(s)) CELL COUNT, BODY FLUID Collection Time: 03/18/19 10:57 AM  
Result Value Ref Range BODY FLUID TYPE PERITONEAL FLUID FLUID COLOR YELLOW    
 FLUID APPEARANCE HAZY FLUID RBC CT. 21 (H) 0 /cu mm FLUID NUCLEATED CELLS 21 /cu mm  
 FLD NEUTROPHILS 15 (A) NRRE % FLD LYMPHS 44 (A) NRRE % FLD MONO/MACROPHAGES 38 (A) NRRE % FLUID MESOTHELIAL 3 (A) NRRE % CULTURE, BODY FLUID W GRAM STAIN Collection Time: 03/18/19 10:57 AM  
Result Value Ref Range Special Requests: NO SPECIAL REQUESTS    
 GRAM STAIN NO WBC'S SEEN    
 GRAM STAIN NO ORGANISMS SEEN Culture result: Culture performed on Fluid swab specimen Culture result: NO GROWTH AFTER 19 HOURS    
CBC W/O DIFF Collection Time: 03/19/19  3:39 AM  
Result Value Ref Range WBC 2.2 (L) 4.1 - 11.1 K/uL  
 RBC 2.29 (L) 4.10 - 5.70 M/uL HGB 6.7 (L) 12.1 - 17.0 g/dL HCT 18.9 (L) 36.6 - 50.3 % MCV 82.5 80.0 - 99.0 FL  
 MCH 29.3 26.0 - 34.0 PG  
 MCHC 35.4 30.0 - 36.5 g/dL  
 RDW 17.1 (H) 11.5 - 14.5 % PLATELET 18 (LL) 289 - 400 K/uL NRBC 0.0 0  WBC ABSOLUTE NRBC 0.00 0.00 - 0.01 K/uL METABOLIC PANEL, BASIC Collection Time: 03/19/19  3:39 AM  
Result Value Ref Range Sodium 131 (L) 136 - 145 mmol/L Potassium 3.9 3.5 - 5.1 mmol/L Chloride 100 97 - 108 mmol/L  
 CO2 25 21 - 32 mmol/L Anion gap 6 5 - 15 mmol/L Glucose 118 (H) 65 - 100 mg/dL BUN 33 (H) 6 - 20 MG/DL Creatinine 0.55 (L) 0.70 - 1.30 MG/DL  
 BUN/Creatinine ratio 60 (H) 12 - 20 GFR est AA >60 >60 ml/min/1.73m2 GFR est non-AA >60 >60 ml/min/1.73m2  Calcium 7.6 (L) 8.5 - 10.1 MG/DL  
 PHOSPHORUS Collection Time: 03/19/19  3:39 AM  
Result Value Ref Range Phosphorus 2.3 (L) 2.6 - 4.7 MG/DL MAGNESIUM Collection Time: 03/19/19  3:39 AM  
Result Value Ref Range Magnesium 1.8 1.6 - 2.4 mg/dL SAMPLES BEING HELD Collection Time: 03/19/19  4:26 AM  
Result Value Ref Range SAMPLES BEING HELD 1LAV   
 COMMENT Add-on orders for these samples will be processed based on acceptable specimen integrity and analyte stability, which may vary by analyte. Imaging: 
I have personally reviewed the patients radiographs and have reviewed the reports: 
Improved insp effort. Hazy r chest with small pl effusion Fátima Odell MD

## 2019-03-19 NOTE — PROGRESS NOTES
0730  Bedside and Verbal shift change report given to Ivana Dyer RN (oncoming nurse) by Estevan Travis RN (offgoing nurse). Report included the following information SBAR, Kardex, Intake/Output, MAR, Recent Results and Med Rec Status. 0500  Critical platelets of 18, hemoglobin 6.7. Dr. Jena Kahn made aware, no new orders at this time.

## 2019-03-19 NOTE — PROGRESS NOTES
Problem: Self Care Deficits Care Plan (Adult) Goal: *Acute Goals and Plan of Care (Insert Text) Occupational Therapy Goals Initiated 3/18/2019 1. Patient will perform bathing with minimal assistance/contact guard assist within 7 day(s). 2.  Patient will perform lower body dressing with modified independence within 7 day(s). 3.  Patient will perform grooming standing at sink with supervision/set-up within 7 day(s). 4.  Patient will perform toilet transfers with modified independence within 7 day(s). 5.  Patient will perform all aspects of toileting with modified independence within 7 day(s). 6.  Patient will participate in upper extremity therapeutic exercise/activities with supervision/set-up for 10 minutes within 7 day(s). 7.  Patient will utilize energy conservation techniques during functional activities with verbal cues within 7 day(s). Occupational Therapy TREATMENT Patient: Rickard Kussmaul (43 y.o. male) Date: 3/19/2019 Diagnosis: Anemia [D64.9] <principal problem not specified> Precautions: Fall Chart, occupational therapy assessment, plan of care, and goals were reviewed. ASSESSMENT: 
Pt seated in chair agreeable to exercising UE's and ADL re-training. He was able to engage with 4 exercises and also doffed/donned socks with extra time. He stated he usually does this seated on commode at home which is lower than seat in hospital. Pt very talkative, needs moderate re-attention to task. Recommend SNF for rehab. Progression toward goals: 
[]       Improving appropriately and progressing toward goals [x]       Improving slowly and progressing toward goals 
[]       Not making progress toward goals and plan of care will be adjusted PLAN: 
Patient continues to benefit from skilled intervention to address the above impairments. Continue treatment per established plan of care. Discharge Recommendations:  SNF for rehab Further Equipment Recommendations for Discharge: None SUBJECTIVE:  
Patient stated I was at [facility].  OBJECTIVE DATA SUMMARY:  
Cognitive/Behavioral Status: 
Neurologic State: Alert Orientation Level: Oriented X4 Cognition: Follows commands Functional Mobility and Transfers for ADLs:Bed Mobility: Not tested as pt already up in chair. Transfers: 
  
 Not tested Balance: 
Sitting: Intact Standing: Intact; With support ADL Intervention: Lower Body Dressing Assistance Socks: Supervision/set-up Leg Crossed Method Used: No 
Position Performed: Seated in chair Therapeutic Exercises:  
 
EXERCISE Sets Reps Active Active Assist  
Passive Comments Shoulder flex/ext 1 10 [x]           []           [] Chest presses 1 10 [x]           []           [] Elbow flex/ext 1 10 [x]           []           [] Forearm supination/pronation 1 10 [x]           []           []             
   []           []           []             
 
Pain: 
Pain Scale 1: Numeric (0 - 10) Pain Intensity 1: 0 Activity Tolerance:  
Fair Please refer to the flowsheet for vital signs taken during this treatment. After treatment:  
[x] Patient left in no apparent distress sitting up in chair 
[] Patient left in no apparent distress in bed 
[x] Call bell left within reach 
[] Nursing notified 
[] Caregiver present 
[] Bed alarm activated COMMUNICATION/COLLABORATION:  
The patients plan of care was discussed with: Physical Therapy Assistant and Occupational Therapist 
 
[address] [place] [address], OLIVIA/L Time Calculation: 25 mins

## 2019-03-19 NOTE — PROGRESS NOTES
Palliative Medicine Consult Patient Name: Dolly Arnold YOB: 1945 Date of Initial Consult: 3/18/19 Reason for Consult: care decisions Requesting Provider: Dr. Jens Mckeon  
Primary Care Physician: None SUMMARY:  
Dolly Arnold is a 66 y.o. with a past history of cirrhosis(likely alcoholic rather than cryptogenic), pancytopenia(been evaluated by Dr. Trinidad Renner and Dr. Polina Whitman)), recurrent ascites, prior alcohol and tobacco abuse, who was admitted on 3/17/2019 from home with a diagnosis of ascites/weakness/panytopenia. Current medical issues leading to Palliative Medicine involvement include: care decisions. Chart reviewed/HPI-patient admitted to the hospital for similar issues as admission in February with weakness, anemia, ascites. Patient had been seen at [facility](notes in the chart under media) and started on high dose steroids(1 mg/kg) on 2/26/19 by Dr. 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 Patient states has been weaker over the past 2 weeks but was still driving, fixing his meals and doing his laundry. SH-lives alone. Retired from [employer] at age 46. No family. Friends will check on him. His MPOA(Dheeraj Eddy) lives in [place] PALLIATIVE DIAGNOSES:  
1. GOC discussion 2. DNR review 3. ACP review 4. Cirrhosis with recurrent ascites 5. Pancytopenia 6. Debility PLAN:  
1. Met with patient. He is feeling better overall but his ascites is returning fairly quickly. In addition, he is about to receive another unit blood. He did meet with Hospice team and also was able to talk with Dheeraj(ROSALIE) via phone last night. 2. GOC-he met with Hospice. He is not sure if quite ready to stop blood transfusions but talked at length on the limitations of the transfusions. As far as the ascites, we can have a drain placed if under Hospice care to assist with controlling.  By the end of the meeting, he does seem more open to Hospice. The biggest issue will be where we can provide Hospice care. We have concerns about him living alone. Plan on meeting with him again tomorrow which will allow him to contemplate further. 3. AMD-in the chart with Francisco J Garcia as MPOA 4. Code status-reviewed with him and he is clear on DNAR/DNI. We completed a DDNR on 3/18. Copy scanned into chart 5. Symptom management-feels better after removal of 11 liters of ascites but denies pain. Concern that it appears to be reaccumulating quickly. 6. Psychosocial-he states has had a good life with his travels with Pets are family too. Limited social interactions at this time. No spiritual concerns 7. Discussed with Angella Powers(Hospice), bedside nurse 8. Initial consult note routed to primary continuity provider 9. Communicated plan of care with: Palliative IDT 
 
 
 GOALS OF CARE / TREATMENT PREFERENCES:  
[====Goals of Care====] GOALS OF CARE: 
Patient/Health Care Proxy Stated Goals: Other (comment)(considering hospice care) TREATMENT PREFERENCES:  
Code Status: DNR Advance Care Planning: 
Advance Care Planning 3/18/2019 Patient's Healthcare Decision Maker is: Named in scanned ACP document Primary Decision Maker Name -  
Primary Decision Maker Relationship to Patient -  
Confirm Advance Directive Yes, on file Patient Would Like to Complete Advance Directive - Does the patient have other document types Do Not Resuscitate Medical Interventions: Limited additional interventions Other Instructions: The palliative care team has discussed with patient / health care proxy about goals of care / treatment preferences for patient. 
[====Goals of Care====] HISTORY:  
 
History obtained from: chart, patient, CHIEF COMPLAINT: weakness HPI/SUBJECTIVE: The patient is:  
[x] Verbal and participatory [] Non-participatory due to:  
Patient feels better since paracentesis, denies any pain 3/19-feeling a little better today. Ascites coming back Clinical Pain Assessment (nonverbal scale for severity on nonverbal patients):  
Clinical Pain Assessment Severity: 0 FUNCTIONAL ASSESSMENT:  
 
Palliative Performance Scale (PPS): PPS: 50 PSYCHOSOCIAL/SPIRITUAL SCREENING:  
 
Advance Care Planning: 
Advance Care Planning 3/18/2019 Patient's Healthcare Decision Maker is: Named in scanned ACP document Primary Decision Maker Name -  
Primary Decision Maker Relationship to Patient -  
Confirm Advance Directive Yes, on file Patient Would Like to Complete Advance Directive - Does the patient have other document types Do Not Resuscitate Any spiritual / Orthodoxy concerns: 
[] Yes /  [x] No 
 
Caregiver Burnout: 
[] Yes /  [] No /  [x] No Caregiver Present Anticipatory grief assessment:  
[x] Normal  / [] Maladaptive ESAS Anxiety: Anxiety: 0 
 
ESAS Depression: Depression: 0 REVIEW OF SYSTEMS:  
 
Positive and pertinent negative findings in ROS are noted above in HPI. The following systems were [x] reviewed / [] unable to be reviewed as noted in HPI Other findings are noted below. Systems: constitutional, ears/nose/mouth/throat, respiratory, gastrointestinal, genitourinary, musculoskeletal, integumentary, neurologic, psychiatric, endocrine. Positive findings noted below. Modified ESAS Completed by: provider Fatigue: 3 Drowsiness: 0 Depression: 0 Pain: 0 Anxiety: 0 Nausea: 0 Anorexia: 0 Dyspnea: 1 Constipation: No  
     
 
 
 PHYSICAL EXAM:  
 
From RN flowsheet: 
Wt Readings from Last 3 Encounters:  
03/18/19 157 lb 3.2 oz (71.3 kg) 02/04/19 177 lb 4 oz (80.4 kg) 01/21/19 190 lb (86.2 kg) Blood pressure 103/63, pulse 85, temperature 97.5 °F (36.4 °C), resp. rate 18, height 5' 10\" (1.778 m), weight 157 lb 3.2 oz (71.3 kg), SpO2 100 %. Pain Scale 1: Numeric (0 - 10) Pain Intensity 1: 0 Last bowel movement, if known: Constitutional: thin, tired appearing, slightly slow to respond but alert and oriented Eyes: pupils equal, anicteric ENMT: no nasal discharge, moist mucous membranes Cardiovascular: regular rhythm, distal pulses intact Respiratory: breathing not labored, symmetric Gastrointestinal: soft non-tender, moderate ascites remains,+bowel sounds Musculoskeletal: no deformity, no tenderness to palpation, atropy Skin: warm, dry Neurologic: following commands, moving all extremities Psychiatric: full affect, no hallucinations Other: 
 
 
 HISTORY:  
 
Active Problems: 
  Cirrhosis ([address] () Anemia (3/17/2019) Thrombocytopenia ([address] (3/17/2019) Pleural effusion, right (3/17/2019) Ascites (3/17/2019) Past Medical History:  
Diagnosis Date  Anemia  Cirrhosis ([address]  History of vascular access device 10/22/2018 [facility] VAT - 4 FR, R basilic, 38 cm for DIVA (outpatient infusions)  Pancytopenia ([address]  Patella fracture 2007 Right knee Past Surgical History:  
Procedure Laterality Date  COLONOSCOPY N/A 9/28/2018 COLONOSCOPY performed by Luis Majano MD at [address] [place] [address]  HX HERNIA REPAIR Bilateral   
 HX RETINAL DETACHMENT REPAIR    
 IR INSERT TUNL CVC W PORT OVER 5 YEARS  2/1/2019 Family History Problem Relation Age of Onset  Anemia Mother  Cancer Father 61 Lung History reviewed, no pertinent family history. Social History Tobacco Use  Smoking status: Former Smoker  Smokeless tobacco: Never Used Substance Use Topics  Alcohol use: Yes Comment: rare No Known Allergies Current Facility-Administered Medications Medication Dose Route Frequency  albumin human 5% (BUMINATE) solution 25 g  25 g IntraVENous Q6H  
 0.9% sodium chloride infusion 250 mL  250 mL IntraVENous PRN  
 cefTRIAXone (ROCEPHIN) 1 g in 0.9% sodium chloride (MBP/ADV) 50 mL  1 g IntraVENous Q24H  pantoprazole (PROTONIX) tablet 40 mg  40 mg Oral ACB  sodium chloride (NS) flush 5-40 mL  5-40 mL IntraVENous Q8H  
 sodium chloride (NS) flush 5-40 mL  5-40 mL IntraVENous PRN  
 HYDROmorphone (PF) (DILAUDID) injection 0.5 mg  0.5 mg IntraVENous Q4H PRN  
 naloxone (NARCAN) injection 0.4 mg  0.4 mg IntraVENous PRN  
 diphenhydrAMINE (BENADRYL) injection 12.5 mg  12.5 mg IntraVENous Q4H PRN  
 ondansetron (ZOFRAN) injection 4 mg  4 mg IntraVENous Q6H PRN  
 docusate sodium (COLACE) capsule 100 mg  100 mg Oral BID  predniSONE (DELTASONE) tablet 70 mg  70 mg Oral DAILY WITH BREAKFAST  
 
 
 
 LAB AND IMAGING FINDINGS:  
 
Lab Results Component Value Date/Time WBC 2.2 (L) 03/19/2019 03:39 AM  
 HGB 6.7 (L) 03/19/2019 03:39 AM  
 PLATELET 18 (LL) 74/49/8514 03:39 AM  
 
Lab Results Component Value Date/Time Sodium 131 (L) 03/19/2019 03:39 AM  
 Potassium 3.9 03/19/2019 03:39 AM  
 Chloride 100 03/19/2019 03:39 AM  
 CO2 25 03/19/2019 03:39 AM  
 BUN 33 (H) 03/19/2019 03:39 AM  
 Creatinine 0.55 (L) 03/19/2019 03:39 AM  
 Calcium 7.6 (L) 03/19/2019 03:39 AM  
 Magnesium 1.8 03/19/2019 03:39 AM  
 Phosphorus 2.3 (L) 03/19/2019 03:39 AM  
  
Lab Results Component Value Date/Time AST (SGOT) 38 (H) 03/18/2019 02:43 AM  
 Alk. phosphatase 361 (H) 03/18/2019 02:43 AM  
 Protein, total 5.4 (L) 03/18/2019 02:43 AM  
 Albumin 2.2 (L) 03/18/2019 02:43 AM  
 Globulin 3.2 03/18/2019 02:43 AM  
 
Lab Results Component Value Date/Time INR 1.7 (H) 03/18/2019 02:43 AM  
 Prothrombin time 16.8 (H) 03/18/2019 02:43 AM  
 aPTT 33.9 (H) 03/18/2019 02:43 AM  
  
Lab Results Component Value Date/Time Iron 243 (H) 09/27/2018 04:14 AM  
 TIBC 252 09/27/2018 04:14 AM  
 Iron % saturation 96 (H) 09/27/2018 04:14 AM  
 Ferritin 537 (H) 09/27/2018 04:14 AM  
  
No results found for: PH, PCO2, PO2 No components found for: [place] Lab Results Component Value Date/Time   (H) 04/23/2016 12:43 AM  
 CK - MB 8.5 (H) 04/20/2016 09:58 AM  
  
 
 
   
 
Total time: 35 
Counseling / coordination time, spent as noted above: 30 
> 50% counseling / coordination?: yes Prolonged service was provided for  []30 min   []75 min in face to face time in the presence of the patient, spent as noted above. Time Start:  
Time End:  
Note: this can only be billed with 59364 (initial) or 63501 (follow up). If multiple start / stop times, list each separately.

## 2019-03-19 NOTE — PROGRESS NOTES
Reason for Admission:   Anemia RRAT Score:   19 Do you (patient/family) have any concerns for transition/discharge? None noted Plan for utilizing home health:   Pt has had [place] in the past with [facility]. Likelihood of readmission? Moderate Transition of Care Plan:     Discharge plan is TBD A referral has been sent to [facility]. Pt lives alone in a first floor apartment with one entry step and 8 stairs with a railing from the parking lot. Pt has no identified support system locally. Julianna Catalan lives in [place], [place].  PTA pt had been independent with ADLs and driving on a limited basis. He owns a cane and a rollator which was his mother's, and \"is an antique. \"  Pt has been to [facility] for rehab in the past.  He uses [facility] on [address]. CM will follow and assist with d/c needs. Lizandro iDaz LCSW Care Management Interventions PCP Verified by CM: Yes(Does not have a PCP; Pt provided information on [facility]) Palliative Care Criteria Met (RRAT>21 & CHF Dx)?: No 
Discharge Durable Medical Equipment: No 
Physical Therapy Consult: Yes Occupational Therapy Consult: Yes Speech Therapy Consult: No 
Current Support Network: Lives Alone Plan discussed with Pt/Family/Caregiver:  Yes

## 2019-03-20 NOTE — PROGRESS NOTES
Palliative Medicine Consult Patient Name: Digna Kelsey YOB: 1945 Date of Initial Consult: 3/18/19 Reason for Consult: care decisions Requesting Provider: Dr. Jenae Garcia  
Primary Care Physician: None SUMMARY:  
Digna Kelsey is a 66 y.o. with a past history of cirrhosis(likely alcoholic rather than cryptogenic), pancytopenia(been evaluated by Dr. Dov Luque and Dr. Carlos Glasgow)), recurrent ascites, prior alcohol and tobacco abuse, who was admitted on 3/17/2019 from home with a diagnosis of ascites/weakness/panytopenia. Current medical issues leading to Palliative Medicine involvement include: care decisions. Chart reviewed/HPI-patient admitted to the hospital for similar issues as admission in February with weakness, anemia, ascites. Patient had been seen at [facility](notes in the chart under media) and started on high dose steroids(1 mg/kg) on 2/26/19 by Dr. 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 Patient states has been weaker over the past 2 weeks but was still driving, fixing his meals and doing his laundry. SH-lives alone. Retired from [employer] at age 46. No family. Friends will check on him. His MPOA(Dheeraj Eddy) lives in [facility] PALLIATIVE DIAGNOSES:  
1. GOC discussion 2. DNR review 3. ACP review 4. Cirrhosis with recurrent ascites 5. Pancytopenia 6. Debility PLAN:  
1. Letty([facility]) and I met with patient. Reviewed again his current medical issues. Discussed the return of his ascites in  very quick fashion and that his Hgb is not responding to blood products like we had hoped. He has thought about Hospice a lot more and does think best for him. We again discussed what Hospice can and not do. We also reviewed that with Hospice, we would stop giving blood products but we could place a drain to help control his ascites. He really wants to return home if at all possible.   
2. GOC-he agrees to transition to hospice care and would like to return home. I have talked with Dr. Karen Louise from IR for placement of peritoneal drain. He thinks the IR department can do tomorrow. 3. AMD-in the chart with Peyman Gutierrez as MPOA 4. Code status-reviewed with him and he is clear on DNAR/DNI. We completed a DDNR on 3/18. Copy scanned into chart 5. Symptom management-continues to deny pain. Ascites has returned and now at least moderate. 6. Psychosocial-he states has had a good life with his travels with [employer]. Limited social interactions at this time. No spiritual concerns 7. Discussed with Angella Milton(Hospice), bedside nurse 8. Initial consult note routed to primary continuity provider 9. Communicated plan of care with: Palliative IDT 
 
 
 GOALS OF CARE / TREATMENT PREFERENCES:  
[====Goals of Care====] GOALS OF CARE: 
Patient/Health Care Proxy Stated Goals: Other (comment)(considering hospice care) TREATMENT PREFERENCES:  
Code Status: DNR Advance Care Planning: 
[facility] Planning 3/18/2019 Patient's Healthcare Decision Maker is: Named in scanned ACP document Primary Decision Maker Name -  
Primary Decision Maker Relationship to Patient -  
Confirm Advance Directive Yes, on file Patient Would Like to Complete Advance Directive - Does the patient have other document types Do Not Resuscitate Medical Interventions: Limited additional interventions Other Instructions: The palliative care team has discussed with patient / health care proxy about goals of care / treatment preferences for patient. 
[====Goals of Care====] HISTORY:  
 
History obtained from: chart, patient, CHIEF COMPLAINT: weakness HPI/SUBJECTIVE: The patient is:  
[x] Verbal and participatory [] Non-participatory due to:  
Patient feels better since paracentesis, denies any pain 3/19-feeling a little better today. Ascites coming back 3/20-denies pain and sitting in bedside chair Clinical Pain Assessment (nonverbal scale for severity on nonverbal patients):  
Clinical Pain Assessment Severity: 0 FUNCTIONAL ASSESSMENT:  
 
Palliative Performance Scale (PPS): PPS: 50 PSYCHOSOCIAL/SPIRITUAL SCREENING:  
 
Advance Care Planning: 
Advance Care Planning 3/18/2019 Patient's Healthcare Decision Maker is: Named in scanned ACP document Primary Decision Maker Name -  
Primary Decision Maker Relationship to Patient -  
Confirm Advance Directive Yes, on file Patient Would Like to Complete Advance Directive - Does the patient have other document types Do Not Resuscitate Any spiritual / Christian concerns: 
[] Yes /  [x] No 
 
Caregiver Burnout: 
[] Yes /  [] No /  [x] No Caregiver Present Anticipatory grief assessment:  
[x] Normal  / [] Maladaptive ESAS Anxiety: Anxiety: 0 
 
ESAS Depression: Depression: 0 REVIEW OF SYSTEMS:  
 
Positive and pertinent negative findings in ROS are noted above in HPI. The following systems were [x] reviewed / [] unable to be reviewed as noted in HPI Other findings are noted below. Systems: constitutional, ears/nose/mouth/throat, respiratory, gastrointestinal, genitourinary, musculoskeletal, integumentary, neurologic, psychiatric, endocrine. Positive findings noted below. Modified ESAS Completed by: provider Fatigue: 3 Drowsiness: 0 Depression: 0 Pain: 0 Anxiety: 0 Nausea: 0 Anorexia: 0 Dyspnea: 1 Constipation: No  
     
 
 
 PHYSICAL EXAM:  
 
From RN flowsheet: 
Wt Readings from Last 3 Encounters:  
03/18/19 157 lb 3.2 oz (71.3 kg) 02/04/19 177 lb 4 oz (80.4 kg) 01/21/19 190 lb (86.2 kg) Blood pressure 99/61, pulse 77, temperature 96.7 °F (35.9 °C), resp. rate 18, height 5' 10\" (1.778 m), weight 157 lb 3.2 oz (71.3 kg), SpO2 100 %. Pain Scale 1: FLACC Pain Intensity 1: 0 Last bowel movement, if known:  
 
Constitutional: thin, tired appearing, Eyes: pupils equal, anicteric ENMT: no nasal discharge, moist mucous membranes Cardiovascular: regular rhythm, distal pulses intact Respiratory: breathing not labored, symmetric Gastrointestinal: soft non-tender, moderate ascites remains,+bowel sounds Musculoskeletal: no deformity, no tenderness to palpation, atropy Skin: warm, dry Neurologic: following commands, moving all extremities Psychiatric: full affect, no hallucinations Other: 
 
 
 HISTORY:  
 
Active Problems: 
  Cirrhosis ([address] () Anemia (3/17/2019) Thrombocytopenia ([address] (3/17/2019) Pleural effusion, right (3/17/2019) Ascites (3/17/2019) Past Medical History:  
Diagnosis Date  Anemia  Cirrhosis ([address]  History of vascular access device 10/22/2018 [facility] VAT - 4 FR, R basilic, 38 cm for DIVA (outpatient infusions)  Pancytopenia ([address]  Patella fracture 2007 Right knee Past Surgical History:  
Procedure Laterality Date  COLONOSCOPY N/A 9/28/2018 COLONOSCOPY performed by Rosemarie Acuna MD at [address]  HX HERNIA REPAIR Bilateral   
 HX RETINAL DETACHMENT REPAIR    
 IR INSERT TUNL CVC W PORT OVER 5 YEARS  2/1/2019 Family History Problem Relation Age of Onset  Anemia Mother  Cancer Father 61 Lung History reviewed, no pertinent family history. Social History Tobacco Use  Smoking status: Former Smoker  Smokeless tobacco: Never Used Substance Use Topics  Alcohol use: Yes Comment: rare No Known Allergies Current Facility-Administered Medications Medication Dose Route Frequency  potassium phosphate 30 mmol in 0.9% sodium chloride 250 mL infusion   IntraVENous ONCE  
 0.9% sodium chloride infusion 250 mL  250 mL IntraVENous PRN  
 0.9% sodium chloride infusion 250 mL  250 mL IntraVENous PRN  pantoprazole (PROTONIX) tablet 40 mg  40 mg Oral ACB  sodium chloride (NS) flush 5-40 mL  5-40 mL IntraVENous Q8H  
  sodium chloride (NS) flush 5-40 mL  5-40 mL IntraVENous PRN  
 HYDROmorphone (PF) (DILAUDID) injection 0.5 mg  0.5 mg IntraVENous Q4H PRN  
 naloxone (NARCAN) injection 0.4 mg  0.4 mg IntraVENous PRN  
 diphenhydrAMINE (BENADRYL) injection 12.5 mg  12.5 mg IntraVENous Q4H PRN  
 ondansetron (ZOFRAN) injection 4 mg  4 mg IntraVENous Q6H PRN  
 docusate sodium (COLACE) capsule 100 mg  100 mg Oral BID  predniSONE (DELTASONE) tablet 70 mg  70 mg Oral DAILY WITH BREAKFAST  
 
 
 
 LAB AND IMAGING FINDINGS:  
 
Lab Results Component Value Date/Time WBC 2.3 (L) 03/20/2019 04:22 AM  
 HGB 6.9 (L) 03/20/2019 04:22 AM  
 PLATELET 15 (LL) 27/41/4538 04:22 AM  
 
Lab Results Component Value Date/Time Sodium 129 (L) 03/20/2019 04:22 AM  
 Potassium 4.1 03/20/2019 04:22 AM  
 Chloride 98 03/20/2019 04:22 AM  
 CO2 25 03/20/2019 04:22 AM  
 BUN 26 (H) 03/20/2019 04:22 AM  
 Creatinine 0.44 (L) 03/20/2019 04:22 AM  
 Calcium 8.0 (L) 03/20/2019 04:22 AM  
 Magnesium 1.8 03/20/2019 04:22 AM  
 Phosphorus 1.6 (L) 03/20/2019 04:22 AM  
  
Lab Results Component Value Date/Time AST (SGOT) 38 (H) 03/18/2019 02:43 AM  
 Alk. phosphatase 361 (H) 03/18/2019 02:43 AM  
 Protein, total 5.4 (L) 03/18/2019 02:43 AM  
 Albumin 2.2 (L) 03/18/2019 02:43 AM  
 Globulin 3.2 03/18/2019 02:43 AM  
 
Lab Results Component Value Date/Time INR 1.7 (H) 03/18/2019 02:43 AM  
 Prothrombin time 16.8 (H) 03/18/2019 02:43 AM  
 aPTT 33.9 (H) 03/18/2019 02:43 AM  
  
Lab Results Component Value Date/Time Iron 243 (H) 09/27/2018 04:14 AM  
 TIBC 252 09/27/2018 04:14 AM  
 Iron % saturation 96 (H) 09/27/2018 04:14 AM  
 Ferritin 537 (H) 09/27/2018 04:14 AM  
  
No results found for: PH, PCO2, PO2 No components found for: [place] Lab Results Component Value Date/Time   (H) 04/23/2016 12:43 AM  
 CK - MB 8.5 (H) 04/20/2016 09:58 AM  
  
 
 
   
 
Total time: 35 
Counseling / coordination time, spent as noted above: 30 
 > 50% counseling / coordination?: yes Prolonged service was provided for  []30 min   []75 min in face to face time in the presence of the patient, spent as noted above. Time Start:  
Time End:  
Note: this can only be billed with 70855 (initial) or 28077 (follow up). If multiple start / stop times, list each separately.

## 2019-03-20 NOTE — PROGRESS NOTES
Shahab Ledesma. Sukumar Abebe, [place] 
[phone] office 
[phone] voicemail Gastroenterology Progress Note March 20, 2019 Admit Date: 3/17/2019 Narrative Assessment and Plan · Cirrhosis · Ascites - s/p large volume paracentesis 3/18/19 · Hypotension · Protein/muscle wasting - weakness. Probably the catabolic state of cirrhosis / liver failure at play here. · Anemia · Thrombocytopenia Plan - await final decision in regards to hospice care 
- continue low sodium diet 
- believes he needs another paracentesis but again waiting on final care decisions 
- monitor H/H and transfuse as needed, no overt signs of GI blood loss Subjective: · No new complaints. Met with palliative care and hospice yesterday. No final decision made but leaning towards hospice. This AM he voices \"I think that is best for me\". Denies abdominal pain, nausea or vomiting. Tolerating diet. Hgb 6.9 despite 1 unit PRBC yesterday. ROS:  The previous review of systems on initial consultation / H&P is noted and reviewed. Specific changes noted above in HPI. Current Medications:  
 
Current Facility-Administered Medications Medication Dose Route Frequency  potassium phosphate 30 mmol in 0.9% sodium chloride 250 mL infusion   IntraVENous ONCE  
 0.9% sodium chloride infusion 250 mL  250 mL IntraVENous PRN  
 0.9% sodium chloride infusion 250 mL  250 mL IntraVENous PRN  pantoprazole (PROTONIX) tablet 40 mg  40 mg Oral ACB  sodium chloride (NS) flush 5-40 mL  5-40 mL IntraVENous Q8H  
 sodium chloride (NS) flush 5-40 mL  5-40 mL IntraVENous PRN  
 HYDROmorphone (PF) (DILAUDID) injection 0.5 mg  0.5 mg IntraVENous Q4H PRN  
 naloxone (NARCAN) injection 0.4 mg  0.4 mg IntraVENous PRN  
 diphenhydrAMINE (BENADRYL) injection 12.5 mg  12.5 mg IntraVENous Q4H PRN  
 ondansetron (ZOFRAN) injection 4 mg  4 mg IntraVENous Q6H PRN  
 docusate sodium (COLACE) capsule 100 mg  100 mg Oral BID  
  predniSONE (DELTASONE) tablet 70 mg  70 mg Oral DAILY WITH BREAKFAST Objective: VITALS:  
Last 24hrs VS reviewed since prior progress note. Most recent are: 
Visit Vitals BP 94/54 (BP 1 Location: Left arm, BP Patient Position: At rest) Pulse 78 Temp 97.6 °F (36.4 °C) Resp 18 Ht 5' 10\" (1.778 m) Wt 71.3 kg (157 lb 3.2 oz) SpO2 99% BMI 22.56 kg/m² Temp (24hrs), Av.5 °F (36.4 °C), Min:97.3 °F (36.3 °C), Max:97.7 °F (36.5 °C) Intake/Output Summary (Last 24 hours) at 3/20/2019 7574 Last data filed at 3/20/2019 9868 Gross per 24 hour Intake 1320 ml Output 700 ml Net 620 ml EXAM: 
General:  Comfortable, no distress   
HEENT:  Atraumatic skull, pupils equal 
Lungs:   No abnormal audible breath sounds. Speaking in complete sentences Abdomen:  soft, increased distention, nontenderMSTSC Neurologic:   Cranial nerves grossly intact, moves all 4 extremities Psych:    Not anxious nor agitated Lab Data Reviewed:  
Recent Labs  
  19 
0422 19 
6248 19 
7406 WBC 2.3* 2.2* 4.5 HGB 6.9* 6.7* 8.3* HCT 20.1* 18.9* 23.6*  
PLT 15* 18* 30* Recent Labs  
  19 
0422 19 
0339 19 
0243 19 
1004 * 131* 131* 136  
K 4.1 3.9 4.2 4.4  
CL 98 100 98 103 CO2 25 25 25 28 * 118* 103* 118* BUN 26* 33* 44* 47* CREA 0.44* 0.55* 0.57* 0.64* CA 8.0* 7.6* 8.1* 8.4* MG 1.8 1.8 2.0  --   
PHOS 1.6* 2.3* 3.1  --   
ALB  --   --  2.2* 2.2* TBILI  --   --  5.0* 4.3* SGOT  --   --  38* 36 ALT  --   --  92* 94* INR  --   --  1.7*  -- No results found for: Cheyenne Cannon No results for input(s): PH, PCO2, PO2, HCO3, FIO2 in the last 72 hours. Recent Labs  
  19 
0243 INR 1.7* Assessment: (See above) Active Problems: 
  Cirrhosis ([facility] [address] () Anemia (3/17/2019) Thrombocytopenia ([address] (3/17/2019) Pleural effusion, right (3/17/2019) Ascites (3/17/2019) Plan: (See above) Signed By: Aura Jeffers PA-C   
 3/20/2019  8:47 AM 
  
Sedrick Santoyo MD

## 2019-03-20 NOTE — PROGRESS NOTES
3/20/2019 
4:57 PM 
EMR reviewed per palliative note, pt is agreeable to hospice, and placement of peritoneal drain is planned prior to d/c. CM will continue to follow. Angy Worthy

## 2019-03-20 NOTE — PROGRESS NOTES
[address], [address] [place] [address] 
[phone] Medical Progress Note NAME: Jay Marin :  1945 MRM:  [id number] Date/Time: 3/20/2019 Subjective: Chief Complaint:  Patient was seen and examined by me. Chart reviewed. Patient is open to hospice, but has not made a decision. He does not want to go to a nursing home Objective:  
 
 
Vitals:  
 
 
Last 24hrs VS reviewed since prior progress note. Most recent are: 
 
Visit Vitals BP 99/61 (BP 1 Location: Left arm, BP Patient Position: Sitting) Pulse 77 Temp 96.7 °F (35.9 °C) Resp 18 Ht 5' 10\" (1.778 m) Wt 71.3 kg (157 lb 3.2 oz) SpO2 100% BMI 22.56 kg/m² SpO2 Readings from Last 6 Encounters:  
19 100% 19 100% 19 100% 19 99% 19 98% 19 96% Intake/Output Summary (Last 24 hours) at 3/20/2019 1420 Last data filed at 3/20/2019 7608 Gross per 24 hour Intake 600 ml Output 600 ml Net 0 ml Exam:  
 
Physical Exam: 
 
Gen:  Disheveled, ill-appearing, cachectic, NAD HEENT:  Scleral icterus, PERRL, hearing intact to voice, dry mucous membranes Neck:  Supple, without masses, thyroid non-tender Resp:  No accessory muscle use, CTAB anteriorly Card:  No murmurs, normal S1, S2 without thrills, bruits or peripheral edema Abd:  less distended. Still has ascites Lymph:  No cervical adenopathy Musc:  No cyanosis or clubbing Skin:  +jaundice Neuro:  Cranial nerves 3-12 are grossly intact, diffused weakness, follows commands appropriately Psych:  Alert with fair insight. Oriented to person, place, and time Medications Reviewed: (see below) Lab Data Reviewed: (see below) 
 
______________________________________________________________________ Medications:  
 
Current Facility-Administered Medications Medication Dose Route Frequency  potassium phosphate 30 mmol in 0.9% sodium chloride 250 mL infusion   IntraVENous ONCE  
 0.9% sodium chloride infusion 250 mL  250 mL IntraVENous PRN  
 0.9% sodium chloride infusion 250 mL  250 mL IntraVENous PRN  pantoprazole (PROTONIX) tablet 40 mg  40 mg Oral ACB  sodium chloride (NS) flush 5-40 mL  5-40 mL IntraVENous Q8H  
 sodium chloride (NS) flush 5-40 mL  5-40 mL IntraVENous PRN  
 HYDROmorphone (PF) (DILAUDID) injection 0.5 mg  0.5 mg IntraVENous Q4H PRN  
 naloxone (NARCAN) injection 0.4 mg  0.4 mg IntraVENous PRN  
 diphenhydrAMINE (BENADRYL) injection 12.5 mg  12.5 mg IntraVENous Q4H PRN  
 ondansetron (ZOFRAN) injection 4 mg  4 mg IntraVENous Q6H PRN  
 docusate sodium (COLACE) capsule 100 mg  100 mg Oral BID  predniSONE (DELTASONE) tablet 70 mg  70 mg Oral DAILY WITH BREAKFAST Lab Review:  
 
Recent Labs  
  03/20/19 
0422 03/19/19 
0335 03/18/19 
0243 WBC 2.3* 2.2* 4.5 HGB 6.9* 6.7* 8.3* HCT 20.1* 18.9* 23.6*  
PLT 15* 18* 30* Recent Labs  
  03/20/19 
0422 03/19/19 
8909 03/18/19 
0243 * 131* 131* K 4.1 3.9 4.2 CL 98 100 98 CO2 25 25 25 * 118* 103* BUN 26* 33* 44* CREA 0.44* 0.55* 0.57* CA 8.0* 7.6* 8.1*  
MG 1.8 1.8 2.0 PHOS 1.6* 2.3* 3.1 ALB  --   --  2.2* TBILI  --   --  5.0*  
SGOT  --   --  38* ALT  --   --  92* INR  --   --  1.7* No results found for: Jerome Alex Assessment / Plan:  
 
67 yo hx of cryptogenic cirrhosis, ascites, pleural effusion, pancytopenia, presented w/ weakness, severe anemia, thrombocytopenia, ascites, malnutrition   
1) Decompensated Cirrhosis/ascites: Likely has etoh cirrhosis. Has not seen a hepatologist since last month's admission. BP too low for aldactone/lasix. s/p 11L paracentesis on 03/18, s/p IV albumin. GI and palliative care following. Given poor prognosis, patient is considering hospice care but has not made a decision.   Home hospice is an option but patient lives alone   
2) Severe Anemia/severe thrombocytopenia: persistent, likely related to cirrhosis vs early MDS. Patient saw [facility] hematology (Dr. Noe Ureña) once, but refused to go back. He was started on prednisone 70mg daily. Now s/p 3u RBCs. Will transfuse 1 more unit of RBCs today. Will cont to monitor Hgb closely. Hematology following 
  
3) R pleural effusion:   Pulm indicated that thoracentesis is not needed   
4) Esophageal varices: cont PPI 
  
5) Severe pro-courtney malnutrition: severe muscle loss. Due to cirrhosis. Nutrition following. Encourage PO 
 
6) UTI: Ucx w/ staph,dipheroid, likely contaminant. Can d/c IV CTX 
  
Code: DNR Total time spent with patient: 20 Minutes Care Plan discussed with: Patient, nursing Discussed:  Care Plan Prophylaxis:  SCD's Disposition:  SNF vs hospice 
        
___________________________________________________ Attending Physician: Michael Maldonado MD

## 2019-03-20 NOTE — ROUTINE PROCESS
Bedside shift change report given to Michelle Raza (oncoming nurse) by Dawit Fernandes (offgoing nurse). Report included the following information SBAR, Kardex, Procedure Summary, Intake/Output, MAR, Accordion, Recent Results and Med Rec Status.

## 2019-03-20 NOTE — PROGRESS NOTES
[facility] at [address], [address] W: [phone]  F: [phone] Reason for Visit:  
Chris Johnson is a 66 y.o. male who is seen in consultation at the request of   for evaluation of severe anemia, thrombocytopenia Hematology / Oncology Treatment History:  
Followed at [facility]: Review of records as below. Recent hospitalization at [facility]: admitted on 2/12/2019- 2/19/2019; admitted with symptomatic anemia; expedited workup for pancytopenia; consults included: endocrinology, hepatology, ; underwent 2 bone marrow bxs (2/13/19 and 2/19/2019) 
 
2/26/2019 Hem/Onc visit; Dr Cruzito De La Torre; pancytopenia: no obvious etiology; no obvious evidence of MDS, there was incidental evidence hemophagocytosis but not enough to convincingly call [place]. Red cell aplasia is on the differential as is possible autoimmune pancytopenia based on a low OSCAR titer or cytopenias due to cirrhosis. Initiated an empiric course of prednisone 1 mg/kg to see if counts improve as bone marrow studies were being reviewed. Cirrhosis: presumably cryptogenic vs ETOH induced; dedicated liver phase CT scan negative for hepatic mass 3/06/2019 Hepatology visit/ Dr Guy/ Cirrhosis: significantly decompensated alcoholic cirrhotic. Overall, his prognosis is not good. He is not a transplant candidate. There is no medication that we can do to fix this. He is going to be paracentesis dependent unless his sodium should improve. Should be considered for palliative care. Following with labs and if Na improved would try diuretics or not. History of Present Illness:  
 
Chris Johnson was admitted on 3/17/2019 from the ED when he presented with c/o weakness, fatigue and difficulty ambulating associated with worsening abd distention. Seen by Dr Cruzito De La Torre at [facility]. ED labs Hgb 5.8  plts 37 CXR rt pleural effusion. Admitted for further eval and management. 3/18 /19 Paracentesis 11,060 ml removed. Mr Rea Troy reports no energy at home. Occasionally SOB. Denies any pain at present. Feeling much better since fluid removed from abd. Lives alone. Fixes own meals; vegetables and chicken. Tries to eat 2 meals per day. Uses cane at home. Energy usually \"remains parked\". \" I have had a good 74 yrs\". No family at present. Interval History:  
 
Siting up in chair; shares he has decided to go home with hospice; abd is getting bigger again. Denies pain or SOB. No family at bedside. Hospice nurse entered room for info session as I was finishing exam.  
 
 
 
Current Facility-Administered Medications Medication Dose Route Frequency  potassium phosphate 30 mmol in 0.9% sodium chloride 250 mL infusion   IntraVENous ONCE  
 0.9% sodium chloride infusion 250 mL  250 mL IntraVENous PRN  
 0.9% sodium chloride infusion 250 mL  250 mL IntraVENous PRN  pantoprazole (PROTONIX) tablet 40 mg  40 mg Oral ACB  sodium chloride (NS) flush 5-40 mL  5-40 mL IntraVENous Q8H  
 sodium chloride (NS) flush 5-40 mL  5-40 mL IntraVENous PRN  
 HYDROmorphone (PF) (DILAUDID) injection 0.5 mg  0.5 mg IntraVENous Q4H PRN  
 naloxone (NARCAN) injection 0.4 mg  0.4 mg IntraVENous PRN  
 diphenhydrAMINE (BENADRYL) injection 12.5 mg  12.5 mg IntraVENous Q4H PRN  
 ondansetron (ZOFRAN) injection 4 mg  4 mg IntraVENous Q6H PRN  
 docusate sodium (COLACE) capsule 100 mg  100 mg Oral BID  predniSONE (DELTASONE) tablet 70 mg  70 mg Oral DAILY WITH BREAKFAST No Known Allergies Review of Systems: A complete review of systems was obtained, negative except as described above. Physical Exam:  
 
Visit Vitals /59 (BP 1 Location: Left arm, BP Patient Position: Sitting) Pulse 80 Temp 97.6 °F (36.4 °C) Resp 18 Ht 5' 10\" (1.778 m) Wt 71.3 kg (157 lb 3.2 oz) SpO2 100% BMI 22.56 kg/m² ECOG PS: 3 General: disheveled; cachetic; No distress Eyes:  icteric sclerae PERRLA, HENT: Atraumatic with normal appearance of ears and nose; OP clear Neck: Supple; no thyromegaly Lymphatic: No cervical, supraclavicular, or axillary adenopathy Respiratory: anteriorly CTAB, normal respiratory effort CV: Normal rate, regular rhythm, no murmurs, no peripheral edema GI: distended,Soft, nontender,  no masses, no hepatomegaly, no splenomegaly MS:  Digits without clubbing or cyanosis. Skin: No rashes, ecchymoses, or petechiae. Normal temperature, turgor, and texture. Neuro/Psych: Alert, oriented, appropriate affect, normal judgment/insight Results:  
 
Lab Results Component Value Date/Time WBC 2.3 (L) 03/20/2019 04:22 AM  
 HGB 6.9 (L) 03/20/2019 04:22 AM  
 HCT 20.1 (L) 03/20/2019 04:22 AM  
 PLATELET 15 (LL) 67/33/1891 04:22 AM  
 MCV 81.7 03/20/2019 04:22 AM  
 ABS. NEUTROPHILS 3.9 03/17/2019 10:04 AM  
 
Lab Results Component Value Date/Time Sodium 129 (L) 03/20/2019 04:22 AM  
 Potassium 4.1 03/20/2019 04:22 AM  
 Chloride 98 03/20/2019 04:22 AM  
 CO2 25 03/20/2019 04:22 AM  
 Glucose 133 (H) 03/20/2019 04:22 AM  
 BUN 26 (H) 03/20/2019 04:22 AM  
 Creatinine 0.44 (L) 03/20/2019 04:22 AM  
 GFR est AA >60 03/20/2019 04:22 AM  
 GFR est non-AA >60 03/20/2019 04:22 AM  
 Calcium 8.0 (L) 03/20/2019 04:22 AM  
 
Lab Results Component Value Date/Time Bilirubin, total 5.0 (H) 03/18/2019 02:43 AM  
 ALT (SGPT) 92 (H) 03/18/2019 02:43 AM  
 AST (SGOT) 38 (H) 03/18/2019 02:43 AM  
 Alk. phosphatase 361 (H) 03/18/2019 02:43 AM  
 Protein, total 5.4 (L) 03/18/2019 02:43 AM  
 Albumin 2.2 (L) 03/18/2019 02:43 AM  
 Globulin 3.2 03/18/2019 02:43 AM  
 
Lab Results Component Value Date/Time  Reticulocyte count 0.3 (L) 01/31/2019 04:27 AM  
 Iron % saturation 96 (H) 09/27/2018 04:14 AM  
 TIBC 252 09/27/2018 04:14 AM  
 Ferritin 537 (H) 09/27/2018 04:14 AM  
 Vitamin B12 >2,000 (H) 09/26/2018 01:00 PM  
 Folate 19.1 09/26/2018 01:00 PM  
 Haptoglobin 105 09/26/2018 10:15 AM  
  09/26/2018 10:15 AM  
 TSH 4.45 (H) 09/27/2018 04:14 AM  
 OSCAR, Direct Positive (A) 09/27/2018 04:14 AM  
 Hep C  virus Ab Interp. NONREACTIVE 04/20/2016 09:58 AM  
 
Lab Results Component Value Date/Time INR 1.7 (H) 03/18/2019 02:43 AM  
 aPTT 33.9 (H) 03/18/2019 02:43 AM  
 
Lab Results Component Value Date/Time  09/26/2018 10:15 AM  
 AFP, Serum, Tumor Marker 3.3 09/27/2018 07:05 PM  
 
3/17/2019 XR CHEST IMPRESSION: 
Increased right-sided pleural effusion and atelectasis with diminished lung 
volumes on the right in comparison to the previous exam.. 3/18/2019 US GUIDE PARACENTESIS IMPRESSION:  
Successful ultrasound guided right lower quadrant paracentesis. 11,060 mL fluid 
drained. Albumin administered. Laboratory results pending. 3/19/2019 US ABD IMPRESSION:  
1. No evidence for portal venous thrombosis. 
  
2. Cirrhotic liver morphology. Small amount of right upper quadrant ascites. Right pleural effusion.  
  
3. Small adherent nonshadowing gallstones versus polyps measuring up to 3 mm.  
 
3/19/2019 CXR IMPRESSION: 
Diminished effusion and atelectasis on the right. .  
  
Assessment and Recommendations:  
 
1. Anemia, normocytic (s/p 4 units PRBCs) Remains idiopathic Seen at [facility]/ Dr Jono Kaplan repeat BM bx 2/19/19 with no obvious etiology;no obvious evidence of MDS; discussed incidental evidence hemophagocytosis but not enough to call [facility].  initiated empiric course of Prednisone 1 mg/kg to see if counts improve --received 1 unit PRBCs today 
--Recommend continuation of prednisone and transfusion support for Hgb < 7 
 
 
2. Thrombocytopenia Chronic since at least 2009.  Likely related to cirrhosis, labwork and bone marrow biopsy otherwise unremarkable.  Spleen at upper limit of normal on imaging. Continue to monitor 3. Cirrhosis Reji Ortiz 3/18  Paracentesis 11,060 ml 
 Seen by hepatology at [facility]/Dr Guy: decompensated alcoholic cirrhotic; not a transplant candidate; paracentesis dependent unless sodium should improve GI following Continue supportive care; paracentesis prn; ? Need for additional paracentesis prior to discharge or drain placement 4. Failure to thrive 
palliative team following for goals of care. Hospice seems appropriate, though he has limited to no support at home. Discussed with team 
 
 
Plan reviewed with Dr Viri Black Signed By: Chayo To NP

## 2019-03-20 NOTE — PROGRESS NOTES
Rounded on Hinduism patients and provided Anointing of the Sick at request of patient Fr. Chalo Javier

## 2019-03-20 NOTE — PROGRESS NOTES
Bedside shift change report given to Herman Robledo RN (oncoming nurse) by Darylene Birchwood, RN (offgoing nurse). Report included the following information SBAR, MAR, Accordion and Recent Results.

## 2019-03-20 NOTE — PROGRESS NOTES
Palliative Medicine Code Status: DNR Advance Care Planning 3/18/2019 Patient's Healthcare Decision Maker is: Named in scanned ACP document Primary Decision Maker Name Chauncey Nuñez ( [phone]) Primary Decision Maker Relationship to Patient Friend Confirm Advance Directive Yes, on file Patient Would Like to Complete Advance Directive Already in place Does the patient have other document types Durable Do Not Resuscitate Patient / Family Encounter Documentation Participants (names): Pt, Palliative Medicine (Dr. Palmira Wilson, [address]) Narrative:  Pt was up in chair near window enjoying the sunshine, was alert and receptive to visit. Dr. Palmira Wilson reviewed current medical condition and inability to \"fix\" several issues pt is facing, discussed having drain placed for ascites if pt opts to proceed with hospice. Pt met with [employer] [facility] liaison yesterday and expressed belief today that hospice would be the most appropriate plan based on his circumstances. Pt lives alone in an apt, stated he is not interested in relocating as he moved frequently for his job, spoke of never really being able to get settled as he transferred from one city to the next. Pt reports upstairs neighbor works from home and would be someone he could call up on for assistance if needed. SW provided pt with information re: medical alert system. Inquired whether pt would be able to afford private caregivers if necessary; pt deflected question, stated he did not have room for someone else to sleep in the house but did not respond when asked if hiring cg would be financially feasible. Pt had shared during previous visit that he has made pre-arrangements with the [employer] (which is not a guarantee of acceptance). ANABELLE exchanged messages with staff at the [facility], was informed that drain would not rule pt out for body donation but ascites at time of death would. Psychosocial Issues Identified/ Resilience Factors:  Pt lives alone, support system is limited, pt was not forthcoming re: finances. Pt does have a close friend/POA who resides in [place]. Goals of Care / Plan:  Pt expressed interest in proceeding with plans for hospice at home; placement of drain to be arranged. SW will plan to follow up with pt re: alternate arrangements if pt is not accepted for body donation at time of death. Will discuss with hospice team.  Updated Dr. Izzy Castro re: above. Thank you for including Palliative Medicine in the care of Mr. Samuel Markham. katelyn  LEEROY Obrien, [facility] 
[phone])

## 2019-03-21 NOTE — PROGRESS NOTES
RN notifies of result of ascites fluid culture that was collected 3/18. The result was possible staph species coag negative isolated thiobroth. Dr. Alicia Maldonado notified.

## 2019-03-21 NOTE — PROGRESS NOTES
Palliative Medicine Consult Patient Name: Corky Cowart YOB: 1945 Date of Initial Consult: 3/18/19 Reason for Consult: care decisions Requesting Provider: Dr. Nelson Holt  
Primary Care Physician: None SUMMARY:  
Corky Cowart is a 66 y.o. with a past history of cirrhosis(likely alcoholic rather than cryptogenic), pancytopenia(been evaluated by Dr. Nida Moreno and Dr. Weiner Postal)), recurrent ascites, prior alcohol and tobacco abuse, who was admitted on 3/17/2019 from home with a diagnosis of ascites/weakness/panytopenia. Current medical issues leading to Palliative Medicine involvement include: care decisions. Chart reviewed/HPI-patient admitted to the hospital for similar issues as admission in February with weakness, anemia, ascites. Patient had been seen at [facility](notes in the chart under media) and started on high dose steroids(1 mg/kg) on 2/26/19 by Dr. 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 Patient states has been weaker over the past 2 weeks but was still driving, fixing his meals and doing his laundry. SH-lives alone. Retired from [employer] at age 46. No family. Friends will check on him. His MPOA(Dheeraj Eddy) lives in [place] PALLIATIVE DIAGNOSES:  
1. GOC discussion 2. DNR review 3. ACP review 4. Cirrhosis with recurrent ascites 5. Pancytopenia 6. Debility PLAN:  
1. Met with patient who is frustrated because his procedure has been pushed back. Having peritoneal drain placed so ascites can be managed at home with hospice. Remains in support of transition to hospice care. [facility] has met with him today since [facility] [place] [facility] is full at his area code. Plan on discharge home tomorrow since drain not being placed until later in the day. 2. GOC-he agrees to transition to hospice care and would like to return home. Home(hopefully tomorrow) with Hospice support from [facility]. Talked with their rep and reviewed his care with her. 3. AMD-in the chart with Jonna Vegas as MPOA 4. Code status-reviewed with him and he is clear on DNAR/DNI. We completed a DDNR on 3/18. Copy scanned into chart 5. Symptom management-continues to deny pain. Ascites has returned and now at least moderate-drain being placed today 6. Psychosocial-he states has had a good life with his travels with [employer]. Limited social interactions at this time. No spiritual concerns 7. Discussed with Yuliana(Hospice), bedside nurse, TERRA 
8. Initial consult note routed to primary continuity provider 9. Communicated plan of care with: Palliative IDT 
 
 
 GOALS OF CARE / TREATMENT PREFERENCES:  
[====Goals of Care====] GOALS OF CARE: 
Patient/Health Care Proxy Stated Goals: Other (comment)(considering hospice care) TREATMENT PREFERENCES:  
Code Status: DNR Advance Care Planning: 
Advance Care Planning 3/18/2019 Patient's Healthcare Decision Maker is: Named in scanned ACP document Primary Decision Maker Name -  
Primary Decision Maker Relationship to Patient -  
Confirm Advance Directive Yes, on file Patient Would Like to Complete Advance Directive - Does the patient have other document types Do Not Resuscitate Medical Interventions: Limited additional interventions Other Instructions: The palliative care team has discussed with patient / health care proxy about goals of care / treatment preferences for patient. 
[====Goals of Care====] HISTORY:  
 
History obtained from: chart, patient, CHIEF COMPLAINT: weakness HPI/SUBJECTIVE: The patient is:  
[x] Verbal and participatory [] Non-participatory due to:  
Patient feels better since paracentesis, denies any pain 3/19-feeling a little better today. Ascites coming back 3/20-denies pain and sitting in bedside chair 3/21-a little agitated because he wants to eat. Clinical Pain Assessment (nonverbal scale for severity on nonverbal patients):  
Clinical Pain Assessment Severity: 0 FUNCTIONAL ASSESSMENT:  
 
Palliative Performance Scale (PPS): PPS: 50 PSYCHOSOCIAL/SPIRITUAL SCREENING:  
 
Advance Care Planning: 
Advance Care Planning 3/18/2019 Patient's Healthcare Decision Maker is: Named in scanned ACP document Primary Decision Maker Name -  
Primary Decision Maker Relationship to Patient -  
Confirm Advance Directive Yes, on file Patient Would Like to Complete Advance Directive - Does the patient have other document types Do Not Resuscitate Any spiritual / Hoahaoism concerns: 
[] Yes /  [x] No 
 
Caregiver Burnout: 
[] Yes /  [] No /  [x] No Caregiver Present Anticipatory grief assessment:  
[x] Normal  / [] Maladaptive ESAS Anxiety: Anxiety: 0 
 
ESAS Depression: Depression: 0 REVIEW OF SYSTEMS:  
 
Positive and pertinent negative findings in ROS are noted above in HPI. The following systems were [x] reviewed / [] unable to be reviewed as noted in HPI Other findings are noted below. Systems: constitutional, ears/nose/mouth/throat, respiratory, gastrointestinal, genitourinary, musculoskeletal, integumentary, neurologic, psychiatric, endocrine. Positive findings noted below. Modified ESAS Completed by: provider Fatigue: 3 Drowsiness: 0 Depression: 0 Pain: 0 Anxiety: 0 Nausea: 0 Anorexia: 0 Dyspnea: 1 Constipation: No  
     
 
 
 PHYSICAL EXAM:  
 
From RN flowsheet: 
Wt Readings from Last 3 Encounters:  
03/18/19 157 lb 3.2 oz (71.3 kg) 02/04/19 177 lb 4 oz (80.4 kg) 01/21/19 190 lb (86.2 kg) Blood pressure 99/56, pulse 76, temperature 98 °F (36.7 °C), resp. rate 20, height 5' 10\" (1.778 m), weight 157 lb 3.2 oz (71.3 kg), SpO2 98 %. Pain Scale 1: Numeric (0 - 10) Pain Intensity 1: 0 Last bowel movement, if known:  
 
Constitutional: thin, tired appearing, Eyes: pupils equal, anicteric ENMT: no nasal discharge, moist mucous membranes Cardiovascular: regular rhythm, distal pulses intact Respiratory: breathing not labored, symmetric Gastrointestinal: soft non-tender, moderate ascites remains,+bowel sounds Musculoskeletal: no deformity, no tenderness to palpation, atropy Skin: warm, dry Neurologic: following commands, moving all extremities Psychiatric: full affect, no hallucinations Other: 
 
 
 HISTORY:  
 
Active Problems: 
  Cirrhosis ([address] () Anemia (3/17/2019) Thrombocytopenia ([address] (3/17/2019) Pleural effusion, right (3/17/2019) Ascites (3/17/2019) Past Medical History:  
Diagnosis Date  Anemia  Cirrhosis ([address]  History of vascular access device 10/22/2018 [facility] VAT - 4 FR, R basilic, 38 cm for DIVA (outpatient infusions)  Pancytopenia ([address]  Patella fracture 2007 Right knee Past Surgical History:  
Procedure Laterality Date  COLONOSCOPY N/A 9/28/2018 COLONOSCOPY performed by Linette Vásquez MD at [address]  HX HERNIA REPAIR Bilateral   
 HX RETINAL DETACHMENT REPAIR    
 IR INSERT TUNL CVC W PORT OVER 5 YEARS  2/1/2019 Family History Problem Relation Age of Onset  Anemia Mother  Cancer Father 61 Lung History reviewed, no pertinent family history. Social History Tobacco Use  Smoking status: Former Smoker  Smokeless tobacco: Never Used Substance Use Topics  Alcohol use: Yes Comment: rare No Known Allergies Current Facility-Administered Medications Medication Dose Route Frequency  ceFAZolin (ANCEF) 2 g/20 mL in sterile water IV syringe  2 g IntraVENous ONCE  
 fentaNYL citrate (PF) injection 12.5-200 mcg  12.5-200 mcg IntraVENous Multiple  heparinized saline 2 units/mL infusion 1,000 Units  500 mL Irrigation Multiple  lidocaine (XYLOCAINE) 10 mg/mL (1 %) injection 10-20 mL  10-20 mL IntraDERMal Multiple  midazolam (VERSED) injection 0.5-10 mg  0.5-10 mg IntraVENous Multiple  0.9% sodium chloride infusion 250 mL  250 mL IntraVENous PRN  
 0.9% sodium chloride infusion 250 mL  250 mL IntraVENous PRN  pantoprazole (PROTONIX) tablet 40 mg  40 mg Oral ACB  sodium chloride (NS) flush 5-40 mL  5-40 mL IntraVENous Q8H  
 sodium chloride (NS) flush 5-40 mL  5-40 mL IntraVENous PRN  
 HYDROmorphone (PF) (DILAUDID) injection 0.5 mg  0.5 mg IntraVENous Q4H PRN  
 naloxone (NARCAN) injection 0.4 mg  0.4 mg IntraVENous PRN  
 diphenhydrAMINE (BENADRYL) injection 12.5 mg  12.5 mg IntraVENous Q4H PRN  
 ondansetron (ZOFRAN) injection 4 mg  4 mg IntraVENous Q6H PRN  
 docusate sodium (COLACE) capsule 100 mg  100 mg Oral BID  predniSONE (DELTASONE) tablet 70 mg  70 mg Oral DAILY WITH BREAKFAST  
 
 
 
 LAB AND IMAGING FINDINGS:  
 
Lab Results Component Value Date/Time WBC 2.4 (L) 03/21/2019 02:07 AM  
 HGB 7.9 (L) 03/21/2019 02:07 AM  
 PLATELET 16 (LL) 06/79/9570 02:07 AM  
 
Lab Results Component Value Date/Time Sodium 128 (L) 03/21/2019 02:07 AM  
 Potassium 5.0 03/21/2019 02:07 AM  
 Chloride 97 03/21/2019 02:07 AM  
 CO2 26 03/21/2019 02:07 AM  
 BUN 31 (H) 03/21/2019 02:07 AM  
 Creatinine 0.50 (L) 03/21/2019 02:07 AM  
 Calcium 8.0 (L) 03/21/2019 02:07 AM  
 Magnesium 1.7 03/21/2019 02:07 AM  
 Phosphorus 2.0 (L) 03/21/2019 02:07 AM  
  
Lab Results Component Value Date/Time AST (SGOT) 38 (H) 03/18/2019 02:43 AM  
 Alk. phosphatase 361 (H) 03/18/2019 02:43 AM  
 Protein, total 5.4 (L) 03/18/2019 02:43 AM  
 Albumin 2.2 (L) 03/18/2019 02:43 AM  
 Globulin 3.2 03/18/2019 02:43 AM  
 
Lab Results Component Value Date/Time INR 1.7 (H) 03/18/2019 02:43 AM  
 Prothrombin time 16.8 (H) 03/18/2019 02:43 AM  
 aPTT 33.9 (H) 03/18/2019 02:43 AM  
  
Lab Results Component Value Date/Time  Iron 243 (H) 09/27/2018 04:14 AM  
 TIBC 252 09/27/2018 04:14 AM  
 Iron % saturation 96 (H) 09/27/2018 04:14 AM  
 Ferritin 537 (H) 09/27/2018 04:14 AM  
  
 No results found for: PH, PCO2, PO2 No components found for: [place] Lab Results Component Value Date/Time  (H) 04/23/2016 12:43 AM  
 CK - MB 8.5 (H) 04/20/2016 09:58 AM  
  
 
 
   
 
Total time: 35 
Counseling / coordination time, spent as noted above: 30 
> 50% counseling / coordination?: yes Prolonged service was provided for  []30 min   []75 min in face to face time in the presence of the patient, spent as noted above. Time Start:  
Time End:  
Note: this can only be billed with 43855 (initial) or 49541 (follow up). If multiple start / stop times, list each separately.

## 2019-03-21 NOTE — PROGRESS NOTES
Nutrition Assessment: 
 
RECOMMENDATIONS/INTERVENTION(S):  
Restart Cardiac diet- soft solids. Monitor PO intakes, weight, fluid accumulation. Continue Ensure Enlive for added kcal.  
If FR needed, change ONS to Ensure compact Take new weight. Add Bowel regimen- has colace. Add to regimen as appropriate. No BM since 3/16. ASSESSMENT:  
3/21: Follow up. Pt having fluid drained today. Will likely d/c tomorrow with hospice. NPO today for asipra drain. Pt is hungry today. Hoping he can eat right after drain placement. Na low 128 trending down. , 133, 118 mg/dL. Phos low 2.0. K+ 5.0 WNL, but trending up. Pt has been eating well, >75% on average. No new weight taken. 3/18: 66 yr old male admitted for weakness. PMHx: anemia, cirrhosis, pancytopenia. Pt states appetite is fair. Ordered tray- tomato soup, pudding, tea. Pt states he needs softer foods 2/2 not having top dentures. Pt states he usually weighs 180 lbs. Bed scale with linens reports 157.2 lbs. Down 20 lbs since last month, s/p 11 L paracentesis which accounts for this weight loss. Pt with obvious severe subcu muscle /fat wasting. Pt denies n/v, c/d. Unsure last BM. Pt with 3+ pitting, poor PO PTA x 5+ days. Pt meets malnutrition criteria. Monitor lytes. Na 131. , 118 mg/dL. Meets Criteria for Severe Acute Malnutrition as evidenced by: 
 [x] Moderate muscle wasting, loss of subcutaneous fat 
 [] Nutritional intake of <50% of recommended intake for >5 days [x] Weight loss of >1-2% in 1 week, >5% in 1 month, >7.5% in 3 months, or >10% in 6 months 
 [x] Moderate-severe edema SUBJECTIVE/OBJECTIVE:  
Diet Order: Cardiac 
% Eaten:   
Patient Vitals for the past 168 hrs: 
 % Diet Eaten  
03/20/19 1848 95 % 03/20/19 1452 100 % 03/19/19 1507 100 % 03/19/19 1155 95 % 03/19/19 0909 75 % 03/18/19 1654 75 % 03/17/19 1930 75 % Pertinent Medications: [x] Reviewed Labs reviewed:  [x] Anthropometrics: Height: 5' 10\" (177.8 cm) Weight: 71.3 kg (157 lb 3.2 oz) IBW (%IBW):   ( ) UBW (%UBW):   (  %) BMI: Body mass index is 22.56 kg/m². This BMI is indicative of: 
 
 [x] Underweight for age   [] Normal    [] Overweight    []  Obesity    []  Extreme Obesity (BMI>40) Estimated Nutrition Needs (Based on): 1885 Kcals/day(BMR(1450x1.3)) , 86 g(-100g/day(1.2-1.4g/kg)) Protein Carbohydrate: At Least 130 g/day  Fluids: 2864-5664 mL/day if FR needed Last BM: 3/16   [x]Active     []Hyperactive  []Hypoactive       [] Absent   BS Skin:    [x] Intact   [] Incision  [] Breakdown   [] DTI   [] Tears/Excoriation/Abrasion  [x]Edema- 2+ BLE pitting, [] Other: Wt Readings from Last 30 Encounters:  
03/18/19 71.3 kg (157 lb 3.2 oz) 02/04/19 80.4 kg (177 lb 4 oz) 01/21/19 86.2 kg (190 lb) 12/13/18 85.3 kg (188 lb) 10/04/18 87.5 kg (193 lb) 10/04/18 87.5 kg (193 lb)  
09/26/18 77.1 kg (170 lb) 04/20/16 72.6 kg (160 lb) NUTRITION DIAGNOSES:  
Problem:  Underweight Etiology: related to decreased ability to consume sufficient energy to [employer] Signs/Symptoms: as evidenced by BMI < 23, age >71. Poor PO intakes, ascities, poor appetite NUTRITION INTERVENTIONS: 
Meals/Snacks: General/healthful diet   Supplements: Commercial supplement GOAL:  
pt will consume >50% of meals and ONS within 3-5 days Cultural, Yarsanism, or Ethnic Dietary Needs: None LEARNING NEEDS (Diet, Food/Nutrient-Drug Interaction):  
 [x] None Identified 
 [] Identified and Education Provided/Documented 
 [] Identified and Pt declined/was not appropriate [x] Interdisciplinary Care Plan Reviewed/Documented  
 [x] Discharge Needs: high kcal diet, cardiac 
 [] No Nutrition Related Discharge Needs NUTRITION RISK:  
Pt Is At Nutrition Risk  [x] No Nutrition Risk Identified  [] PT SEEN FOR:  
 []  MD Consult: []Calorie Count []Diabetic Diet Education []Diet Education []Electrolyte Management []General Nutrition Management and Supplements []Management of Tube Feeding []TPN Recommendations []  RN Referral:  []MST score >=2 
   []Enteral/Parenteral Nutrition PTA []Pregnant: Gestational DM or Multigestation  
              [] Pressure Ulcer 
   
[]  Low BMI      []  Length of Stay       [] Dysphagia Diet     [] Ventilator      [x] Follow-Up Previous Recommendations: 
 [x] Implemented          [] Not Implemented          [] Not Applicable Previous Goal: 
 [] Met              [x] Progressing Towards Goal              [] Not Progressing Towards Goal   [] Not Applicable Dileep Phelps RD Pager: [phone] Office: [phone]

## 2019-03-21 NOTE — PROGRESS NOTES
TRANSFER - IN REPORT: 
 
Verbal report received  on Wanna Danger  being received from [phone] for ordered procedure Report consisted of patients Situation, Background, Assessment and  
Recommendations(SBAR). Information from the following report(s) Procedure Verification was reviewed with the receiving nurse. Opportunity for questions and clarification was provided. Assessment completed upon patients arrival to unit and care assumed.

## 2019-03-21 NOTE — PROGRESS NOTES
GI Note Chart reviewed and patient has made decision to transition to hospice. Will sign off, please reconsult if any additional needs.  
 
Elyssa Amor PA-C 
03/21/19 
8:36 AM 
Hebert Abdullahi MD

## 2019-03-21 NOTE — PROGRESS NOTES
1:16 PM 
Pt accepted by [facility]. Pt will need transportation set up when ready for dc. Pt is waiting for procedure today. Will follow. María Rae 11:17 AM 
CM sent referral to [facility] and called. They will send out a representative today to speak to pt. Will continue to follow. María Rae 10:50 AM 
CM informed by [facility] they are capped in pt's area and cannot accept pt. CM spoke to pt and informed, gave choice letter and discussed other options. Pt chose [facility] and pt signed choice letter. Letter in pt's chart. CM will send referral to [facility]. Will follow. María Rae

## 2019-03-21 NOTE — DISCHARGE SUMMARY
Physician Discharge Summary Patient ID: 
Trinidad Grady [id number] 
89 y.o. 
1945 Admit date: 3/17/2019 Discharge date and time: 3/22/2019 Admission Diagnoses: Anemia [D64.9] Discharge Diagnoses:   
Principal Diagnosis Decompensated alcoholic cirrhosis Other Diagnoses Active Problems: 
  Cirrhosis ([facility] [address] () Anemia (3/17/2019) Thrombocytopenia ([facility] [address] (3/17/2019) Pleural effusion, right (3/17/2019) Ascites (3/17/2019) Hospital Course:  
Shayla Valerio cirrhosis / recurrent ascites - Failed to keep follow up with hepatologist. Hliario Hove too low for aldactone/lasix.  s/p 11L paracentesis on 03/18, s/p IV albumin.  GI and palliative care following.  Given poor prognosis, patient will enter hospice. IR placed paracentesis port for comfort at home on hospice 
  
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  Nutrition following.  Encourage PO 
  
Debilitated patient - Will provide support with [facility] PT/OT initially for safety on home hospice. 
  
Severe anemia / severe thrombocytopenia - POA, persistent, related to cirrhosis vs early MDS.  Patient saw [facility] hematology (Dr. Chris Garcia) once, but refused to go back. [facility] was started on prednisone 70mg daily, but can stop on hospice.  Now s/p 4u RBCs.  Hematology following 
  
R pleural effusion - Secondary to ascites. thoracentesis is not needed, and dangerous I setting of low platelets. 
  
Esophageal varices - PPI 
  
UTI - Dx on admit, but reject Dx based on dipheroid, likely contaminant.  
   
PCP: None Consults: GI, Palliative Care and Hospice Significant Diagnostic Studies: See Hospital Course Discharged home in stable condition. Discharge Exam: 
BP 92/55 (BP 1 Location: Left arm, BP Patient Position: At rest) Pulse 78 Temp 97.8 °F (36.6 °C) Resp 18 Ht 5' 10\" (1.778 m) Wt 71.3 kg (157 lb 3.2 oz) SpO2 99% BMI 22.56 kg/m²  
  
 Gen:  Frail, cachectic, in no acute distress HEENT:  Pink conjunctivae, PERRL, hearing intact to voice, moist mucous membranes Neck:  Supple, without masses, thyroid non-tender Resp:  No accessory muscle use, clear breath sounds without wheezes rales or rhonchi 
Card:  No murmurs, normal S1, S2 without thrills, bruits or peripheral edema Abd:  Soft, non-tender, distended, normoactive bowel sounds are present, no mass Lymph:  No cervical or inguinal adenopathy Musc:  No cyanosis or clubbing Skin:  No rashes or ulcers, skin turgor is reduced Neuro:  Cranial nerves are grossly intact, severe motor weakness, follows commands vaguely Psych: Poor insight, oriented to person, place, anxious Patient Instructions:  
Current Discharge Medication List  
  
CONTINUE these medications which have NOT CHANGED Details  
magnesium chloride (MAG DELAY) 64 mg delayed release tablet Take 1 Tab by mouth two (2) times a day. Qty: 60 Tab, Refills: 0  
  
pantoprazole (PROTONIX) 40 mg tablet Take 1 Tab by mouth Daily (before breakfast). Qty: 30 Tab, Refills: 0 STOP taking these medications  
  
 predniSONE (DELTASONE) 20 mg tablet Comments:  
Reason for Stopping:   
   
  
 
Activity: Activity as tolerated Diet: Regular Diet and Comfort feeding Wound Care: Keep wound clean and dry and As directed Follow-up with hospice in 1 days. Follow-up tests/labs - none Signed: 
Judge Kevin MD 
3/22/2019 
9:45 AM

## 2019-03-21 NOTE — PROGRESS NOTES
Occupational therapy note: 
Chart reviewed. Noted patient with decision to transition to hospice services. Will complete current orders for OT services. Daphne Blake MS OTR/L

## 2019-03-21 NOTE — PROGRESS NOTES
Pharmacist Discharge Medication Reconciliation Discharge Provider:  Dr. Song Oconnor Discharge Medications: My Medications CONTINUE taking these medications Instructions Each Dose to Equal Morning Noon Evening Bedtime  
magnesium chloride 64 mg delayed release tablet Commonly known as:  MAG DELAY Your last dose was: Your next dose is: Take 1 Tab by mouth two (2) times a day. 64 mg 
  
  
  
  
  
pantoprazole 40 mg tablet Commonly known as:  PROTONIX Your last dose was: Your next dose is: Take 1 Tab by mouth Daily (before breakfast). 40 mg 
  
  
  
  
  
 
  
 
STOP taking these medications   
 
predniSONE 20 mg tablet Commonly known as:  Jag Avendano Patient discharging to hospice The patient's chart, MAR, and AVS were reviewed by 
 Pat Garsia, HARRYD, Contact: [phone]

## 2019-03-21 NOTE — PROGRESS NOTES
TRANSFER - OUT REPORT: 
 
Verbal report given to Carrie Vogel RN on Marco Bachelor being transferred to [facility] for routine post - op Report consisted of patient's Situation, Background, Assessment and  
Recommendations(SBAR). Information from the following report(s) Procedure Summary was reviewed with the receiving nurse. Opportunity for questions and clarification was provided. Patient transported with: 
 Patient-specific medications from Pharmacy Registered Nurse

## 2019-03-21 NOTE — PROGRESS NOTES
[facility] TRANSFER - IN REPORT: 
 
Verbal report received from [facility] RT(name) on Millie Gomez  being received from angio (unit) for routine progression of care Report consisted of patients Situation, Background, Assessment and  
Recommendations(SBAR). Information from the following report(s) SBAR was reviewed with the receiving nurse. Opportunity for questions and clarification was provided. Assessment completed upon patients arrival to unit and care assumed. [address] TRANSFER - OUT REPORT: 
 
Verbal report given to [facility] RN 5F(name) on Millie Gomez  being transferred to  53 (unit) for routine progression of care Report consisted of patients Situation, Background, Assessment and  
Recommendations(SBAR). Information from the following report(s) SBAR was reviewed with the receiving nurse. Lines:  
Venous Access Device Angiodynamics Port 02/01/19 Upper chest (subclavicular area, right (Active) Central Line Being Utilized Yes 3/20/2019  8:39 PM  
Criteria for Appropriate Use Long term IV/antibiotic administration 3/20/2019  8:39 PM  
Site Assessment Bleeding 3/20/2019  8:39 PM  
Date of Last Dressing Change 03/17/19 3/20/2019 12:37 AM  
Dressing Status Clean, dry, & intact 3/20/2019  8:39 PM  
Dressing Type Transparent 3/20/2019  8:39 PM  
Action Taken Tubing changed 3/20/2019  8:39 PM  
Date Accessed (Medial Site) 03/17/19 3/20/2019  8:39 PM  
Access Time (Medial Site) [id number] 3/17/2019  9:37 AM  
Access Needle Size (Site #1) 20 G 3/17/2019  9:37 AM  
Access Needle Length (Medial Site) 1 inch 3/17/2019  9:37 AM  
Positive Blood Return (Medial Site) Yes 3/20/2019  8:39 PM  
Action Taken (Medial Site) Infusing 3/20/2019  8:00 AM  
Alcohol Cap Used Yes 3/20/2019  8:00 AM  
  
 
Opportunity for questions and clarification was provided. Patient transported with: 
 Registered Nurse

## 2019-03-21 NOTE — PROGRESS NOTES
[facility] [place] [facility] [place] [id number] [address] [facility], [address] [place] [address] 1007 [facility] W: [phone]  F: [phone] Reason for Visit:  
Katerin Gan is a 66 y.o. male who is seen in consultation at the request of   for evaluation of severe anemia, thrombocytopenia Hematology / Oncology Treatment History:  
Followed at [facility]: Review of records as below. Recent hospitalization at [facility]: admitted on 2/12/2019- 2/19/2019; admitted with symptomatic anemia; expedited workup for pancytopenia; consults included: endocrinology, hepatology, ; underwent 2 bone marrow bxs (2/13/19 and 2/19/2019) 
 
2/26/2019 Hem/Onc visit; Dr Julien Aponte; pancytopenia: no obvious etiology; no obvious evidence of MDS, there was incidental evidence hemophagocytosis but not enough to convincingly call [place]. Red cell aplasia is on the differential as is possible autoimmune pancytopenia based on a low OSCAR titer or cytopenias due to cirrhosis. Initiated an empiric course of prednisone 1 mg/kg to see if counts improve as bone marrow studies were being reviewed. Cirrhosis: presumably cryptogenic vs ETOH induced; dedicated liver phase CT scan negative for hepatic mass 3/06/2019 Hepatology visit/ Dr Guy/ Cirrhosis: significantly decompensated alcoholic cirrhotic. Overall, his prognosis is not good. He is not a transplant candidate. There is no medication that we can do to fix this. He is going to be paracentesis dependent unless his sodium should improve. Should be considered for palliative care. Following with labs and if Na improved would try diuretics or not. History of Present Illness:  
 
Katerin Gan was admitted on 3/17/2019 from the ED when he presented with c/o weakness, fatigue and difficulty ambulating associated with worsening abd distention. Seen by Dr Julien Aponte at [facility]. ED labs Hgb 5.8  plts 37 CXR rt pleural effusion. Admitted for further eval and management. 3/18 /19 Paracentesis 11,060 ml removed. Mr Yusra Duncan reports no energy at home. Occasionally SOB. Denies any pain at present. Feeling much better since fluid removed from abd. Lives alone. Fixes own meals; vegetables and chicken. Tries to eat 2 meals per day. Uses cane at home. Energy usually \"remains parked\". \" I have had a good 76 yrs\". No family at present. Interval History:  
 
Waiting for procedure today; unsure about when will be going  Home; denies any SOB or pain. No family at bedside. Current Facility-Administered Medications Medication Dose Route Frequency  0.9% sodium chloride infusion 250 mL  250 mL IntraVENous PRN  
 0.9% sodium chloride infusion 250 mL  250 mL IntraVENous PRN  pantoprazole (PROTONIX) tablet 40 mg  40 mg Oral ACB  sodium chloride (NS) flush 5-40 mL  5-40 mL IntraVENous Q8H  
 sodium chloride (NS) flush 5-40 mL  5-40 mL IntraVENous PRN  
 HYDROmorphone (PF) (DILAUDID) injection 0.5 mg  0.5 mg IntraVENous Q4H PRN  
 naloxone (NARCAN) injection 0.4 mg  0.4 mg IntraVENous PRN  
 diphenhydrAMINE (BENADRYL) injection 12.5 mg  12.5 mg IntraVENous Q4H PRN  
 ondansetron (ZOFRAN) injection 4 mg  4 mg IntraVENous Q6H PRN  
 docusate sodium (COLACE) capsule 100 mg  100 mg Oral BID  predniSONE (DELTASONE) tablet 70 mg  70 mg Oral DAILY WITH BREAKFAST No Known Allergies Review of Systems: A complete review of systems was obtained, negative except as described above. Physical Exam:  
 
Visit Vitals /59 (BP 1 Location: Left arm, BP Patient Position: At rest) Pulse 82 Temp 98.1 °F (36.7 °C) Resp 12 Ht 5' 10\" (1.778 m) Wt 71.3 kg (157 lb 3.2 oz) SpO2 96% BMI 22.56 kg/m² ECOG PS: 3 General: disheveled; cachetic; No distress Eyes:  icteric sclerae PERRLA, HENT: Atraumatic with normal appearance of ears and nose; OP clear Neck: Supple; no thyromegaly Lymphatic: No cervical, supraclavicular, or axillary adenopathy Respiratory: anteriorly CTAB, normal respiratory effort CV: Normal rate, regular rhythm, no murmurs, no peripheral edema GI: distended,Soft, nontender,  no masses, no hepatomegaly, no splenomegaly MS:  Digits without clubbing or cyanosis. Skin: No rashes, ecchymoses, or petechiae. Normal temperature, turgor, and texture. Neuro/Psych: Alert, oriented, appropriate affect, normal judgment/insight Results:  
 
Lab Results Component Value Date/Time WBC 2.4 (L) 03/21/2019 02:07 AM  
 HGB 7.9 (L) 03/21/2019 02:07 AM  
 HCT 22.5 (L) 03/21/2019 02:07 AM  
 PLATELET 16 (LL) 68/74/1517 02:07 AM  
 MCV 82.1 03/21/2019 02:07 AM  
 ABS. NEUTROPHILS 3.9 03/17/2019 10:04 AM  
 
Lab Results Component Value Date/Time Sodium 128 (L) 03/21/2019 02:07 AM  
 Potassium 5.0 03/21/2019 02:07 AM  
 Chloride 97 03/21/2019 02:07 AM  
 CO2 26 03/21/2019 02:07 AM  
 Glucose 137 (H) 03/21/2019 02:07 AM  
 BUN 31 (H) 03/21/2019 02:07 AM  
 Creatinine 0.50 (L) 03/21/2019 02:07 AM  
 GFR est AA >60 03/21/2019 02:07 AM  
 GFR est non-AA >60 03/21/2019 02:07 AM  
 Calcium 8.0 (L) 03/21/2019 02:07 AM  
 
Lab Results Component Value Date/Time Bilirubin, total 5.0 (H) 03/18/2019 02:43 AM  
 ALT (SGPT) 92 (H) 03/18/2019 02:43 AM  
 AST (SGOT) 38 (H) 03/18/2019 02:43 AM  
 Alk. phosphatase 361 (H) 03/18/2019 02:43 AM  
 Protein, total 5.4 (L) 03/18/2019 02:43 AM  
 Albumin 2.2 (L) 03/18/2019 02:43 AM  
 Globulin 3.2 03/18/2019 02:43 AM  
 
Lab Results Component Value Date/Time  Reticulocyte count 0.3 (L) 01/31/2019 04:27 AM  
 Iron % saturation 96 (H) 09/27/2018 04:14 AM  
 TIBC 252 09/27/2018 04:14 AM  
 Ferritin 537 (H) 09/27/2018 04:14 AM  
 Vitamin B12 >2,000 (H) 09/26/2018 01:00 PM  
 Folate 19.1 09/26/2018 01:00 PM  
 Haptoglobin 105 09/26/2018 10:15 AM  
  09/26/2018 10:15 AM  
 TSH 4.45 (H) 09/27/2018 04:14 AM  
 OSCAR, Direct Positive (A) 09/27/2018 04:14 AM  
 Hep C  virus Ab Interp. NONREACTIVE 04/20/2016 09:58 AM  
 
Lab Results Component Value Date/Time INR 1.7 (H) 03/18/2019 02:43 AM  
 aPTT 33.9 (H) 03/18/2019 02:43 AM  
 
Lab Results Component Value Date/Time  09/26/2018 10:15 AM  
 AFP, Serum, Tumor Marker 3.3 09/27/2018 07:05 PM  
 
3/17/2019 XR CHEST IMPRESSION: 
Increased right-sided pleural effusion and atelectasis with diminished lung 
volumes on the right in comparison to the previous exam.. 3/18/2019 US GUIDE PARACENTESIS IMPRESSION:  
Successful ultrasound guided right lower quadrant paracentesis. 11,060 mL fluid 
drained. Albumin administered. Laboratory results pending. 3/19/2019 US ABD IMPRESSION:  
1. No evidence for portal venous thrombosis. 
  
2. Cirrhotic liver morphology. Small amount of right upper quadrant ascites. Right pleural effusion.  
  
3. Small adherent nonshadowing gallstones versus polyps measuring up to 3 mm.  
 
3/19/2019 CXR IMPRESSION: 
Diminished effusion and atelectasis on the right. .  
  
Assessment and Recommendations:  
 
1. Anemia, normocytic (s/p 4 units PRBCs) Remains idiopathic Seen at [facility]/ Dr Jono Kaplan repeat BM bx 2/19/19 with no obvious etiology;no obvious evidence of MDS; discussed incidental evidence [facility] but [facility]  initiated empiric course of Prednisone 1 mg/kg to see if counts improve  
--Recommend continuation of prednisone and transfusion support for Hgb < 7 
 
 
2. Thrombocytopenia Chronic since at least 2009.  Likely related to cirrhosis, labwork and bone marrow biopsy otherwise unremarkable.  Spleen at upper limit of normal on imaging. Continue to monitor 3. Cirrhosis Reji Ortiz 3/18  Paracentesis 11,060 ml Seen by hepatology at [facility]/Dr Guy: decompensated alcoholic cirrhotic; not a transplant candidate; paracentesis dependent unless sodium should improve GI following Continue supportive care; paracentesis prn; 3/21 Plan for drain placement today to assits management of ascites at home 4. Failure to thrive 
palliative team following for goals of care. Hospice seems appropriate, though he has limited to no support at home. Discussed with team 
--Discussed with [facility]; unable to accept pt; reaching out to another agency. Plan reviewed with Dr Gonzales Monday Signed By: Bernadette Ford NP

## 2019-03-21 NOTE — PROGRESS NOTES
Bedside shift change report given to Radha Delatorre RN (oncoming nurse) by Kandi Anand RN (offgoing nurse). Report included the following information SBAR, Intake/Output, MAR, Accordion and Recent Results.

## 2019-03-21 NOTE — DISCHARGE INSTRUCTIONS
Patient Discharge Instructions    Chrissy Jones / [id number] : 1945    Admitted 3/17/2019 Discharged: 3/21/2019     Primary Diagnoses  Problem List as of 3/21/2019 Date Reviewed: 3/17/2019           Anemia   Thrombocytopenia (HCC)   Pleural effusion, right   Ascites   Dyspnea   Lower extremity edema   Hypomagnesemia   Symptomatic anemia   Pancytopenia (HCC)   Cirrhosis (HCC)   Hyponatremia          Take Home Medications     · It is important that you take the medication exactly as they are prescribed. · Keep your medication in the bottles provided by the pharmacist and keep a list of the medication names, dosages, and times to be taken in your wallet. · Do not take other medications without consulting your doctor. What to do at Home    Recommended diet: Regular Diet and Comfort feeding    Recommended activity: Activity as tolerated    If you experience worse symptoms, please follow up with hospice at home. Follow-up with your hospice group in a few days    Patient Education        Hospice:  [facility] provides medical treatment to relieve symptoms at the end of life. The goal is to keep you comfortable, not to try to cure you. Hospice care does not speed up or lengthen dying. It focuses on easing pain and other symptoms. Hospice caregivers want to enhance your quality of life. Hospice care also offers emotional help and spiritual support when you are dying. It also helps family members care for a loved one who is dying. Hospice care can help you review your life, say important things to family and friends, and explore spiritual issues. Hospice also helps your family and friends deal with their grief after you die. You can use hospice care if your illness cannot be cured and doctors believe you have no more than 6 months to live. You do not need to be confined to a bed or in a hospital to benefit from this type of care.   The hospice team includes nurses, counselors, therapists, social workers, pastors, home health aides, and trained volunteers. You can get care in your own home or in a hospice center. Some hospice workers also go to nursing homes or hospitals. How can you care for yourself at home? · Prepare a list of advance directives. These are instructions to your doctor and family members about what kind of care you want if you become unable to speak or express yourself. · Find out if your health insurance covers hospice care. · Find hospice programs in your area. People who can help include your doctor, your state health department, and your insurance company. · Decide what kinds of hospice services you want. It helps to know what you want before you enter a hospice program.  · Think about some questions when preparing for hospice care. ? Who do you want to make decisions about your medical care if you are not able to? Many people choose their spouse, child, or doctor. ? What are you most afraid of that might happen? You might be afraid of having pain or losing your independence. Let your hospice team know your fears. The team can help you deal with them. ? Where would you prefer to die? Choices include your home, a hospital, or a nursing home. ? Do you want to donate organs when you die? Make sure that your family clearly understands this. ? Do you want any Adventism rites or practices to be done before you die? Let your hospice team know what you want. Where can you learn more? Go to http://xiang-lucho.info/. Enter Q360 in the search box to learn more about \"Hospice: Care Instructions. \"  Current as of: April 18, 2018  Content Version: 11.9  © 9634-0068 [employer]. Care instructions adapted under license by [employer] (which disclaims liability or warranty for this information).  If you have questions about a medical condition or this instruction, always ask your healthcare professional. [place] disclaims any warranty or liability for your use of this information. Information obtained by :  I understand that if any problems occur once I am at home I am to contact my physician. I understand and acknowledge receipt of the instructions indicated above.                                                                                                                                            Physician's or R.N.'s Signature                                                                  Date/Time                                                                                                                                              Patient or Representative Signature                                                          Date/Time

## 2019-03-21 NOTE — HOSPICE
[facility] Good Help to Those in Need 
[phone] Patient Name: Kerline Felipe YOB: 1945 Age: 66 y.o. [facility] RN Note: [address] is capped in this address area due to increasing volume of patients. We hope to be open to new admissions as soon as possible. Thank you for considering us first for this patient's needs. We will communicate when we are open for new referrals again. We are happy to assist in finding another hospice referral for this patient. Thank you for the opportunity to be of service to this patient.

## 2019-03-21 NOTE — PROGRESS NOTES
Physical Therapy 3/21/2019 Chart reviewed. Noted patient with decision to transition to hospice services. Will complete current orders for PT services.  
 
Thank Lilian Aguiar, PT, DPT

## 2019-03-21 NOTE — PROGRESS NOTES
Bedside and Verbal shift change report given to Vernell Menendez (oncoming nurse) by SILVERIO (offgoing nurse). Report included the following information SBAR, Kardex, Intake/Output, MAR and Recent Results.

## 2019-03-22 NOTE — H&P
[place] [facility]  [phone]    Patient Name: Rickard Kussmaul  YOB: 1945    Date of Provider Hospice Visit: 03/22/19    Level of Care:   [x] General Inpatient (GIP)    [] Routine   [] Respite    Current Location of Care:  [] [facility] [] [facility] [] [facility] [] [facility] [x] Hospice House [facility], patient referred from:  [] [facility] [x] [facility] [] [facility] [] [facility] [] Home [] Other:     Date of Original Hospice Admission: 3/22/19  Hospice Medical Director at time of admission: [address] [place] [address] Diagnosis: alcoholic cirrhosis  Diagnoses RELATED to the terminal prognosis: Pancytopenia-unclear etiology but transfusion dependent, recurrent large volume ascites, protein malnutrition  Other Diagnoses:      Shaquille Dhillon is a 66y.o. year old who was admitted to [address] [place] [address]. Patient admitted to [facility] with recurrent issues with alcoholic cirrhosis and pancytopenia. Patient with large volume ascites and during admission he had large volume paracentesis of 11 liters. He has a bilirubin of 5, INR of 1.7 and albumin of 2.2. In addition, he has become transfusion dependent for suspected a type of MDS-been seen by both Dr. Cynthia Goodpasture and [facility] hematologist. Had bone marrow done and was not clear on etiology but started on high dose prednisone(1 mg/kg) by [facility]. Unfortunately the prednisone had no impact on his pancytopenia. When admitted to the hospital, he was found to have H/H of 5.8/16.6, WBC of 2.3, and Platelets down to 15. Despite 4-5 units of PRBC, his H/H only climbed to 7.9/22.5. He becomes less responsive to the PRBC. Multiple discussions had about goals and patient agreed to transition to hospice care. A peritoneal drain was placed on 3/21 with the plan on discharge home with Hospice. Unfortunately, on the morning of discharge, he remained TVMQFSPAFNA(40'Q systolic) and symptomatic(light headed/near syncope).  In addition, he continued to have high output issues with leakage from his drain and it was decided to admit him to [facility] level of care to since he has significant nursing care issues with his drain placement. Patient transferred to [facility] for ongoing care. SH-patient lives alone. He has not family. His neighbor who lives above him does check on him. His MPOA is Harleen Garcia and I have talked with him several times. He plans on flying from [facility] on 3/28. Tyestuardo Jose has the ability to make both financial and medical decisions for patient. The patient's principle diagnosis has resulted in liver failure, ascites      Functionally, the patient's Karnofsky and/or Palliative Performance Scale has declined over a period of weeks and is estimated at 30. The patient is dependent on the following ADLs:unable to ambulate on his own    Objective information that support this patients limited prognosis includes:   See labs above      The patient/family chose comfort measures with the support of Hospice. HOSPICE DIAGNOSES   Active Symptoms:  1. Large volume ascites with drain placement and leakage  2. Symptomatic hypotension-related to anemia and drainage of ascites  3. Generalized debility  4. Difficult disposition secondary to living alone     PLAN   1. Patient will be admitted [facility] level of care for frequent nursing assessment and management of his large volume ascites and drain placement  2. Comfort order set placed for symptoms  3. Plan reviewed with bedside nurse via phone, patient and also discussed with Gopal Eddy([facility], phone number [phone]  4. Please review plans with Gopal Garcia who hopefully will travel here on 3/28  5. SW consult to assist with disposition options-he does not want to move from his current house/living situation    6.  and SW to support family needs  7.  Disposition: too soon to tell    Prognosis estimated based on 03/22/19 clinical assessment is:   [] Hours to Days    [x] Days to Weeks    [] Other:    Communicated plan of care with: Hospice Case Manager; Hospice IDT; Care Team     GOALS OF CARE     Patient/Medical POA stated Goal of Care: Hospice care    [] I have reviewed and/or updated ACP information in the Advance Care Planning Navigator. This information is available in the [address] link in the patient's chart header. Primary Decision Maker (Health Care Agent): John Roque in the chart    Resuscitation Status: DNR  If DNR is there a Durable DNR on file? : [x] Yes [] No (If no, complete Durable DNR)    HISTORY     History obtained from: patient    Caprice Sizer  The patient is:   [x] Verbal  [] Nonverbal  [] Unresponsive    HPI/SUBJECTIVE:  Patient is feeling very weak and like he is going to pass out when he sits on the side of the bed.  Lots of leakage from drain       REVIEW OF SYSTEMS     The following systems were: [x] reviewed  [] unable to be reviewed    Positive ROS include:  Constitutional: fatigue, weakness,    Gastrointestinal:poor appetite,    Psychiatric:anxiety,   Endocrine:     Adult Non-Verbal Pain Assessment Score: denies pain    Face  [] 0   No particular expression or smile  [] 1   Occasional grimace, tearing, frowning, wrinkled forehead  [] 2   Frequent grimace, tearing, frowning, wrinkled forehead    Activity (movement)  [] 0   Lying quietly, normal position  [] 1   Seeking attention through movement or slow, cautious movement  [] 2   Restless, excessive activity and/or withdrawal reflexes    Guarding  [] 0   Lying quietly, no positioning of hands over areas of body  [] 1   Splinting areas of the body, tense  [] 2   Rigid, stiff    Physiology (vital signs)  [] 0   Stable vital signs  [] 1   Change in any of the following: SBP > 20mm Hg; HR > 20/minute  [] 2   Change in any of the following: SBP > 30mm Hg; HR > 25/minute    Respiratory  [] 0   Baseline RR/SpO2, compliant with ventilator  [] 1   RR > 10 above baseline, or 5% drop SpO2, mild asynchrony with ventilator  [] 2   RR > 20 above baseline, or 10% drop SpO2, asynchrony with ventilator     FUNCTIONAL ASSESSMENT     Palliative Performance Scale (PPS):30       PSYCHOSOCIAL/SPIRITUAL ASSESSMENT     Active Problems:    * No active hospital problems. *    Past Medical History:   Diagnosis Date    Anemia     Cirrhosis ([address]     History of vascular access device 10/22/2018    [facility] VAT - 4 FR, R basilic, 38 cm for DIVA (outpatient infusions)    Pancytopenia ([address]     Patella fracture 2007    Right knee      Past Surgical History:   Procedure Laterality Date    COLONOSCOPY N/A 9/28/2018    COLONOSCOPY performed by Raul Yoder MD at [address] HX APPENDECTOMY      HX HERNIA REPAIR Bilateral     HX RETINAL DETACHMENT REPAIR      IR INSERT CATH PLEURAL INDWELL  3/21/2019    IR INSERT TUNL CVC W PORT OVER 5 YEARS  2/1/2019      Social History     Tobacco Use    Smoking status: Former Smoker    Smokeless tobacco: Never Used   Substance Use Topics    Alcohol use: Yes     Comment: rare     Family History   Problem Relation Age of Onset    Anemia Mother     Cancer Father 61        Lung      No Known Allergies   Current Facility-Administered Medications   Medication Dose Route Frequency    senna-docusate (PERICOLACE) 8.6-50 mg per tablet 2 Tab  2 Tab Oral BID PRN        PHYSICAL EXAM     Wt Readings from Last 3 Encounters:   03/18/19 157 lb 3.2 oz (71.3 kg)   02/04/19 177 lb 4 oz (80.4 kg)   01/21/19 190 lb (86.2 kg)       Visit Vitals  BP 92/50 (BP 1 Location: Left arm, BP Patient Position: Supine; Post activity)   Pulse 79   Temp 98.3 °F (36.8 °C)   Resp 18   SpO2 97%       Supplemental O2  [] Yes  [x] NO  Last bowel movement: 3/21    Currently this patient has:  [] Peripheral IV [] PICC  [x] PORT [] ICD    [] Brenner Catheter [] NG Tube   [] PEG Tube    [] Rectal Tube [] Drain  [] Other:     Constitutional: thin, ill appearing, alert and oriented  Eyes: icterus  ENMT: dry  Cardiovascular: rrr  Respiratory: CTA  Gastrointestinal: soft, moderate ascites with drain in right lower quadrant area, leakage from around this area  Musculoskeletal:muscle wasting  Skin:jaundice  Neurologic:nl  Psychiatric: tearful at times  Other:       Pertinent Lab and or Imaging Tests:  Lab Results   Component Value Date/Time    Sodium 128 (L) 03/21/2019 02:07 AM    Potassium 5.0 03/21/2019 02:07 AM    Chloride 97 03/21/2019 02:07 AM    CO2 26 03/21/2019 02:07 AM    Anion gap 5 03/21/2019 02:07 AM    Glucose 137 (H) 03/21/2019 02:07 AM    BUN 31 (H) 03/21/2019 02:07 AM    Creatinine 0.50 (L) 03/21/2019 02:07 AM    BUN/Creatinine ratio 62 (H) 03/21/2019 02:07 AM    GFR est AA >60 03/21/2019 02:07 AM    GFR est non-AA >60 03/21/2019 02:07 AM    Calcium 8.0 (L) 03/21/2019 02:07 AM     Lab Results   Component Value Date/Time    Protein, total 5.4 (L) 03/18/2019 02:43 AM    Albumin 2.2 (L) 03/18/2019 02:43 AM           Total time: 60  Counseling / coordination time:   > 50% counseling / coordination?:

## 2019-03-22 NOTE — PROGRESS NOTES
1650: 
TRANSFER - OUT REPORT: 
 
Verbal report given to No Ann(name) on Chris Bazzi  being transferred to [facility] (unit) for routine progression of care Report consisted of patients Situation, Background, Assessment and  
Recommendations(SBAR). Information from the following report(s) SBAR, Kardex, ED Summary, Intake/Output, MAR, Accordion and Recent Results was reviewed with the receiving nurse. Lines:  
Venous Access Device Angiodynamics Port 02/01/19 Upper chest (subclavicular area, right (Active) Central Line Being Utilized Yes 3/22/2019  9:59 AM  
Criteria for Appropriate Use Limited/no vessel suitable for conventional peripheral access 3/22/2019  9:59 AM  
Site Assessment Clean, dry, & intact 3/22/2019  9:59 AM  
Date of Last Dressing Change 03/17/19 3/22/2019  9:59 AM  
Dressing Status Clean, dry, & intact 3/22/2019  9:59 AM  
Dressing Type Tape;Transparent 3/22/2019  9:59 AM  
Action Taken Tubing changed 3/20/2019  8:39 PM  
Date Accessed (Medial Site) 03/17/19 3/20/2019  8:39 PM  
Access Time (Medial Site) 1252 3/17/2019  9:37 AM  
Access Needle Size (Site #1) 20 G 3/17/2019  9:37 AM  
Access Needle Length (Medial Site) 1 inch 3/17/2019  9:37 AM  
Positive Blood Return (Medial Site) Yes 3/22/2019  9:59 AM  
Action Taken (Medial Site) Infusing 3/20/2019  8:00 AM  
Alcohol Cap Used Yes 3/20/2019  8:00 AM  
  
 
Opportunity for questions and clarification was provided. RN inserted 300 units heparin into pt's port. RN went over discharge instructions with pt and offered time for any questions. No further questions at this time. RN packed pt's belongings up. 
 
21 [phone]: Patient transported with: 
EMS and personal belongings.

## 2019-03-22 NOTE — PROGRESS NOTES
03/22/19 Met with patient who is agreeable to Hospice. TC to [facility]. They have accepted him and Liaison Carla Ramirez on her way to the hospital. She also reported that patient could get some PT. Patient going home alone. She has several set and sidewalk and hill before getting to his apartment. Aproximately a total of 8 steps. He has a cane at home. IM letter signed and in chart. Earliest ambulance  is 2:00pm.  
 
3:42 PM 
 
Notified by Palliative that patient cannot go home. Notified [facility]. [facility] [employer] approved him to go there. Notified [facility]. Ambulance to pick him at 5:00pm now. RN to call report to [phone]. Patient agreeable and signed a choice letter. FRANCIA Murray

## 2019-03-22 NOTE — HOSPICE
Patient to be transported to [facility] under the service of [facility]. Report given to Mini by this RN, consents signed faxed.  [facility] to arrive for transport at 5pm.

## 2019-03-22 NOTE — PROGRESS NOTES
Palliative Medicine Consult Patient Name: Akshat Kramer YOB: 1945 Date of Initial Consult: 3/18/19 Reason for Consult: care decisions Requesting Provider: Dr. Chelsy Stewart  
Primary Care Physician: None SUMMARY:  
Akshat Kramer is a 66 y.o. with a past history of cirrhosis(likely alcoholic rather than cryptogenic), pancytopenia(been evaluated by Dr. Alex Summers and Dr. Paula Simpson)), recurrent ascites, prior alcohol and tobacco abuse, who was admitted on 3/17/2019 from home with a diagnosis of ascites/weakness/panytopenia. Current medical issues leading to Palliative Medicine involvement include: care decisions. Chart reviewed/HPI-patient admitted to the hospital for similar issues as admission in February with weakness, anemia, ascites. Patient had been seen at [facility](notes in the chart under media) and started on high dose steroids(1 mg/kg) on 2/26/19 by Dr. 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 Patient states has been weaker over the past 2 weeks but was still driving, fixing his meals and doing his laundry. SH-lives alone. Retired from [employer] at age 46. No family. Friends will check on him. His MPOA(Dheeraj Eddy) lives in [place] PALLIATIVE DIAGNOSES:  
1. GOC discussion 2. DNR review 3. ACP review 4. Cirrhosis with recurrent ascites 5. Pancytopenia 6. Debility PLAN:  
1. Letty([facility]) and met with patient. He had drain placed yesterday but having significant leakage from the drain and he is feeling very lightheaded when attempting to stand. Do not feel safe discharge home given that he lives at home alone. Denies any pain. 2. GOC-he continue to agree to transition to hospice care but not a safe discharge at this time. [facility] and explained the situation.  They have a contract with the [facility] and I think that is the safest option at this time-needs nursing management of symptoms and new drain, especially with this ongoing leakage-drain appears ok but I think leakage secondary to his massive ascites. Patient agrees with placement at [facility] if bed available 3. AMD-in the chart with Jordan Juarez as MPOA. I talked with Delores Brady earlier today and at that time, the plan was for discharge home. Will update him after we have update plan. Delores Brady has a flight coming on 3/28 but does not want to tell Judah-going to surprise him 4. Code status-reviewed with him and he is clear on DNAR/DNI. We completed a DDNR on 3/18. Copy scanned into chart 5. Symptom management-continues to deny pain. Ascites remains difficult to manage despite drain placement-lightheaded/hypotenion has limited other medications 6. Psychosocial-he states has had a good life with his travels with [employer]. Limited social interactions at this time. No spiritual concerns 7. Discussed with Yuliana(Hospice), bedside nurse, CM, Dr. James Castaneda, and [facility] 8. Initial consult note routed to primary continuity provider 9. Communicated plan of care with: Palliative IDT ADDENDUM: 
After several discussions, patient will be transferred to [facility] with [facility]. I discussed the plan with patient and I also updated Dheeraj(MPOA). Delores Brady made aware of potential financial costs at the [facility] as well as he may not be able to donate his body with his ascites. Delores Brady able to access his finances and also knows that patient wants cremation if unable to donate his body. Discussed with Dr. Janki Wells and Yuliana([facility]) GOALS OF CARE / TREATMENT PREFERENCES:  
[====Goals of Care====] GOALS OF CARE: 
Patient/Health Care Proxy Stated Goals: Other (comment)(considering hospice care) TREATMENT PREFERENCES:  
Code Status: DNR Advance Care Planning: 
Advance Care Planning 3/18/2019 Patient's Healthcare Decision Maker is: Named in scanned ACP document Primary Decision Maker Name -  
Primary Decision Maker Relationship to Patient -  
 Confirm Advance Directive Yes, on file Patient Would Like to Complete Advance Directive - Does the patient have other document types Do Not Resuscitate Medical Interventions: Limited additional interventions Other Instructions: The palliative care team has discussed with patient / health care proxy about goals of care / treatment preferences for patient. 
[====Goals of Care====] HISTORY:  
 
History obtained from: chart, patient, CHIEF COMPLAINT: weakness HPI/SUBJECTIVE: The patient is:  
[x] Verbal and participatory [] Non-participatory due to:  
Patient feels better since paracentesis, denies any pain 3/19-feeling a little better today. Ascites coming back 3/20-denies pain and sitting in bedside chair 3/21-a little agitated because he wants to eat.  
 
3/22-appears weaker, attempting to stand without success. Becomes tearful when discussing. Clinical Pain Assessment (nonverbal scale for severity on nonverbal patients):  
Clinical Pain Assessment Severity: 0 FUNCTIONAL ASSESSMENT:  
 
Palliative Performance Scale (PPS): PPS: 50 PSYCHOSOCIAL/SPIRITUAL SCREENING:  
 
Advance Care Planning: 
Advance Care Planning 3/18/2019 Patient's Healthcare Decision Maker is: Named in scanned ACP document Primary Decision Maker Name -  
Primary Decision Maker Relationship to Patient -  
Confirm Advance Directive Yes, on file Patient Would Like to Complete Advance Directive - Does the patient have other document types Do Not Resuscitate Any spiritual / Restorationist concerns: 
[] Yes /  [x] No 
 
Caregiver Burnout: 
[] Yes /  [] No /  [x] No Caregiver Present Anticipatory grief assessment:  
[x] Normal  / [] Maladaptive ESAS Anxiety: Anxiety: 0 
 
ESAS Depression: Depression: 0 REVIEW OF SYSTEMS:  
 
Positive and pertinent negative findings in ROS are noted above in HPI. The following systems were [x] reviewed / [] unable to be reviewed as noted in HPI Other findings are noted below. Systems: constitutional, ears/nose/mouth/throat, respiratory, gastrointestinal, genitourinary, musculoskeletal, integumentary, neurologic, psychiatric, endocrine. Positive findings noted below. Modified ESAS Completed by: provider Fatigue: 3 Drowsiness: 0 Depression: 0 Pain: 0 Anxiety: 0 Nausea: 0 Anorexia: 0 Dyspnea: 1 Constipation: No  
     
 
 
 PHYSICAL EXAM:  
 
From RN flowsheet: 
Wt Readings from Last 3 Encounters:  
03/18/19 157 lb 3.2 oz (71.3 kg) 02/04/19 177 lb 4 oz (80.4 kg) 01/21/19 190 lb (86.2 kg) Blood pressure 99/54, pulse 85, temperature 97.8 °F (36.6 °C), resp. rate 18, height 5' 10\" (1.778 m), weight 157 lb 3.2 oz (71.3 kg), SpO2 99 %. Pain Scale 1: Numeric (0 - 10) Pain Intensity 1: 0 Last bowel movement, if known:  
 
Constitutional: thin, tired appearing, weaker Eyes: pupils equal, anicteric ENMT: no nasal discharge, moist mucous membranes Cardiovascular: regular rhythm, distal pulses intact Respiratory: breathing not labored, symmetric Gastrointestinal: soft non-tender, drain in place, leakage around the  remains,+bowel sounds Musculoskeletal: no deformity, no tenderness to palpation, atropy Skin: warm, dry Neurologic: following commands, moving all extremities Psychiatric: full affect, no hallucinations Other: 
 
 
 HISTORY:  
 
Active Problems: 
  Cirrhosis ([address] () Anemia (3/17/2019) Thrombocytopenia ([address] (3/17/2019) Pleural effusion, right (3/17/2019) Ascites (3/17/2019) Past Medical History:  
Diagnosis Date  Anemia  Cirrhosis ([address]  History of vascular access device 10/22/2018 [facility] VAT - 4 FR, R basilic, 38 cm for DIVA (outpatient infusions)  Pancytopenia ([address]  Patella fracture 2007 Right knee Past Surgical History:  
Procedure Laterality Date  COLONOSCOPY N/A 9/28/2018 COLONOSCOPY performed by Rosemarie Acuna MD at [address]  HX HERNIA REPAIR Bilateral   
 HX RETINAL DETACHMENT REPAIR    
 IR INSERT CATH PLEURAL INDWELL  3/21/2019  IR INSERT TUNL CVC W PORT OVER 5 YEARS  2/1/2019 Family History Problem Relation Age of Onset  Anemia Mother  Cancer Father 61 Lung History reviewed, no pertinent family history. Social History Tobacco Use  Smoking status: Former Smoker  Smokeless tobacco: Never Used Substance Use Topics  Alcohol use: Yes Comment: rare No Known Allergies Current Facility-Administered Medications Medication Dose Route Frequency  heparin (porcine) pf 300 Units  300 Units InterCATHeter NOW  
 0.9% sodium chloride infusion 250 mL  250 mL IntraVENous PRN  
 0.9% sodium chloride infusion 250 mL  250 mL IntraVENous PRN  pantoprazole (PROTONIX) tablet 40 mg  40 mg Oral ACB  sodium chloride (NS) flush 5-40 mL  5-40 mL IntraVENous Q8H  
 sodium chloride (NS) flush 5-40 mL  5-40 mL IntraVENous PRN  
 HYDROmorphone (PF) (DILAUDID) injection 0.5 mg  0.5 mg IntraVENous Q4H PRN  
 naloxone (NARCAN) injection 0.4 mg  0.4 mg IntraVENous PRN  
 diphenhydrAMINE (BENADRYL) injection 12.5 mg  12.5 mg IntraVENous Q4H PRN  
 ondansetron (ZOFRAN) injection 4 mg  4 mg IntraVENous Q6H PRN  
 docusate sodium (COLACE) capsule 100 mg  100 mg Oral BID  predniSONE (DELTASONE) tablet 70 mg  70 mg Oral DAILY WITH BREAKFAST  
 
 
 
 LAB AND IMAGING FINDINGS:  
 
Lab Results Component Value Date/Time WBC 2.4 (L) 03/21/2019 02:07 AM  
 HGB 7.9 (L) 03/21/2019 02:07 AM  
 PLATELET 16 (LL) 36/28/2759 02:07 AM  
 
Lab Results Component Value Date/Time  Sodium 128 (L) 03/21/2019 02:07 AM  
 Potassium 5.0 03/21/2019 02:07 AM  
 Chloride 97 03/21/2019 02:07 AM  
 CO2 26 03/21/2019 02:07 AM  
 BUN 31 (H) 03/21/2019 02:07 AM  
 Creatinine 0.50 (L) 03/21/2019 02:07 AM  
 Calcium 8.0 (L) 03/21/2019 02:07 AM  
 Magnesium 1.7 03/21/2019 02:07 AM  
 Phosphorus 2.0 (L) 03/21/2019 02:07 AM  
  
Lab Results Component Value Date/Time AST (SGOT) 38 (H) 03/18/2019 02:43 AM  
 Alk. phosphatase 361 (H) 03/18/2019 02:43 AM  
 Protein, total 5.4 (L) 03/18/2019 02:43 AM  
 Albumin 2.2 (L) 03/18/2019 02:43 AM  
 Globulin 3.2 03/18/2019 02:43 AM  
 
Lab Results Component Value Date/Time INR 1.7 (H) 03/18/2019 02:43 AM  
 Prothrombin time 16.8 (H) 03/18/2019 02:43 AM  
 aPTT 33.9 (H) 03/18/2019 02:43 AM  
  
Lab Results Component Value Date/Time Iron 243 (H) 09/27/2018 04:14 AM  
 TIBC 252 09/27/2018 04:14 AM  
 Iron % saturation 96 (H) 09/27/2018 04:14 AM  
 Ferritin 537 (H) 09/27/2018 04:14 AM  
  
No results found for: PH, PCO2, PO2 No components found for: [place] Lab Results Component Value Date/Time  (H) 04/23/2016 12:43 AM  
 CK - MB 8.5 (H) 04/20/2016 09:58 AM  
  
 
 
   
 
Total time: 35 
Counseling / coordination time, spent as noted above: 30 
> 50% counseling / coordination?: yes Prolonged service was provided for  []30 min   []75 min in face to face time in the presence of the patient, spent as noted above. Time Start:  
Time End:  
Note: this can only be billed with 81546 (initial) or 43954 (follow up). If multiple start / stop times, list each separately.

## 2019-03-22 NOTE — PROGRESS NOTES
[facility] [place] at [place] [address] [facility], [address] [place] [address] W: [phone]  F: [phone] Reason for Visit:  
Angel Loza is a 66 y.o. male who is seen in consultation at the request of   for evaluation of severe anemia, thrombocytopenia Hematology / Oncology Treatment History:  
Followed at [facility]: Review of records as below. Recent hospitalization at [facility]: admitted on 2/12/2019- 2/19/2019; admitted with symptomatic anemia; expedited workup for pancytopenia; consults included: endocrinology, hepatology, ; underwent 2 bone marrow bxs (2/13/19 and 2/19/2019) 
 
2/26/2019 Hem/Onc visit; Dr Schuyler Horne; pancytopenia: no obvious etiology; no obvious evidence of MDS, there was incidental evidence hemophagocytosis but not enough to convincingly call [place]. Red cell aplasia is on the differential as is possible autoimmune pancytopenia based on a low OSCAR titer or cytopenias due to cirrhosis. Initiated an empiric course of prednisone 1 mg/kg to see if counts improve as bone marrow studies were being reviewed. Cirrhosis: presumably cryptogenic vs ETOH induced; dedicated liver phase CT scan negative for hepatic mass 3/06/2019 Hepatology visit/ Dr Guy/ Cirrhosis: significantly decompensated alcoholic cirrhotic. Overall, his prognosis is not good. He is not a transplant candidate. There is no medication that we can do to fix this. He is going to be paracentesis dependent unless his sodium should improve. Should be considered for palliative care. Following with labs and if Na improved would try diuretics or not. History of Present Illness:  
 
Angel Loza was admitted on 3/17/2019 from the ED when he presented with c/o weakness, fatigue and difficulty ambulating associated with worsening abd distention. Seen by Dr Schuyler Horne at [facility]. ED labs Hgb 5.8  plts 37 CXR rt pleural effusion. Admitted for further eval and management. 3/18 /19 Paracentesis 11,060 ml removed. Mr Veronika Redman reports no energy at home. Occasionally SOB. Denies any pain at present. Feeling much better since fluid removed from abd. Lives alone. Fixes own meals; vegetables and chicken. Tries to eat 2 meals per day. Uses cane at home. Energy usually \"remains parked\". \" I have had a good 76 yrs\". No family at present. Interval History:  
 
Pertioneal drain placed yesterday. Some leakage overnight. He is frustrated that he hasn't been allowed to ambulate while here, worried about going home. No family at bedside. Current Facility-Administered Medications Medication Dose Route Frequency  0.9% sodium chloride infusion 250 mL  250 mL IntraVENous PRN  
 0.9% sodium chloride infusion 250 mL  250 mL IntraVENous PRN  pantoprazole (PROTONIX) tablet 40 mg  40 mg Oral ACB  sodium chloride (NS) flush 5-40 mL  5-40 mL IntraVENous Q8H  
 sodium chloride (NS) flush 5-40 mL  5-40 mL IntraVENous PRN  
 HYDROmorphone (PF) (DILAUDID) injection 0.5 mg  0.5 mg IntraVENous Q4H PRN  
 naloxone (NARCAN) injection 0.4 mg  0.4 mg IntraVENous PRN  
 diphenhydrAMINE (BENADRYL) injection 12.5 mg  12.5 mg IntraVENous Q4H PRN  
 ondansetron (ZOFRAN) injection 4 mg  4 mg IntraVENous Q6H PRN  
 docusate sodium (COLACE) capsule 100 mg  100 mg Oral BID  predniSONE (DELTASONE) tablet 70 mg  70 mg Oral DAILY WITH BREAKFAST No Known Allergies Review of Systems: A complete review of systems was obtained, negative except as described above. Physical Exam:  
 
Visit Vitals BP 92/55 (BP 1 Location: Left arm, BP Patient Position: At rest) Pulse 78 Temp 97.8 °F (36.6 °C) Resp 18 Ht 5' 10\" (1.778 m) Wt 157 lb 3.2 oz (71.3 kg) SpO2 99% BMI 22.56 kg/m² ECOG PS: 3 General: disheveled; cachetic; No distress Eyes:  icteric sclerae PERRLA, HENT: Atraumatic with normal appearance of ears and nose; OP clear Neck: Supple; no thyromegaly Lymphatic: No cervical, supraclavicular, or axillary adenopathy Respiratory: anteriorly CTAB, normal respiratory effort CV: Normal rate, regular rhythm, no murmurs, no peripheral edema GI: distended,Soft, nontender,  no masses, no hepatomegaly, no splenomegaly MS:  Digits without clubbing or cyanosis. Skin: No rashes, ecchymoses, or petechiae. Normal temperature, turgor, and texture. Neuro/Psych: Alert, oriented, appropriate affect, normal judgment/insight Results:  
 
Lab Results Component Value Date/Time WBC 2.4 (L) 03/21/2019 02:07 AM  
 HGB 7.9 (L) 03/21/2019 02:07 AM  
 HCT 22.5 (L) 03/21/2019 02:07 AM  
 PLATELET 16 (LL) 41/63/7504 02:07 AM  
 MCV 82.1 03/21/2019 02:07 AM  
 ABS. NEUTROPHILS 3.9 03/17/2019 10:04 AM  
 
Lab Results Component Value Date/Time Sodium 128 (L) 03/21/2019 02:07 AM  
 Potassium 5.0 03/21/2019 02:07 AM  
 Chloride 97 03/21/2019 02:07 AM  
 CO2 26 03/21/2019 02:07 AM  
 Glucose 137 (H) 03/21/2019 02:07 AM  
 BUN 31 (H) 03/21/2019 02:07 AM  
 Creatinine 0.50 (L) 03/21/2019 02:07 AM  
 GFR est AA >60 03/21/2019 02:07 AM  
 GFR est non-AA >60 03/21/2019 02:07 AM  
 Calcium 8.0 (L) 03/21/2019 02:07 AM  
 
Lab Results Component Value Date/Time Bilirubin, total 5.0 (H) 03/18/2019 02:43 AM  
 ALT (SGPT) 92 (H) 03/18/2019 02:43 AM  
 AST (SGOT) 38 (H) 03/18/2019 02:43 AM  
 Alk. phosphatase 361 (H) 03/18/2019 02:43 AM  
 Protein, total 5.4 (L) 03/18/2019 02:43 AM  
 Albumin 2.2 (L) 03/18/2019 02:43 AM  
 Globulin 3.2 03/18/2019 02:43 AM  
 
Lab Results Component Value Date/Time  Reticulocyte count 0.3 (L) 01/31/2019 04:27 AM  
 Iron % saturation 96 (H) 09/27/2018 04:14 AM  
 TIBC 252 09/27/2018 04:14 AM  
 Ferritin 537 (H) 09/27/2018 04:14 AM  
 Vitamin B12 >2,000 (H) 09/26/2018 01:00 PM  
 Folate 19.1 09/26/2018 01:00 PM  
 Haptoglobin 105 09/26/2018 10:15 AM  
  09/26/2018 10:15 AM  
 TSH 4.45 (H) 09/27/2018 04:14 AM  
 OSCAR, Direct Positive (A) 09/27/2018 04:14 AM  
 Hep C  virus Ab Interp. NONREACTIVE 04/20/2016 09:58 AM  
 
Lab Results Component Value Date/Time INR 1.7 (H) 03/18/2019 02:43 AM  
 aPTT 33.9 (H) 03/18/2019 02:43 AM  
 
Lab Results Component Value Date/Time  09/26/2018 10:15 AM  
 AFP, Serum, Tumor Marker 3.3 09/27/2018 07:05 PM  
 
3/17/2019 XR CHEST IMPRESSION: 
Increased right-sided pleural effusion and atelectasis with diminished lung 
volumes on the right in comparison to the previous exam.. 3/18/2019 US GUIDE PARACENTESIS IMPRESSION:  
Successful ultrasound guided right lower quadrant paracentesis. 11,060 mL fluid 
drained. Albumin administered. Laboratory results pending. 3/19/2019 US ABD IMPRESSION:  
1. No evidence for portal venous thrombosis. 
  
2. Cirrhotic liver morphology. Small amount of right upper quadrant ascites. Right pleural effusion.  
  
3. Small adherent nonshadowing gallstones versus polyps measuring up to 3 mm.  
 
3/19/2019 CXR IMPRESSION: 
Diminished effusion and atelectasis on the right. .  
  
Assessment and Recommendations:  
 
1. Anemia, normocytic (s/p 4 units PRBCs) Idiopathic. Possibly related to his worsening cirrhosis, though the pathophysiologic explanation for this has remained elusive. Evaluated extensively at [address] [place] [address] without apparent etiology uncovered there either. Currently on empiric course of steroids prescribed by [facility]. After extensive discussions, the patient is willing to forgo further transfusion support in order to have the support of home hospice. 2. Thrombocytopenia Secondary to progressive cirrhosis. 3.Cirrhosis Salome Pichardo Seen by hepatology at [facility]Dr Guy: decompensated alcoholic cirrhotic; not a transplant candidate; paracentesis dependent GI following Now with peritoneal drain in place. Plan with home with hospice Given the changing goals of care (hospice), I will sign off and remain available if needed. Signed By: Ni Mendoza MD

## 2019-03-22 NOTE — PROGRESS NOTES
Palliative Medicine Code Status: DNR Advance Care Planning 3/18/2019 Patient's Healthcare Decision Maker is: Named in scanned ACP document Primary Decision Maker Name Chauncey Nuñez ( [phone]) Primary Decision Maker Relationship to Patient Friend Confirm Advance Directive Yes, on file Patient Would Like to Complete Advance Directive Already in place Does the patient have other document types Durable Do Not Resuscitate Patient / Family Encounter Documentation Participants (names): Pt, Palliative Medicine (Dr. Palmira Wilson, [address]) Narrative:  Palliative team was approached by RN prior to entering room with concerns re: discharge plan. Pt had been scheduled to go home today with [facility] but is now complaining of feeling \"woozy,\" is also having significant leaking with drain that was placed 3/21. Discussed plan of transferring [facility] to [facility] for [facility] before returning home if possible. Pt became tearful, expressed concern re: his mail, spoke of the difficulty of being alone. Pt did agree to try [facility] if bed is available. Psychosocial Issues Identified/ Resilience Factors: Pt does have an upstairs neighbor and a dear fried in [address] but support is otherwise limited. Pt was tearful at time of visit, overwhelmed by current circumstances. Goals of Care / Plan: Dr. Palmira Wilson spoke with [facility] liaison re: possible transfer to [facility] rather than home, awaiting decision from . Will continue to follow. Addendum:  [facility] declined to admit pt at [facility]. [facility] Dr. Palmira Wilson spoke with pt's He Ching by phone to give update, discussed pt's finances and cost of [facility] if pt no longer meets inpt hospice criteria. Dr. Palmira Wilson discussed need for \"Plan B\" if pt is not accepted by the [facility] at time of death; Charlie Escoto stated plan would be for cremation. Hospice RN aware of above. Thank you for including Palliative Medicine in the care of Mr. Kelechi Qiu. Romi  LEEROY Obrien, [facility]- 
[phone])

## 2019-03-22 NOTE — PROGRESS NOTES
Pt arrives from [facility] via medical transport. Pt is safely in bed, bed wheels locked, bed low, call light in reach. Pt educated on call light use. Tech got VS.  Pt is alert and oriented x 4. Pt denies pain, SOB, and nausea. RN called Dr. Julius Wilson to inform that pt is here. He will put in orders. 7:32 PM  RN gave report to [facility], RN. Pt safely in bed.

## 2019-03-22 NOTE — PALLIATIVE CARE DISCHARGE
Goals of Care/Treatment Preferences The Palliative Medicine team was consulted as part of your/your loved one's care in the hospital. Our team is a supportive service; we strive to relieve suffering and improve quality of life. We reviewed advance care planning information, which includes the following: 
Patient's Healthcare Decision Maker is[de-identified] Named in scanned ACP document Confirm Advance Directive: Yes, on file Does the patient have other document types: Do Not Resuscitate Patient/Health Care Proxy Stated Goals: Other (comment)(hospice care) We reviewed / discussed your code status as: DNR 
   Full Code means perform CPR in the event of cardiac arrest. 
    DNR means do NOT perform CPR in the event of cardiac arrest. 
    Partial Code means you have specific preferences, please discuss with your healthcare team. 
    Mya Octavia means this issue was not addressed / resolved during your stay Medical Interventions: Hospice care Other Instructions: You will be transferring to the [facility] under the care of [employer]. While in the hospital a Durable Do Not Resuscitate Order was completed, which should travel with you. When you are in a facility, this form should be placed on your chart. Once you are home, it is recommended that the [facility] form be placed in a visible location such as on the refrigerator or bedroom door. Because of the importance of this information, we are providing you with a printed copy to share with other healthcare providers after this hospitalization is complete.

## 2019-03-22 NOTE — PROGRESS NOTES
[facility] Medical Progress Note NAME: Akshat Kramer :  1945 MRM:  [id number] Date/Time: 3/22/2019  9:42 AM 
 
  
Assessment and Plan:  
 
Decompensated alcoholic cirrhosis / recurrent ascites - Failed to keep follow up with hepatologist. [place]to Back too low for aldactone/lasix.  s/p 11L paracentesis on , s/p IV albumin. GI and palliative care following. Given poor prognosis, patient will enter hospice. IR placed paracentesis port for comfort at home on hospice 
  
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  Nutrition following. Encourage PO 
  
Debilitated patient - Will provide support with [facility] PT/OT initially for safety on home hospice. Severe anemia / severe thrombocytopenia - POA, persistent, related to cirrhosis vs early MDS.  Patient saw [facility] hematology (Dr. Marivel Montoya) once, but refused to go back. [facility] was started on prednisone 70mg daily, but can stop on hospice.  Now s/p 4u RBCs. Hematology following 
  
R pleural effusion - Secondary to ascites. thoracentesis is not needed, and dangerous I setting of low platelets. 
  
Esophageal varices - PPI 
  
UTI - Dx on admit, but reject Dx based on dipheroid, likely contaminant. Subjective: Chief Complaint:  Weak. Tolerated drain port. Ready to DC home. ROS: 
(bold if positive, if negative) ConstipationAbd PainSOB/HORN Tolerating minimal PT Tolerating some Diet Objective:  
 
Last 24hrs VS reviewed since prior progress note. Most recent are: 
 
Visit Vitals BP 92/55 (BP 1 Location: Left arm, BP Patient Position: At rest) Pulse 78 Temp 97.8 °F (36.6 °C) Resp 18 Ht 5' 10\" (1.778 m) Wt 71.3 kg (157 lb 3.2 oz) SpO2 99% BMI 22.56 kg/m² SpO2 Readings from Last 6 Encounters:  
19 99% 19 100% 19 100% 19 99% 19 98% 19 96% Intake/Output Summary (Last 24 hours) at 3/22/2019 0169 Last data filed at 3/22/2019 0001 Gross per 24 hour Intake 180 ml Output 1300 ml Net -1120 ml Physical Exam: 
 
Gen:  Frail, cachectic, in no acute distress HEENT:  Pink conjunctivae, PERRL, hearing intact to voice, moist mucous membranes Neck:  Supple, without masses, thyroid non-tender Resp:  No accessory muscle use, clear breath sounds without wheezes rales or rhonchi 
Card:  No murmurs, normal S1, S2 without thrills, bruits or peripheral edema Abd:  Soft, non-tender, distended, normoactive bowel sounds are present, no mass Lymph:  No cervical or inguinal adenopathy Musc:  No cyanosis or clubbing Skin:  No rashes or ulcers, skin turgor is reduced Neuro:  Cranial nerves are grossly intact, severe motor weakness, follows commands vaguely Psych: Poor insight, oriented to person, place, anxious Telemetry reviewed:   normal sinus rhythm 
__________________________________________________________________ Medications Reviewed: (see below) Medications:  
 
Current Facility-Administered Medications Medication Dose Route Frequency  0.9% sodium chloride infusion 250 mL  250 mL IntraVENous PRN  
 0.9% sodium chloride infusion 250 mL  250 mL IntraVENous PRN  pantoprazole (PROTONIX) tablet 40 mg  40 mg Oral ACB  sodium chloride (NS) flush 5-40 mL  5-40 mL IntraVENous Q8H  
 sodium chloride (NS) flush 5-40 mL  5-40 mL IntraVENous PRN  
 HYDROmorphone (PF) (DILAUDID) injection 0.5 mg  0.5 mg IntraVENous Q4H PRN  
 naloxone (NARCAN) injection 0.4 mg  0.4 mg IntraVENous PRN  
 diphenhydrAMINE (BENADRYL) injection 12.5 mg  12.5 mg IntraVENous Q4H PRN  
 ondansetron (ZOFRAN) injection 4 mg  4 mg IntraVENous Q6H PRN  
 docusate sodium (COLACE) capsule 100 mg  100 mg Oral BID  predniSONE (DELTASONE) tablet 70 mg  70 mg Oral DAILY WITH BREAKFAST Lab Data Reviewed: (see below) Lab Review:  
 
Recent Labs  
  03/21/19 
0207 03/20/19 
0422 WBC 2.4* 2.3* HGB 7.9* 6.9*  
 HCT 22.5* 20.1*  
PLT 16* 15* Recent Labs  
  03/21/19 
0207 03/20/19 
0422 * 129*  
K 5.0 4.1 CL 97 98 CO2 26 25 * 133* BUN 31* 26* CREA 0.50* 0.44* CA 8.0* 8.0*  
MG 1.7 1.8 PHOS 2.0* 1.6* No results found for: Yas Holden No results for input(s): PH, PCO2, PO2, HCO3, FIO2 in the last 72 hours. No results for input(s): INR in the last 72 hours. No lab exists for component: INREXT, INREXT All Micro Results Procedure Component Value Units Date/Time CULTURE, BODY FLUID W Triston Scott 115 [[id number]]  (Abnormal) Collected:  03/18/19 1057 Order Status:  Completed Specimen:  Ascitic Fluid Updated:  03/21/19 1039 Special Requests: NO SPECIAL REQUESTS     
  GRAM STAIN NO WBC'S SEEN     
   NO ORGANISMS SEEN Culture result:    
  Culture performed on Fluid swab specimen NO GROWTH ON SOLID MEDIA AT 3 DAYS POSSIBLE STAPHYLOCOCCUS SPECIES, COAGULASE NEGATIVE ISOLATED FROM THIO BROTH ONLY  
     
 CULTURE, URINE [[id number]]  (Abnormal) Collected:  03/17/19 1614 Order Status:  Completed Specimen:  Cath Urine Updated:  03/19/19 1003 Special Requests: NO SPECIAL REQUESTS [place] Count --     
  [id number] COLONIES/mL Culture result: PROBABLE STAPHYLOCOCCUS SPECIES, COAGULASE NEGATIVE (2 COLONY TYPES) DIPHTHEROIDS (2 COLONY TYPES) URINE CULTURE HOLD SAMPLE [[id number]] Collected:  03/17/19 1614 Order Status:  Completed Specimen:  Urine from Serum Updated:  03/17/19 1635 Urine culture hold URINE ON HOLD IN MICROBIOLOGY DEPT FOR 3 DAYS. IF UNPRESERVED URINE IS SUBMITTED, IT CANNOT BE USED FOR ADDITIONAL TESTING AFTER 24 HRS, RECOLLECTION WILL BE REQUIRED. I have reviewed notes of prior 24hr. Other pertinent lab: none Total time spent with patient: 45 Minutes Care Plan discussed with: Patient, Care Manager, Nursing Staff, Consultant/Specialist and >50% of time spent in counseling and coordination of care Discussed:  Care Plan and D/C Planning Prophylaxis:  H2B/PPI Disposition:  Home w/Family and hospice 
        
___________________________________________________ Attending Physician: Carli Orellana MD

## 2019-03-23 NOTE — PROGRESS NOTES
NAME OF PATIENT:  Romy Cortes    LEVEL OF CARE:  GIP    REASON FOR GIP:   Medication adjustment that must be monitored 24/7 and Stabilizing treatment that cannot take place at home    *PATIENT REMAINS ELIGIBLE FOR [facility] LEVEL OF CARE AS EVIDENCED BY: (MUST BE ADDRESSED OF PATIENT GIP)      REASON FOR RESPITE:  NA    O2 SAFETY:  NA    FALL INTERVENTIONS PROVIDED:   Implemented/recommended use of non-skid footwear, Implemented/recommended use of fall risk identification flag to all team members, Implemented/recommended assistive devices and encouraged their use, Implemented/recommended resources for alarm system (personal alarm, bed alarm, call bell, etc.) , Implemented/recommended environmental changes (remove hazards, lower bed, improve lighting, etc.) and Implemented/recommended increased supervision/assistance    INTERDISPLINARY COMMUNICATION/COLLABORATION:  Physician, MSW, [place] and RN, CNA    NEW MEDICATION INITIATION DOCUMENTATION:  NA    Reason medication is being initiated:  BRAXTON    MD / Provider name consulted re: change in status / initiation of new medication:  NA    New Symptom(s):  NA    New Order(s):  NA    Name of the person notified of the changes:  NA    Name of person being taught:  NA    Instructions given:  NA    Side Effects taught:  NA    Response to teaching: NA      COMFORTABLE DYING MEASURE:  Is Patient/family satisfied with symptom level?  yes    DISCHARGE PLAN:  Discharge plans are pending. 19:15 Shift report received from [place] [id number]  19:30 Patient alert and oriention x4 sitting up in bed eating dinner and watching TV, denies pain or discomfort, lung sounds clear, no coughing noted. Skin warm to touch, Rt Aspira drain clamped with weeping around dressing area, dressing reinforced, will cont to monitor. Rt port dressing dry and intact. Continent of bladder and bowel last bm 3/16/19. Requesting to get some sleep. 21:00 Patient resting quietly, no signs of pain or discomfort noted.   22:00 Patient asleep, resting quietly, appears comfortable. 23:00 Patient resting quietly, voiced no c/o pain or discomfort. Will cont monitor. 00:00 Patient remains asleep, appears comfortable. 02:00 Patient awake voided 250 ml tea colored urine denies pain or discomfort, appears comfortable. 03:00 Patient resting quietly, no signs of pain or discomfort noted. Will cont to monitor. 04:00 Patient remains asleep, resting quietly. 05:00 Patient awake, denies pain or discomfort, Rt Aspira drain, dressing intact, fluid removed via peritoneal drain 1 liter tan colored drainage, tolerated with no difficulty BP-82/50. Will cont to monitor. 06:00 Patient sitting up in bed watching TV, provided juice, denies pain or discomfort,appears comfortable. 07:00 Report given.

## 2019-03-23 NOTE — PROGRESS NOTES
0700  Report received. 0745  Pt lying in bed, awake. Pt is alert and oriented. No co pain or dyspnea at this time. Lungs clear. NO cough noted. Pt is on RA. Abd with ascites, soft and nontender. Aspira drain site dressing is wet. chux is saturated. Pt reports last BM was over 1 week ago. Pt is continent of bowel and bladder. Pt has +2 edema in LE. Pt has a R chest PAC. Dressing is clear and intact. Pt is jaundiced with scattered bruising. 0800  Dressing changed on Aspira drain site. Site is without redness or warmth. Pt tolerated Dressing change without difficulty. 0900  Pt tolerated 100% of his breakfast.  No needs at this time. [address] in to visit. Level of care changed to Routine. 1230  Pt tolerated 100% of his lunch. No complaints at this time. [address], Mr Dunn's niece in to visit. 1500  Pt drain of 1000 cc of le drainage. BP 84/52. Dressing saturated. Pt tolerated without difficulty. 1730  Pt only tolerated bites of his dinner. No needs at this time. 1815  Pt sitting up in his bed watching TV. Pt requested a stool softner. pericolace provided. 1900  Report given. NAME OF PATIENT:  Edward Jin    LEVEL OF CARE:  GIP LOC  Changed to Routine. REASON FOR GIP:  Excessive drainage from Aspira drain site. Medication adjustment that must be monitored 24/7 and Stabilizing treatment that cannot take place at home    *PATIENT REMAINS ELIGIBLE FOR GIP LEVEL OF CARE AS EVIDENCED BY: (MUST BE ADDRESSED OF PATIENT GIP)  Pt requires frequent dressing changes to drain site. REASON FOR RESPITE:  n/a    O2 SAFETY:  RA.     FALL INTERVENTIONS PROVIDED:   Implemented/recommended use of fall risk identification flag to all team members, Implemented/recommended assistive devices and encouraged their use, Implemented/recommended resources for alarm system (personal alarm, bed alarm, call bell, etc.)  and Implemented/recommended environmental changes (remove hazards, lower bed, improve lighting, etc.)    INTERDISPLINARY COMMUNICATION/COLLABORATION:  Physician, MSW, [place] and RN, CNA    NEW MEDICATION INITIATION DOCUMENTATION:  No new medications initiated. Reason medication is being initiated:  n/a    MD / Provider name consulted re: change in status / initiation of new medication:  n/a    New Symptom(s):  n/a    New Order(s):  n/a    Name of the person notified of the changes:  n/a    Name of person being taught:  n/a    Instructions given:  n/a    Side Effects taught:  n/a    Response to teaching:  n/a      COMFORTABLE DYING MEASURE:  Is Patient/family satisfied with symptom level?  yes    DISCHARGE PLAN:  Pt will remain at the [facility] until help can be arranged to come into pt's home and assist with dressing change and daily care.

## 2019-03-23 NOTE — PROGRESS NOTES
Change to routine level of care, symptom issues managed. Continue to manage drain.   Yanick Aviles NP  \

## 2019-03-23 NOTE — PROGRESS NOTES
1900 Report received from [facility]. Pt is Routine level of care. Dx Cirrhosis of Liver. 1945  Pt is alert and oriented x3. He is dosing and watching TV. He denies any pain or discomfort. Aspira drain right abdomen, dressing dry and intact. Right Port accessed, dressing intact. 2130 Pt continues to rest quietly, appears to be sleeping. 26 Pt awakened for drainage of Aspira. Dressing saturated with abdominal fluid,gown and pad beneath right side. Drainage noted trickling from drain insertion site. BP 86/42. Heartrate 83. Obtained 300 ml light yellow fluid from drain. Pt tolerated well with no c/o abd discomfort. 2330 Pt alert, very pleasant. Request door closed so that he can go back to sleep. States passing flatus but no BM. Senna given earlier evening. 0100 Appears to be sleeping.  0300 Continues to sleep.  0400 Awake, has voided in urinal. 100 ml of tea colored urine. Denies discomfort.  0600 Resting quietly.  Appears to be sleeping.  0700 Off going report to [place], RN.        NAME OF PATIENT:  Aakash Fung    LEVEL OF CARE:  Routine    REASON FOR GIP:   na    *PATIENT REMAINS ELIGIBLE FOR GIP LEVEL OF CARE AS EVIDENCED BY: (MUST BE ADDRESSED OF PATIENT GIP)   na    REASON FOR RESPITE:  na    O2 SAFETY:  na    FALL INTERVENTIONS PROVIDED:   Implemented/recommended use of non-skid footwear, Implemented/recommended use of fall risk identification flag to all team members, Implemented/recommended assistive devices and encouraged their use, Implemented/recommended resources for alarm system (personal alarm, bed alarm, call bell, etc.) , Implemented/recommended environmental changes (remove hazards, lower bed, improve lighting, etc.) and Implemented/recommended increased supervision/assistance    INTERDISPLINARY COMMUNICATION/COLLABORATION:  Physician, MSW, [place] and RN, CNA    NEW MEDICATION INITIATION DOCUMENTATION:  na    Reason medication is being initiated:  hugh    MD / Provider name consulted re: change in status / initiation of new medication:  na    New Symptom(s):  na    New Order(s):  na    Name of the person notified of the changes:  na    Name of person being taught:  na    Instructions given:  na    Side Effects taught:  na  Response to teaching:  na      COMFORTABLE DYING MEASURE:  Is Patient/family satisfied with symptom level? Yes    DISCHARGE PLAN:  Remain Routine at [facility] until arrangements arranged for care at home.

## 2019-03-24 NOTE — PROGRESS NOTES
0700  Report received  2315  Pt lying in bed, awake jaundiced. No co pain or dyspnea at this time. Lungs clear. No cough noted. Pt is on RA. + bowel sounds. Abd slightly distended, soft, nontender. Last reported Bm was 3-16. Pt tool a stool softner last night and reports he is passing gas. Pt is voiding w/o difficulty in a urinal.  Pt has +1 edema in his lower ext. Skin is intact. Pt has a aspira drain. Dressing is intact. Slightly saturated. Pt wanted to wait to change it in a little while. Pt has a R chest PAC. Dressing is clear and intact. 0915  Pt tolerated 100% of his breakfast.  No needs at this time. 1100  Pt's dressing and sheets saturated. Aspira drain did not drain anything. Pt had leakage around a site below the aspira drain. Colostomy bag placed over the openings to collect the drainage. Linens changed, gown changed. Pt stated abd was tender but did not need any meds at this time. Pt tolerated the procedure well. 1230  Pt tolerated 80 % of his lunch. 1330   Pt asleep. 1500   Pt had a med BM  Pt turned and repositioned. 1600  Pt had another Large BM. Pt cleaned, repositioned and resting comfortably. 5  Pt had another bowel movement. Pt turned and repositioned. No other needs at this time. 1830  Pt tolerated 100% of his dinner. Pt stated he wanted to rest.  No needs at this time.     1900  Report given       NAME OF PATIENT:  Jay Mcmahan    LEVEL OF CARE:  Routine    REASON FOR GIP:   n/a    *PATIENT REMAINS ELIGIBLE FOR GIP LEVEL OF CARE AS EVIDENCED BY: (MUST BE ADDRESSED OF PATIENT GIP)  n/a      REASON FOR RESPITE:  n/a    O2 SAFETY:  Ra    FALL INTERVENTIONS PROVIDED:   Implemented/recommended assistive devices and encouraged their use, Implemented/recommended resources for alarm system (personal alarm, bed alarm, call bell, etc.)  and Implemented/recommended environmental changes (remove hazards, lower bed, improve lighting, etc.)    INTERDISPLINARY COMMUNICATION/COLLABORATION:  Physician, MSW, [place] and RN, CNA    NEW MEDICATION INITIATION DOCUMENTATION:  No new medications initiated. Reason medication is being initiated:  n/a    MD / Provider name consulted re: change in status / initiation of new medication:  n/a    New Symptom(s):  n/a    New Order(s):  n/a    Name of the person notified of the changes:  n/a    Name of person being taught:  n/a    Instructions given:  n/a    Side Effects taught:  n/a    Response to teaching:  n/a      COMFORTABLE DYING MEASURE:  Is Patient/family satisfied with symptom level?  yes    DISCHARGE PLAN:  Pt will remain at the [facility] until help can be arranged in his home.

## 2019-03-25 NOTE — PROGRESS NOTES
NAME OF PATIENT:  Millie Gomez    LEVEL OF CARE:  Routine    REASON FOR GIP:   na    *PATIENT REMAINS ELIGIBLE FOR GIP LEVEL OF CARE AS EVIDENCED BY: (MUST BE ADDRESSED OF PATIENT GIP)  na    REASON FOR RESPITE:  na    O2 SAFETY:  na    FALL INTERVENTIONS PROVIDED:   Implemented/recommended use of fall risk identification flag to all team members, Implemented/recommended resources for alarm system (personal alarm, bed alarm, call bell, etc.) , Implemented/recommended environmental changes (remove hazards, lower bed, improve lighting, etc.) and Implemented/recommended increased supervision/assistance    INTERDISPLINARY COMMUNICATION/COLLABORATION:  Physician, MSW, [place] and RN, CNA    NEW MEDICATION INITIATION DOCUMENTATION:  na    Reason medication is being initiated:  na    MD / Provider name consulted re: change in status / initiation of new medication: na    New Symptom(s):  na    New Order(s):  na    Name of the person notified of the changes:  na    Name of person being taught:  na    Instructions given:  na    Side Effects taught:  na    Response to teaching:  na      COMFORTABLE DYING MEASURE:  Is Patient/family satisfied with symptom level? Yes    DISCHARGE PLAN:  Remain Routine at [facility] until arrangements made for care at home. 1900 Report received from [facility]. Pt is Routine level of care. Dx Cirrhosis of the Liver with Ascites. 1930 Pt is resting quietly in bed, sleeping. Easily arouseable, denies pain or discomfort. Aspira drain right side covered with ostomy bag to collect constant drainage from the site at tube insertion. Fluid is straw colored and clear. Smaller ostomy bag covering puncture site below this area. Flange of tube appears 1 inch away from contact with skin. No output reported with use of Aspira drain connection. Abdomen distended but soft. 2000 Abd fluid leaking from around site continuously.  150 ml emptied from collection bag.  2200 Pt resting quietly, appears to be sleeping. 0000 Aroused easily from sleep. Collection bag at drain site emptied of 450 ml light yellow fluid. Voided 200 ml dark tea colored urine. Denies pain or discomfort. 0200 Resting quietly. Appears to be sleeping.  0400 Continues to rest quietly. 0530 Emptied 350 ml cloudy yellow fluid from collection bag. Total of 950 ml for this 12 hrs.

## 2019-03-25 NOTE — PROGRESS NOTES
0700  Report received. Pt Routine LOC. 0720  Pt lying in bed, asleep. Pt aroused to verbal stimuli. No co pain or dyspnea at this time. Lungs clear. No cough noted.  + bowel sounds. Last reported BM was yest.  Pt had several. Pt is incontinent of bowel. Pt is voiding small amts of dark urine in the urinal.  +1 edema in LE. Aspira drain site continues to drain clear yellow drainage in a urostomy bag. Aspira tubing is not sutured in. Rn unable to drain anything from the drain,   The site around the tubing drains continuously. Pt is able to tolerate PO intake and meds. 0900  Pt tolerated 75% of his breakfast without difficulty. 1110  Pt's aspira site drained. 400 cc clear yellow fluid removed. Pt resting, watching TV. No needs at this time. 26  Dr Tony Salcedo in to visit. Pt eating his lunch. No needs. Pt states he is confused about staying here. He thought he was going to be able to go home and have Hospice come into his home and care for him there. Dr Tony Salcedo explained that Hospice would come and visit, check on him, keep his meds straight and help with medical issues and that he could get a CNA to come in and help with a bath 1-2 times during the week. 1400  Pt resting. No complaints  1600  Pt reports he is having abd pain in the middle of his stomach. Pt states it feels like pressure. 4/10  Pt medicated with Morphine. 1630  Pt asleep. No facial grimacing at this time. 1745  Pt tolerated 50% of his dinner. No other needs at this time. 1830  Pt resting. 1900  Report given.       NAME OF PATIENT:  Dolly Arnold    LEVEL OF CARE:  Routine    REASON FOR GIP:  n/a    *PATIENT REMAINS ELIGIBLE FOR GIP LEVEL OF CARE AS EVIDENCED BY: (MUST BE ADDRESSED OF PATIENT GIP)  n/a      REASON FOR RESPITE:  n/a    O2 SAFETY:  n/a    FALL INTERVENTIONS PROVIDED:   Implemented/recommended use of fall risk identification flag to all team members, Implemented/recommended assistive devices and encouraged their use, Implemented/recommended resources for alarm system (personal alarm, bed alarm, call bell, etc.)  and Implemented/recommended environmental changes (remove hazards, lower bed, improve lighting, etc.)    INTERDISPLINARY COMMUNICATION/COLLABORATION:  Physician, MSW, [place] and RN, CNA    NEW MEDICATION INITIATION DOCUMENTATION:  No new medications initiated. Reason medication is being initiated:  n/a    MD / Provider name consulted re: change in status / initiation of new medication:  n/a    New Symptom(s):  n/a    New Order(s):  n/a    Name of the person notified of the changes:  n/a    Name of person being taught:  n/a    Instructions given:  n/a    Side Effects taught:  n/a    Response to teaching:  n/a      COMFORTABLE DYING MEASURE:  yes  Is Patient/family satisfied with symptom level? DISCHARGE PLAN:  Discharge plan to be discussed today with SW.

## 2019-03-25 NOTE — PROGRESS NOTES
Bed alarm on, bed low position, Wheels locked. Lights dim. Rn encouraged pt to reposition during the day to relieve pressure points.

## 2019-03-25 NOTE — PROGRESS NOTES
[facility] Help to Those in Need  [phone]    Patient Name: Trinidad Grady  YOB: 1945    Date of Provider Hospice Visit: 03/25/19    Level of Care:   [] General Inpatient (GIP)    [x] Routine   [] Respite    Current Location of Care:  [] [facility] [] [facility] [] [address] [] [facility] [x] Hospice House [facility]    IF [facility], patient referred from:  [] [facility] [x] [facility] [] [address] [] [facility] [] Home [] Other:     Date of Original Hospice Admission: 3/22/19  Hospice Medical Director at time of admission: Ottoniel Brown     Principle Hospice Diagnosis: alcoholic cirrhosis  Diagnoses RELATED to the terminal prognosis: Pancytopenia-unclear etiology but transfusion dependent, recurrent large volume ascites, protein malnutrition       HOSPICE SUMMARY   Do not cut and paste chart information other than imaging findings    Trinidad Grady is a 66y.o. year old who was admitted to [facility] with principal diagnosis of Alcoholic cirrhosis liver that has led to steady functional decline. Pt recently admitted with with recurrent issues of  Pancytopenia & large volume ascites that was drained. He has abnormal labs with  bilirubin of 5, INR of 1.7 and albumin of 2.2. In addition, he has become transfusion dependent for suspected a type of MDS resulting in anemia with H/H of 5.8/16.6, WBC of 2.3, and Platelets down to 15. Despite 4-5 units of PRBC, his H/H only climbed to 7.9/22.5. He becomes less responsive to the PRBC. A peritoneal drain has been placed that is leaking and pt is EVAIAGINNDZ(72'G systolic) and symptomatic(light headed/near syncope). Functionally, the patient's Karnofsky and/or Palliative Performance Scale has declined over a period of weeks and is estimated at 30. The patient is dependent on the following ADLs:unable to ambulate on his own       The patient/family chose comfort measures with the support of Hospice.      HOSPICE DIAGNOSES   Active Symptoms:  1.  Large volume ascites with drain placement and leakage  2. Symptomatic hypotension-related to anemia and drainage of ascites  3. Generalized debility  4. Difficult disposition secondary to living alone  5. Mild Abdominal discomfort on palpation      PLAN   1. Patient on routine level care  2. Comfort care medications to be continued  3. Continue drain care and management  4. SW consult to assist with disposition options-he does not want to move from his current house/living situation but understands on my explanation that he needs someone staying with him for assistance that cannot be provided through hospice however help can be given on a frequent basis for medical and personal care needs during the week. SW looking into placement in group home setting     5.  and SW to support family needs        Prognosis estimated based on 03/25/19 clinical assessment is:   [] Hours to Days    [x] Days to Weeks    [] Other:    Communicated plan of care with: Hospice Case Manager; Hospice IDT; Care Team     GOALS OF CARE     Patient/Medical POA stated Goal of Care:     [] I have reviewed and/or updated ACP information in the Advance Care Planning Navigator. This information is available in the [address] link in the patient's chart header.     Primary Decision Maker ([address]):     Resuscitation Status: DNR  If DNR is there a Durable DNR on file? : [x] Yes [] No (If no, complete Durable DNR)    HISTORY     History obtained from: patient    CHIEF COMPLAINT: I thought [address] was going to give me help  The patient is:   [x] Verbal  [] Nonverbal  [] Unresponsive    HPI/SUBJECTIVE:  Pt just finishing lunch; eating soft foods, abdomen slightly tender on touch/palpation  Drains are leaking; fluid pale yellow collecting in urostomy bag       REVIEW OF SYSTEMS     The following systems were: [x] reviewed  [] unable to be reviewed    Positive ROS include:  Constitutional: fatigue, weakness, in pain, short of breath  Ears/nose/mouth/throat: increased airway secretions  Respiratory:shortness of breath, wheezing  Gastrointestinal:poor appetite, nausea, vomiting, abdominal pain, constipation, diarrhea  Musculoskeletal:pain, deformities, swelling legs  Neurologic:confusion, hallucinations, weakness  Psychiatric:anxiety, feeling depressed, poor sleep  Endocrine:        FUNCTIONAL ASSESSMENT     Palliative Performance Scale (PPS):30%       PSYCHOSOCIAL/SPIRITUAL ASSESSMENT     Active Problems:    * No active hospital problems.  *    Past Medical History:   Diagnosis Date    Anemia     [facility] [address]     History of vascular access device 10/22/2018    [facility] VAT - 4 FR, R basilic, 38 cm for DIVA (outpatient infusions)    Pancytopenia ([facility] [address]     Patella fracture 2007    Right knee      Past Surgical History:   Procedure Laterality Date    COLONOSCOPY N/A 9/28/2018    COLONOSCOPY performed by Melvina Westbrook MD at [address] [facility] HX APPENDECTOMY      HX HERNIA REPAIR Bilateral     HX RETINAL DETACHMENT REPAIR      IR INSERT CATH PLEURAL INDWELL  3/21/2019    IR INSERT TUNL CVC W PORT OVER 5 YEARS  2/1/2019      Social History     Tobacco Use    Smoking status: Former Smoker    Smokeless tobacco: Never Used   Substance Use Topics    Alcohol use: Yes     Comment: rare     Family History   Problem Relation Age of Onset    Anemia Mother     Cancer Father 61        Lung      No Known Allergies   Current Facility-Administered Medications   Medication Dose Route Frequency    senna-docusate (PERICOLACE) 8.6-50 mg per tablet 2 Tab  2 Tab Oral BID PRN    sennosides (SENOKOT) 8.8 mg/5 mL syrup 8.8 mg  5 mL Oral BID PRN    bisacodyl (DULCOLAX) suppository 10 mg  10 mg Rectal DAILY PRN    ondansetron (ZOFRAN ODT) tablet 4 mg  4 mg Oral Q6H PRN    LORazepam (INTENSOL) 2 mg/mL oral concentrate 1 mg  1 mg Oral Q1H PRN    acetaminophen (TYLENOL) tablet 650 mg  650 mg Oral Q4H PRN    Or    acetaminophen (TYLENOL) solution 650 mg  650 mg Oral Q4H PRN    Or    acetaminophen (TYLENOL) suppository 650 mg  650 mg Rectal Q4H PRN    scopolamine (TRANSDERM-SCOP) 1 mg over 3 days 1 Patch  1 Patch TransDERmal Q72H PRN    glycopyrrolate (ROBINUL) injection 0.2 mg  0.2 mg IntraVENous Q4H PRN    morphine (ROXANOL) 100 mg/5 mL (20 mg/mL) concentrated solution 5 mg  5 mg Oral Q1H PRN        PHYSICAL EXAM     Wt Readings from Last 3 Encounters:   03/22/19 65.8 kg (145 lb)   03/18/19 71.3 kg (157 lb 3.2 oz)   02/04/19 80.4 kg (177 lb 4 oz)       Visit Vitals  BP (!) (P) 82/46   Pulse (P) 82   Temp (P) 98.6 °F (37 °C)   Resp (P) 18   Ht 5' 10\" (1.778 m)   Wt 65.8 kg (145 lb)   SpO2 (P) 98%   BMI 20.81 kg/m²       Supplemental O2  [] Yes  [x] NO  Last bowel movement:    Currently this patient has:  [] Peripheral IV [] PICC  [x] PORT [] ICD    [] Brenner Catheter [] NG Tube   [] PEG Tube    [] Rectal Tube [x] Drain  [] Other:     Constitutional: thin, ill appearing, alert and oriented  Eyes: icterus  ENMT: dry  Cardiovascular: regular heart sounds  Respiratory: decreased breath sounds  Gastrointestinal: soft, moderate ascites with drain in right lower quadrant area that was leaking so fluid being collected in a urostomy bag around it  Musculoskeletal:muscle wasting  Skin:jaundice  Neurologic:no obvious deficits  Psychiatric: tearful at times  Other: pt using urinal          Pertinent Lab and or Imaging Tests:  Lab Results   Component Value Date/Time    Sodium 128 (L) 03/21/2019 02:07 AM    Potassium 5.0 03/21/2019 02:07 AM    Chloride 97 03/21/2019 02:07 AM    CO2 26 03/21/2019 02:07 AM    Anion gap 5 03/21/2019 02:07 AM    Glucose 137 (H) 03/21/2019 02:07 AM    BUN 31 (H) 03/21/2019 02:07 AM    Creatinine 0.50 (L) 03/21/2019 02:07 AM    BUN/Creatinine ratio 62 (H) 03/21/2019 02:07 AM    GFR est AA >60 03/21/2019 02:07 AM    GFR est non-AA >60 03/21/2019 02:07 AM    Calcium 8.0 (L) 03/21/2019 02:07 AM     Lab Results   Component Value Date/Time    Protein, total 5.4 (L) 03/18/2019 02:43 AM    Albumin 2.2 (L) 03/18/2019 02:43 AM           Total time: 35mts  Counseling / coordination time: 20mts  > 50% counseling / coordination?: yes

## 2019-03-25 NOTE — PROGRESS NOTES
[facility] SW/CH VISIT    [facility] Hospice Chaplain Initial Visit for Routine Patient at the [facility]    I visited the room of the patient who is at the [facility] on routine status. This afternoon, he was in bed, awake and alert, watching television. He was not agitated, not restless, and appeared well medicated and comfortable. I introduced myself, discussed my role as  within the care team, and made an effort to ascertain the level of  Support which the patient desires. We spoke for an extended time as he provided me a detailed history of his work with [employer] and then [employer]. He is a member of [employer] and they know that he is temporarily at the [facility]. He appears to find his spiritual center through that connection. He a good friend who works with [employer] who lives in [place] and is coming to visit later this week,   This afternoon,  I provided a ministry of presence, encouraged life review, story-telling, offered words of comfort, assurance, spiritual encouragement, as well as offered a  prayer. I asked if I could continue to visit him and he said yes. I also left my contact information with him and indicated he  could reach me if needed or desired. GOALS:  Continue to visit the patient and his family, to provide supplemental pastoral care and spiritual support to that being provided by [facility] to assist both the patient and any family with coping with the decline being experienced by the patient. Build trust and familiarity with the patient to encourage a discussion of their spiritual thoughts and concerns at a deeper and more personal level if they so choose. Validate the emotions and normalize the anticipatory grief and anxiety of the patient regarding the declining his medical and his living situation. PLAN:   Continue to visit the patient  while he is at the [facility] and I am in the facility, or PRN as requested. Coordinate with Care Team on POC.     VISIT FREQUENCY:   2 wk 1,2,. starting 3/25/19 plus 2 prn 14 days.    Rick Milian, [address]    [phone]

## 2019-03-26 NOTE — PROGRESS NOTES
NAME OF PATIENT:  Herminio Hardin    LEVEL OF CARE:  Routine  O2 SAFETY:  On R/A    FALL INTERVENTIONS PROVIDED:   Implemented/recommended resources for alarm system (personal alarm, bed alarm, call bell, etc.)  and Implemented/recommended increased supervision/assistance    INTERDISPLINARY COMMUNICATION/COLLABORATION:  Physician, FRANCIA, Brisa and RN, CNA    NEW MEDICATION INITIATION DOCUMENTATION:  N/A at this time    Reason medication is being initiated:  N/A    MD / Provider name consulted re: change in status / initiation of new medication:  N/A    New Symptom(s):  Denies all c/os at present reports good effect of previous pain medication    New Order(s):  N/A    Name of the person notified of the changes:  Alert, oriented and appropriate patient    Name of person being taught:  Client    Instructions given:  N/A    Side Effects taught:  N/A    Response to teaching:  N/A      COMFORTABLE DYING MEASURE:  Is Patient/family satisfied with symptom level?  yes    DISCHARGE PLAN:  Looking into group home placement  1900  Report from previous shift  2000  Resting quietly in room  Denies c/os. Advises good relief from earlier pain medication. Emptied 150 cc from drain site through ostomy bag with aspira drain partially out and continual drainage. PA  Completed. Will continue to monitor  2156  Resting in room without signs of distress  2340  Snoring softly without signs of distress. Will continue to monitor  0137  Incontinent lrg amount of urine plus aspira drain spilled as I emptied- 300 plus. Bed and bath completed. Positioned on R side Sacrum is red non blanching. Heels offloaded. Made mention of urinary cath. Client advises he has never had one. Advised to let us know if he wanted us to put one in. Denied pain prior to bed and bath but obvious discomfort and grimacing with turning and positioning. Prn morphine given per his request s/p bath.   Will continue to monitor  0338 Resting with eyes closed and no signs of distress. Will continue to monitor  0600  Removed needle from accessed Port 2nd to duration and no longer using. Emptied 200 cc from urostomy bag covering draining site from where aspira drain is coming out. Encouraged client to stay of sacrum as he had removed pillow and was supine. Positioned on L side advising client his sacrum is very red. Will continue to monitor. Total 650 cc this shift clear yellow fluid from leakage around aspira drain that appears to be almost completely out.   Will continue to monitor

## 2019-03-26 NOTE — PROGRESS NOTES
Comfortable with prn dose of morphine. SW helping with disposition at time of DC.   Urostomy bags aiding with skin integrity to avoid continuous drainage on skin

## 2019-03-26 NOTE — PROGRESS NOTES
NAME OF PATIENT:  Herminio Hardin    LEVEL OF CARE:  Routine  O2 SAFETY:  On R/A    FALL INTERVENTIONS PROVIDED:   Implemented/recommended resources for alarm system (personal alarm, bed alarm, call bell, etc.)  and Implemented/recommended increased supervision/assistance    INTERDISPLINARY COMMUNICATION/COLLABORATION:  Physician, MSW, Brisa and RN, CNA    NEW MEDICATION INITIATION DOCUMENTATION:  N/A at this time    Reason medication is being initiated:  N/A    MD / Provider name consulted re: change in status / initiation of new medication:  N/A    New Symptom(s):  Decrease responsiveness from yesterday    New Order(s):  N/A    Name of the person notified of the changes:  N/A    Name of person being taught:  N/A-  [facility] [place]    Instructions given:  N/A    Side Effects taught:  N/A    Response to teaching:  N/A      COMFORTABLE DYING MEASURE:  Is Patient/family satisfied with symptom level?  yes    DISCHARGE PLAN:  Pending LTC placement/group home option per MSW  1900  Report from previous shift  1942  Resting quietly but grimaces and moans with assessment. Eyes partially open. Minimally responsive, but did say ok when I told him what I was seeing and would get him some pain medication. Otherwise was not responding verbally or following commands. Did suck on sponge with mouth care. HRR at 92  Respirations even unlabored at 20 on R/A with lungs CTA.  +2 pedal edema noted with loud moan when moving feet/legs for assessment. Abdomen soft NT with normoactive BS. Depends dry. Drain to R side of abdomen mostly out  Minimal output in ostomy bag. Will continue to monitor and turn after pain medication has had chance to work  2053  Positioned on R side. Soft moan with turning but otherwise relaxed facial muscles and unlabored respirations. Will continue to monitor  2245  Resting quietly with even unlabored respirations.   Will continue to monitor  0030 Eyes are closed with relaxed facial muscles. Respirations are even and unlabored. Will continue to monitor  0145 Positioned supine at this time with noted grimace. Depends remain dry. Mouth care provided. Abd drain with covering ostomy bag remain without drainage. Will continue to monitor  0209  Client heard moaning from hallway. Noted grimace and moan again upon entering room. Appears to not have urinated for approx 24 hours. Premedicated with Morphine and lorazepam for symptoms of pain and restlessness and prior to possible incident pain. Will continue to monitor  0250  Church placed with le urine obtained 400-500 cc. Bed and bath completed. Positioned on L side following. Client tolerated well but with some moaning and grimacing noted with turns and positioning of feet ( noted to be cold now)  Increased jaundice color noted to back with increasing purple areas. Will continue to monitor  0450  Snoring softly without signs of distress. Will continue to monitor  0606 Eyes closed and snoring with relaxed facial muscles. Emptied 500 cc urine from church. Positioned on R side with noted grimace during turning. Feet and hands now very cold. Mouth care provided. No drainage from abdominal sites this shift.   Will continue to monitor

## 2019-03-26 NOTE — PROGRESS NOTES
..  0700:Report received from Daegis I/O and [place]. Pt is alert and oriented times 2 with periods of confusion complain of pain to Abd rate at a 5 sharp pain. 08:17:Adm 5 mg of morphine for pain SL tolerated well.  08:56:Pt stated his pain is much better and he just wanted to rest.  1000:Pt ate a cup of chocolate ice cream and a cup of apple juice, tolerated well, increase weakness noted. 1200:Pt is sleeping repositioned for comfort responds to yes and no questions. 13:45:Adm morphine 5 mg and ativan 1 mg for pain and anxiety, Urostomy bag emptied 400CC bags changed. 14:20:Meds effective resting quietly. 1600:Pt remain comfortable, periods of apnea noted 20 sec, continue to monitor.   1800:Neighbor visiting at bedside, pt is minimally responding to tactile and verbal stimuli.  1900:Report given to Jo YAÑEZ        NAME OF PATIENT: Trinidad Macias     LEVEL OF CARE: Routine  REASON FOR GIP: N/A     *PATIENT REMAINS ELIGIBLE FOR [facility] LEVEL OF CARE AS EVIDENCED BY:N/A    REASON FOR RESPITE:N/A       O2 SAFETY:  Concentrator positioning (6\" from furniture/drapes), Tanks stored in hannon , No petroleum based products on face while oxygen in use and Oxygen sign on the door     FALL INTERVENTIONS PROVIDED:   Implemented/recommended use of fall risk identification flag to all team members, Implemented/recommended assistive devices and encouraged their use, Implemented/recommended resources for alarm system (personal alarm, bed alarm, call bell, etc.)  and Implemented/recommended environmental changes (remove hazards, lower bed, improve lighting, etc.)     INTERDISPLINARY COMMUNICATION/COLLABORATION:  Physician, MSW, [place] and RN, CNA     NEW MEDICATION INITIATION DOCUMENTATION:N/A      Reason medication is being initiated:  N/A     MD / Provider name consulted re: change in status / initiation of new medication:  N/A     New Symptom(s):  N/A     New Order(s):  N/A     Name of the person notified of the changes:  N/A     Name of person being taught:  N/A     Instructions given:  N/A     Side Effects taught:  N/A     Response to teaching:  N/A        COMFORTABLE DYING MEASURE:  Is Patient/family satisfied with symptom level?  yes     DISCHARGE PLAN:MD and MSW working on discharge.

## 2019-03-26 NOTE — HOSPICE
[facility]  [phone]    Social Work Admission Note  Patient Name: Jay Marin  YOB: 1945  Age: 66 y.o. Date of Visit:3-25-19  Facility of Care: [facility]  Patient Room: 15/01     Hospice Attending: Jackie Ponce MD  Hospice Diagnosis: Cirrhosis with Ascites    Level of Care:    []  GIP   Respite   [x]  Routine    NARRATIVE   Patient is a 66year old single male who  was admitted to hospice services with dx of Cirrhosis with Ascites. Patient was transferred to the [facility] from [address] [place] [address] for GIP LOC on 3-22-19 and changed to Routine the following day. Patient was alert and oriented x 4 during initial visit with him. He is single with no children or family. His best friend Shanta Weiss resides in [place]. Msw spoke with him via phone to make him aware of patient's current situation. Msw spoke to patient about his unlikely ability to return to his apartment due to decline in conditon. He was surprised but did acknowledge he was weak and unable to perform ADL's. We discussed possibility of adult group homes. He wants to return to his apartment but stated he would have to do what he had to do. Msw contacted Marcial Hernandez in [place] who stated she had availability and could take him this week. He has enough income to pay for his stay. Possibility of Friday transfer. Gerson Ramirez trying to come to [place] by Thursday. He will contact msw with plans. Msw to continue to follow with emotional support as patient adjusts to going to a new living environment. Support as he derik with loss of independence. Follow for disposition.     ADVANCE CARE PLANNING    Code Status: DNR  Durable DNR: x_ Yes  _ No  Advance Care Planning 3/18/2019   Patient's Healthcare Decision Maker is: Named in scanned ACP document   Primary Decision Maker Name -   Primary Decision Maker Relationship to Patient -   Confirm Advance Directive Yes, on file   Patient Would Like to Complete Advance Directive -   Does the patient have other document types Do Not Resuscitate       Relationship Status:  [x]  Single     []        []      []  Domestic Partner     []  /  []  Common Law  []    []  Unknown    If in a relationship, name of partner/spouse:  Duration of relationship:    Religious: Pentecostalism  Resources Provided Group homes*     Social Work Initial Assessment     Gender:  male    Race/Ethnicity: (jovany all that apply)  []  [place] [facility]) or [place] Native  []    []  Black or   []   or   []  [address] or [facility]  [x]  White  []  Unknown      Service:    []  Yes   [x]  No       []  Unknown  Appropriate for Pinning Ceremony:   []  Yes      []  No  Is patient using VA benefits?    []  Yes      []  No   primary language     []   Needed  []   utilized during visit    Ability to express thoughts/needs/feelings  [x]  Expressed thoughts/feelings/needs without difficulty  []  Requires extra time and cuing  []  Speech limited single words  []  Uses only gestures (eye, blinking eye or head movement/pointing)  []  Unable to express thoughts/feelings/needs (speech unintelligible or inappropriate)  []  Unresponsive  Notes:      Mental Status:  [x]  Alert-oriented to:     [x]  Person     [x]  Place     [x]  Time  []  Comatose-responds to:    []   Verbal stimuli    []  Tactile stimuli    []  Painful stimuli  []  Forgetful  []  Disoriented/Confused  []  Lethargic  []  Agitated  []  Other (specify):    Notes:      Patients description of Illness/Current Health Status:    []  Patient unable to discuss  []  Patient unwilling to discuss  [x]  (Specify)   Pt acknowledged he could no longer care for himself due to illness progression     Knowledge/Understanding of Disease Process  Patient:    []  Demonstrates knowledge/understanding of disease process   []  Demonstrates knowledge/understanding of treatment plan   [] Demonstrates knowledge/understanding of prognosis   []  Demonstrates acceptance of prognosis   []  Demonstrates knowledge/understanding of resuscitation status   []  Other (specify)  Caregiver:   []  Demonstrates knowledge/understanding of disease process   []  Demonstrates knowledge/understanding of treatment plan   []  Demonstrates knowledge/understanding of prognosis   []  Demonstrates acceptance of prognosis   []  Demonstrates knowledge/understanding of resuscitation status   []  Other (specify)  Notes:      Patients living arrangement/care setting:  Use the PRIOR COLUMN when the PATIENTS current health status necessitated a change in his/her primary residence. Prior Current Response              [x]             []    Patients own home/residence              []             []    Home of family member/friend              []             []    Boarding home              []             []    Assisted living facility/penitentiary center              []             []    Hospital/Acute care facility              []             []    Skilled nursing facility              []             []    Long term care facility/Nursing home              []             []    Hospice in Patient      Primary Caregiver:  []  No Primary Caregiver  Name of Primary Caregiver: Dheeraj  Relationship or Primary Caregiver:    []  Spouse/Significant other       []  Natural Child        []  Step child       []  Sibling   []  Parent   []  Friend/Neighbor   []  Community/[employer] Volunteer   []  Paid help   [x]  Other (specify):_friend_________  Notes:       Family members/Significant others:  Name:  Relationship:  Phone Number:  Actively involved in care? []  Yes  []  No    Name:  Relationship:  Phone Number:  Actively involved in care? []  Yes  []  No    Name:  Relationship:  Phone Number:  Actively involved in care?   []  Yes  []  No    Social support systems: (select ONE best description)  []  Excellent social support system which includes three or more family members or friends  [x]  Good social support system which includes two or less members or friends  []  [address] support which includes one family member or friend  []  Poor social support; no family members or friends; basically ALONE  Notes:      Emotional Status: (jovany all that apply)    Patient Caregiver Response                 [x]                []    Mood/Affect stable and appropriate                   []                []    Angry                 [x]                []    Anxious                 []                []    Apprehensive                 []                []    Avoidant                 []                []    Clinging                 []                []    Depressed                 []                []    Distraught                 []                []    Elated                 []                []    Euphoric                 []                []    Fearful                 []                []    Flat Affect                 []                []    Helpless                 []                []    Hostile                 []                []    Impulsive                 []                []    Irritable                 []                []    Labile                 []                []    Manic                 []                []    Restlessness                 []                []    Sad                 []                []    Suspicious                 []                []    Tearful                 []                []    Withdrawn     Notes:     Coping Skills (strengths/weakness):    Patient: Coping Skills (strength/weakness): Betty   Family/caregiver (strength/weakness):     [place] of care (jovany all that apply):     [x]  No burden evident   []  Family must administer medications   []  Illness causing financial strain   []  Family/Support feels overwhelmed   []  Family/Support sleep disturbed with patients care   []  Patients care causes extra physical stress  of death  []  Illness causes changes in family lifestyle  []  Illness impacting family/support employment  []  Family experiencing increased time demands  []  Patients behavior endangers family  []  Denial of patients illness  []  Concern over outcome of illness/fear  []  Patients behavior embarrassing to family   Notes:      Risk Factors: (jovany all that apply):    []  No burden evident   []  Alcohol abuse  []  Financial resources inadequate to meet basic needs (food/house/etc)  []  Financial resources inadequate to meet health care needs (supplies/equipment/medications)  []  Food/nutrition resources inadequate  []  Home environment unsafe/inadequate for home care  []  Homicidal risk  []  Lives alone or without concerned relatives  []  Multiple medications/complex schedule  []  Physical limitations increase likelihood of falls  []  Plan of care/treatments complicated  []  Substance use/abuse  []  Suicidal risk  []  Visual impairment threatens safety/ability to perform self-care  []  Other (specify):     Abuse/Neglect (actual/potential risks):  [x]  No signs of abuse/neglect  []  History of abuse/neglect                 []  IMXCPHTO          []  Sexual  []  History of domestic violence  []  Lacks adequate physical care  []  Lacks emotional nurturing/support  []  Lacks appropriate stimulation/cognitive experiences  []  Left alone inappropriately  []  Lacks necessary supervision  []  Inadequate or delayed medical care  []  Unsafe environment (i.e guns/drug use/history of violence in the home/etc.)  []  Bruising or other physical signs of injury present  []  Other (specify):  Notes:   []  Refer to child/adult protective services      Current Sources of Stress (in Addition to Current Illness):   []  None reported  []  Bills/Debt    []  Career/Job change    []   (short term)    []   (long term)    []  Death of a child (recent)    []  Death of a parent (recent)   []  Death of a spouse (recent)   [] Employment status changed   []  Family discord    []  Financial loss/Inadequate inther (specify):come  []  Job loss  []  Legal issues unresolved  [x]  Lifestyle change  []  Marital discord  []  Marriage within the last year  []  Paperwork (insurance/legal/etc) overwhelming  []  Separation/Divorce  []  Other (specify):  Notes: no family     Current [employer] Being Utilized     1. [facility]    Interventions/Plan of Care     1. Assess social and emotional factors related to coping with end of life issuesxx  2. Community resource planning/referral xx  3.  Relocation to different care setting if/when symptoms stabilizexx    Discharge Planning     Patient to be transferred to group home on Friday 3-29-19  MSW Assessment Completed by: Tess Cogan  03/26/19    Time In:1130a      Time Out:1p

## 2019-03-26 NOTE — HOSPICE
[facility]  [phone]    Social Work Admission Note  Patient Name: Byron Jin  YOB: 1945  Age: 66 y.o. Date of Visit: 03/26/19  Facility of Care: [facility]  Patient Room: 15/01     Hospice Attending: Brit Anne MD  Hospice Diagnosis: Cirrhosis with Ascites    Level of Care:    []  GIP    [x]  Respite   []  Routine    NARRATIVE     Patient is a 66year old single male who was admitted to hospice services with  Dx of Cirrhosis with Ascites. Patient was transferred to the [facility] from [facility] for GIP LOC. He was changed to Routine the following day. Patient was alert and oriented x 4 during initial meeting. He was lying in bed - stated he felt weak and hadn't been ambulating. He reported while at home last week he woke up and couldn't get OOB. He called 91. Patient is single with no children. His best friend Gilbert Coronado resides in [place]. He has no family. We discussed his unlikely ability   to return to his previous living arrangement where he lived alone in an apartment. He appeared surprised but admitted he was too weak to get OOB and perform ADL's. Msw approached the subject of an adult home. He wasn't too receptive but stated he would have to do what needed to be done. He has SS and a long term income which would cover some adult homes. Msw checked with one located in [facility] owned by [facility] [phone]. She did have availability and could accept patient with his income. Msw informed patient and  inquired as to whether contact could be made with Uday Lozano. He agreed. Spoke to Uday Lozano to inform him of situation. He stated he might be able to come to [place] this Thursday but would know more tomorrow. Uday Lozano relayed he would contact msw to relay plans. Msw informed him and pt that transfer would most likely occur on Friday.   Patient is now Routine and responsible for Room and Board at [facility] -- he would like to apply to the [facility] for financial assistance. He is going to try and get his neighbor to go to his apartment and obtain bank statements. Msw to continue to follow pt as he adjusts to having to move to a group home. Follow for disposition. ADVANCE CARE PLANNING    Code Status: DNR  Durable DNR:x _ Yes  _ No  Advance Care Planning 3/18/2019   Patient's Healthcare Decision Maker is: Named in scanned ACP document   Primary Decision Maker Name -   Primary Decision Maker Relationship to Patient -   Confirm Advance Directive Yes, on file   Patient Would Like to Complete Advance Directive -   Does the patient have other document types Do Not Resuscitate       Relationship Status:  [x]  Single     []        []      []  Domestic Partner     []  /  []  Common Law  []    []  Unknown    If in a relationship, name of partner/spouse:  Duration of relationship:    Oriental orthodox: Jainism     Home: [address]  Resources Provided:  Group homes, Financial assistance     Social Work Initial Assessment     Gender:  male    Race/Ethnicity: (jovany all that apply)  []  [place] ([facility]) or [place] Native  []  [address]  []  Black or   []   or   []  [address] or [facility]  [x]  [place]  []  Unknown      Service:    []  Yes   [x]  No       []  Unknown  Appropriate for Pinning Ceremony:   []  Yes      []  No  Is patient using VA benefits?    []  Yes      []  No     Primary Language: English  []   Needed  []   utilized during visit    Ability to express thoughts/needs/feelings  [x]  Expressed thoughts/feelings/needs without difficulty  []  Requires extra time and cuing  []  Speech limited single words  []  Uses only gestures (eye, blinking eye or head movement/pointing)  []  Unable to express thoughts/feelings/needs (speech unintelligible or inappropriate)  []  Unresponsive  Notes:      Mental Status:  [x]  Alert-oriented to:     [x]  Person     [x] Place     [x]  Time  []  Comatose-responds to:    []   Verbal stimuli    []  Tactile stimuli    []  Painful stimuli  []  Forgetful  []  Disoriented/Confused  []  Lethargic  []  Agitated  []  Other (specify):    Notes:      Patients description of Illness/Current Health Status:    []  Patient unable to discuss  []  Patient unwilling to discuss  [x]  (Specify Acknowledged  he could no longer live alone due to his weakened condition    Knowledge/Uncerstanding of Disease Process  Patient:    []  Demonstrates knowledge/understanding of disease process   []  Demonstrates knowledge/understanding of treatment plan   []  Demonstrates knowledge/understanding of prognosis   []  Demonstrates acceptance of prognosis   []  Demonstrates knowledge/understanding of resuscitation status   []  Other (specify)  Caregiver:   []  Demonstrates knowledge/understanding of disease process   []  Demonstrates knowledge/understanding of treatment plan   []  Demonstrates knowledge/understanding of prognosis   []  Demonstrates acceptance of prognosis   []  Demonstrates knowledge/understanding of resuscitation status   []  Other (specify)  Notes:      Patients living arrangement/care setting:  Use the PRIOR COLUMN when the PATIENTS current health status necessitated a change in his/her primary residence.     Prior Current Response              [x]             []    Patients own home/residence              []             []    Home of family member/friend              []             []    Boarding home              []             []    Assisted living facility/[facility] center              []             []    Hospital/Acute care facility              []             []    Skilled nursing facility              []             []    Long term care facility/Nursing home              []             [x]    Hospice in Patient      Primary Caregiver:  []  No Primary Caregiver  Name of Primary Caregiver: Dheeraj  Relationship or Primary Caregiver:    [x] Spouse/Significant other       []  Natural Child        []  Step child       []  Sibling   []  Parent   []  Friend/Neighbor   []  Community/Advent Volunteer   []  Paid help   []  Other (specify):___________  Notes:       Family members/Significant others:  Name:  Relationship:  Phone Number:  Actively involved in care? []  Yes  []  No    Name:  Relationship:  Phone Number:  Actively involved in care? []  Yes  []  No    Name:  Relationship:  Phone Number:  Actively involved in care?   []  Yes  []  No    Social support systems: (select ONE best description)  []  Excellent social support system which includes three or more family members or friends  [x]  Good social support system which includes two or less members or friends  [x]  [address] support which includes one family member or friend  []  Poor social support; no family members or friends; basically ALONE  Notes:      Emotional Status: (jovany all that apply)    Patient Caregiver Response                 [x]                []    Mood/Affect stable and appropriate                   []                []    Angry                 []                [x]    Anxious                 []                []    Apprehensive                 []                []    Avoidant                 []                []    Clinging                 []                [x]    Depressed                 []                []    Distraught                 []                []    Elated                 []                []    Euphoric                 []                [x]    Fearful                 []                [x]    Flat Affect                 []                []    Helpless                 []                []    Hostile                 []                []    Impulsive                 []                []    Irritable                 []                []    Labile                 []                []    Manic                 []                []    Restlessness                 [] [x]    Sad                 []                []    Suspicious                 []                []    Tearful                 []                []    Withdrawn     Notes:     Coping Skills (strengths/weakness):    Patient: Coping Skills (strength/weakness):    Family/caregiver (strength/weakness):     [place] of care (jovany all that apply):     [x]  No burden evident   []  Family must administer medications   []  Illness causing financial strain   []  Family/Support feels overwhelmed   []  Family/Support sleep disturbed with patients care   []  Patients care causes extra physical stress  of death  []  Illness causes changes in family lifestyle  []  Illness impacting family/support employment  []  Family experiencing increased time demands  []  Patients behavior endangers family  []  Denial of patients illness  []  Concern over outcome of illness/fear  []  Patients behavior embarrassing to family   Notes:      Risk Factors: (jovany all that apply):    []  No burden evident   []  Alcohol abuse  []  Financial resources inadequate to meet basic needs (food/house/etc)  []  Financial resources inadequate to meet health care needs (supplies/equipment/medications)  []  Food/nutrition resources inadequate  [x]  Home environment unsafe/inadequate for home care  []  Homicidal risk  [x]  Lives alone or without concerned relatives  []  Multiple medications/complex schedule  []  Physical limitations increase likelihood of falls  []  Plan of care/treatments complicated  []  Substance use/abuse  []  Suicidal risk  []  Visual impairment threatens safety/ability to perform self-care  []  Other (specify):     Abuse/Neglect (actual/potential risks):  [x]  No signs of abuse/neglect  []  History of abuse/neglect                 []  [id number]          []  Sexual  []  History of domestic violence  []  Lacks adequate physical care  []  Lacks emotional nurturing/support  []  Lacks appropriate stimulation/cognitive experiences  [] Left alone inappropriately  []  Lacks necessary supervision  []  Inadequate or delayed medical care  []  Unsafe environment (i.e guns/drug use/history of violence in the home/etc.)  []  Bruising or other physical signs of injury present  []  Other (specify):  Notes:   []  Refer to child/adult protective services      Current Sources of Stress (in Addition to Current Illness):   []  None reported  []  Bills/Debt    []  Career/Job change    []   (short term)    []   (long term)    []  Death of a child (recent)    []  Death of a parent (recent)   []  Death of a spouse (recent)   []  Employment status changed   []  Family discord    []  Financial loss/Inadequate inther (specify):come  []  Job loss  []  Legal issues unresolved  [x]  Lifestyle change  []  Marital discorar  []  Paperwork (insurance/legal/etc) overwhelming  []  Separation/Divorce  []  Other (specify):  Notes:      Current [employer] Being Utilized     1. [facility]      Interventions/Plan of Care     1. Assess social and emotional factors related to coping with end of life issuesx  2. Community resource planning/referralx   3. Relocation to different care setting if/when symptoms stabilizex    Discharge Planning     1.    Transfer to [facility] Assessment Completed by: Crow Evangelista  3-25-19  Time In:1030a      Time Out:12pd  []

## 2019-03-27 NOTE — HOSPICE
Visit to patient's room today -- he was in bed with eyes closed. Easily aroused but very lethargic. Tried to talk with him regarding  plans but he wasn't able to remember what he wanted. Patient had been experiencing some pain yesterday and was given Morphine. Will contact [address] [facility]

## 2019-03-27 NOTE — PROGRESS NOTES
..  0700[facility] Pt is unresponsive to verbal and tactile stimuli, no discomfort noted extremities cold to touch. 0900:Pt continues to be unresponsive to verbal and tactile stimuli, no discomfort noted. 1100:Repositioned for comfort, mouth care done. 1300:Mouth care done church patent and draining brown color urine no drainage from urostomy. 1500:Pt is moans and grimace on movement. 15:34:Adm PRN morphine for pain SL, toletrated well.  1700:Pt's neighbor stated he was [employer] and he would appreciate his last right, called Shara Wagner who stated he had offered but pt decline but would be happy to assist in getting one over. 17:39:Adm morphine 5 mg for pain SL   17:50:Father Sergey Hartman arrived, to give pt last rights. meds effective, no grimacing noted. 1900:Report given to Arnel Marrero RN.       NAME OF PATIENT: Marivel Ponce     LEVEL OF CARE: Routine  REASON FOR GIP: N/A     *PATIENT REMAINS ELIGIBLE FOR [facility] LEVEL OF CARE AS EVIDENCED BY:N/A    REASON FOR RESPITE:N/A       O2 SAFETY:  Concentrator positioning (6\" from furniture/drapes), Tanks stored in hannon , No petroleum based products on face while oxygen in use and Oxygen sign on the door     FALL INTERVENTIONS PROVIDED:   Implemented/recommended use of fall risk identification flag to all team members, Implemented/recommended assistive devices and encouraged their use, Implemented/recommended resources for alarm system (personal alarm, bed alarm, call bell, etc.)  and Implemented/recommended environmental changes (remove hazards, lower bed, improve lighting, etc.)     INTERDISPLINARY COMMUNICATION/COLLABORATION:  Physician, MSW, [place] and RN, CNA     NEW MEDICATION INITIATION DOCUMENTATION:N/A      Reason medication is being initiated:  N/A     MD / Provider name consulted re: change in status / initiation of new medication:  N/A     New Symptom(s):  N/A     New Order(s):  N/A     Name of the person notified of the changes:  N/A     Name of person being taught:  N/A     Instructions given:  N/A     Side Effects taught:  N/A     Response to teaching:  N/A        COMFORTABLE DYING MEASURE:  Is Patient/family satisfied with symptom level?  yes     DISCHARGE PLAN:MD and MSW working on discharge.

## 2019-03-27 NOTE — HOSPICE
Contacted Dheeraj AMANDA to inquire about  home. He reported patient wanted [employer]. He was informed about patient's status today. He relayed he would be arriving on Thursday morning and be at the [facility] around noon. Plan to meet with him.

## 2019-03-27 NOTE — PROGRESS NOTES
[facility] Help to Those in Need  [phone]    Patient Name: Chrissy Jones  YOB: 1945    Date of Provider Hospice Visit: 03/27/19    Level of Care:   [] General Inpatient (GIP)    [x] Routine   [] Respite    Current Location of Care:  [] [facility] [] [facility] [] [facility] [] [facility] [x] [facility]    IF [facility], patient referred from:  [] [facility] [x] [facility] [] [facility] [] [facility] [] Home [] Other:     Date of Original Hospice Admission: 3/22/19  Hospice Medical Director at time of admission: Javier Martini     Principle Hospice Diagnosis: alcoholic cirrhosis  Diagnoses RELATED to the terminal prognosis: Pancytopenia-unclear etiology but transfusion dependent, recurrent large volume ascites, protein malnutrition       HOSPICE SUMMARY   Do not cut and paste chart information other than imaging findings    Chrissy Jones is a 66y.o. year old who was admitted to [facility] with principal diagnosis of Alcoholic cirrhosis liver that has led to steady functional decline. Pt recently admitted with with recurrent issues of  Pancytopenia & large volume ascites that was drained. He has abnormal labs with  bilirubin of 5, INR of 1.7 and albumin of 2.2. In addition, he has become transfusion dependent for suspected a type of MDS resulting in anemia with H/H of 5.8/16.6, WBC of 2.3, and Platelets down to 15. Despite 4-5 units of PRBC, his H/H only climbed to 7.9/22.5. He becomes less responsive to the PRBC. A peritoneal drain has been placed that is leaking and pt is YOOOHOUPYXM(81'X systolic) and symptomatic(light headed/near syncope). Functionally, the patient's Karnofsky and/or Palliative Performance Scale has declined over a period of weeks and is estimated at 20. The patient is dependent on the following ADLs:all       The patient/family chose comfort measures with the support of Hospice.      HOSPICE DIAGNOSES   Active Symptoms:  1. Large volume ascites with drain placement and leakage  2.  Symptomatic hypotension-related to anemia and drainage of ascites  3. Generalized debility  4. Difficult disposition secondary to living alone  5. Mild Abdominal discomfort on palpation      PLAN   1. Change to level of care GIP as patient has needs of assistance in all care needs, and continuation of comfort care medications, pt. Unresponsive this am during NP visit  2. Continue drain care and management  3. Continue acetaminophen tab/solution/supp. 650 Q 4 hr PRN fever  4. Continue Bisacodyl supp. 10 mg PRN per RE for constipation  5. Continue glycopyrrolate inj. 0.2 mg Q 4 Hrs PRN for secretions, has not received any doses in the last 24 hours  6. Continue Morphine 100 mcg/5ml con.solution Q 1 hr as needed for c/o pain or SOB,has required 6 doses in the last 24 hours   7. Continue Lorazepam 2 mg/ml oral con. 1 mg  Q 1 hours PRN PO for anxiety, has required 3 doses in the last 24 hours. 8. Continue ondansetron 4 mg tab PO Q 6 hrs for c/o NV, has not required any doses over the last 24 hours  9. Continue scopolamine 1 mg patch over 72 hours (replace every 3 days), has not required over the last 24 hours  10. Continue senna-docusate 8.6-50 mg per tab x 2 tabs PO BID PRN for constipation, has not required any PRN doses over the last 24 hours  11. Continue sennosides 8.8mg/5ml syrup BID PO PRN constipation (if unable to swallow pericolace tabs)  12. SW consult to assist with disposition options-he does not want to move from his current house/living situation but understands on my explanation that he needs someone staying with him for assistance that cannot be provided through hospice however help can be given on a frequent basis for medical and personal care needs during the week. SW looking into placement in group home setting.        - SW to establish payor source with POA        when he arrives tomorrow for current        visit.     13.   and SW to support family needs        Prognosis estimated based on 03/27/19 clinical assessment is:   [] Hours to Days    [x] Days to Weeks    [] Other:    Communicated plan of care with: Hospice Case Manager; Hospice IDT; Care Team     GOALS OF CARE     Patient/Medical POA stated Goal of Care:     [] I have reviewed and/or updated ACP information in the Advance Care Planning Navigator. This information is available in the [address] link in the patient's chart header. Primary Decision Maker ([address]):     Resuscitation Status: DNR  If DNR is there a Durable DNR on file? : [x] Yes [] No (If no, complete Durable DNR)    HISTORY     History obtained from: patient    CHIEF COMPLAINT: I thought [address] was going to give me help  The patient is:   [x] Verbal  [] Nonverbal  [] Unresponsive    HPI/SUBJECTIVE:  Patient somnolent on arrival in room and did not awaken to name called, moans to tactile stimulation.  Drains  Not leaking; fluid pale yellow collecting in urostomy bag       REVIEW OF SYSTEMS     The following systems were: [x] reviewed  [] unable to be reviewed    Positive ROS include:  Constitutional: fatigue, weakness, in pain, short of breath  Ears/nose/mouth/throat: increased airway secretions  Respiratory:shortness of breath, wheezing  Gastrointestinal:poor appetite, nausea, vomiting, abdominal pain, constipation, diarrhea  Musculoskeletal:pain, deformities, swelling legs  Neurologic:confusion, hallucinations, weakness  Psychiatric:anxiety, feeling depressed, poor sleep  Endocrine:        FUNCTIONAL ASSESSMENT     Palliative Performance Scale (PPS):20%       PSYCHOSOCIAL/SPIRITUAL ASSESSMENT     Current active list: Pain, anxiety control, constipation  Past Medical History:   Diagnosis Date    Anemia     Cirrhosis ([address]     History of vascular access device 10/22/2018    [facility] VAT - 4 FR, R basilic, 38 cm for DIVA (outpatient infusions)    Pancytopenia ([address]     Patella fracture 2007    Right knee      Past Surgical History:   Procedure Laterality Date    COLONOSCOPY N/A 9/28/2018    COLONOSCOPY performed by Gerald Connolly MD at [address] HX APPENDECTOMY      HX HERNIA REPAIR Bilateral     HX RETINAL DETACHMENT REPAIR      IR INSERT CATH PLEURAL INDWELL  3/21/2019    IR INSERT TUNL CVC W PORT OVER 5 YEARS  2/1/2019      Social History     Tobacco Use    Smoking status: Former Smoker    Smokeless tobacco: Never Used   Substance Use Topics    Alcohol use: Yes     Comment: rare     Family History   Problem Relation Age of Onset    Anemia Mother     Cancer Father 61        Lung      No Known Allergies   Current Facility-Administered Medications   Medication Dose Route Frequency    senna-docusate (PERICOLACE) 8.6-50 mg per tablet 2 Tab  2 Tab Oral BID PRN    sennosides (SENOKOT) 8.8 mg/5 mL syrup 8.8 mg  5 mL Oral BID PRN    bisacodyl (DULCOLAX) suppository 10 mg  10 mg Rectal DAILY PRN    ondansetron (ZOFRAN ODT) tablet 4 mg  4 mg Oral Q6H PRN    LORazepam (INTENSOL) 2 mg/mL oral concentrate 1 mg  1 mg Oral Q1H PRN    acetaminophen (TYLENOL) tablet 650 mg  650 mg Oral Q4H PRN    Or    acetaminophen (TYLENOL) solution 650 mg  650 mg Oral Q4H PRN    Or    acetaminophen (TYLENOL) suppository 650 mg  650 mg Rectal Q4H PRN    scopolamine (TRANSDERM-SCOP) 1 mg over 3 days 1 Patch  1 Patch TransDERmal Q72H PRN    glycopyrrolate (ROBINUL) injection 0.2 mg  0.2 mg IntraVENous Q4H PRN    morphine (ROXANOL) 100 mg/5 mL (20 mg/mL) concentrated solution 5 mg  5 mg Oral Q1H PRN        PHYSICAL EXAM     Wt Readings from Last 3 Encounters:   03/22/19 65.8 kg (145 lb)   03/18/19 71.3 kg (157 lb 3.2 oz)   02/04/19 80.4 kg (177 lb 4 oz)       Visit Vitals  BP 90/48 (BP 1 Location: Left arm, BP Patient Position: At rest)   Pulse 93   Temp 98 °F (36.7 °C)   Resp 20   Ht 5' 10\" (1.778 m)   Wt 65.8 kg (145 lb)   SpO2 98%   BMI 20.81 kg/m²       Supplemental O2  [] Yes  [x] NO  Last bowel movement:    Currently this patient has:  [] Peripheral IV [] PICC  [x] PORT [] ICD    [] Brenner Catheter [] NG Tube   [] PEG Tube    [] Rectal Tube [x] Drain  [] Other:     Constitutional: thin, ill appearing, alert and oriented  Eyes: icterus  ENMT: dry  Cardiovascular: regular heart sounds  Respiratory: decreased breath sounds  Gastrointestinal: soft, moderate ascites with drain in right lower quadrant area that was leaking so fluid being collected in a urostomy bag around it  Musculoskeletal:muscle wasting  Skin:jaundice  Neurologic:no obvious deficits  Psychiatric: sommulent          Pertinent Lab and or Imaging Tests:  Lab Results   Component Value Date/Time    Sodium 128 (L) 03/21/2019 02:07 AM    Potassium 5.0 03/21/2019 02:07 AM    Chloride 97 03/21/2019 02:07 AM    CO2 26 03/21/2019 02:07 AM    Anion gap 5 03/21/2019 02:07 AM    Glucose 137 (H) 03/21/2019 02:07 AM    BUN 31 (H) 03/21/2019 02:07 AM    Creatinine 0.50 (L) 03/21/2019 02:07 AM    BUN/Creatinine ratio 62 (H) 03/21/2019 02:07 AM    GFR est AA >60 03/21/2019 02:07 AM    GFR est non-AA >60 03/21/2019 02:07 AM    Calcium 8.0 (L) 03/21/2019 02:07 AM     Lab Results   Component Value Date/Time    Protein, total 5.4 (L) 03/18/2019 02:43 AM    Albumin 2.2 (L) 03/18/2019 02:43 AM           Total time: 35mts  Counseling / coordination time: 20mts  > 50% counseling / coordination?: yes    Effie Cheung, FNP student

## 2019-03-28 NOTE — PROGRESS NOTES
NAME OF PATIENT:  Christiano Danger    LEVEL OF CARE:  ROUTINE    REASON FOR GIP:   NA    *PATIENT REMAINS ELIGIBLE FOR GIP LEVEL OF CARE AS EVIDENCED BY: (MUST BE ADDRESSED OF PATIENT GIP)      REASON FOR RESPITE:  NA    O2 SAFETY:  NA    FALL INTERVENTIONS PROVIDED:   Implemented/recommended use of non-skid footwear, Implemented/recommended use of fall risk identification flag to all team members, Implemented/recommended assistive devices and encouraged their use, Implemented/recommended resources for alarm system (personal alarm, bed alarm, call bell, etc.) , Implemented/recommended environmental changes (remove hazards, lower bed, improve lighting, etc.) and Implemented/recommended increased supervision/assistance    INTERDISPLINARY COMMUNICATION/COLLABORATION:  Physician, MSW, [place] and RN, CNA    NEW MEDICATION INITIATION DOCUMENTATION:  NA    Reason medication is being initiated:  NA    MD / Provider name consulted re: change in status / initiation of new medication:  NA    New Symptom(s):  NA    New Order(s):  NA    Name of the person notified of the changes:  NA    Name of person being taught:  NA  Instructions given:  NA    Side Effects taught:  NA    Response to teaching:  NA      COMFORTABLE DYING MEASURE:  Is Patient/family satisfied with symptom level?  yes    DISCHARGE PLAN:  Remain at [facility] until end of life. 19:15 Shift report received from [facility] LPN  47:24 Patient resting quietly, eyes closed unresponsive to verbal and tactile stimulus. Lung sounds diminished , respirations even and unlabored, shallow breathing with periods of apnea. Skin jaundice and cool to touch. Brenner catheter draining scant amt of le urine. No facial grimacing noted. 22:00 Patient non responsive, no respirations, no heart rate. Pronounced by Zenia Cota RN. Spoke with Marilee Robin [phone], will be using [employer] and has declined [employer]. MD notified via e-mail.

## 2019-03-28 NOTE — PROGRESS NOTES
I visited the room of the patient who is at the [facility] on routine status. This afternoon, he was in bed, unresponsive to vocal stimulation, occasionally twitching.e was not agitated. A neighbor/friend of his [facility]) had been present earlier in the day, but had stepped out   This afternoon,  I provided a ministry of presence, encouraged life review, story-telling, offered words of comfort, assurance, spiritual encouragement, as well as offered a  prayer. I asked if I could continue to visit him and he said yes. I also left my contact information with him and indicated he  could reach me if needed or desired. GOALS:  Continue to visit the patient and his family, to provide supplemental pastoral care and spiritual support to that being provided by [address] [place] [address]. [employer] to assist both the patient and any family with coping with the decline being experienced by the patient. Build trust and familiarity with the patient to encourage a discussion of their spiritual thoughts and concerns at a deeper and more personal level if they so choose. Validate the emotions and normalize the anticipatory grief and anxiety of the patient regarding the declining his medical and his living situation. PLAN:   Continue to visit the patient  while he is at the [facility] and I am in the facility, or PRN as requested. Coordinate with Care Team on POC.  VISIT FREQUENCY:   2 wk 1,2,. starting 3/25/19 plus 2 prn 14 days.    Charlie Eddy, [address]    [phone]

## 2019-03-28 NOTE — PROGRESS NOTES
[id number] [facility] contacted me to indicate that the friend of this patient had returned and requested the [place] [place] receive [address]. Alysa Hendrickson asked me to coordinate this. I called Father Stefany Dean at [facility] and discussed this situation. He agreed to come to the [facility] and perform this Sacrament. According to staff, he arrived at approximately 18:00 and remained with the patient and [facility] for some time. After he left, [facility] came to the RN station to thank the staff.   The patient  approximately 3 hours later

## 2020-03-16 NOTE — PROGRESS NOTES
Orders received. Attempted to see patient but currently being taken to Ultrasound for test. Will see later today. Nursing aware. no

## 2020-07-22 NOTE — ACP (ADVANCE CARE PLANNING)
*Pt requesting 90 day supply*    Future appt:     Your appointments     Date & Time Appointment Department [facility])    Aug 26, 2020  9:30 AM CDT Exam - Established with Iona Salguero MD [facility], [facility] ([facility] Advance Care Planning (ACP) Provider Note - Comprehensive     Date of ACP Conversation: 03/17/19   Diagnosis: liver cirrhosis  Persons included in Conversation:  patient  Length of ACP Conversation in minutes:  16 minutes    Authorized Decision Maker (if patient is incapable of making informed decisions): This person is:  Hugh Pires, who lives in [facility] ACP for ALL Patients with Decision Making Capacity:   Exploration of values, goals, and preferences if recovery is not expected, even with continued medical treatment in the event of: Imminent death    Review of Existing Advance Directive:  Wants to be DNR. not ready for hospice yet.   designated a friend in [place] as [place]    For Serious or Chronic Illness:  Understanding of medical condition      Interventions Provided:  Recommended completion of Advance Directive form after review of ACP materials and conversation with prospective healthcare agent

## 2021-05-12 NOTE — ED NOTES
Patient resting in bed in no apparent distress. Call bell in reach, bed in low locked position. Wife Mary on VR calling states patient was given 2 samples of Tresiba by .    had also sent a prescription to [facility] which she states they did not  as they had the samples and wanted to use those first.    Wife states patient is running low on the samples so they went to the pharmacy to  the Tresiba that was ordered by  on 4/29 but was told by pharmacy that refill was already picked up on 4/30.  Wife states they did not pick it up.    I placed patient on hold and called pharmacy on the other line and was told per their records prescription for Tresiba was picked up on 4/30. Pharmacy also states wife is stating NO they didn't pick it up.  Pharmacy manger is working on looking at the cameras to confirm if it was picked up.  Per pharmacy they did confirm on cameras that it was picked up on 4/30 by an older man with glasses.    Advised patient at this time she needs to solve this issue with he pharmacy there is nothing we can do on our end.  Wife verbalized understanding of information given.

## 2022-09-21 NOTE — DISCHARGE INSTRUCTIONS
HOSPITALIST DISCHARGE INSTRUCTIONS  NAME: Candida Rome   :  1945   MRN:  [id number]     Date/Time:  2/3/2019 9:32 AM    ADMIT DATE: 2019     DISCHARGE DATE: 2019    ADMITTING DIAGNOSIS:  Cirrhosis   Anemia     DISCHARGE DIAGNOSIS:  As above     MEDICATIONS:     · It is important that you take the medication exactly as they are prescribed. · Keep your medication in the bottles provided by the pharmacist and keep a list of the medication names, dosages, and times to be taken in your wallet. · Do not take other medications without consulting your doctor. Pain Management: per above medications    What to do at Home    Recommended diet:  Regular Diet    Recommended activity: Activity as tolerated    If you experience any of the following symptoms then please call your primary care physician or return to the emergency room if you cannot get hold of your doctor:  Fever, chills, nausea, vomiting, diarrhea, change in mentation, falling, bleeding, shortness of breath, chest pain    Follow Up:  Dr. Angelo  you are to call and set up an appointment to see them in 2 weeks. Information obtained by :  I understand that if any problems occur once I am at home I am to contact my physician. I understand and acknowledge receipt of the instructions indicated above.                                                                                                                                            Physician's or R.N.'s Signature                                                                  Date/Time                                                                                                                                              Patient or Representative Signature                                                          Date/Time Include Location In Plan?: No Detail Level: Generalized

## 2023-01-10 NOTE — ED NOTES
Verbal report was given to [facility] who will assume care of patient at this time. Receiving nurse had an opportunity to ask questions and seek clarifications. Receiving nurse aware of pending lab results and orders that need to be completed. Patient transferred to room 20 and placed on Cardiac Monitor. Blood continues to infuse at 125mL/hr and patient is tolerating it well. Patient declines to attempt urine specimen at this time, states he typically only urinates once in the morning and once in the evening. He has a urinal within reach and agrees to notify nurse if he feels the urge to urinate. Call bell in reach, bed in low locked position. VSS. Patient is a 60-yo AAM, single, reportedly domiciled with his mother, patient denying any prior psychiatric history (was treated in long term 25 years ago with haldol for unknown symptoms/diagnosis and never followed up after release from long term) and history of alcohol abuse (reports quitting "long time ago"), PMH of HCV, Hx of esophageal stricture (S/p stenting in 5/22), Emphysema, chronic pancreatitis who initially presented to [facility] on 11/25/22 for AMS. Pt was admitted to [facility] on 11/25 for AMS 2/2 Septic shock. Pt was initially intubated for acute respiratory failure and required pressors for hypotension. Pt's hospital course was complicated by YUNIEL/ATN requiring HD. Pt was extubated and was transferred to [facility] for esophageal stent removal. Nephrology team following for YUNIEL on CKD. Pt underwent cardiac cath on 1/4/23 and LUE AVF creation on 1/5/23. He has intractable hiccups being treated with chlorpromazine. Psychiatry was consulted as patient refused blood draws and also because of paranoia.     Patient was seen and evaluated by the patient's bedside. During evaluation patient is calm and cooperative. He is alert, oriented X3. Patient was asked about his current clinical conditions and he was able to say his current medical problems. He was asked why he refused blood draws and he mentioned, "they keep sticking me for blood". Patient is cachectic and he may be a hard stick for blood draws. He agrees to do the blood draws, "if they're gentle with me". He understands why he needs blood draws. Patient is focused on getting his pain medication, says he will comply with treatment recommendations if his pain medications are given on time. He denies any psychiatric history. He does however say that he was treated with Haldol more than 25 years ago when he was incarcerated for drug-trafficking charges. He is unable to says what psychiatric symptoms he experienced at that time to be treated with Haldol. He informs after release from long term he never followed up with a psychiatrist and that he does not need any psychiatric medications for any mental health reasons. He does talk about "telekinesis" and "psychokinesis" and how his actions and thoughts could be manipulated by others with these techniques, but these appear to be his chronic benign delusions. He has not done anything dangerous in context of these delusions. He denies any thoughts to harm others. He denies any access to firearms.     Patient denies any depressive symptoms such as persistently sad or depressed mood, accompanied with symptoms such as anhedonia, anergia, or changes in appetite or sleep. Patient denies any suicidal or homicidal ideations, intent or plans. No signs and symptoms of acute manic/hypomanic episodes. No persistent irritability or euphoria with accompanying increase in goal directed activity or increase in energy level. Denies any A/V/T hallucinations. No overwhelming worries, nervousness or restlessness. No panic symptoms. No OCD or PTSD symptoms. He shares he used to drink alcohol in the past and it has been a "long time" since he had alcoholic drinks. He denies any illicit drug use.

## 2023-01-25 NOTE — PROGRESS NOTES
Bedside and Verbal shift change report given to Sebastian Cheung RN (oncoming nurse) by OTILIO Barillas (offgoing nurse). Report included the following information SBAR, Kardex, Accordion, Recent Results and Cardiac Rhythm NSR. Pt doing bowel prep for colonoscopy 9/28. BSC placed bedside. 2045:  Pt moved from 320 to 321. Bedside and Verbal shift change report given to OTILIO Cardoza (oncoming nurse) by Sebastian Cheung RN (offgoing nurse). Report included the following information SBAR, Kardex, Recent Results and Cardiac Rhythm A Fib. ROSAMARIA
